# Patient Record
Sex: MALE | Race: WHITE | Employment: OTHER | URBAN - METROPOLITAN AREA
[De-identification: names, ages, dates, MRNs, and addresses within clinical notes are randomized per-mention and may not be internally consistent; named-entity substitution may affect disease eponyms.]

---

## 2017-05-03 ENCOUNTER — HOSPITAL ENCOUNTER (OUTPATIENT)
Dept: CARDIAC REHAB | Age: 67
Discharge: HOME | End: 2017-05-03
Attending: INTERNAL MEDICINE
Payer: MEDICARE

## 2017-05-03 VITALS — HEIGHT: 70 IN | BODY MASS INDEX: 22.33 KG/M2 | WEIGHT: 156 LBS

## 2017-05-03 DIAGNOSIS — J44.9 CHRONIC OBSTRUCTIVE PULMONARY DISEASE, UNSPECIFIED COPD TYPE (HCC): ICD-10-CM

## 2017-05-03 PROCEDURE — 93798 PHYS/QHP OP CAR RHAB W/ECG: CPT

## 2017-05-03 PROCEDURE — G0424 PULMONARY REHAB W EXER: HCPCS

## 2017-05-03 RX ORDER — FISH OIL/DHA/EPA 1200-144MG
1200 CAPSULE ORAL
COMMUNITY

## 2017-05-03 RX ORDER — CITALOPRAM 10 MG/1
TABLET ORAL DAILY
COMMUNITY

## 2017-05-03 RX ORDER — MELOXICAM 7.5 MG/1
TABLET ORAL
COMMUNITY

## 2017-05-03 RX ORDER — ALBUTEROL SULFATE 90 UG/1
AEROSOL, METERED RESPIRATORY (INHALATION)
COMMUNITY

## 2017-05-03 NOTE — CARDIO/PULMONARY
Pulmonary Rehab Orientation:     Patient is a 73year old male patient referred to us by Dr. Janice Clark  due to diagnosis of COPD. He lives alone, lost his wife 2 1/2 years ago to cancer. He is retired worked as a  for 20 years. His hobby is playing golf. Patient's prior medical history consists of :      Hepatitis C Screening  1950       DTaP/Tdap/Td series (1 - Tdap)  12/3/1972       FOBT Q 1 YEAR AGE 50-75  12/3/2001       ZOSTER VACCINE AGE 60>  12/3/2011       GLAUCOMA SCREENING Q2Y  12/3/2016       Pneumococcal 65+ Low/Medium Risk (1 of 2 - PCV13)  12/3/2016       MEDICARE YEARLY EXAM  12/3/2016        INFLUENZA AGE 9 TO ADULT  8/1/2017         He continues to smoke 5 cigarettes a day, but he is trying to quit, no set date yet. Lungs clear,no edema noted and no cough. Immunization and allergies were reviewed and noted to be up to date. Exercise includes walking around the block at home along with walking when he plays golf. Patient limitations are none, states he was very active before his diagnosis of COPD. Patient given overview of Pulmonary Rehab program, placement of electrodes/leads and rationale for program.     VS were as follows:   Height 5'10\"  Weight 156 lb  BMI 22.4    Pt reported goals are:  1. To be able to play golf without shortness of breath. 2.  Build up his stamina so he can walk further. 3.  Build his muscle strength. First exercise scheduled for Friday May 5, 2017.

## 2017-05-03 NOTE — CARDIO/PULMONARY
Mr. Payal Hines presented today for 6 minute walk test.  Walk test was conducted via treadmill 6 Minute Walk Test (6MWT). Patient completed test with no stops. Distance walked: .078 miles = 126 meters = 412 feet. Patient was on room air. Lowest )2 was 90%. RPD a 1. RPE 13.     Resting HR 70  Resting /96  Resting O2 94    Peak HR 82 (minute 6)  Peak /92    Immediately After HR 70  Immediately After /92  Immediately After O2 0    5min after HR 64  5min after /92  5min after O2 95

## 2017-05-03 NOTE — CARDIO/PULMONARY
Mr. Kimmy Mondragon, called to let us know that his PFT's are going to be done on Monday May 8,2017.

## 2017-05-05 ENCOUNTER — HOSPITAL ENCOUNTER (OUTPATIENT)
Dept: CARDIAC REHAB | Age: 67
Discharge: HOME | End: 2017-05-05
Attending: INTERNAL MEDICINE
Payer: MEDICARE

## 2017-05-05 VITALS — BODY MASS INDEX: 22.81 KG/M2 | WEIGHT: 159 LBS

## 2017-05-05 DIAGNOSIS — J44.9 CHRONIC OBSTRUCTIVE PULMONARY DISEASE, UNSPECIFIED COPD TYPE (HCC): ICD-10-CM

## 2017-05-05 PROCEDURE — G0424 PULMONARY REHAB W EXER: HCPCS

## 2017-05-05 NOTE — CARDIO/PULMONARY
Cardio Pulmonary Rehab: Pt presented today for first exercise session. Exercise Specialist created individualized aerobic exercise regimen. ES reviewed with pt exercise safety and equipment use, RPE chart, Pulse check, target HR, and warm up/cool down. Reviewed with pt if S/S or other unusual symptoms should occur to notify staff. Mr. Nima Feliz successfully completed his first exercise session. Patient was able to tolerate the treadmill for 5 minutes, Airdyne for 5 minutes, and the Nustep for 5 minutes. Patient had no complaints of chest pain or SOB. Peak METS 1.8 (treadmill)  Peak HR 84 (treadmill)  Duration: 25 minutes.

## 2017-05-09 ENCOUNTER — HOSPITAL ENCOUNTER (OUTPATIENT)
Dept: CARDIAC REHAB | Age: 67
Discharge: HOME | End: 2017-05-09
Attending: INTERNAL MEDICINE
Payer: MEDICARE

## 2017-05-09 VITALS — BODY MASS INDEX: 23.27 KG/M2 | WEIGHT: 162.2 LBS

## 2017-05-09 DIAGNOSIS — J44.9 CHRONIC OBSTRUCTIVE PULMONARY DISEASE, UNSPECIFIED COPD TYPE (HCC): ICD-10-CM

## 2017-05-09 PROCEDURE — G0424 PULMONARY REHAB W EXER: HCPCS

## 2017-05-09 NOTE — CARDIO/PULMONARY
S: pt stated that his breathing was a #3 today, moderate. Pt is still smoking 2-5 cigarettes per day, pt is using the nicotine gum    O: B/P 136/82, HR 82, SAO2 90% on room air and decreased to 85% with exercise, but rebound with rest and PLB. BS-clear, NPC, no edema noted    A: pt is making good progress and is using PLB, mild to moderate CHACON and fatigue noted    P: pt to be absent Thursday, pt to return next Tuesday. Pt to continue to walk at home and to use hand weights. Pt to become smoke free and to use a Heart Healthy Diet. Pt to use information from class on Energy Conservation.

## 2017-05-16 ENCOUNTER — HOSPITAL ENCOUNTER (OUTPATIENT)
Dept: CARDIAC REHAB | Age: 67
Discharge: HOME | End: 2017-05-16
Attending: INTERNAL MEDICINE
Payer: MEDICARE

## 2017-05-16 VITALS — BODY MASS INDEX: 22.87 KG/M2 | WEIGHT: 159.4 LBS

## 2017-05-16 PROCEDURE — G0424 PULMONARY REHAB W EXER: HCPCS

## 2017-05-16 NOTE — CARDIO/PULMONARY
S: pt stated that his breathing was a #3 today, moderate. Pt is still smoking 2-5 cigarettes per day, pt is using the nicotine gum     O: B/P 152/93, HR 75, SAO2 92% on room air and decreased to 92% with exercise, BS-clear, NPC, no edema noted     A: pt is making good progress and is using PLB, mild to moderate CHACON and fatigue noted     P: pt to return Thursday. Pt to continue to walk at home and to use hand weights. Pt to become smoke free and to use a Heart Healthy Diet.  Pt to use information from class on Sleep Disorders, how to get a better night sleep.

## 2017-05-18 ENCOUNTER — HOSPITAL ENCOUNTER (OUTPATIENT)
Dept: CARDIAC REHAB | Age: 67
Discharge: HOME | End: 2017-05-18
Attending: INTERNAL MEDICINE
Payer: MEDICARE

## 2017-05-18 VITALS — WEIGHT: 158 LBS | BODY MASS INDEX: 22.67 KG/M2

## 2017-05-18 PROCEDURE — G0424 PULMONARY REHAB W EXER: HCPCS

## 2017-05-18 NOTE — CARDIO/PULMONARY
S: pt stated that his breathing was a #2 today,slight. Pt is still smoking 2-5 cigarettes per day, pt is using the nicotine gum      O: B/P 125/79, HR 84, SAO2 92% on room air and decreased to 90% with exercise, BS-clear, NPC, no edema noted      A: pt is making good progress and is using PLB, mild to moderate CHACON and fatigue noted      P: pt to return Tuesday. Pt to continue to walk at home and to use hand weights. Pt to become smoke free and to use a Heart Healthy Diet.      S/P: pt has good family/friends support and enjoys golf.

## 2017-05-23 ENCOUNTER — HOSPITAL ENCOUNTER (OUTPATIENT)
Dept: CARDIAC REHAB | Age: 67
Discharge: HOME | End: 2017-05-23
Attending: INTERNAL MEDICINE
Payer: MEDICARE

## 2017-05-23 VITALS — WEIGHT: 160.8 LBS | BODY MASS INDEX: 23.07 KG/M2

## 2017-05-23 PROCEDURE — G0424 PULMONARY REHAB W EXER: HCPCS

## 2017-05-23 NOTE — CARDIO/PULMONARY
S: pt stated that his breathing was a #2 today,slight. Pt is still smoking 2-5 cigarettes per day, pt is using the nicotine gum      O: B/P 153/95, HR 89, SAO2 92% on room air and decreased to 91% with exercise, BS-clear, NPC, no edema noted.      A: pt is making good progress and is using PLB, mild to moderate CHACON and fatigue noted      P: pt to return Thursday. Pt to continue to walk at home and to use hand weights. Pt to become smoke free and to use a Heart Healthy Diet. Reviewed Preventing Lung Infection with pt.  Note to be fax to MD concerning PVC's.     S/P: pt has good family/friends support and enjoys golf.   Mark Guzman

## 2017-05-25 ENCOUNTER — HOSPITAL ENCOUNTER (OUTPATIENT)
Dept: CARDIAC REHAB | Age: 67
Discharge: HOME | End: 2017-05-25
Attending: INTERNAL MEDICINE
Payer: MEDICARE

## 2017-05-25 VITALS — BODY MASS INDEX: 22.79 KG/M2 | WEIGHT: 158.8 LBS

## 2017-05-25 PROCEDURE — G0424 PULMONARY REHAB W EXER: HCPCS

## 2017-05-25 NOTE — CARDIO/PULMONARY
S: pt stated that his breathing was a #2 today,slight. Pt is still smoking 2-5 cigarettes per day, pt is using the nicotine gum      O: B/P 135/82, HR 80, SAO2 95% on room air and decreased to 91% with exercise, BS-clear, NPC, no edema noted.      A: pt is making good progress and is using PLB, mild to moderate CHACON and fatigue noted      P: pt to return Tuesday. Pt to continue to walk at home and to use hand weights.  Pt to become smoke free and to use a Heart Healthy Diet.       S/P: pt has good family/friends support and enjoys golf.

## 2017-05-30 ENCOUNTER — HOSPITAL ENCOUNTER (OUTPATIENT)
Dept: CARDIAC REHAB | Age: 67
Discharge: HOME | End: 2017-05-30
Attending: INTERNAL MEDICINE
Payer: MEDICARE

## 2017-05-30 VITALS — WEIGHT: 159.4 LBS | BODY MASS INDEX: 22.87 KG/M2

## 2017-05-30 PROCEDURE — G0424 PULMONARY REHAB W EXER: HCPCS

## 2017-05-30 NOTE — CARDIO/PULMONARY
S: pt stated that his breathing was a #2 today,slight. Pt is still smoking 2-5 cigarettes per day, pt is using the nicotine gum      O: B/P 134/80, HR 79, SAO2 92% on room air and decreased to 91% with exercise, BS CTA, no edema, reports productive cough with clear sputum      A: pt is making good progress and is using PLB, mild to moderate CHACON and fatigue noted      P: pt to return Thursday. Pt to continue to walk at home and to use hand weights. Pt to become smoke free and to use a Heart Healthy Diet.       S/P: pt has good family/friends support and enjoys golf. Reports having a good holiday weekend resting and watching TV.

## 2017-06-01 ENCOUNTER — HOSPITAL ENCOUNTER (OUTPATIENT)
Dept: CARDIAC REHAB | Age: 67
Discharge: HOME | End: 2017-06-01
Attending: INTERNAL MEDICINE
Payer: MEDICARE

## 2017-06-01 VITALS — WEIGHT: 158.4 LBS | BODY MASS INDEX: 22.73 KG/M2

## 2017-06-01 PROCEDURE — G0424 PULMONARY REHAB W EXER: HCPCS

## 2017-06-01 NOTE — CARDIO/PULMONARY
S: pt stated that his breathing was a #2 today,slight. Pt is still smoking 2-5 cigarettes per day, pt is using the nicotine gum      O: B/P 138/85, HR 76, SAO2 92% on room air and decreased to 91% with exercise, BS-clear, prod cough of a small amount of clear, no edema noted. TM increased to 1.6 mph      A: pt is making good progress and is using PLB, mild to moderate CHACON and fatigue noted      P: pt to return Tuesday. Pt to continue to walk at home and to use hand weights. Pt to become smoke free and to use a Heart Healthy Diet. Reviewed Nutrition with pt.       S/P: pt has good family/friends support and enjoys golf.

## 2017-06-06 ENCOUNTER — HOSPITAL ENCOUNTER (OUTPATIENT)
Dept: CARDIAC REHAB | Age: 67
Discharge: HOME | End: 2017-06-06
Attending: INTERNAL MEDICINE
Payer: MEDICARE

## 2017-06-06 VITALS — BODY MASS INDEX: 23.22 KG/M2 | WEIGHT: 161.8 LBS

## 2017-06-06 PROCEDURE — G0424 PULMONARY REHAB W EXER: HCPCS

## 2017-06-06 NOTE — CARDIO/PULMONARY
S: pt stated that his breathing was a #2 today,slight. Pt is still smoking 2-5 cigarettes per day, pt is using the nicotine gum      O: B/P 148/84, HR 71, SAO2 94% on room air and decreased to 92% with exercise, BS-clear, non prod cough , no edema noted.      A: pt is making good progress and is using PLB, mild to moderate CHACON and fatigue noted      P: pt to return Thursday. Pt to continue to walk at home and to use hand weights. Pt to become smoke free and to use a Heart Healthy Diet. COPD medications reviewed by the Pharm.       S/P: pt has good family/friends support and enjoys golf.

## 2017-06-08 ENCOUNTER — HOSPITAL ENCOUNTER (OUTPATIENT)
Dept: CARDIAC REHAB | Age: 67
Discharge: HOME | End: 2017-06-08
Attending: INTERNAL MEDICINE
Payer: MEDICARE

## 2017-06-08 VITALS — BODY MASS INDEX: 23.1 KG/M2 | WEIGHT: 161 LBS

## 2017-06-08 PROCEDURE — G0424 PULMONARY REHAB W EXER: HCPCS

## 2017-06-08 NOTE — CARDIO/PULMONARY
S: pt stated that his breathing was a #2 today,slight. Pt is still smoking 2-3 cigarettes per day, pt is using the nicotine gum      O: B/P 142/86, HR 86, SAO2 94% on room air and decreased to 92% with exercise, BS-clear, non prod cough , no edema noted.      A: pt is making good progress and is using PLB, mild to moderate CHACON and fatigue noted      P: pt to return Tuesday. Pt to continue to walk at home and to use hand weights. Pt to become smoke free and to use a Heart Healthy Diet. COPD medications reviewed by the Pharm.       S/P: pt has good family/friends support and enjoys golf. Patient reports is stamina is improving stating he lives in a trilevel, is up and down stairs all the time and believes he can tolerate it more. Patient states his \"state of mind\" about his health is improving.

## 2017-06-13 ENCOUNTER — HOSPITAL ENCOUNTER (OUTPATIENT)
Dept: CARDIAC REHAB | Age: 67
Discharge: HOME | End: 2017-06-13
Attending: INTERNAL MEDICINE
Payer: MEDICARE

## 2017-06-15 ENCOUNTER — HOSPITAL ENCOUNTER (OUTPATIENT)
Dept: CARDIAC REHAB | Age: 67
Discharge: HOME | End: 2017-06-15
Attending: INTERNAL MEDICINE
Payer: MEDICARE

## 2017-06-15 VITALS — WEIGHT: 157 LBS | BODY MASS INDEX: 22.53 KG/M2

## 2017-06-15 PROCEDURE — G0424 PULMONARY REHAB W EXER: HCPCS

## 2017-06-15 NOTE — CARDIO/PULMONARY
: pt stated that his breathing was a #2 today,slight. Pt is still smoking 2-5 cigarettes per day, pt is using the nicotine gum      O: B/P 121/77, HR 92, SAO2 91% on room air and decreased to 92% with exercise, BS-clear, non prod cough , no edema noted.      A: pt is making good progress and is using PLB, mild to moderate CHACON and fatigue noted      P: pt to return Tuesday at 10:30. Pt to continue to walk at home and to use hand weights.  Pt to become smoke free and to use a Heart Healthy Diet.        S/P: pt has good family/friends support and enjoys golf.

## 2017-06-20 ENCOUNTER — HOSPITAL ENCOUNTER (OUTPATIENT)
Dept: CARDIAC REHAB | Age: 67
Discharge: HOME | End: 2017-06-20
Attending: INTERNAL MEDICINE
Payer: MEDICARE

## 2017-06-22 ENCOUNTER — HOSPITAL ENCOUNTER (OUTPATIENT)
Dept: CARDIAC REHAB | Age: 67
Discharge: HOME | End: 2017-06-22
Attending: INTERNAL MEDICINE
Payer: MEDICARE

## 2017-06-27 ENCOUNTER — HOSPITAL ENCOUNTER (OUTPATIENT)
Dept: CARDIAC REHAB | Age: 67
Discharge: HOME | End: 2017-06-27
Attending: INTERNAL MEDICINE
Payer: MEDICARE

## 2017-06-27 VITALS — BODY MASS INDEX: 22.67 KG/M2 | WEIGHT: 158 LBS

## 2017-06-27 PROCEDURE — G0424 PULMONARY REHAB W EXER: HCPCS

## 2017-06-27 NOTE — CARDIO/PULMONARY
:S: pt stated that his breathing was a #2 today,slight. Pt is still smoking 2-5 cigarettes per day, pt is using the nicotine gum      O: B/P 145/93, HR 84, SAO2 91% on room air and decreased to 92% with exercise, BS-clear, but decreased, non prod cough , no edema noted.      A: pt is making good progress and is using PLB, mild to moderate CHACON and fatigue noted      P: pt to return Thursday. Pt to continue to walk at home and to use hand weights.  Pt to become smoke free and to use a Heart Healthy Diet.        S/P: pt has good family/friends support and enjoys golf.

## 2017-06-29 ENCOUNTER — HOSPITAL ENCOUNTER (OUTPATIENT)
Dept: CARDIAC REHAB | Age: 67
Discharge: HOME | End: 2017-06-29
Attending: INTERNAL MEDICINE
Payer: MEDICARE

## 2017-06-29 VITALS — BODY MASS INDEX: 22.81 KG/M2 | WEIGHT: 159 LBS

## 2017-06-29 PROCEDURE — G0424 PULMONARY REHAB W EXER: HCPCS

## 2017-06-29 NOTE — CARDIO/PULMONARY
:S: pt stated that his breathing was a #2 today,slight. Pt is still smoking 2-5 cigarettes per day, pt is using the nicotine gum      O: B/P 140/78, HR 80, SAO2 91% on room air and decreased to 91% with exercise, BS-clear, but decreased, non prod cough , no edema noted.      A: pt is making good progress and is using PLB, mild to moderate CHACON and fatigue noted      P: pt to return next Thursday, rehab will be closed Tuesday. Pt to continue to walk at home and to use hand weights. Pt to become smoke free and to use a Heart Healthy Diet.        S/P: pt has good family/friends support and enjoys golf.

## 2017-07-06 ENCOUNTER — HOSPITAL ENCOUNTER (OUTPATIENT)
Dept: CARDIAC REHAB | Age: 67
Discharge: HOME | End: 2017-07-06
Attending: INTERNAL MEDICINE
Payer: MEDICARE

## 2017-07-06 VITALS — BODY MASS INDEX: 22.96 KG/M2 | WEIGHT: 160 LBS

## 2017-07-06 PROCEDURE — G0424 PULMONARY REHAB W EXER: HCPCS

## 2017-07-06 NOTE — CARDIO/PULMONARY
S: pt stated that his breathing was a #2 today,slight. Pt is still smoking 2-5 cigarettes per day, pt is using the nicotine gum      O: B/P 150/84, HR 80, SAO2 93% on room air and decreased to 91% with exercise, BS-clear, but decreased, reports ,mild prod cough brown sputum, no edema noted.      A: pt is making good progress and is using PLB, mild to moderate CHACON and fatigue noted      P: pt to return next Tuesday,  Pt to continue to walk at home and to use hand weights. Pt to become smoke free and to use a Heart Healthy Diet.        S/P: pt has good family/friends support and enjoys golf. Advised to keep himself hydrated during high heat and humidity weather.

## 2017-07-11 ENCOUNTER — HOSPITAL ENCOUNTER (OUTPATIENT)
Dept: CARDIAC REHAB | Age: 67
Discharge: HOME | End: 2017-07-11
Attending: INTERNAL MEDICINE
Payer: MEDICARE

## 2017-07-11 VITALS — WEIGHT: 158 LBS | BODY MASS INDEX: 22.67 KG/M2

## 2017-07-11 PROCEDURE — G0424 PULMONARY REHAB W EXER: HCPCS

## 2017-07-11 NOTE — CARDIO/PULMONARY
Patient comes in for visit today. Stress level reported at a 2-3. Lungs clear. No ankle edema. Reports slightly congested cough in the mornings. Walks 3-4 x wk. Eating heart healthy. Friends provide support.

## 2017-07-13 ENCOUNTER — HOSPITAL ENCOUNTER (OUTPATIENT)
Dept: CARDIAC REHAB | Age: 67
Discharge: HOME | End: 2017-07-13
Attending: INTERNAL MEDICINE
Payer: MEDICARE

## 2017-07-13 VITALS — BODY MASS INDEX: 22.53 KG/M2 | WEIGHT: 157 LBS

## 2017-07-13 PROCEDURE — G0424 PULMONARY REHAB W EXER: HCPCS

## 2017-07-13 NOTE — CARDIO/PULMONARY
No changes, o2 sats 91%ra,does not use oxygen,lungs clear,clear  Mucus with cough occas no pedal edema-works out independently.  To become smoke free and follow Vince Frye at home and uses hand weights

## 2017-07-18 ENCOUNTER — HOSPITAL ENCOUNTER (OUTPATIENT)
Dept: CARDIAC REHAB | Age: 67
Discharge: HOME | End: 2017-07-18
Attending: INTERNAL MEDICINE
Payer: MEDICARE

## 2017-07-18 VITALS — WEIGHT: 159 LBS | BODY MASS INDEX: 22.81 KG/M2

## 2017-07-18 PROCEDURE — G0424 PULMONARY REHAB W EXER: HCPCS

## 2017-07-18 NOTE — CARDIO/PULMONARY
S: pt stated that his breathing was a #2 today, slight. Pt is still smoking 2-5 cigarettes per day, pt is using the nicotine gum      O: B/P 125/69, HR 82, SAO2 91% on room air reports , mild prod cough brown sputum, no edema noted.      A: pt is making good progress and is using PLB, mild to moderate CHACON and fatigue noted      P: pt to return next Thursday,  Pt to continue to walk at home and to use hand weights. Pt to become smoke free and to use a Heart Healthy Diet.        S/P: pt has good family/friends support and enjoys golf.

## 2017-07-20 ENCOUNTER — APPOINTMENT (OUTPATIENT)
Dept: CARDIAC REHAB | Age: 67
End: 2017-07-20
Attending: INTERNAL MEDICINE
Payer: MEDICARE

## 2017-07-25 ENCOUNTER — HOSPITAL ENCOUNTER (OUTPATIENT)
Dept: CARDIAC REHAB | Age: 67
Discharge: HOME | End: 2017-07-25
Attending: INTERNAL MEDICINE
Payer: MEDICARE

## 2017-07-25 VITALS — BODY MASS INDEX: 22.67 KG/M2 | WEIGHT: 158 LBS

## 2017-07-25 PROCEDURE — G0424 PULMONARY REHAB W EXER: HCPCS

## 2017-07-27 ENCOUNTER — HOSPITAL ENCOUNTER (OUTPATIENT)
Dept: CARDIAC REHAB | Age: 67
Discharge: HOME | End: 2017-07-27
Attending: INTERNAL MEDICINE
Payer: MEDICARE

## 2017-07-27 VITALS — BODY MASS INDEX: 22.81 KG/M2 | WEIGHT: 159 LBS

## 2017-07-27 PROCEDURE — G0424 PULMONARY REHAB W EXER: HCPCS

## 2017-08-01 ENCOUNTER — HOSPITAL ENCOUNTER (OUTPATIENT)
Dept: CARDIAC REHAB | Age: 67
Discharge: HOME | End: 2017-08-01
Attending: INTERNAL MEDICINE
Payer: MEDICARE

## 2017-08-03 ENCOUNTER — HOSPITAL ENCOUNTER (OUTPATIENT)
Dept: CARDIAC REHAB | Age: 67
Discharge: HOME | End: 2017-08-03
Attending: INTERNAL MEDICINE
Payer: MEDICARE

## 2017-08-03 VITALS — WEIGHT: 162 LBS | BODY MASS INDEX: 23.24 KG/M2

## 2017-08-03 PROCEDURE — G0424 PULMONARY REHAB W EXER: HCPCS

## 2017-08-08 ENCOUNTER — HOSPITAL ENCOUNTER (OUTPATIENT)
Dept: CARDIAC REHAB | Age: 67
Discharge: HOME | End: 2017-08-08
Attending: INTERNAL MEDICINE
Payer: MEDICARE

## 2017-08-08 PROCEDURE — G0424 PULMONARY REHAB W EXER: HCPCS

## 2017-08-10 ENCOUNTER — HOSPITAL ENCOUNTER (OUTPATIENT)
Dept: CARDIAC REHAB | Age: 67
Discharge: HOME | End: 2017-08-10
Attending: INTERNAL MEDICINE
Payer: MEDICARE

## 2017-08-10 VITALS — WEIGHT: 158.6 LBS | BODY MASS INDEX: 22.76 KG/M2

## 2017-08-10 PROCEDURE — G0424 PULMONARY REHAB W EXER: HCPCS

## 2017-08-15 ENCOUNTER — HOSPITAL ENCOUNTER (OUTPATIENT)
Dept: CARDIAC REHAB | Age: 67
Discharge: HOME | End: 2017-08-15
Attending: INTERNAL MEDICINE
Payer: MEDICARE

## 2017-08-15 PROCEDURE — G0424 PULMONARY REHAB W EXER: HCPCS

## 2017-08-17 ENCOUNTER — HOSPITAL ENCOUNTER (OUTPATIENT)
Dept: CARDIAC REHAB | Age: 67
Discharge: HOME | End: 2017-08-17
Attending: INTERNAL MEDICINE
Payer: MEDICARE

## 2017-08-17 VITALS — BODY MASS INDEX: 22.81 KG/M2 | WEIGHT: 159 LBS

## 2017-08-17 PROCEDURE — G0424 PULMONARY REHAB W EXER: HCPCS

## 2017-08-22 ENCOUNTER — APPOINTMENT (OUTPATIENT)
Dept: CARDIAC REHAB | Age: 67
End: 2017-08-22
Attending: INTERNAL MEDICINE
Payer: MEDICARE

## 2017-08-31 ENCOUNTER — HOSPITAL ENCOUNTER (OUTPATIENT)
Dept: CARDIAC REHAB | Age: 67
Discharge: HOME | End: 2017-08-31
Attending: INTERNAL MEDICINE
Payer: MEDICARE

## 2017-08-31 ENCOUNTER — APPOINTMENT (OUTPATIENT)
Dept: CARDIAC REHAB | Age: 67
End: 2017-08-31
Attending: INTERNAL MEDICINE
Payer: MEDICARE

## 2017-08-31 VITALS — BODY MASS INDEX: 22.81 KG/M2 | WEIGHT: 159 LBS

## 2017-08-31 PROCEDURE — G0424 PULMONARY REHAB W EXER: HCPCS

## 2017-09-05 ENCOUNTER — HOSPITAL ENCOUNTER (OUTPATIENT)
Dept: CARDIAC REHAB | Age: 67
Discharge: HOME | End: 2017-09-05
Attending: INTERNAL MEDICINE
Payer: MEDICARE

## 2017-09-05 VITALS — WEIGHT: 158 LBS | BODY MASS INDEX: 22.67 KG/M2

## 2017-09-05 PROCEDURE — G0424 PULMONARY REHAB W EXER: HCPCS

## 2017-09-07 ENCOUNTER — HOSPITAL ENCOUNTER (OUTPATIENT)
Dept: CARDIAC REHAB | Age: 67
Discharge: HOME | End: 2017-09-07
Attending: INTERNAL MEDICINE
Payer: MEDICARE

## 2017-09-07 VITALS — WEIGHT: 159.4 LBS | BODY MASS INDEX: 22.87 KG/M2

## 2017-09-07 PROCEDURE — G0424 PULMONARY REHAB W EXER: HCPCS

## 2017-09-14 ENCOUNTER — HOSPITAL ENCOUNTER (OUTPATIENT)
Dept: CARDIAC REHAB | Age: 67
Discharge: HOME | End: 2017-09-14
Attending: INTERNAL MEDICINE
Payer: MEDICARE

## 2017-09-14 VITALS — BODY MASS INDEX: 22.96 KG/M2 | WEIGHT: 160 LBS

## 2017-09-14 PROCEDURE — G0424 PULMONARY REHAB W EXER: HCPCS

## 2017-09-19 ENCOUNTER — HOSPITAL ENCOUNTER (OUTPATIENT)
Dept: CARDIAC REHAB | Age: 67
Discharge: HOME | End: 2017-09-19
Attending: INTERNAL MEDICINE
Payer: MEDICARE

## 2017-09-19 VITALS — BODY MASS INDEX: 22.99 KG/M2 | WEIGHT: 160.2 LBS

## 2017-09-19 PROCEDURE — G0424 PULMONARY REHAB W EXER: HCPCS

## 2017-09-21 ENCOUNTER — HOSPITAL ENCOUNTER (OUTPATIENT)
Dept: CARDIAC REHAB | Age: 67
Discharge: HOME | End: 2017-09-21
Attending: INTERNAL MEDICINE
Payer: MEDICARE

## 2017-09-21 VITALS — WEIGHT: 160 LBS | BODY MASS INDEX: 22.96 KG/M2

## 2017-09-21 PROCEDURE — G0424 PULMONARY REHAB W EXER: HCPCS

## 2017-09-22 ENCOUNTER — HOSPITAL ENCOUNTER (OUTPATIENT)
Dept: GENERAL RADIOLOGY | Age: 67
Discharge: HOME | End: 2017-09-22
Payer: MEDICARE

## 2017-09-22 DIAGNOSIS — J44.9 COPD (CHRONIC OBSTRUCTIVE PULMONARY DISEASE) (HCC): ICD-10-CM

## 2017-09-22 PROCEDURE — 71020 XR CHEST PA LAT: CPT

## 2017-09-26 ENCOUNTER — HOSPITAL ENCOUNTER (OUTPATIENT)
Dept: CARDIAC REHAB | Age: 67
Discharge: HOME | End: 2017-09-26
Attending: INTERNAL MEDICINE
Payer: MEDICARE

## 2017-09-26 VITALS — WEIGHT: 160.4 LBS | BODY MASS INDEX: 23.02 KG/M2

## 2017-09-26 PROCEDURE — G0424 PULMONARY REHAB W EXER: HCPCS

## 2017-09-28 ENCOUNTER — HOSPITAL ENCOUNTER (OUTPATIENT)
Dept: CARDIAC REHAB | Age: 67
Discharge: HOME | End: 2017-09-28
Attending: INTERNAL MEDICINE
Payer: MEDICARE

## 2017-09-28 VITALS — WEIGHT: 159 LBS | BODY MASS INDEX: 22.81 KG/M2

## 2017-09-28 PROCEDURE — G0424 PULMONARY REHAB W EXER: HCPCS

## 2017-10-31 ENCOUNTER — APPOINTMENT (OUTPATIENT)
Dept: CARDIAC REHAB | Age: 67
End: 2017-10-31
Attending: INTERNAL MEDICINE

## 2017-11-07 ENCOUNTER — APPOINTMENT (OUTPATIENT)
Dept: CARDIAC REHAB | Age: 67
End: 2017-11-07
Attending: INTERNAL MEDICINE

## 2019-04-08 ENCOUNTER — HOSPITAL ENCOUNTER (OUTPATIENT)
Dept: CT IMAGING | Age: 69
Discharge: HOME | End: 2019-04-08
Attending: NURSE PRACTITIONER
Payer: MEDICARE

## 2019-04-08 DIAGNOSIS — Z87.891 PERSONAL HISTORY OF NICOTINE DEPENDENCE: ICD-10-CM

## 2019-04-08 PROCEDURE — G0297 LDCT FOR LUNG CA SCREEN: HCPCS

## 2020-11-04 ENCOUNTER — TRANSCRIBE ORDER (OUTPATIENT)
Dept: SCHEDULING | Age: 70
End: 2020-11-04

## 2020-11-04 DIAGNOSIS — Z87.891 PERSONAL HISTORY OF NICOTINE DEPENDENCE: Primary | ICD-10-CM

## 2020-12-03 ENCOUNTER — TRANSCRIBE ORDER (OUTPATIENT)
Dept: SCHEDULING | Age: 70
End: 2020-12-03

## 2020-12-03 DIAGNOSIS — Z87.891 PERSONAL HISTORY OF TOBACCO USE, PRESENTING HAZARDS TO HEALTH: Primary | ICD-10-CM

## 2021-04-27 ENCOUNTER — HOSPITAL ENCOUNTER (OUTPATIENT)
Dept: CT IMAGING | Age: 71
Discharge: HOME | End: 2021-04-27
Attending: INTERNAL MEDICINE
Payer: MEDICARE

## 2021-04-27 DIAGNOSIS — Z87.891 PERSONAL HISTORY OF TOBACCO USE, PRESENTING HAZARDS TO HEALTH: ICD-10-CM

## 2021-04-27 PROCEDURE — 71271 CT THORAX LUNG CANCER SCR C-: CPT

## 2021-05-06 ENCOUNTER — TRANSCRIBE ORDER (OUTPATIENT)
Dept: SCHEDULING | Age: 71
End: 2021-05-06

## 2021-05-06 DIAGNOSIS — R91.1 SOLITARY PULMONARY NODULE: Primary | ICD-10-CM

## 2021-05-21 ENCOUNTER — HOSPITAL ENCOUNTER (OUTPATIENT)
Dept: PET IMAGING | Age: 71
Discharge: HOME | End: 2021-05-21
Attending: INTERNAL MEDICINE
Payer: MEDICARE

## 2021-05-21 VITALS — HEIGHT: 69 IN | WEIGHT: 153 LBS | BODY MASS INDEX: 22.66 KG/M2

## 2021-05-21 DIAGNOSIS — R91.1 SOLITARY PULMONARY NODULE: ICD-10-CM

## 2021-05-21 LAB
GLUCOSE BLD STRIP.AUTO-MCNC: 93 MG/DL (ref 65–117)
SERVICE CMNT-IMP: NORMAL

## 2021-05-21 PROCEDURE — 78815 PET IMAGE W/CT SKULL-THIGH: CPT

## 2021-05-21 RX ORDER — SODIUM CHLORIDE 0.9 % (FLUSH) 0.9 %
10 SYRINGE (ML) INJECTION
Status: COMPLETED | OUTPATIENT
Start: 2021-05-21 | End: 2021-05-21

## 2021-05-21 RX ADMIN — Medication 10 ML: at 13:25

## 2023-01-13 ENCOUNTER — TRANSCRIBE ORDER (OUTPATIENT)
Dept: SCHEDULING | Age: 73
End: 2023-01-13

## 2023-01-13 DIAGNOSIS — Z87.891 PERSONAL HISTORY OF NICOTINE DEPENDENCE: Primary | ICD-10-CM

## 2023-01-16 ENCOUNTER — TRANSCRIBE ORDER (OUTPATIENT)
Dept: SCHEDULING | Age: 73
End: 2023-01-16

## 2023-01-16 DIAGNOSIS — Z87.891 PERSONAL HISTORY OF TOBACCO USE, PRESENTING HAZARDS TO HEALTH: Primary | ICD-10-CM

## 2023-01-26 ENCOUNTER — HOSPITAL ENCOUNTER (OUTPATIENT)
Dept: CT IMAGING | Age: 73
Discharge: HOME | End: 2023-01-26
Attending: INTERNAL MEDICINE
Payer: MEDICARE

## 2023-01-26 DIAGNOSIS — Z87.891 PERSONAL HISTORY OF TOBACCO USE, PRESENTING HAZARDS TO HEALTH: ICD-10-CM

## 2023-01-26 PROCEDURE — 71271 CT THORAX LUNG CANCER SCR C-: CPT

## 2023-05-04 ENCOUNTER — TRANSCRIBE ORDER (OUTPATIENT)
Dept: SCHEDULING | Age: 73
End: 2023-05-04

## 2023-05-04 DIAGNOSIS — I73.9 PERIPHERAL VASCULAR DISEASE, UNSPECIFIED (HCC): Primary | ICD-10-CM

## 2023-05-13 ENCOUNTER — TRANSCRIBE ORDERS (OUTPATIENT)
Facility: HOSPITAL | Age: 73
End: 2023-05-13

## 2023-05-13 DIAGNOSIS — I73.9 PERIPHERAL VASCULAR DISEASE, UNSPECIFIED (HCC): Primary | ICD-10-CM

## 2024-02-16 ENCOUNTER — HOSPITAL ENCOUNTER (OUTPATIENT)
Facility: HOSPITAL | Age: 74
End: 2024-02-16
Attending: INTERNAL MEDICINE
Payer: MEDICARE

## 2024-02-16 DIAGNOSIS — F17.211 NICOTINE DEPENDENCE, CIGARETTES, IN REMISSION: ICD-10-CM

## 2024-02-16 DIAGNOSIS — Z87.891 PERSONAL HISTORY OF TOBACCO USE: ICD-10-CM

## 2024-02-16 DIAGNOSIS — Z87.891 PERSONAL HISTORY OF NICOTINE DEPENDENCE: ICD-10-CM

## 2024-02-16 PROCEDURE — 71271 CT THORAX LUNG CANCER SCR C-: CPT

## 2024-05-16 ENCOUNTER — APPOINTMENT (OUTPATIENT)
Facility: HOSPITAL | Age: 74
DRG: 253 | End: 2024-05-16
Payer: MEDICARE

## 2024-05-16 ENCOUNTER — HOSPITAL ENCOUNTER (INPATIENT)
Facility: HOSPITAL | Age: 74
LOS: 6 days | Discharge: HOME | DRG: 253 | End: 2024-05-22
Attending: EMERGENCY MEDICINE | Admitting: STUDENT IN AN ORGANIZED HEALTH CARE EDUCATION/TRAINING PROGRAM
Payer: MEDICARE

## 2024-05-16 DIAGNOSIS — T81.89XA NON-HEALING SURGICAL WOUND, INITIAL ENCOUNTER: ICD-10-CM

## 2024-05-16 DIAGNOSIS — T81.89XS NONHEALING SURGICAL WOUND, SEQUELA: Primary | ICD-10-CM

## 2024-05-16 LAB
ALBUMIN SERPL-MCNC: 3 G/DL (ref 3.5–5)
ALBUMIN/GLOB SERPL: 0.9 (ref 1.1–2.2)
ALP SERPL-CCNC: 122 U/L (ref 45–117)
ALT SERPL-CCNC: 12 U/L (ref 12–78)
ANION GAP SERPL CALC-SCNC: 5 MMOL/L (ref 5–15)
AST SERPL-CCNC: 16 U/L (ref 15–37)
BASOPHILS # BLD: 0.1 K/UL (ref 0–0.1)
BASOPHILS NFR BLD: 1 % (ref 0–1)
BILIRUB SERPL-MCNC: 0.6 MG/DL (ref 0.2–1)
BUN SERPL-MCNC: 24 MG/DL (ref 6–20)
BUN/CREAT SERPL: 27 (ref 12–20)
CALCIUM SERPL-MCNC: 8.4 MG/DL (ref 8.5–10.1)
CHLORIDE SERPL-SCNC: 106 MMOL/L (ref 97–108)
CO2 SERPL-SCNC: 29 MMOL/L (ref 21–32)
CREAT SERPL-MCNC: 0.9 MG/DL (ref 0.7–1.3)
DIFFERENTIAL METHOD BLD: NORMAL
EOSINOPHIL # BLD: 0.2 K/UL (ref 0–0.4)
EOSINOPHIL NFR BLD: 5 % (ref 0–7)
ERYTHROCYTE [DISTWIDTH] IN BLOOD BY AUTOMATED COUNT: 13.8 % (ref 11.5–14.5)
ERYTHROCYTE [SEDIMENTATION RATE] IN BLOOD: 12 MM/HR (ref 0–20)
GLOBULIN SER CALC-MCNC: 3.5 G/DL (ref 2–4)
GLUCOSE SERPL-MCNC: 154 MG/DL (ref 65–100)
HCT VFR BLD AUTO: 43 % (ref 36.6–50.3)
HGB BLD-MCNC: 15 G/DL (ref 12.1–17)
IMM GRANULOCYTES # BLD AUTO: 0 K/UL (ref 0–0.04)
IMM GRANULOCYTES NFR BLD AUTO: 0 % (ref 0–0.5)
LYMPHOCYTES # BLD: 0.9 K/UL (ref 0.8–3.5)
LYMPHOCYTES NFR BLD: 20 % (ref 12–49)
MCH RBC QN AUTO: 31.4 PG (ref 26–34)
MCHC RBC AUTO-ENTMCNC: 34.9 G/DL (ref 30–36.5)
MCV RBC AUTO: 90 FL (ref 80–99)
MONOCYTES # BLD: 0.6 K/UL (ref 0–1)
MONOCYTES NFR BLD: 12 % (ref 5–13)
NEUTS SEG # BLD: 2.9 K/UL (ref 1.8–8)
NEUTS SEG NFR BLD: 62 % (ref 32–75)
NRBC # BLD: 0 K/UL (ref 0–0.01)
NRBC BLD-RTO: 0 PER 100 WBC
PLATELET # BLD AUTO: 186 K/UL (ref 150–400)
PMV BLD AUTO: 10 FL (ref 8.9–12.9)
POTASSIUM SERPL-SCNC: 3.4 MMOL/L (ref 3.5–5.1)
PROT SERPL-MCNC: 6.5 G/DL (ref 6.4–8.2)
RBC # BLD AUTO: 4.78 M/UL (ref 4.1–5.7)
SODIUM SERPL-SCNC: 140 MMOL/L (ref 136–145)
WBC # BLD AUTO: 4.7 K/UL (ref 4.1–11.1)

## 2024-05-16 PROCEDURE — 85025 COMPLETE CBC W/AUTO DIFF WBC: CPT

## 2024-05-16 PROCEDURE — 87040 BLOOD CULTURE FOR BACTERIA: CPT

## 2024-05-16 PROCEDURE — 96375 TX/PRO/DX INJ NEW DRUG ADDON: CPT

## 2024-05-16 PROCEDURE — 85652 RBC SED RATE AUTOMATED: CPT

## 2024-05-16 PROCEDURE — 2580000003 HC RX 258: Performed by: STUDENT IN AN ORGANIZED HEALTH CARE EDUCATION/TRAINING PROGRAM

## 2024-05-16 PROCEDURE — 96374 THER/PROPH/DIAG INJ IV PUSH: CPT

## 2024-05-16 PROCEDURE — 1100000000 HC RM PRIVATE

## 2024-05-16 PROCEDURE — 6360000002 HC RX W HCPCS: Performed by: EMERGENCY MEDICINE

## 2024-05-16 PROCEDURE — 36415 COLL VENOUS BLD VENIPUNCTURE: CPT

## 2024-05-16 PROCEDURE — 86140 C-REACTIVE PROTEIN: CPT

## 2024-05-16 PROCEDURE — 99285 EMERGENCY DEPT VISIT HI MDM: CPT

## 2024-05-16 PROCEDURE — 2580000003 HC RX 258: Performed by: EMERGENCY MEDICINE

## 2024-05-16 PROCEDURE — 80053 COMPREHEN METABOLIC PANEL: CPT

## 2024-05-16 PROCEDURE — 2700000000 HC OXYGEN THERAPY PER DAY

## 2024-05-16 PROCEDURE — 73630 X-RAY EXAM OF FOOT: CPT

## 2024-05-16 RX ORDER — ASPIRIN 325 MG
325 TABLET ORAL DAILY
Status: DISCONTINUED | OUTPATIENT
Start: 2024-05-17 | End: 2024-05-21

## 2024-05-16 RX ORDER — SODIUM CHLORIDE 9 MG/ML
INJECTION, SOLUTION INTRAVENOUS PRN
Status: DISCONTINUED | OUTPATIENT
Start: 2024-05-16 | End: 2024-05-22 | Stop reason: HOSPADM

## 2024-05-16 RX ORDER — ALBUTEROL SULFATE 90 UG/1
1 AEROSOL, METERED RESPIRATORY (INHALATION) EVERY 6 HOURS PRN
Status: DISCONTINUED | OUTPATIENT
Start: 2024-05-16 | End: 2024-05-22 | Stop reason: HOSPADM

## 2024-05-16 RX ORDER — ONDANSETRON 2 MG/ML
4 INJECTION INTRAMUSCULAR; INTRAVENOUS EVERY 6 HOURS PRN
Status: DISCONTINUED | OUTPATIENT
Start: 2024-05-16 | End: 2024-05-22 | Stop reason: HOSPADM

## 2024-05-16 RX ORDER — SODIUM CHLORIDE 0.9 % (FLUSH) 0.9 %
5-40 SYRINGE (ML) INJECTION PRN
Status: DISCONTINUED | OUTPATIENT
Start: 2024-05-16 | End: 2024-05-22 | Stop reason: HOSPADM

## 2024-05-16 RX ORDER — ONDANSETRON 4 MG/1
4 TABLET, ORALLY DISINTEGRATING ORAL EVERY 8 HOURS PRN
Status: DISCONTINUED | OUTPATIENT
Start: 2024-05-16 | End: 2024-05-22 | Stop reason: HOSPADM

## 2024-05-16 RX ORDER — SODIUM CHLORIDE 0.9 % (FLUSH) 0.9 %
5-40 SYRINGE (ML) INJECTION EVERY 12 HOURS SCHEDULED
Status: DISCONTINUED | OUTPATIENT
Start: 2024-05-16 | End: 2024-05-22 | Stop reason: HOSPADM

## 2024-05-16 RX ORDER — ACETAMINOPHEN 650 MG/1
650 SUPPOSITORY RECTAL EVERY 6 HOURS PRN
Status: DISCONTINUED | OUTPATIENT
Start: 2024-05-16 | End: 2024-05-22 | Stop reason: HOSPADM

## 2024-05-16 RX ORDER — ENOXAPARIN SODIUM 100 MG/ML
40 INJECTION SUBCUTANEOUS DAILY
Status: DISCONTINUED | OUTPATIENT
Start: 2024-05-17 | End: 2024-05-22 | Stop reason: HOSPADM

## 2024-05-16 RX ORDER — ACETAMINOPHEN 325 MG/1
650 TABLET ORAL EVERY 6 HOURS PRN
Status: DISCONTINUED | OUTPATIENT
Start: 2024-05-16 | End: 2024-05-22 | Stop reason: HOSPADM

## 2024-05-16 RX ORDER — CITALOPRAM 20 MG/1
10 TABLET ORAL DAILY
Status: DISCONTINUED | OUTPATIENT
Start: 2024-05-17 | End: 2024-05-22 | Stop reason: HOSPADM

## 2024-05-16 RX ORDER — ARFORMOTEROL TARTRATE 15 UG/2ML
15 SOLUTION RESPIRATORY (INHALATION)
Status: DISCONTINUED | OUTPATIENT
Start: 2024-05-16 | End: 2024-05-22 | Stop reason: HOSPADM

## 2024-05-16 RX ORDER — SODIUM CHLORIDE 9 MG/ML
INJECTION, SOLUTION INTRAVENOUS CONTINUOUS
Status: ACTIVE | OUTPATIENT
Start: 2024-05-16 | End: 2024-05-18

## 2024-05-16 RX ADMIN — WATER 2000 MG: 1 INJECTION INTRAMUSCULAR; INTRAVENOUS; SUBCUTANEOUS at 19:28

## 2024-05-16 RX ADMIN — SODIUM CHLORIDE: 9 INJECTION, SOLUTION INTRAVENOUS at 22:25

## 2024-05-16 RX ADMIN — VANCOMYCIN HYDROCHLORIDE 1750 MG: 10 INJECTION, POWDER, LYOPHILIZED, FOR SOLUTION INTRAVENOUS at 19:33

## 2024-05-16 RX ADMIN — SODIUM CHLORIDE, PRESERVATIVE FREE 5 ML: 5 INJECTION INTRAVENOUS at 22:19

## 2024-05-16 ASSESSMENT — PAIN - FUNCTIONAL ASSESSMENT: PAIN_FUNCTIONAL_ASSESSMENT: 0-10

## 2024-05-16 ASSESSMENT — LIFESTYLE VARIABLES
HOW MANY STANDARD DRINKS CONTAINING ALCOHOL DO YOU HAVE ON A TYPICAL DAY: PATIENT DOES NOT DRINK
HOW OFTEN DO YOU HAVE A DRINK CONTAINING ALCOHOL: NEVER

## 2024-05-16 ASSESSMENT — PAIN DESCRIPTION - ORIENTATION
ORIENTATION: LEFT
ORIENTATION: LEFT

## 2024-05-16 ASSESSMENT — PAIN DESCRIPTION - LOCATION
LOCATION: TOE (COMMENT WHICH ONE)
LOCATION: FOOT

## 2024-05-16 ASSESSMENT — PAIN SCALES - GENERAL
PAINLEVEL_OUTOF10: 1
PAINLEVEL_OUTOF10: 10
PAINLEVEL_OUTOF10: 4
PAINLEVEL_OUTOF10: 4

## 2024-05-16 ASSESSMENT — PAIN DESCRIPTION - DESCRIPTORS
DESCRIPTORS: ACHING
DESCRIPTORS: ACHING

## 2024-05-16 NOTE — ED NOTES
Pt arrives to ED c/o gradually worsening swelling, redness, and tenderness radiating from the site of his L great toe amputation from 5 week ago. Pt states these sxs started about 3 weeks ago and have worsened since.    1835 - Dr. Turk at bedside    1923 - Bedside shift change report given to Anibal (oncoming nurse) by Arben (offgoing nurse). Report included the following information Nurse Handoff Report, ED Encounter Summary, and Recent Results.

## 2024-05-17 ENCOUNTER — APPOINTMENT (OUTPATIENT)
Facility: HOSPITAL | Age: 74
DRG: 253 | End: 2024-05-17
Attending: STUDENT IN AN ORGANIZED HEALTH CARE EDUCATION/TRAINING PROGRAM
Payer: MEDICARE

## 2024-05-17 ENCOUNTER — APPOINTMENT (OUTPATIENT)
Facility: HOSPITAL | Age: 74
DRG: 253 | End: 2024-05-17
Payer: MEDICARE

## 2024-05-17 LAB
ALBUMIN SERPL-MCNC: 3 G/DL (ref 3.5–5)
ALBUMIN/GLOB SERPL: 0.7 (ref 1.1–2.2)
ALP SERPL-CCNC: 114 U/L (ref 45–117)
ALT SERPL-CCNC: 11 U/L (ref 12–78)
ANION GAP SERPL CALC-SCNC: 4 MMOL/L (ref 5–15)
AST SERPL-CCNC: 16 U/L (ref 15–37)
BASOPHILS # BLD: 0.1 K/UL (ref 0–0.1)
BASOPHILS NFR BLD: 1 % (ref 0–1)
BILIRUB SERPL-MCNC: 0.8 MG/DL (ref 0.2–1)
BUN SERPL-MCNC: 22 MG/DL (ref 6–20)
BUN/CREAT SERPL: 27 (ref 12–20)
CALCIUM SERPL-MCNC: 8.4 MG/DL (ref 8.5–10.1)
CHLORIDE SERPL-SCNC: 108 MMOL/L (ref 97–108)
CO2 SERPL-SCNC: 29 MMOL/L (ref 21–32)
CREAT SERPL-MCNC: 0.83 MG/DL (ref 0.7–1.3)
CRP SERPL-MCNC: 2.45 MG/DL (ref 0–0.3)
DIFFERENTIAL METHOD BLD: NORMAL
EOSINOPHIL # BLD: 0.3 K/UL (ref 0–0.4)
EOSINOPHIL NFR BLD: 4 % (ref 0–7)
ERYTHROCYTE [DISTWIDTH] IN BLOOD BY AUTOMATED COUNT: 13.9 % (ref 11.5–14.5)
GLOBULIN SER CALC-MCNC: 4.1 G/DL (ref 2–4)
GLUCOSE SERPL-MCNC: 86 MG/DL (ref 65–100)
HCT VFR BLD AUTO: 43.7 % (ref 36.6–50.3)
HGB BLD-MCNC: 14.9 G/DL (ref 12.1–17)
IMM GRANULOCYTES # BLD AUTO: 0 K/UL (ref 0–0.04)
IMM GRANULOCYTES NFR BLD AUTO: 0 % (ref 0–0.5)
LYMPHOCYTES # BLD: 1.3 K/UL (ref 0.8–3.5)
LYMPHOCYTES NFR BLD: 21 % (ref 12–49)
MCH RBC QN AUTO: 30.7 PG (ref 26–34)
MCHC RBC AUTO-ENTMCNC: 34.1 G/DL (ref 30–36.5)
MCV RBC AUTO: 89.9 FL (ref 80–99)
MONOCYTES # BLD: 0.6 K/UL (ref 0–1)
MONOCYTES NFR BLD: 10 % (ref 5–13)
NEUTS SEG # BLD: 4 K/UL (ref 1.8–8)
NEUTS SEG NFR BLD: 64 % (ref 32–75)
NRBC # BLD: 0 K/UL (ref 0–0.01)
NRBC BLD-RTO: 0 PER 100 WBC
PLATELET # BLD AUTO: 184 K/UL (ref 150–400)
PMV BLD AUTO: 9.8 FL (ref 8.9–12.9)
POTASSIUM SERPL-SCNC: 3.8 MMOL/L (ref 3.5–5.1)
PROT SERPL-MCNC: 7.1 G/DL (ref 6.4–8.2)
RBC # BLD AUTO: 4.86 M/UL (ref 4.1–5.7)
SODIUM SERPL-SCNC: 141 MMOL/L (ref 136–145)
WBC # BLD AUTO: 6.2 K/UL (ref 4.1–11.1)

## 2024-05-17 PROCEDURE — 6360000002 HC RX W HCPCS: Performed by: STUDENT IN AN ORGANIZED HEALTH CARE EDUCATION/TRAINING PROGRAM

## 2024-05-17 PROCEDURE — 94760 N-INVAS EAR/PLS OXIMETRY 1: CPT

## 2024-05-17 PROCEDURE — 94761 N-INVAS EAR/PLS OXIMETRY MLT: CPT

## 2024-05-17 PROCEDURE — 36415 COLL VENOUS BLD VENIPUNCTURE: CPT

## 2024-05-17 PROCEDURE — 80053 COMPREHEN METABOLIC PANEL: CPT

## 2024-05-17 PROCEDURE — 6370000000 HC RX 637 (ALT 250 FOR IP): Performed by: STUDENT IN AN ORGANIZED HEALTH CARE EDUCATION/TRAINING PROGRAM

## 2024-05-17 PROCEDURE — 6370000000 HC RX 637 (ALT 250 FOR IP): Performed by: HOSPITALIST

## 2024-05-17 PROCEDURE — 85025 COMPLETE CBC W/AUTO DIFF WBC: CPT

## 2024-05-17 PROCEDURE — 93922 UPR/L XTREMITY ART 2 LEVELS: CPT

## 2024-05-17 PROCEDURE — 1100000000 HC RM PRIVATE

## 2024-05-17 PROCEDURE — 2700000000 HC OXYGEN THERAPY PER DAY

## 2024-05-17 PROCEDURE — 94640 AIRWAY INHALATION TREATMENT: CPT

## 2024-05-17 PROCEDURE — 2580000003 HC RX 258: Performed by: STUDENT IN AN ORGANIZED HEALTH CARE EDUCATION/TRAINING PROGRAM

## 2024-05-17 RX ORDER — NICOTINE 21 MG/24HR
1 PATCH, TRANSDERMAL 24 HOURS TRANSDERMAL DAILY
Status: DISCONTINUED | OUTPATIENT
Start: 2024-05-17 | End: 2024-05-22 | Stop reason: HOSPADM

## 2024-05-17 RX ORDER — ACETAMINOPHEN 325 MG/1
650 TABLET ORAL ONCE
Status: DISCONTINUED | OUTPATIENT
Start: 2024-05-17 | End: 2024-05-20

## 2024-05-17 RX ORDER — OXYCODONE HYDROCHLORIDE AND ACETAMINOPHEN 5; 325 MG/1; MG/1
1 TABLET ORAL EVERY 4 HOURS PRN
Status: DISCONTINUED | OUTPATIENT
Start: 2024-05-17 | End: 2024-05-22 | Stop reason: HOSPADM

## 2024-05-17 RX ORDER — SENNA AND DOCUSATE SODIUM 50; 8.6 MG/1; MG/1
2 TABLET, FILM COATED ORAL DAILY PRN
Status: DISCONTINUED | OUTPATIENT
Start: 2024-05-17 | End: 2024-05-22 | Stop reason: HOSPADM

## 2024-05-17 RX ORDER — OXYCODONE HYDROCHLORIDE 5 MG/1
5 TABLET ORAL ONCE
Status: COMPLETED | OUTPATIENT
Start: 2024-05-17 | End: 2024-05-17

## 2024-05-17 RX ADMIN — CEFEPIME 2000 MG: 2 INJECTION, POWDER, FOR SOLUTION INTRAVENOUS at 02:23

## 2024-05-17 RX ADMIN — ARFORMOTEROL TARTRATE 15 MCG: 15 SOLUTION RESPIRATORY (INHALATION) at 19:55

## 2024-05-17 RX ADMIN — IPRATROPIUM BROMIDE 0.5 MG: 0.5 SOLUTION RESPIRATORY (INHALATION) at 14:25

## 2024-05-17 RX ADMIN — ENOXAPARIN SODIUM 40 MG: 100 INJECTION SUBCUTANEOUS at 08:38

## 2024-05-17 RX ADMIN — OXYCODONE HYDROCHLORIDE AND ACETAMINOPHEN 1 TABLET: 5; 325 TABLET ORAL at 20:29

## 2024-05-17 RX ADMIN — CEFEPIME 2000 MG: 2 INJECTION, POWDER, FOR SOLUTION INTRAVENOUS at 17:43

## 2024-05-17 RX ADMIN — SODIUM CHLORIDE: 9 INJECTION, SOLUTION INTRAVENOUS at 13:15

## 2024-05-17 RX ADMIN — OXYCODONE HYDROCHLORIDE AND ACETAMINOPHEN 1 TABLET: 5; 325 TABLET ORAL at 10:55

## 2024-05-17 RX ADMIN — VANCOMYCIN HYDROCHLORIDE 1000 MG: 1 INJECTION, POWDER, LYOPHILIZED, FOR SOLUTION INTRAVENOUS at 08:29

## 2024-05-17 RX ADMIN — OXYCODONE 5 MG: 5 TABLET ORAL at 04:46

## 2024-05-17 RX ADMIN — VANCOMYCIN HYDROCHLORIDE 1000 MG: 1 INJECTION, POWDER, LYOPHILIZED, FOR SOLUTION INTRAVENOUS at 20:36

## 2024-05-17 RX ADMIN — SODIUM CHLORIDE, PRESERVATIVE FREE 10 ML: 5 INJECTION INTRAVENOUS at 08:39

## 2024-05-17 RX ADMIN — CEFEPIME 2000 MG: 2 INJECTION, POWDER, FOR SOLUTION INTRAVENOUS at 10:52

## 2024-05-17 RX ADMIN — ACETAMINOPHEN 650 MG: 325 TABLET ORAL at 04:35

## 2024-05-17 RX ADMIN — IPRATROPIUM BROMIDE 0.5 MG: 0.5 SOLUTION RESPIRATORY (INHALATION) at 19:54

## 2024-05-17 RX ADMIN — ARFORMOTEROL TARTRATE 15 MCG: 15 SOLUTION RESPIRATORY (INHALATION) at 07:32

## 2024-05-17 RX ADMIN — SODIUM CHLORIDE, PRESERVATIVE FREE 10 ML: 5 INJECTION INTRAVENOUS at 20:36

## 2024-05-17 RX ADMIN — CITALOPRAM HYDROBROMIDE 10 MG: 20 TABLET ORAL at 08:24

## 2024-05-17 RX ADMIN — IPRATROPIUM BROMIDE 0.5 MG: 0.5 SOLUTION RESPIRATORY (INHALATION) at 07:32

## 2024-05-17 RX ADMIN — ASPIRIN 325 MG: 325 TABLET ORAL at 08:38

## 2024-05-17 ASSESSMENT — PAIN DESCRIPTION - ONSET
ONSET: ON-GOING
ONSET: ON-GOING

## 2024-05-17 ASSESSMENT — PAIN SCALES - GENERAL
PAINLEVEL_OUTOF10: 7
PAINLEVEL_OUTOF10: 8
PAINLEVEL_OUTOF10: 3
PAINLEVEL_OUTOF10: 0
PAINLEVEL_OUTOF10: 0
PAINLEVEL_OUTOF10: 6

## 2024-05-17 ASSESSMENT — PAIN DESCRIPTION - PAIN TYPE
TYPE: ACUTE PAIN;SURGICAL PAIN;NEUROPATHIC PAIN
TYPE: ACUTE PAIN;SURGICAL PAIN;NEUROPATHIC PAIN

## 2024-05-17 ASSESSMENT — PAIN DESCRIPTION - DESCRIPTORS
DESCRIPTORS: ACHING
DESCRIPTORS: ACHING;THROBBING
DESCRIPTORS: ACHING;THROBBING
DESCRIPTORS: ACHING

## 2024-05-17 ASSESSMENT — PAIN DESCRIPTION - LOCATION
LOCATION: FOOT

## 2024-05-17 ASSESSMENT — PAIN - FUNCTIONAL ASSESSMENT
PAIN_FUNCTIONAL_ASSESSMENT: PREVENTS OR INTERFERES SOME ACTIVE ACTIVITIES AND ADLS
PAIN_FUNCTIONAL_ASSESSMENT: ACTIVITIES ARE NOT PREVENTED

## 2024-05-17 ASSESSMENT — PAIN DESCRIPTION - ORIENTATION
ORIENTATION: LEFT

## 2024-05-17 ASSESSMENT — PAIN DESCRIPTION - FREQUENCY
FREQUENCY: INTERMITTENT
FREQUENCY: INTERMITTENT

## 2024-05-17 NOTE — PROGRESS NOTES
Pharmacy Antimicrobial Kinetic Dosing    Indication for Antimicrobials: SSTI - left foot wound, s/p great toe amputation 5 weeks ago     Current Regimen of Each Antimicrobial:  Vancomycin pharmacy to dose; Start Date ; Day # 2  Cefepime 2gm q 8; Start ; Day # 2    Previous Antimicrobial Therapy:    Goal Level: Vancomycin -600    Date Dose & Interval Measured (mcg/mL) Predicted AUC                       Significant Cultures:    blood: pending     Labs:  Recent Labs     Units 24  0449 24  1826   CREATININE MG/DL 0.83 0.90   BUN MG/DL 22* 24*   WBC K/uL 6.2 4.7     Temp (24hrs), Av °F (36.7 °C), Min:97.7 °F (36.5 °C), Max:98.1 °F (36.7 °C)    Conditions for Dosing Consideration: None    Creatinine Clearance (mL/min): Estimated Creatinine Clearance: 79 mL/min (based on SCr of 0.83 mg/dL).       Impression/Plan:   Continue vancomycin 1000 mg IV q12h for [id number]. Will check a random level tomorrow at 0400  Cefepime dose okay   Podiatry and vascular surgery consulted   Antimicrobial stop date 7 days     Pharmacy will follow daily and adjust medications as appropriate for renal function and/or serum levels.    Thank you,  Sakshi Regan [facility]

## 2024-05-17 NOTE — PROGRESS NOTES
Pharmacy Antimicrobial Kinetic Dosing    Indication for Antimicrobials: SSTI     Current Regimen of Each Antimicrobial:  Vancomycin pharmacy to dose; Start Date ; Day # 1  Cefepime 2gm q 8; Start ; Day #1    Previous Antimicrobial Therapy:  Vancomycin 1750mg x 1      Goal Level: Vancomycin -600    Date Dose & Interval Measured (mcg/mL) Predicted AUC                       Significant Cultures:       Labs:  Recent Labs     Units 24  1826   CREATININE MG/DL 0.90   BUN MG/DL 24*   WBC K/uL 4.7     Temp (24hrs), Av.9 °F (36.6 °C), Min:97.7 °F (36.5 °C), Max:98.1 °F (36.7 °C)      Conditions for Dosing Consideration: None    Creatinine Clearance (mL/min): Estimated Creatinine Clearance: 73 mL/min (based on SCr of 0.9 mg/dL).       Impression/Plan:   Vancomycin 1000mg q 12 for   Cefepime dose okay   Antimicrobial stop date 7 days     Pharmacy will follow daily and adjust medications as appropriate for renal function and/or serum levels.    Thank you,  Diane Landers [facility]

## 2024-05-17 NOTE — ED PROVIDER NOTES
[facility] EMERGENCY DEPT  EMERGENCY DEPARTMENT ENCOUNTER       Pt Name: Nasir Hatfield  MRN: [id number]  Birthdate 1950  Date of evaluation: 5/16/2024  Provider: Litzy Turk MD   PCP: Annie Mcnair, LOLA - NP  Note Started: 8:29 PM EDT 5/16/24     CHIEF COMPLAINT       Chief Complaint   Patient presents with    Wound Infection     Patient ambulatory into triage complaining of infection of amputated L great toe. Patient reports that the great toe was amputated 5 weeks ago and reports that he noticed more redness and pain over the week since seeing his doctor on Monday. Patient reports that he is on abx currently.         HISTORY OF PRESENT ILLNESS: 1 or more elements      History From: Patient, History limited by: none     Nasir Hatfield is a 73 y.o. male patient with history of COPD, peripheral vascular disease, here for foot infection.  Patient had an amputation of his left great toe 5 weeks ago.  It was performed by his podiatrist in [place].  He said he saw his doctor earlier this week, and everything looked fine.  He has been on clindamycin for a week, but in the last few days, he has had increased pain, swelling and redness to the left foot.  Denies fever or chills.  Denies chest pain, cough.  He has baseline shortness of breath due to COPD, and is on oxygen.       Please See MDM for Additional Details of the HPI/PMH  Nursing Notes were all reviewed and agreed with or any disagreements were addressed in the HPI.     REVIEW OF SYSTEMS        Positives and Pertinent negatives as per HPI.    PAST HISTORY     Past Medical History:  Past Medical History:   Diagnosis Date    Chronic obstructive pulmonary disease (HCC)        Past Surgical History:  No past surgical history on file.    Family History:  Family History   Problem Relation Age of Onset    Other Mother         developed aortic tear after hip fracture    Stroke Father        Social History:  Social History     Tobacco Use    Smoking status:  visualized and preliminarily interpreted by the ED Provider with the findings documented in the MDM section.     Interpretation per the Radiologist below, if available at the time of this note:     XR FOOT LEFT (MIN 3 VIEWS)   Final Result   1. Significant soft tissue swelling over the left first metatarsal at the site   of amputation of the great toe. Minimal cortical indistinctness both medially   and laterally at the distal metatarsal, where early or mild acute osteomyelitis   is not excluded..           PROCEDURES   Unless otherwise noted below, none  Procedures     CRITICAL CARE TIME   0    EMERGENCY DEPARTMENT COURSE and DIFFERENTIAL DIAGNOSIS/MDM   Vitals:    Vitals:    05/16/24 1819 05/16/24 1821 05/16/24 1830 05/16/24 1900   BP: 130/76  (!) 141/85 107/75   Pulse:    75   Resp:    18   Temp:    98.1 °F (36.7 °C)   TempSrc:    Oral   SpO2:  92% 93% 97%   Weight:       Height:            Patient was given the following medications:  Medications   vancomycin (VANCOCIN) 1750 mg in sodium chloride 0.9 % 500 mL IVPB (1,750 mg IntraVENous New Bag 5/16/24 1933)   ceFEPIme (MAXIPIME) 2,000 mg in sterile water 20 mL IV syringe (2,000 mg IntraVENous Given 5/16/24 1928)       Medical Decision Making  Blood work, x-ray, medicate    Amount and/or Complexity of Data Reviewed  Labs: ordered. Decision-making details documented in ED Course.  Radiology: ordered. Decision-making details documented in ED Course.  Discussion of management or test interpretation with external provider(s): The patient is being admitted by the hospitalist, Dr. Ambrose    Risk  Decision regarding hospitalization.              FINAL IMPRESSION     1. Nonhealing surgical wound, sequela    2. Non-healing surgical wound, initial encounter          DISPOSITION/PLAN   Nasir Hatfield's  results have been reviewed with him.  He has been counseled regarding his diagnosis, treatment, and plan.  He verbally conveys understanding and agreement of the signs,  symptoms, diagnosis, treatment and prognosis and additionally agrees to follow up as discussed.  He also agrees with the care-plan and conveys that all of his questions have been answered.  I have also provided discharge instructions for him that include: educational information regarding their diagnosis and treatment, and list of reasons why they would want to return to the ED prior to their follow-up appointment, should his condition change.     CLINICAL IMPRESSION    Admit Note: Pt is being admitted by Dr. Ambrose. The results of their tests and reason(s) for their admission have been discussed with pt and/or available family. They convey agreement and understanding for the need to be admitted and for the admission diagnosis.     PATIENT REFERRED TO:  No follow-up provider specified.     DISCHARGE MEDICATIONS:     Medication List        ASK your doctor about these medications      albuterol sulfate  (90 Base) MCG/ACT inhaler  Commonly known as: PROVENTIL;VENTOLIN;PROAIR     aspirin 325 MG tablet     citalopram 10 MG tablet  Commonly known as: CELEXA     meloxicam 7.5 MG tablet  Commonly known as: MOBIC     umeclidinium-vilanterol 62.5-25 MCG/ACT inhaler  Commonly known as: ANORO ELLIPTA                DISCONTINUED MEDICATIONS:  Current Discharge Medication List          I am the Primary Clinician of Record.   Litzy Turk MD (electronically signed)    (Please note that parts of this dictation were completed with voice recognition software. Quite often unanticipated grammatical, syntax, homophones, and other interpretive errors are inadvertently transcribed by the computer software. Please disregards these errors. Please excuse any errors that have escaped final proofreading.)        Litzy Turk MD  05/17/24 [id number]

## 2024-05-17 NOTE — PROGRESS NOTES
Chart reviewed for PCP hospital follow up. Patient's FELA is currently 5/20/24. CMA can schedule the appt when patient is clinically ready to discharge. [facility] placed [facility] information AVS for patient resource. Pending patient discharge.

## 2024-05-17 NOTE — PROGRESS NOTES
Hospitalist Progress Note    NAME:   Nasir Hatfield   : 1950   MRN: [id number]     Date/Time: 2024 10:35 AM  Patient PCP: Annie Mcnair APRN - NP    Estimated discharge date:  Barriers: needs vascular     Assessment / Plan:  Left foot wound, s/p left great toe amputation   History of peripheral vascular disease -status post stenting on left lower extremity  Here - with several days increasing left foot pain at site of great toe amputation, performed approx 5 weeks ago by podiatry in [place], per pt. Increased pain, some erythema around site.   XR  on admission -  with cortical changes near first metatarsal - unable to exclude osteomyelitis.   S/p Vanc and Cefepime in the ED.   ESR 12 CRP 2.45  Ordered RACHELLE for LLE   Podiatry consulted -advised for vascular surgery consult.  Vascular was consulted will follow-up recommendation  Will continue broad spectrum coverage for now - Vanc, Cefepime   Will follow blood culture  She has been ambulating well even with the pain in the foot.  No active discharge was found, continue regular nursing care  Percocet ordered for pain management, with as needed bowel regimen    Severe COPD on home oxygen  Stable currently, on home O2 at 3L   Continue home inhaler     Active smoker  Patient is very much positive to giving up smoking.  She wanted nicotine patch.  At this 10 minutes spent in counseling regarding smoking cessation.     Depression / anxiety  Continue celexa         Medical Decision Making:   I personally reviewed labs: CBC BMP  I personally reviewed imaging: X-ray of foot likely cortical changes on distal MT  I personally reviewed EKG: N/A  Toxic drug monitoring: Renal function while on vancomycin  Discussed case with: Patient and podiatry attending     Code Status: FULL CODE  DVT Prophylaxis: lovenox       Subjective:     Chief Complaint / Reason for Physician Visit  Patient currently admitted and treated for left great toe nonhealing wound.   He also likely has peripheral vascular disease.    Labs and chart reviewed patient examined overnight events noted.  He mentioned he has been having on and off pain in his left foot.      Objective:     VITALS:   Last 24hrs VS reviewed since prior progress note. Most recent are:  Patient Vitals for the past 24 hrs:   BP Temp Temp src Pulse Resp SpO2 Height Weight   05/17/24 0804 121/78 97.2 °F (36.2 °C) Oral 63 20 90 % -- --   05/17/24 0516 -- -- -- -- 14 -- -- --   05/17/24 0325 (!) 141/82 98.2 °F (36.8 °C) Oral 66 17 96 % -- --   05/16/24 2310 (!) 145/95 98.1 °F (36.7 °C) Oral 62 18 97 % -- --   05/16/24 2230 (!) 144/77 -- -- 66 18 97 % -- --   05/16/24 2200 (!) 153/85 98 °F (36.7 °C) Oral 61 16 98 % -- --   05/16/24 1900 107/75 98.1 °F (36.7 °C) Oral 75 18 97 % -- --   05/16/24 1830 (!) 141/85 -- -- -- -- 93 % -- --   05/16/24 1821 -- -- -- -- -- 92 % -- --   05/16/24 1819 130/76 -- -- -- -- -- -- --   05/16/24 1713 (!) 152/116 97.7 °F (36.5 °C) Oral 73 18 90 % 1.778 m (5' 10\") 69.4 kg (153 lb)         Intake/Output Summary (Last 24 hours) at 5/17/2024 1035  Last data filed at 5/17/2024 0516  Gross per 24 hour   Intake 505.35 ml   Output 600 ml   Net -94.65 ml        I had a face to face encounter and independently examined this patient on 5/17/2024, as outlined below:  PHYSICAL EXAM:  General: Alert, cooperative.  On 3 L of oxygen via nasal cannula  EENT:  EOMI. Anicteric sclerae.  Resp:  CTA bilaterally, no wheezing or rales.  No accessory muscle use.  Occasional coughs  CV:  Regular  rhythm,  No edema  GI:  Soft, Non distended, Non tender.  +Bowel sounds  Neurologic:  Alert and oriented X 3, normal speech  Psych:   Good insight. Not anxious nor agitated  Skin:  No rashes.  No jaundice  Left foot: Status post amputation at the MTP level-no active discharge however tenderness present to palpation..  The whole foot looks dusky.  No pulses palpable.    Reviewed most current lab test results and cultures

## 2024-05-17 NOTE — PROGRESS NOTES
74 yo non-diabetic smoker w/COPD had LLE percutaneous vascular intervention at [facility] followed by L great toe amp (Dr Fleming) now admitted with L foot cellulitis +/- osteo at 1st MT remnant. L RACHELLE = 0.5. Bounding R pedal pulses. No pulses below groin level on left. Will get CTA this weekend. Will need repeat intervention LLE before any further foot surgery.

## 2024-05-17 NOTE — H&P
Hospitalist Admission Note    NAME:   Nasir Hatfield   : 1950   MRN: [id number]     Date/Time: 2024 9:24 PM    Patient PCP: Annie Mcnair, LOLA - DANA    *Please be aware this note is formulated with assistance from voice-recognition dictation software. Please excuse any errors that may be present*    ______________________________________________________________________  Given the patient's current clinical presentation, I have a high level of concern for decompensation if discharged from the emergency department.  Complex decision making was performed, which includes reviewing the patient's available past medical records, laboratory results, and x-ray films.       My assessment of this patient's clinical condition and my plan of care is as follows.    Assessment / Plan:  # Left foot wound, s/p left great toe amputation   # History of peripheral vascular disease   Here today with several days increasing left foot pain at site of great toe amputation, performed approx 5 weeks ago by podiatry in [place], per pt. Increased pain, some erythema around site. Labs reassuring however XR today with cortical changes near first metatarsal - unable to exclude osteomyelitis. S/p Vanc and Cefepime in the ED.   - Ordered RACHELLE for LLE   - Podiatry consult  - Vascular Surgery consult  - No palpable pulse to DP on my evaluation; discussed with ED who states was able to obtain doppler pulse, did repeat exam   - Will continue broad spectrum coverage for now - Vanc, Cefepime   - Check ESR and CRP   - Trend CBC   - Check procalcitonin    # Severe COPD on home oxygen  - Stable currently, on home O2 at 3L   - Continue home inhaler equivalents     # Depression / anxiety  - Continue celexa       Medical Decision Making:   I personally reviewed labs: Yes - CBC, CMP  I personally reviewed imaging: Yes - XR  I personally reviewed EKG: N/A  Toxic drug monitoring: Yes  Discussed case with: ED provider. After discussion I am  Automatic Reconciliation, Ar   citalopram (CELEXA) 10 MG tablet Take by mouth daily    Automatic Reconciliation, Ar   meloxicam (MOBIC) 7.5 MG tablet Take by mouth daily as needed    Automatic Reconciliation, Ar   umeclidinium-vilanterol (ANORO ELLIPTA) 62.5-25 MCG/ACT inhaler Inhale 1 puff into the lungs daily    Automatic Reconciliation, Ar         Objective:   VITALS:    Patient Vitals for the past 24 hrs:   BP Temp Temp src Pulse Resp SpO2 Height Weight   24 1900 107/75 98.1 °F (36.7 °C) Oral 75 18 97 % -- --   24 1830 (!) 141/85 -- -- -- -- 93 % -- --   24 1821 -- -- -- -- -- 92 % -- --   24 1819 130/76 -- -- -- -- -- -- --   24 1713 (!) 152/116 97.7 °F (36.5 °C) Oral 73 18 90 % 1.778 m (5' 10\") 69.4 kg (153 lb)       Temp (24hrs), Av.9 °F (36.6 °C), Min:97.7 °F (36.5 °C), Max:98.1 °F (36.7 °C)      O2 Flow Rate (L/min): 3 L/min O2 Device: Nasal cannula    Wt Readings from Last 12 Encounters:   24 69.4 kg (153 lb)         PHYSICAL EXAM:  General:    Alert, cooperative, appears stated age.     Lungs:   CTA b/l.  No wheezing or Rhonchi. No rales.  Chest wall:  No tenderness.  No accessory muscle use.  Heart:   Regular  rhythm,  No  Murmur.   No edema  Abdomen:   Soft, NT. ND  BS+  Extremities: LLE with surgical wound to left great toe area - general erythema to distal foot, no obvious drainage. Preserved cap refill to LLE distally. The left foot is not dusky or mottled.   Neurologic: No facial asymmetry. No aphasia or slurred speech. Symmetrical strength, Sensation grossly intact.          LAB DATA REVIEWED:    Recent Results (from the past 12 hour(s))   CBC with Auto Differential    Collection Time: 24  6:26 PM   Result Value Ref Range    WBC 4.7 4.1 - 11.1 K/uL    RBC 4.78 4.10 - 5.70 M/uL    Hemoglobin 15.0 12.1 - 17.0 g/dL    Hematocrit 43.0 36.6 - 50.3 %    MCV 90.0 80.0 - 99.0 FL    MCH 31.4 26.0 - 34.0 PG    MCHC 34.9 30.0 - 36.5 g/dL    RDW 13.8 11.5 - 14.5 %

## 2024-05-17 NOTE — PROGRESS NOTES
Physician Progress Note      PATIENT:               ANGEL OAKES  CSN #:                  [id number]  :                       1950  ADMIT DATE:       2024 5:15 PM  DISCH DATE:  RESPONDING  PROVIDER #:        Danilo Zamorano MD          QUERY TEXT:    Pt admitted with foot wound and has respiratory failure documented. If   possible, please document in progress notes and discharge summary further   specificity regarding the type and acuity of respiratory failure:    The medical record reflects the following:  Risk Factors: COPD on 3 L NC at baseline      Clinical Indicators:  H&P:  Severe COPD on home oxygen  - Stable currently, on home O2 at 3L  - Continue home inhaler equivalents        Treatment: maintain on home o2, monitor sats, continue home inhalers        Thank you,  JOI Hargrove,RN  Clinical Documentation  [email or web address]  Ph: [phone]  or via [employer]  Options provided:  -- Chronic respiratory failure with hypoxia  -- Other - I will add my own diagnosis  -- Disagree - Not applicable / Not valid  -- Disagree - Clinically unable to determine / Unknown  -- Refer to Clinical Documentation Reviewer    PROVIDER RESPONSE TEXT:    This patient has chronic respiratory failure with hypoxia.    Query created by: Shannon Hutchins on 2024 11:31 AM      Electronically signed by:  Danilo Zamorano MD 2024 12:51 PM

## 2024-05-17 NOTE — PLAN OF CARE
Problem: Respiratory - Adult  Goal: Achieves optimal ventilation and oxygenation  Outcome: Progressing  Flowsheets (Taken 5/16/2024 2310)  Achieves optimal ventilation and oxygenation:   Assess for changes in respiratory status   Assess for changes in mentation and behavior   Position to facilitate oxygenation and minimize respiratory effort   Oxygen supplementation based on oxygen saturation or arterial blood gases   Initiate smoking cessation protocol as indicated   Encourage broncho-pulmonary hygiene including cough, deep breathe, incentive spirometry   Assess the need for suctioning and aspirate as needed   Assess and instruct to report shortness of breath or any respiratory difficulty   Respiratory therapy support as indicated     Problem: Discharge Planning  Goal: Discharge to home or other facility with appropriate resources  Outcome: Progressing  Flowsheets (Taken 5/16/2024 2310)  Discharge to home or other facility with appropriate resources:   Identify barriers to discharge with patient and caregiver   Arrange for needed discharge resources and transportation as appropriate   Identify discharge learning needs (meds, wound care, etc)   Arrange for interpreters to assist at discharge as needed   Refer to discharge planning if patient needs post-hospital services based on physician order or complex needs related to functional status, cognitive ability or social support system     Problem: Pain  Goal: Verbalizes/displays adequate comfort level or baseline comfort level  Outcome: Progressing     Problem: Safety - Adult  Goal: Free from fall injury  Outcome: Progressing  Flowsheets (Taken 5/16/2024 2310)  Free From Fall Injury:   Instruct family/caregiver on patient safety   Based on caregiver fall risk screen, instruct family/caregiver to ask for assistance with transferring infant if caregiver noted to have fall risk factors     Problem: ABCDS Injury Assessment  Goal: Absence of physical injury  Outcome:  Progressing  Flowsheets (Taken 5/16/2024 7996)  Absence of Physical Injury: Implement safety measures based on patient assessment

## 2024-05-17 NOTE — CONSULTS
Seen patient in the room   Reviewed the results  Discussed the findings with the patient   Need vascular surgery eval first before podiatry surgical treatment   Non palpable pedal pulses dependent rubor left foot

## 2024-05-17 NOTE — RT PROTOCOL NOTE
ADULT PROTOCOL: JET AEROSOL ASSESSMENT    Patient  Nasir Hatfield     73 y.o.   male     5/17/2024  11:37 AM    Breath Sounds Pre Procedure: Breath Sounds Pre-Tx CONG: Diminished, Clear                                  Breath Sounds Pre-Tx LLL: Diminished        Breath Sounds Pre-Tx RUL: Diminished, Clear        Breath Sounds Pre-Tx RML: Diminished, Clear        Breath Sounds Pre-Tx RLL: Diminished  Breath Sounds Post Procedure: Breath Sounds Post-Tx CONG: Diminished, Clear          Breath Sounds Post-Tx LLL: Diminished          Breath Sounds Post-Tx RUL: Diminished, Clear          Breath Sounds Post-Tx RML: Diminished          Breath Sounds Post-Tx RLL: Diminished                                       Heart Rate: Pre procedure Pre-Tx Pulse: 69           Post procedure      Resp Rate: Pre procedure Pre-Tx Resps: 16           Post procedure        Oxygen: O2 Therapy: Oxygen   nasal cannula     Changed: No    SpO2:  SpO2: 90 %   with Oxygen                Nebulizer Therapy: Current medications Medications: Ipratropium Bromide, Arformoterol      Changed: Yes    Comments: Made Atrovent TID.   Problem List:   Patient Active Problem List   Diagnosis    Acute respiratory failure (HCC)    Nonhealing surgical wound, sequela       Respiratory Therapist: Favio Art, RT

## 2024-05-18 ENCOUNTER — APPOINTMENT (OUTPATIENT)
Facility: HOSPITAL | Age: 74
DRG: 253 | End: 2024-05-18
Payer: MEDICARE

## 2024-05-18 LAB
ALBUMIN SERPL-MCNC: 2.7 G/DL (ref 3.5–5)
ALBUMIN/GLOB SERPL: 0.8 (ref 1.1–2.2)
ALP SERPL-CCNC: 100 U/L (ref 45–117)
ALT SERPL-CCNC: 13 U/L (ref 12–78)
ANION GAP SERPL CALC-SCNC: 5 MMOL/L (ref 5–15)
AST SERPL-CCNC: 17 U/L (ref 15–37)
BASOPHILS # BLD: 0 K/UL (ref 0–0.1)
BASOPHILS NFR BLD: 1 % (ref 0–1)
BILIRUB SERPL-MCNC: 0.7 MG/DL (ref 0.2–1)
BUN SERPL-MCNC: 22 MG/DL (ref 6–20)
BUN/CREAT SERPL: 31 (ref 12–20)
CALCIUM SERPL-MCNC: 8.2 MG/DL (ref 8.5–10.1)
CHLORIDE SERPL-SCNC: 110 MMOL/L (ref 97–108)
CO2 SERPL-SCNC: 26 MMOL/L (ref 21–32)
CREAT SERPL-MCNC: 0.72 MG/DL (ref 0.7–1.3)
DIFFERENTIAL METHOD BLD: NORMAL
ECHO BSA: 1.85 M2
EOSINOPHIL # BLD: 0.3 K/UL (ref 0–0.4)
EOSINOPHIL NFR BLD: 5 % (ref 0–7)
ERYTHROCYTE [DISTWIDTH] IN BLOOD BY AUTOMATED COUNT: 14.1 % (ref 11.5–14.5)
GLOBULIN SER CALC-MCNC: 3.4 G/DL (ref 2–4)
GLUCOSE SERPL-MCNC: 86 MG/DL (ref 65–100)
HCT VFR BLD AUTO: 39 % (ref 36.6–50.3)
HGB BLD-MCNC: 13.2 G/DL (ref 12.1–17)
IMM GRANULOCYTES # BLD AUTO: 0 K/UL (ref 0–0.04)
IMM GRANULOCYTES NFR BLD AUTO: 0 % (ref 0–0.5)
LYMPHOCYTES # BLD: 1.1 K/UL (ref 0.8–3.5)
LYMPHOCYTES NFR BLD: 21 % (ref 12–49)
MCH RBC QN AUTO: 31 PG (ref 26–34)
MCHC RBC AUTO-ENTMCNC: 33.8 G/DL (ref 30–36.5)
MCV RBC AUTO: 91.5 FL (ref 80–99)
MONOCYTES # BLD: 0.6 K/UL (ref 0–1)
MONOCYTES NFR BLD: 11 % (ref 5–13)
NEUTS SEG # BLD: 3.3 K/UL (ref 1.8–8)
NEUTS SEG NFR BLD: 62 % (ref 32–75)
NRBC # BLD: 0 K/UL (ref 0–0.01)
NRBC BLD-RTO: 0 PER 100 WBC
PLATELET # BLD AUTO: 163 K/UL (ref 150–400)
PMV BLD AUTO: 9.8 FL (ref 8.9–12.9)
POTASSIUM SERPL-SCNC: 4 MMOL/L (ref 3.5–5.1)
PROT SERPL-MCNC: 6.1 G/DL (ref 6.4–8.2)
RBC # BLD AUTO: 4.26 M/UL (ref 4.1–5.7)
SODIUM SERPL-SCNC: 141 MMOL/L (ref 136–145)
VANCOMYCIN SERPL-MCNC: 14.8 UG/ML
VAS LEFT ABI: 0.5
VAS LEFT ARM BP: 148 MMHG
VAS LEFT DORSALIS PEDIS BP: 58 MMHG
VAS LEFT PTA BP: 74 MMHG
VAS RIGHT ABI: 0.99
VAS RIGHT DORSALIS PEDIS BP: 139 MMHG
VAS RIGHT PTA BP: 146 MMHG
VAS RIGHT TBI: 0.57
VAS RIGHT TOE PRESSURE: 85 MMHG
WBC # BLD AUTO: 5.3 K/UL (ref 4.1–11.1)

## 2024-05-18 PROCEDURE — 94761 N-INVAS EAR/PLS OXIMETRY MLT: CPT

## 2024-05-18 PROCEDURE — 2700000000 HC OXYGEN THERAPY PER DAY

## 2024-05-18 PROCEDURE — 2580000003 HC RX 258: Performed by: STUDENT IN AN ORGANIZED HEALTH CARE EDUCATION/TRAINING PROGRAM

## 2024-05-18 PROCEDURE — 6360000004 HC RX CONTRAST MEDICATION: Performed by: SURGERY

## 2024-05-18 PROCEDURE — 80202 ASSAY OF VANCOMYCIN: CPT

## 2024-05-18 PROCEDURE — 94760 N-INVAS EAR/PLS OXIMETRY 1: CPT

## 2024-05-18 PROCEDURE — 1100000000 HC RM PRIVATE

## 2024-05-18 PROCEDURE — 6370000000 HC RX 637 (ALT 250 FOR IP): Performed by: STUDENT IN AN ORGANIZED HEALTH CARE EDUCATION/TRAINING PROGRAM

## 2024-05-18 PROCEDURE — 6360000002 HC RX W HCPCS: Performed by: STUDENT IN AN ORGANIZED HEALTH CARE EDUCATION/TRAINING PROGRAM

## 2024-05-18 PROCEDURE — 36415 COLL VENOUS BLD VENIPUNCTURE: CPT

## 2024-05-18 PROCEDURE — 75635 CT ANGIO ABDOMINAL ARTERIES: CPT

## 2024-05-18 PROCEDURE — 80053 COMPREHEN METABOLIC PANEL: CPT

## 2024-05-18 PROCEDURE — 94640 AIRWAY INHALATION TREATMENT: CPT

## 2024-05-18 PROCEDURE — 85025 COMPLETE CBC W/AUTO DIFF WBC: CPT

## 2024-05-18 PROCEDURE — 93922 UPR/L XTREMITY ART 2 LEVELS: CPT | Performed by: SURGERY

## 2024-05-18 RX ADMIN — CEFEPIME 2000 MG: 2 INJECTION, POWDER, FOR SOLUTION INTRAVENOUS at 17:34

## 2024-05-18 RX ADMIN — SODIUM CHLORIDE: 9 INJECTION, SOLUTION INTRAVENOUS at 21:12

## 2024-05-18 RX ADMIN — SODIUM CHLORIDE, PRESERVATIVE FREE 10 ML: 5 INJECTION INTRAVENOUS at 21:14

## 2024-05-18 RX ADMIN — CEFEPIME 2000 MG: 2 INJECTION, POWDER, FOR SOLUTION INTRAVENOUS at 02:14

## 2024-05-18 RX ADMIN — SODIUM CHLORIDE, PRESERVATIVE FREE 10 ML: 5 INJECTION INTRAVENOUS at 08:45

## 2024-05-18 RX ADMIN — IPRATROPIUM BROMIDE 0.5 MG: 0.5 SOLUTION RESPIRATORY (INHALATION) at 20:22

## 2024-05-18 RX ADMIN — IPRATROPIUM BROMIDE 0.5 MG: 0.5 SOLUTION RESPIRATORY (INHALATION) at 07:36

## 2024-05-18 RX ADMIN — ARFORMOTEROL TARTRATE 15 MCG: 15 SOLUTION RESPIRATORY (INHALATION) at 07:36

## 2024-05-18 RX ADMIN — VANCOMYCIN HYDROCHLORIDE 1250 MG: 10 INJECTION, POWDER, LYOPHILIZED, FOR SOLUTION INTRAVENOUS at 21:14

## 2024-05-18 RX ADMIN — CEFEPIME 2000 MG: 2 INJECTION, POWDER, FOR SOLUTION INTRAVENOUS at 09:32

## 2024-05-18 RX ADMIN — OXYCODONE HYDROCHLORIDE AND ACETAMINOPHEN 1 TABLET: 5; 325 TABLET ORAL at 23:44

## 2024-05-18 RX ADMIN — IOPAMIDOL 100 ML: 755 INJECTION, SOLUTION INTRAVENOUS at 14:09

## 2024-05-18 RX ADMIN — IPRATROPIUM BROMIDE 0.5 MG: 0.5 SOLUTION RESPIRATORY (INHALATION) at 13:39

## 2024-05-18 RX ADMIN — ENOXAPARIN SODIUM 40 MG: 100 INJECTION SUBCUTANEOUS at 08:45

## 2024-05-18 RX ADMIN — ASPIRIN 325 MG: 325 TABLET ORAL at 08:45

## 2024-05-18 RX ADMIN — OXYCODONE HYDROCHLORIDE AND ACETAMINOPHEN 1 TABLET: 5; 325 TABLET ORAL at 00:41

## 2024-05-18 RX ADMIN — VANCOMYCIN HYDROCHLORIDE 1250 MG: 10 INJECTION, POWDER, LYOPHILIZED, FOR SOLUTION INTRAVENOUS at 09:29

## 2024-05-18 RX ADMIN — OXYCODONE HYDROCHLORIDE AND ACETAMINOPHEN 1 TABLET: 5; 325 TABLET ORAL at 19:20

## 2024-05-18 RX ADMIN — OXYCODONE HYDROCHLORIDE AND ACETAMINOPHEN 1 TABLET: 5; 325 TABLET ORAL at 08:42

## 2024-05-18 RX ADMIN — CITALOPRAM HYDROBROMIDE 10 MG: 20 TABLET ORAL at 08:44

## 2024-05-18 RX ADMIN — OXYCODONE HYDROCHLORIDE AND ACETAMINOPHEN 1 TABLET: 5; 325 TABLET ORAL at 13:29

## 2024-05-18 RX ADMIN — ARFORMOTEROL TARTRATE 15 MCG: 15 SOLUTION RESPIRATORY (INHALATION) at 20:22

## 2024-05-18 ASSESSMENT — PAIN SCALES - GENERAL
PAINLEVEL_OUTOF10: 0
PAINLEVEL_OUTOF10: 6
PAINLEVEL_OUTOF10: 6
PAINLEVEL_OUTOF10: 0
PAINLEVEL_OUTOF10: 7

## 2024-05-18 ASSESSMENT — PAIN DESCRIPTION - ORIENTATION
ORIENTATION: LEFT

## 2024-05-18 ASSESSMENT — PAIN DESCRIPTION - LOCATION
LOCATION: FOOT

## 2024-05-18 ASSESSMENT — PAIN DESCRIPTION - DESCRIPTORS
DESCRIPTORS: SORE;ACHING
DESCRIPTORS: ACHING
DESCRIPTORS: SORE

## 2024-05-18 NOTE — PLAN OF CARE
Problem: Respiratory - Adult  Goal: Achieves optimal ventilation and oxygenation  5/17/2024 2254 by Domingo Paz, RN  Outcome: Progressing  5/17/2024 1955 by Sherry Oglesby RCP  Outcome: Progressing  Flowsheets (Taken 5/17/2024 0843 by Ashish Gomez, RN)  Achieves optimal ventilation and oxygenation:   Assess for changes in respiratory status   Assess for changes in mentation and behavior     Problem: Discharge Planning  Goal: Discharge to home or other facility with appropriate resources  Outcome: Progressing     Problem: Pain  Goal: Verbalizes/displays adequate comfort level or baseline comfort level  Outcome: Progressing     Problem: Safety - Adult  Goal: Free from fall injury  Outcome: Progressing     Problem: ABCDS Injury Assessment  Goal: Absence of physical injury  Outcome: Progressing

## 2024-05-18 NOTE — PROGRESS NOTES
Pharmacy Antimicrobial Kinetic Dosing    Indication for Antimicrobials: SSTI - left foot wound, s/p great toe amputation 5 weeks ago     Current Regimen of Each Antimicrobial:  Vancomycin pharmacy to dose; Start Date ; Day # 3  Cefepime 2gm q 8; Start ; Day # 3    Previous Antimicrobial Therapy:  Vancomycin 1750mg x 1      Goal Level: Vancomycin -600    Date Dose & Interval Measured (mcg/mL) Predicted AUC    1000mg q 12  14.8 425                 Significant Cultures:    blood: NG, prelim      Labs:  Recent Labs     Units 24  0214 24  0449 24  1826   CREATININE MG/DL 0.72 0.83 0.90   BUN MG/DL 22* 22* 24*   WBC K/uL 5.3 6.2 4.7     Temp (24hrs), Av.7 °F (36.5 °C), Min:97.2 °F (36.2 °C), Max:98.2 °F (36.8 °C)      Conditions for Dosing Consideration: None    Creatinine Clearance (mL/min): Estimated Creatinine Clearance: 91 mL/min (based on SCr of 0.72 mg/dL).       Impression/Plan:   Vancomycin therapeutic but on lower end (), will adjust regimen to 1250mg q 12 for   Random level with am labs    Cefepime dose okay   Podiatry and vascular surgery consulted   Antimicrobial stop date 7 days     Pharmacy will follow daily and adjust medications as appropriate for renal function and/or serum levels.    Thank you,  Diane Landers [facility]

## 2024-05-18 NOTE — PLAN OF CARE
Problem: Respiratory - Adult  Goal: Achieves optimal ventilation and oxygenation  5/17/2024 2256 by Domingo Paz RN  Outcome: Progressing  5/17/2024 2254 by Domingo Paz RN  Outcome: Progressing  5/17/2024 1955 by Sherry Oglesby RCP  Outcome: Progressing  Flowsheets (Taken 5/17/2024 0843 by Ashish Gomez RN)  Achieves optimal ventilation and oxygenation:   Assess for changes in respiratory status   Assess for changes in mentation and behavior     Problem: Discharge Planning  Goal: Discharge to home or other facility with appropriate resources  5/17/2024 2256 by Domingo Paz RN  Outcome: Progressing  5/17/2024 2254 by Domingo Paz RN  Outcome: Progressing     Problem: Pain  Goal: Verbalizes/displays adequate comfort level or baseline comfort level  5/17/2024 2256 by Domingo Paz RN  Outcome: Progressing  5/17/2024 2254 by Domingo Paz RN  Outcome: Progressing     Problem: Safety - Adult  Goal: Free from fall injury  5/17/2024 2256 by Domingo Paz RN  Outcome: Progressing  5/17/2024 2254 by Domingo Paz RN  Outcome: Progressing     Problem: ABCDS Injury Assessment  Goal: Absence of physical injury  5/17/2024 2256 by Domingo Paz RN  Outcome: Progressing  5/17/2024 2254 by Domingo Paz RN  Outcome: Progressing

## 2024-05-18 NOTE — PROGRESS NOTES
Hospitalist Progress Note    NAME:   Nasir Hatfield   : 1950   MRN: [id number]     Date/Time: 2024 11:22 AM  Patient PCP: Annie Mcnair APRN - NP    Estimated discharge date:  Barriers: needs vascular - cta .    Assessment / Plan:  Left foot wound, s/p left great toe amputation   History of peripheral vascular disease -status post stenting on left lower extremity  Here - with several days increasing left foot pain at site of great toe amputation, performed approx 5 weeks ago by podiatry in [place], per pt. Increased pain, some erythema around site.   XR  on admission -  with cortical changes near first metatarsal - unable to exclude osteomyelitis.   S/p Vanc and Cefepime in the ED.   ESR 12 CRP 2.45  Blood cultures negative till now  RACHELLE-  Right side findings: Resting RACHELLE is 0.99. This is within the normal range. Resting TBI is 0.57. Moderately decreased resting right TBI.    Left side findings: Resting RACHELLE is 0.50. This is severely decreased.  Podiatry consulted -advised for vascular surgery consult.  Vascular was consulted -advised for CTA which will be performed today.  Will continue broad spectrum coverage for now - Vanc, Cefepime   She has been ambulating well even with the pain in the foot.  No active discharge was found, continue regular nursing care  Percocet ordered for pain management, with as needed bowel regimen-continue the same    Severe COPD on home oxygen  Stable currently, on home O2 at 3L   Continue home inhaler     Active smoker  Patient is very much positive to giving up smoking.  She wanted nicotine patch.  At this 10 minutes spent in counseling regarding smoking cessation.     Depression / anxiety  Continue celexa         Medical Decision Making:   I personally reviewed labs: CBC BMP  I personally reviewed imaging: None for today  I personally reviewed EKG: N/A  Toxic drug monitoring: Renal function while on vancomycin  Discussed case with: Patient      Code  Status: FULL CODE  DVT Prophylaxis: lovenox       Subjective:     Chief Complaint / Reason for Physician Visit  Patient currently admitted and treated for left great toe nonhealing wound.  He also likely has peripheral vascular disease.    Labs and chart reviewed patient examined overnight events noted.  He mentioned he has been having on and off pain in his left foot.      Objective:     VITALS:   Last 24hrs VS reviewed since prior progress note. Most recent are:  Patient Vitals for the past 24 hrs:   BP Temp Temp src Pulse Resp SpO2   05/18/24 0912 -- -- -- -- 18 --   05/18/24 0842 -- -- -- -- 18 --   05/18/24 0815 -- -- -- -- -- (!) 89 %   05/18/24 0210 123/88 97.9 °F (36.6 °C) -- 72 14 93 %   05/18/24 0111 -- -- -- -- 14 --   05/18/24 0041 -- -- -- -- 14 --   05/17/24 2015 (!) 144/85 97.5 °F (36.4 °C) Oral 61 14 92 %   05/17/24 1125 -- -- -- -- 20 --         Intake/Output Summary (Last 24 hours) at 5/18/2024 1122  Last data filed at 5/18/2024 0434  Gross per 24 hour   Intake 940 ml   Output 700 ml   Net 240 ml        I had a face to face encounter and independently examined this patient on 5/18/2024, as outlined below:  PHYSICAL EXAM:  General: Alert, cooperative.  On 3 L of oxygen via nasal cannula  EENT:  EOMI. Anicteric sclerae.  Resp:  CTA bilaterally, no wheezing or rales.  No accessory muscle use.  Occasional coughs  CV:  Regular  rhythm,  No edema  GI:  Soft, Non distended, Non tender.  +Bowel sounds  Neurologic:  Alert and oriented X 3, normal speech  Psych:   Good insight. Not anxious nor agitated  Skin:  No rashes.  No jaundice  Left foot: Status post amputation at the MTP level-no active discharge however tenderness present to palpation..  The whole foot looks dusky.  No pulses palpable.    Reviewed most current lab test results and cultures  YES  Reviewed most current radiology test results   YES  Review and summation of old records today    NO  Reviewed patient's current orders and MAR

## 2024-05-18 NOTE — CARE COORDINATION
Care Management Initial Assessment       RUR: 11%  Readmission? No  1st IM letter given? Yes - 5/16/24  1st  letter given: No    CM introduce self, explain role and confirmed demographics with pt.    Pt lives alone in a tri level home with no steps enter.    No hx of home health, SNF or inpatient rehab.    Pt has a hx of outpatient rehab.    Pt stated that his PCP is Dr. Nielson.     Pt uses [facility] at [place].    At the time of d/c pt will transport himself.    CM will follow and assist with d/c planning.   05/18/24 1142   Service Assessment   Patient Orientation Alert and Oriented   Cognition Alert   History Provided By Patient   Primary Caregiver Self   Support Systems Children   Patient's Healthcare Decision Maker is: Named in Scanned ACP Document   PCP Verified by CM Yes   Last Visit to PCP Within last 6 months   Prior Functional Level Independent in ADLs/IADLs   Current Functional Level Independent in ADLs/IADLs   Can patient return to prior living arrangement Yes   Family able to assist with home care needs: No   Would you like for me to discuss the discharge plan with any other family members/significant others, and if so, who? No   Financial Resources Medicare   Community Resources None   Social/Functional History   Lives With Alone   Type of Home House   Home Equipment Oxygen  (Built in shower seat)   Active  Yes   Discharge Planning   Type of Residence House   Current Services Prior To Admission Oxygen Therapy   Patient expects to be discharged to: House     Advance Care Planning     General Advance Care Planning (ACP) Conversation    Date of Conversation: 5/18/2024  Conducted with: Patient with Decision Making Capacity  Other persons present: None    Healthcare Decision Maker: No healthcare decision makers have been documented.  Click here to complete HealthCare Decision Makers including selection of the Healthcare Decision Maker Relationship (ie \"Primary\")

## 2024-05-18 NOTE — PLAN OF CARE
Problem: Respiratory - Adult  Goal: Achieves optimal ventilation and oxygenation  5/18/2024 1654 by Rey Gonzalez, RN  Outcome: Progressing  5/18/2024 0739 by Favio Martel, RT  Outcome: Progressing     Problem: Discharge Planning  Goal: Discharge to home or other facility with appropriate resources  Outcome: Progressing     Problem: Pain  Goal: Verbalizes/displays adequate comfort level or baseline comfort level  Outcome: Progressing     Problem: Safety - Adult  Goal: Free from fall injury  Outcome: Progressing     Problem: ABCDS Injury Assessment  Goal: Absence of physical injury  Outcome: Progressing

## 2024-05-19 LAB
ALBUMIN SERPL-MCNC: 2.9 G/DL (ref 3.5–5)
ALBUMIN/GLOB SERPL: 0.7 (ref 1.1–2.2)
ALP SERPL-CCNC: 102 U/L (ref 45–117)
ALT SERPL-CCNC: 14 U/L (ref 12–78)
ANION GAP SERPL CALC-SCNC: 6 MMOL/L (ref 5–15)
AST SERPL-CCNC: 21 U/L (ref 15–37)
BASOPHILS # BLD: 0.1 K/UL (ref 0–0.1)
BASOPHILS NFR BLD: 1 % (ref 0–1)
BILIRUB SERPL-MCNC: 0.7 MG/DL (ref 0.2–1)
BUN SERPL-MCNC: 15 MG/DL (ref 6–20)
BUN/CREAT SERPL: 21 (ref 12–20)
CALCIUM SERPL-MCNC: 8.7 MG/DL (ref 8.5–10.1)
CHLORIDE SERPL-SCNC: 108 MMOL/L (ref 97–108)
CO2 SERPL-SCNC: 25 MMOL/L (ref 21–32)
CREAT SERPL-MCNC: 0.73 MG/DL (ref 0.7–1.3)
DIFFERENTIAL METHOD BLD: NORMAL
EOSINOPHIL # BLD: 0.3 K/UL (ref 0–0.4)
EOSINOPHIL NFR BLD: 6 % (ref 0–7)
ERYTHROCYTE [DISTWIDTH] IN BLOOD BY AUTOMATED COUNT: 13.7 % (ref 11.5–14.5)
GLOBULIN SER CALC-MCNC: 4.1 G/DL (ref 2–4)
GLUCOSE SERPL-MCNC: 90 MG/DL (ref 65–100)
HCT VFR BLD AUTO: 44.5 % (ref 36.6–50.3)
HGB BLD-MCNC: 14.9 G/DL (ref 12.1–17)
IMM GRANULOCYTES # BLD AUTO: 0 K/UL (ref 0–0.04)
IMM GRANULOCYTES NFR BLD AUTO: 0 % (ref 0–0.5)
LYMPHOCYTES # BLD: 1 K/UL (ref 0.8–3.5)
LYMPHOCYTES NFR BLD: 18 % (ref 12–49)
MCH RBC QN AUTO: 30.7 PG (ref 26–34)
MCHC RBC AUTO-ENTMCNC: 33.5 G/DL (ref 30–36.5)
MCV RBC AUTO: 91.8 FL (ref 80–99)
MONOCYTES # BLD: 0.5 K/UL (ref 0–1)
MONOCYTES NFR BLD: 9 % (ref 5–13)
NEUTS SEG # BLD: 3.7 K/UL (ref 1.8–8)
NEUTS SEG NFR BLD: 66 % (ref 32–75)
NRBC # BLD: 0 K/UL (ref 0–0.01)
NRBC BLD-RTO: 0 PER 100 WBC
PLATELET # BLD AUTO: 156 K/UL (ref 150–400)
PMV BLD AUTO: 9.8 FL (ref 8.9–12.9)
POTASSIUM SERPL-SCNC: 4.1 MMOL/L (ref 3.5–5.1)
PROT SERPL-MCNC: 7 G/DL (ref 6.4–8.2)
RBC # BLD AUTO: 4.85 M/UL (ref 4.1–5.7)
SODIUM SERPL-SCNC: 139 MMOL/L (ref 136–145)
VANCOMYCIN SERPL-MCNC: 16.5 UG/ML
WBC # BLD AUTO: 5.6 K/UL (ref 4.1–11.1)

## 2024-05-19 PROCEDURE — 6370000000 HC RX 637 (ALT 250 FOR IP): Performed by: STUDENT IN AN ORGANIZED HEALTH CARE EDUCATION/TRAINING PROGRAM

## 2024-05-19 PROCEDURE — 6360000002 HC RX W HCPCS: Performed by: STUDENT IN AN ORGANIZED HEALTH CARE EDUCATION/TRAINING PROGRAM

## 2024-05-19 PROCEDURE — 94640 AIRWAY INHALATION TREATMENT: CPT

## 2024-05-19 PROCEDURE — 80053 COMPREHEN METABOLIC PANEL: CPT

## 2024-05-19 PROCEDURE — 85025 COMPLETE CBC W/AUTO DIFF WBC: CPT

## 2024-05-19 PROCEDURE — 2700000000 HC OXYGEN THERAPY PER DAY

## 2024-05-19 PROCEDURE — 2580000003 HC RX 258: Performed by: STUDENT IN AN ORGANIZED HEALTH CARE EDUCATION/TRAINING PROGRAM

## 2024-05-19 PROCEDURE — 36415 COLL VENOUS BLD VENIPUNCTURE: CPT

## 2024-05-19 PROCEDURE — 80202 ASSAY OF VANCOMYCIN: CPT

## 2024-05-19 PROCEDURE — 94761 N-INVAS EAR/PLS OXIMETRY MLT: CPT

## 2024-05-19 PROCEDURE — 1100000000 HC RM PRIVATE

## 2024-05-19 RX ORDER — BUDESONIDE 0.5 MG/2ML
0.5 INHALANT ORAL
Status: DISCONTINUED | OUTPATIENT
Start: 2024-05-19 | End: 2024-05-22 | Stop reason: HOSPADM

## 2024-05-19 RX ADMIN — SODIUM CHLORIDE, PRESERVATIVE FREE 10 ML: 5 INJECTION INTRAVENOUS at 10:03

## 2024-05-19 RX ADMIN — BUDESONIDE INHALATION 500 MCG: 0.5 SUSPENSION RESPIRATORY (INHALATION) at 19:36

## 2024-05-19 RX ADMIN — OXYCODONE HYDROCHLORIDE AND ACETAMINOPHEN 1 TABLET: 5; 325 TABLET ORAL at 11:25

## 2024-05-19 RX ADMIN — IPRATROPIUM BROMIDE 0.5 MG: 0.5 SOLUTION RESPIRATORY (INHALATION) at 19:36

## 2024-05-19 RX ADMIN — SODIUM CHLORIDE, PRESERVATIVE FREE 10 ML: 5 INJECTION INTRAVENOUS at 20:02

## 2024-05-19 RX ADMIN — CEFEPIME 2000 MG: 2 INJECTION, POWDER, FOR SOLUTION INTRAVENOUS at 11:31

## 2024-05-19 RX ADMIN — OXYCODONE HYDROCHLORIDE AND ACETAMINOPHEN 1 TABLET: 5; 325 TABLET ORAL at 06:25

## 2024-05-19 RX ADMIN — VANCOMYCIN HYDROCHLORIDE 1250 MG: 10 INJECTION, POWDER, LYOPHILIZED, FOR SOLUTION INTRAVENOUS at 19:57

## 2024-05-19 RX ADMIN — ARFORMOTEROL TARTRATE 15 MCG: 15 SOLUTION RESPIRATORY (INHALATION) at 07:37

## 2024-05-19 RX ADMIN — IPRATROPIUM BROMIDE 0.5 MG: 0.5 SOLUTION RESPIRATORY (INHALATION) at 14:49

## 2024-05-19 RX ADMIN — CEFEPIME 2000 MG: 2 INJECTION, POWDER, FOR SOLUTION INTRAVENOUS at 17:34

## 2024-05-19 RX ADMIN — ENOXAPARIN SODIUM 40 MG: 100 INJECTION SUBCUTANEOUS at 09:49

## 2024-05-19 RX ADMIN — OXYCODONE HYDROCHLORIDE AND ACETAMINOPHEN 1 TABLET: 5; 325 TABLET ORAL at 17:39

## 2024-05-19 RX ADMIN — SODIUM CHLORIDE: 9 INJECTION, SOLUTION INTRAVENOUS at 01:12

## 2024-05-19 RX ADMIN — IPRATROPIUM BROMIDE 0.5 MG: 0.5 SOLUTION RESPIRATORY (INHALATION) at 07:37

## 2024-05-19 RX ADMIN — CITALOPRAM HYDROBROMIDE 10 MG: 20 TABLET ORAL at 09:49

## 2024-05-19 RX ADMIN — VANCOMYCIN HYDROCHLORIDE 1250 MG: 10 INJECTION, POWDER, LYOPHILIZED, FOR SOLUTION INTRAVENOUS at 09:50

## 2024-05-19 RX ADMIN — ASPIRIN 325 MG: 325 TABLET ORAL at 09:48

## 2024-05-19 RX ADMIN — CEFEPIME 2000 MG: 2 INJECTION, POWDER, FOR SOLUTION INTRAVENOUS at 01:13

## 2024-05-19 RX ADMIN — ARFORMOTEROL TARTRATE 15 MCG: 15 SOLUTION RESPIRATORY (INHALATION) at 19:36

## 2024-05-19 ASSESSMENT — PAIN DESCRIPTION - DESCRIPTORS: DESCRIPTORS: ACHING

## 2024-05-19 ASSESSMENT — PAIN DESCRIPTION - ORIENTATION
ORIENTATION: LEFT
ORIENTATION: LEFT

## 2024-05-19 ASSESSMENT — PAIN SCALES - GENERAL
PAINLEVEL_OUTOF10: 10
PAINLEVEL_OUTOF10: 4
PAINLEVEL_OUTOF10: 4

## 2024-05-19 ASSESSMENT — PAIN DESCRIPTION - LOCATION
LOCATION: FOOT
LOCATION: FOOT

## 2024-05-19 NOTE — PROGRESS NOTES
His left foot still aches  CTA reviewed: prior LLE intervention in [place] (from retrograde pedal/PT approach) is down. He has a long segment SFA/pop occlusion.  Plan an attempt to open and stent from antegrade approach - posted for Monday. If unable to cross, will need bypass this admission - he has good quality visible GSV. He understands and agrees.

## 2024-05-19 NOTE — RT PROTOCOL NOTE
ADULT PROTOCOL: JET AEROSOL ASSESSMENT    Patient  Nasir Hatfield     73 y.o.   male     5/19/2024  2:51 PM    Breath Sounds Pre Procedure: Breath Sounds Pre-Tx CONG: Diminished                                  Breath Sounds Pre-Tx LLL: Diminished        Breath Sounds Pre-Tx RUL: Diminished        Breath Sounds Pre-Tx RML: Diminished        Breath Sounds Pre-Tx RLL: Diminished  Breath Sounds Post Procedure: Breath Sounds Post-Tx CONG: Diminished          Breath Sounds Post-Tx LLL: Diminished          Breath Sounds Post-Tx RUL: Diminished          Breath Sounds Post-Tx RML: Diminished          Breath Sounds Post-Tx RLL: Diminished                                       Heart Rate: Pre procedure Pre-Tx Pulse: 85           Post procedure Post-Tx Pulse: 88    Resp Rate: Pre procedure Pre-Tx Resps: 16           Post procedure Post-Tx Resps: 20      Oxygen: O2 Therapy: Oxygen   nasal cannula     Changed: Yes    SpO2:  SpO2: (!) 86 %   with Oxygen                Nebulizer Therapy: Current medications Medications: Ipratropium Bromide      Changed: Yes    Comments: Added budesonide to supplement home Trelegy inhaler.      Problem List:   Patient Active Problem List   Diagnosis    Acute respiratory failure (HCC)    Nonhealing surgical wound, sequela       Respiratory Therapist: Favio Art, RT

## 2024-05-19 NOTE — PLAN OF CARE
Problem: Respiratory - Adult  Goal: Achieves optimal ventilation and oxygenation  Outcome: Progressing     Problem: Discharge Planning  Goal: Discharge to home or other facility with appropriate resources  Outcome: Progressing     Problem: Pain  Goal: Verbalizes/displays adequate comfort level or baseline comfort level  Outcome: Progressing     Problem: Safety - Adult  Goal: Free from fall injury  Outcome: Progressing     Problem: ABCDS Injury Assessment  Goal: Absence of physical injury  Outcome: Progressing

## 2024-05-19 NOTE — PROGRESS NOTES
Pharmacy Antimicrobial Kinetic Dosing    Indication for Antimicrobials: SSTI - left foot wound, s/p great toe amputation 5 weeks ago     Current Regimen of Each Antimicrobial:  Vancomycin pharmacy to dose; Start Date ; Day #   Cefepime 2gm q 8; Start ; Day # 4    Previous Antimicrobial Therapy:  Vancomycin 1750mg x 1      Goal Level: Vancomycin -600    Date Dose & Interval Measured (mcg/mL) Predicted AUC    1000mg q 12  14.8 425    1250mg q 12 16.5 559           Significant Cultures:    blood: NG x 3 days    Labs:  Recent Labs     Units 24  0550 24  0214 24  0449 24  1826   CREATININE MG/DL 0.73 0.72 0.83 0.90   BUN MG/DL 15 22* 22* 24*   WBC K/uL 5.6 5.3 6.2 4.7     Temp (24hrs), Av °F (36.7 °C), Min:97.7 °F (36.5 °C), Max:98.2 °F (36.8 °C)    Conditions for Dosing Consideration: None    Creatinine Clearance (mL/min): Estimated Creatinine Clearance: 90 mL/min (based on SCr of 0.73 mg/dL).     Impression/Plan:   Vancomycin level resulted,  at steady state, continue current regimen  Cefepime dose okay   Podiatry and vascular surgery consulted   Antimicrobial stop date 7 days     Pharmacy will follow daily and adjust medications as appropriate for renal function and/or serum levels.    Thank you,  Darryl Ash [facility]

## 2024-05-19 NOTE — PROGRESS NOTES
Hospitalist Progress Note    NAME:   Nasir Hatfield   : 1950   MRN: [id number]     Date/Time: 2024 11:28 AM  Patient PCP: Annie Mcnair APRN - NP    Estimated discharge date:  Barriers: needs vascular , POSSIBLE BYPASS ON MONDAY     Assessment / Plan:  Left foot wound, s/p left great toe amputation   History of peripheral vascular disease -status post stenting on left lower extremity  Here - with several days increasing left foot pain at site of great toe amputation, performed approx 5 weeks ago by podiatry in [place], per pt. Increased pain, some erythema around site.   XR  on admission -  with cortical changes near first metatarsal - unable to exclude osteomyelitis.   S/p Vanc and Cefepime in the ED.   ESR 12 CRP 2.45  Blood cultures negative till now  RACHELLE-  Right side findings: Resting RACHELLE is 0.99. This is within the normal range. Resting TBI is 0.57. Moderately decreased resting right TBI.    Left side findings: Resting RACHELLE is 0.50. This is severely decreased.  Podiatry consulted -advised for vascular surgery consult.  Vascular was consulted -advised for CTA   CTA - ON  -   1.  Long segment occlusion of the left distal superficial femoral artery and  proximal popliteal artery.  2.  Bilateral distal anterior tibial artery occlusion.  3.  No significant aortoiliac stenosis.    Will continue broad spectrum coverage for now - Vanc, Cefepime   HE has been ambulating well even with the pain in the foot.  No active discharge was found, continue regular nursing care  Percocet ordered for pain management, with as needed bowel regimen-continue the same  Planned for surgery in am tomorrow - BYPASS VS STENT  NPO PAST MN     Severe COPD on home oxygen  Stable currently, on home O2 at 3L   Continue home inhaler     Active smoker  Patient is very much positive to giving up smoking.  She wanted nicotine patch.  At this 10 minutes spent in counseling regarding smoking cessation.     Depression  wheezing or rales.  No accessory muscle use.  Occasional coughs  CV:  Regular  rhythm,  No edema  GI:  Soft, Non distended, Non tender.  +Bowel sounds  Neurologic:  Alert and oriented X 3, normal speech  Psych:   Good insight. Not anxious nor agitated  Skin:  No rashes.  No jaundice  Left foot: Status post amputation at the MTP level-no active discharge however tenderness present to palpation..  The whole foot looks dusky.  No pulses palpable.    Reviewed most current lab test results and cultures  YES  Reviewed most current radiology test results   YES  Review and summation of old records today    NO  Reviewed patient's current orders and MAR    YES  PMH/SH reviewed - no change compared to H&P    Procedures: see electronic medical records for all procedures/Xrays and details which were not copied into this note but were reviewed prior to creation of Plan.      LABS:  I reviewed today's most current labs and imaging studies.  Pertinent labs include:  Recent Labs     05/17/24 0449 05/18/24  0214 05/19/24  0550   WBC 6.2 5.3 5.6   HGB 14.9 13.2 14.9   HCT 43.7 39.0 44.5    163 156     Recent Labs     05/17/24  0449 05/18/24  0214 05/19/24  0550    141 139   K 3.8 4.0 4.1    110* 108   CO2 29 26 25   GLUCOSE 86 86 90   BUN 22* 22* 15   CREATININE 0.83 0.72 0.73   CALCIUM 8.4* 8.2* 8.7   BILITOT 0.8 0.7 0.7   AST 16 17 21   ALT 11* 13 14       Signed: Danilo Zamorano MD

## 2024-05-20 ENCOUNTER — APPOINTMENT (OUTPATIENT)
Facility: HOSPITAL | Age: 74
DRG: 253 | End: 2024-05-20
Payer: MEDICARE

## 2024-05-20 ENCOUNTER — ANESTHESIA EVENT (OUTPATIENT)
Facility: HOSPITAL | Age: 74
DRG: 253 | End: 2024-05-20
Payer: MEDICARE

## 2024-05-20 ENCOUNTER — ANESTHESIA (OUTPATIENT)
Facility: HOSPITAL | Age: 74
DRG: 253 | End: 2024-05-20
Payer: MEDICARE

## 2024-05-20 LAB
ANION GAP SERPL CALC-SCNC: 4 MMOL/L (ref 5–15)
BUN SERPL-MCNC: 11 MG/DL (ref 6–20)
BUN/CREAT SERPL: 15 (ref 12–20)
CALCIUM SERPL-MCNC: 8.9 MG/DL (ref 8.5–10.1)
CHLORIDE SERPL-SCNC: 107 MMOL/L (ref 97–108)
CO2 SERPL-SCNC: 27 MMOL/L (ref 21–32)
CREAT SERPL-MCNC: 0.71 MG/DL (ref 0.7–1.3)
GLUCOSE SERPL-MCNC: 89 MG/DL (ref 65–100)
POTASSIUM SERPL-SCNC: 3.9 MMOL/L (ref 3.5–5.1)
SODIUM SERPL-SCNC: 138 MMOL/L (ref 136–145)

## 2024-05-20 PROCEDURE — 2580000003 HC RX 258: Performed by: SURGERY

## 2024-05-20 PROCEDURE — B41G1ZZ FLUOROSCOPY OF LEFT LOWER EXTREMITY ARTERIES USING LOW OSMOLAR CONTRAST: ICD-10-PCS | Performed by: SURGERY

## 2024-05-20 PROCEDURE — 6360000002 HC RX W HCPCS: Performed by: STUDENT IN AN ORGANIZED HEALTH CARE EDUCATION/TRAINING PROGRAM

## 2024-05-20 PROCEDURE — 36415 COLL VENOUS BLD VENIPUNCTURE: CPT

## 2024-05-20 PROCEDURE — 94760 N-INVAS EAR/PLS OXIMETRY 1: CPT

## 2024-05-20 PROCEDURE — 3600000007 HC SURGERY HYBRID BASE: Performed by: SURGERY

## 2024-05-20 PROCEDURE — C1725 CATH, TRANSLUMIN NON-LASER: HCPCS | Performed by: SURGERY

## 2024-05-20 PROCEDURE — C1769 GUIDE WIRE: HCPCS | Performed by: SURGERY

## 2024-05-20 PROCEDURE — 6360000002 HC RX W HCPCS: Performed by: SURGERY

## 2024-05-20 PROCEDURE — 3700000000 HC ANESTHESIA ATTENDED CARE: Performed by: SURGERY

## 2024-05-20 PROCEDURE — 80048 BASIC METABOLIC PNL TOTAL CA: CPT

## 2024-05-20 PROCEDURE — 94640 AIRWAY INHALATION TREATMENT: CPT

## 2024-05-20 PROCEDURE — 047N34Z DILATION OF LEFT POPLITEAL ARTERY WITH DRUG-ELUTING INTRALUMINAL DEVICE, PERCUTANEOUS APPROACH: ICD-10-PCS | Performed by: SURGERY

## 2024-05-20 PROCEDURE — C1894 INTRO/SHEATH, NON-LASER: HCPCS | Performed by: SURGERY

## 2024-05-20 PROCEDURE — 6360000002 HC RX W HCPCS: Performed by: NURSE ANESTHETIST, CERTIFIED REGISTERED

## 2024-05-20 PROCEDURE — C1887 CATHETER, GUIDING: HCPCS | Performed by: SURGERY

## 2024-05-20 PROCEDURE — 3600000017 HC SURGERY HYBRID ADDL 15MIN: Performed by: SURGERY

## 2024-05-20 PROCEDURE — 6360000002 HC RX W HCPCS: Performed by: ANESTHESIOLOGY

## 2024-05-20 PROCEDURE — 7100000000 HC PACU RECOVERY - FIRST 15 MIN: Performed by: SURGERY

## 2024-05-20 PROCEDURE — 3700000001 HC ADD 15 MINUTES (ANESTHESIA): Performed by: SURGERY

## 2024-05-20 PROCEDURE — 7100000001 HC PACU RECOVERY - ADDTL 15 MIN: Performed by: SURGERY

## 2024-05-20 PROCEDURE — 2709999900 HC NON-CHARGEABLE SUPPLY: Performed by: SURGERY

## 2024-05-20 PROCEDURE — 6370000000 HC RX 637 (ALT 250 FOR IP): Performed by: STUDENT IN AN ORGANIZED HEALTH CARE EDUCATION/TRAINING PROGRAM

## 2024-05-20 PROCEDURE — C1874 STENT, COATED/COV W/DEL SYS: HCPCS | Performed by: SURGERY

## 2024-05-20 PROCEDURE — C1876 STENT, NON-COA/NON-COV W/DEL: HCPCS | Performed by: SURGERY

## 2024-05-20 PROCEDURE — 2580000003 HC RX 258: Performed by: NURSE ANESTHETIST, CERTIFIED REGISTERED

## 2024-05-20 PROCEDURE — 6370000000 HC RX 637 (ALT 250 FOR IP): Performed by: SURGERY

## 2024-05-20 PROCEDURE — 2580000003 HC RX 258: Performed by: STUDENT IN AN ORGANIZED HEALTH CARE EDUCATION/TRAINING PROGRAM

## 2024-05-20 PROCEDURE — 1100000000 HC RM PRIVATE

## 2024-05-20 PROCEDURE — 047L34Z DILATION OF LEFT FEMORAL ARTERY WITH DRUG-ELUTING INTRALUMINAL DEVICE, PERCUTANEOUS APPROACH: ICD-10-PCS | Performed by: SURGERY

## 2024-05-20 PROCEDURE — 2700000000 HC OXYGEN THERAPY PER DAY

## 2024-05-20 PROCEDURE — A4217 STERILE WATER/SALINE, 500 ML: HCPCS | Performed by: SURGERY

## 2024-05-20 PROCEDURE — 94761 N-INVAS EAR/PLS OXIMETRY MLT: CPT

## 2024-05-20 PROCEDURE — 047U34Z DILATION OF LEFT PERONEAL ARTERY WITH DRUG-ELUTING INTRALUMINAL DEVICE, PERCUTANEOUS APPROACH: ICD-10-PCS | Performed by: SURGERY

## 2024-05-20 PROCEDURE — 2500000003 HC RX 250 WO HCPCS: Performed by: NURSE ANESTHETIST, CERTIFIED REGISTERED

## 2024-05-20 DEVICE — VIABAHN SX ENDO HEPARIN 18 RO 6MMX5CM 6FR 120CM CATH
Type: IMPLANTABLE DEVICE | Site: OTHER | Status: FUNCTIONAL
Brand: GORE VIABAHN ENDOPROSTHESIS WITH HEPARIN

## 2024-05-20 DEVICE — STENT PRB35-06-040-120 PROTEGE EF V07
Type: IMPLANTABLE DEVICE | Site: OTHER | Status: FUNCTIONAL
Brand: EVERFLEX™ PROTÉGÉ™ EVERFLEX™

## 2024-05-20 RX ORDER — ONDANSETRON 2 MG/ML
INJECTION INTRAMUSCULAR; INTRAVENOUS PRN
Status: DISCONTINUED | OUTPATIENT
Start: 2024-05-20 | End: 2024-05-20 | Stop reason: SDUPTHER

## 2024-05-20 RX ORDER — MIDAZOLAM HYDROCHLORIDE 1 MG/ML
INJECTION INTRAMUSCULAR; INTRAVENOUS PRN
Status: DISCONTINUED | OUTPATIENT
Start: 2024-05-20 | End: 2024-05-20 | Stop reason: SDUPTHER

## 2024-05-20 RX ORDER — ONDANSETRON 2 MG/ML
4 INJECTION INTRAMUSCULAR; INTRAVENOUS
Status: DISCONTINUED | OUTPATIENT
Start: 2024-05-20 | End: 2024-05-20 | Stop reason: HOSPADM

## 2024-05-20 RX ORDER — HYDROMORPHONE HYDROCHLORIDE 1 MG/ML
0.5 INJECTION, SOLUTION INTRAMUSCULAR; INTRAVENOUS; SUBCUTANEOUS EVERY 5 MIN PRN
Status: DISCONTINUED | OUTPATIENT
Start: 2024-05-20 | End: 2024-05-20 | Stop reason: HOSPADM

## 2024-05-20 RX ORDER — SUCCINYLCHOLINE/SOD CL,ISO/PF 200MG/10ML
SYRINGE (ML) INTRAVENOUS PRN
Status: DISCONTINUED | OUTPATIENT
Start: 2024-05-20 | End: 2024-05-20 | Stop reason: SDUPTHER

## 2024-05-20 RX ORDER — PHENYLEPHRINE HYDROCHLORIDE 10 MG/ML
INJECTION INTRAVENOUS PRN
Status: DISCONTINUED | OUTPATIENT
Start: 2024-05-20 | End: 2024-05-20 | Stop reason: SDUPTHER

## 2024-05-20 RX ORDER — FENTANYL CITRATE 50 UG/ML
50 INJECTION, SOLUTION INTRAMUSCULAR; INTRAVENOUS EVERY 5 MIN PRN
Status: DISCONTINUED | OUTPATIENT
Start: 2024-05-20 | End: 2024-05-20 | Stop reason: HOSPADM

## 2024-05-20 RX ORDER — ROCURONIUM BROMIDE 10 MG/ML
INJECTION, SOLUTION INTRAVENOUS PRN
Status: DISCONTINUED | OUTPATIENT
Start: 2024-05-20 | End: 2024-05-20 | Stop reason: SDUPTHER

## 2024-05-20 RX ORDER — HYDROMORPHONE HYDROCHLORIDE 2 MG/ML
INJECTION, SOLUTION INTRAMUSCULAR; INTRAVENOUS; SUBCUTANEOUS PRN
Status: DISCONTINUED | OUTPATIENT
Start: 2024-05-20 | End: 2024-05-20 | Stop reason: SDUPTHER

## 2024-05-20 RX ORDER — HEPARIN SODIUM 5000 [USP'U]/ML
INJECTION, SOLUTION INTRAVENOUS; SUBCUTANEOUS PRN
Status: DISCONTINUED | OUTPATIENT
Start: 2024-05-20 | End: 2024-05-20 | Stop reason: SDUPTHER

## 2024-05-20 RX ORDER — SODIUM CHLORIDE 0.9 % (FLUSH) 0.9 %
5-40 SYRINGE (ML) INJECTION PRN
Status: DISCONTINUED | OUTPATIENT
Start: 2024-05-20 | End: 2024-05-20 | Stop reason: HOSPADM

## 2024-05-20 RX ORDER — MIDAZOLAM HYDROCHLORIDE 5 MG/5ML
2 INJECTION, SOLUTION INTRAMUSCULAR; INTRAVENOUS
Status: DISCONTINUED | OUTPATIENT
Start: 2024-05-20 | End: 2024-05-20 | Stop reason: HOSPADM

## 2024-05-20 RX ORDER — DIPHENHYDRAMINE HYDROCHLORIDE 50 MG/ML
12.5 INJECTION INTRAMUSCULAR; INTRAVENOUS
Status: DISCONTINUED | OUTPATIENT
Start: 2024-05-20 | End: 2024-05-20 | Stop reason: HOSPADM

## 2024-05-20 RX ORDER — SODIUM CHLORIDE 0.9 % (FLUSH) 0.9 %
5-40 SYRINGE (ML) INJECTION EVERY 12 HOURS SCHEDULED
Status: DISCONTINUED | OUTPATIENT
Start: 2024-05-20 | End: 2024-05-20 | Stop reason: HOSPADM

## 2024-05-20 RX ORDER — SODIUM CHLORIDE, SODIUM LACTATE, POTASSIUM CHLORIDE, CALCIUM CHLORIDE 600; 310; 30; 20 MG/100ML; MG/100ML; MG/100ML; MG/100ML
INJECTION, SOLUTION INTRAVENOUS CONTINUOUS
Status: DISCONTINUED | OUTPATIENT
Start: 2024-05-20 | End: 2024-05-20 | Stop reason: HOSPADM

## 2024-05-20 RX ORDER — FENTANYL CITRATE 50 UG/ML
INJECTION, SOLUTION INTRAMUSCULAR; INTRAVENOUS PRN
Status: DISCONTINUED | OUTPATIENT
Start: 2024-05-20 | End: 2024-05-20 | Stop reason: SDUPTHER

## 2024-05-20 RX ORDER — MEPERIDINE HYDROCHLORIDE 25 MG/ML
12.5 INJECTION INTRAMUSCULAR; INTRAVENOUS; SUBCUTANEOUS EVERY 5 MIN PRN
Status: DISCONTINUED | OUTPATIENT
Start: 2024-05-20 | End: 2024-05-20 | Stop reason: HOSPADM

## 2024-05-20 RX ORDER — LIDOCAINE HYDROCHLORIDE 20 MG/ML
INJECTION, SOLUTION EPIDURAL; INFILTRATION; INTRACAUDAL; PERINEURAL PRN
Status: DISCONTINUED | OUTPATIENT
Start: 2024-05-20 | End: 2024-05-20 | Stop reason: SDUPTHER

## 2024-05-20 RX ORDER — HYDRALAZINE HYDROCHLORIDE 20 MG/ML
10 INJECTION INTRAMUSCULAR; INTRAVENOUS
Status: DISCONTINUED | OUTPATIENT
Start: 2024-05-20 | End: 2024-05-20 | Stop reason: HOSPADM

## 2024-05-20 RX ORDER — SODIUM CHLORIDE 9 MG/ML
INJECTION, SOLUTION INTRAVENOUS PRN
Status: DISCONTINUED | OUTPATIENT
Start: 2024-05-20 | End: 2024-05-20 | Stop reason: HOSPADM

## 2024-05-20 RX ORDER — PROCHLORPERAZINE EDISYLATE 5 MG/ML
5 INJECTION INTRAMUSCULAR; INTRAVENOUS
Status: DISCONTINUED | OUTPATIENT
Start: 2024-05-20 | End: 2024-05-20 | Stop reason: HOSPADM

## 2024-05-20 RX ORDER — SODIUM CHLORIDE, SODIUM LACTATE, POTASSIUM CHLORIDE, CALCIUM CHLORIDE 600; 310; 30; 20 MG/100ML; MG/100ML; MG/100ML; MG/100ML
INJECTION, SOLUTION INTRAVENOUS CONTINUOUS PRN
Status: DISCONTINUED | OUTPATIENT
Start: 2024-05-20 | End: 2024-05-20 | Stop reason: SDUPTHER

## 2024-05-20 RX ORDER — PROPOFOL 10 MG/ML
INJECTION, EMULSION INTRAVENOUS PRN
Status: DISCONTINUED | OUTPATIENT
Start: 2024-05-20 | End: 2024-05-20 | Stop reason: SDUPTHER

## 2024-05-20 RX ORDER — NALOXONE HYDROCHLORIDE 0.4 MG/ML
INJECTION, SOLUTION INTRAMUSCULAR; INTRAVENOUS; SUBCUTANEOUS PRN
Status: DISCONTINUED | OUTPATIENT
Start: 2024-05-20 | End: 2024-05-20 | Stop reason: HOSPADM

## 2024-05-20 RX ADMIN — ARFORMOTEROL TARTRATE 15 MCG: 15 SOLUTION RESPIRATORY (INHALATION) at 07:17

## 2024-05-20 RX ADMIN — ROCURONIUM BROMIDE 35 MG: 10 INJECTION INTRAVENOUS at 14:07

## 2024-05-20 RX ADMIN — VANCOMYCIN HYDROCHLORIDE 1250 MG: 10 INJECTION, POWDER, LYOPHILIZED, FOR SOLUTION INTRAVENOUS at 21:58

## 2024-05-20 RX ADMIN — HYDRALAZINE HYDROCHLORIDE 10 MG: 20 INJECTION INTRAMUSCULAR; INTRAVENOUS at 15:37

## 2024-05-20 RX ADMIN — ARFORMOTEROL TARTRATE 15 MCG: 15 SOLUTION RESPIRATORY (INHALATION) at 19:27

## 2024-05-20 RX ADMIN — SODIUM CHLORIDE, POTASSIUM CHLORIDE, SODIUM LACTATE AND CALCIUM CHLORIDE: 600; 310; 30; 20 INJECTION, SOLUTION INTRAVENOUS at 13:48

## 2024-05-20 RX ADMIN — CEFEPIME 2000 MG: 2 INJECTION, POWDER, FOR SOLUTION INTRAVENOUS at 18:27

## 2024-05-20 RX ADMIN — FENTANYL CITRATE 50 MCG: 50 INJECTION, SOLUTION INTRAMUSCULAR; INTRAVENOUS at 13:17

## 2024-05-20 RX ADMIN — PROPOFOL 150 MG: 10 INJECTION, EMULSION INTRAVENOUS at 13:52

## 2024-05-20 RX ADMIN — ACETAMINOPHEN 650 MG: 325 TABLET ORAL at 18:17

## 2024-05-20 RX ADMIN — ONDANSETRON HYDROCHLORIDE 4 MG: 2 INJECTION, SOLUTION INTRAMUSCULAR; INTRAVENOUS at 14:18

## 2024-05-20 RX ADMIN — VANCOMYCIN HYDROCHLORIDE 1250 MG: 10 INJECTION, POWDER, LYOPHILIZED, FOR SOLUTION INTRAVENOUS at 09:04

## 2024-05-20 RX ADMIN — CITALOPRAM HYDROBROMIDE 10 MG: 20 TABLET ORAL at 08:59

## 2024-05-20 RX ADMIN — OXYCODONE HYDROCHLORIDE AND ACETAMINOPHEN 1 TABLET: 5; 325 TABLET ORAL at 00:07

## 2024-05-20 RX ADMIN — OXYCODONE HYDROCHLORIDE AND ACETAMINOPHEN 1 TABLET: 5; 325 TABLET ORAL at 06:55

## 2024-05-20 RX ADMIN — MIDAZOLAM HYDROCHLORIDE 2 MG: 1 INJECTION, SOLUTION INTRAMUSCULAR; INTRAVENOUS at 13:16

## 2024-05-20 RX ADMIN — IPRATROPIUM BROMIDE 0.5 MG: 0.5 SOLUTION RESPIRATORY (INHALATION) at 19:31

## 2024-05-20 RX ADMIN — HYDROMORPHONE HYDROCHLORIDE 0.5 MG: 2 INJECTION INTRAMUSCULAR; INTRAVENOUS; SUBCUTANEOUS at 13:48

## 2024-05-20 RX ADMIN — LIDOCAINE HYDROCHLORIDE 100 MG: 20 INJECTION, SOLUTION EPIDURAL; INFILTRATION; INTRACAUDAL; PERINEURAL at 13:52

## 2024-05-20 RX ADMIN — CEFEPIME 2000 MG: 2 INJECTION, POWDER, FOR SOLUTION INTRAVENOUS at 01:51

## 2024-05-20 RX ADMIN — CEFEPIME 2000 MG: 2 INJECTION, POWDER, FOR SOLUTION INTRAVENOUS at 10:50

## 2024-05-20 RX ADMIN — HEPARIN SODIUM 5000 UNITS: 5000 INJECTION INTRAVENOUS; SUBCUTANEOUS at 14:16

## 2024-05-20 RX ADMIN — BUDESONIDE INHALATION 500 MCG: 0.5 SUSPENSION RESPIRATORY (INHALATION) at 07:17

## 2024-05-20 RX ADMIN — SODIUM CHLORIDE, PRESERVATIVE FREE 10 ML: 5 INJECTION INTRAVENOUS at 22:00

## 2024-05-20 RX ADMIN — SUGAMMADEX 150 MG: 100 INJECTION, SOLUTION INTRAVENOUS at 14:49

## 2024-05-20 RX ADMIN — Medication 100 MG: at 13:52

## 2024-05-20 RX ADMIN — BUDESONIDE INHALATION 500 MCG: 0.5 SUSPENSION RESPIRATORY (INHALATION) at 19:27

## 2024-05-20 RX ADMIN — PHENYLEPHRINE HYDROCHLORIDE 160 MCG: 10 INJECTION INTRAVENOUS at 14:05

## 2024-05-20 RX ADMIN — IPRATROPIUM BROMIDE 0.5 MG: 0.5 SOLUTION RESPIRATORY (INHALATION) at 07:10

## 2024-05-20 RX ADMIN — ROCURONIUM BROMIDE 5 MG: 10 INJECTION INTRAVENOUS at 13:52

## 2024-05-20 RX ADMIN — PHENYLEPHRINE HYDROCHLORIDE 40 MCG/MIN: 10 INJECTION INTRAVENOUS at 13:59

## 2024-05-20 ASSESSMENT — PAIN DESCRIPTION - LOCATION: LOCATION: HEAD

## 2024-05-20 ASSESSMENT — PAIN SCALES - GENERAL
PAINLEVEL_OUTOF10: 10
PAINLEVEL_OUTOF10: 9
PAINLEVEL_OUTOF10: 4
PAINLEVEL_OUTOF10: 0
PAINLEVEL_OUTOF10: 4

## 2024-05-20 ASSESSMENT — ENCOUNTER SYMPTOMS: SHORTNESS OF BREATH: 1

## 2024-05-20 NOTE — FLOWSHEET NOTE
05/20/24 1505   Handoff   Communication Given Transfer Handoff   Handoff Given To Gera PETERSON   Handoff Received From James ZULETA/ Dona RN   Handoff Communication Face to Face;At bedside   Time Handoff Given 1505

## 2024-05-20 NOTE — PROGRESS NOTES
Pt had a good night, eager to get through surgery and feeling better. Pt asks that we contact his daughter once we know a time on surgery as she would like to be present. No complaints or concerns at this time. Verbal and bedside shift report communicated to oncoming day shift nurse NICHOLAS Vanegas .

## 2024-05-20 NOTE — ANESTHESIA PRE PROCEDURE
History:  No past surgical history on file.    Social History:    Social History     Tobacco Use   • Smoking status: Light Smoker     Current packs/day: 0.25     Types: Cigarettes   • Smokeless tobacco: Not on file   Substance Use Topics   • Alcohol use: Yes     Alcohol/week: 16.0 standard drinks of alcohol                                Ready to quit: Not Answered  Counseling given: Not Answered      Vital Signs (Current):   Vitals:    05/20/24 0037 05/20/24 0655 05/20/24 0845 05/20/24 1242   BP:   (!) 151/84 (!) 164/100   Pulse:   83 85   Resp: 16 18 22 12   Temp:   97.9 °F (36.6 °C) 98.7 °F (37.1 °C)   TempSrc:   Oral Oral   SpO2:    94%   Weight:       Height:                                                  BP Readings from Last 3 Encounters:   05/20/24 (!) 164/100       NPO Status: Time of last liquid consumption: 0900                        Time of last solid consumption: 0900                        Date of last liquid consumption: 05/19/24                        Date of last solid food consumption: 05/19/24    BMI:   Wt Readings from Last 3 Encounters:   05/16/24 69.4 kg (153 lb)     Body mass index is 21.95 kg/m².    CBC:   Lab Results   Component Value Date/Time    WBC 5.6 05/19/2024 05:50 AM    RBC 4.85 05/19/2024 05:50 AM    HGB 14.9 05/19/2024 05:50 AM    HCT 44.5 05/19/2024 05:50 AM    MCV 91.8 05/19/2024 05:50 AM    RDW 13.7 05/19/2024 05:50 AM     05/19/2024 05:50 AM       CMP:   Lab Results   Component Value Date/Time     05/20/2024 12:07 AM    K 3.9 05/20/2024 12:07 AM     05/20/2024 12:07 AM    CO2 27 05/20/2024 12:07 AM    BUN 11 05/20/2024 12:07 AM    CREATININE 0.71 05/20/2024 12:07 AM    LABGLOM >90 05/20/2024 12:07 AM    GLUCOSE 89 05/20/2024 12:07 AM    CALCIUM 8.9 05/20/2024 12:07 AM    BILITOT 0.7 05/19/2024 05:50 AM    ALKPHOS 102 05/19/2024 05:50 AM    AST 21 05/19/2024 05:50 AM    ALT 14 05/19/2024 05:50 AM       POC Tests: No results for input(s): \"POCGLU\", \"POCNA\",

## 2024-05-20 NOTE — PROGRESS NOTES
Transportation arrived for patient being transferred to pre op /pacu. Partial dentures removed and placed with patient belongings. CHG bath performed. Pt has iv abx running with 2 hours left.

## 2024-05-20 NOTE — BRIEF OP NOTE
Brief Postoperative Note      Patient: Nasir Hatfield  YOB: 1950  MRN: [id number]    Date of Procedure: 5/20/2024    Pre-Op Diagnosis: Left SFA/popliteal occlusion    Post-Op Diagnosis: Same       Proc: 1. R CFA sheath placement w/ultrasound guidance            2. LLE arteriogram            3. 3rd level cath placement LLE            4. Left SFA/popliteal PTA/stent placement             5. Left peroneal artery PTA (3.0mm)                Surgeon(s):  Harris Lorenzo MD    Assistant:  First Assistant: Bethanie Ryan RN    Anesthesia: Other    Estimated Blood Loss (mL): Minimal    Complications: None    Implants:  Implant Name Type Inv. Item Serial No.  Lot No. LRB No. Used Action   GRAFT EVAR L150MM DIA5MM CATH L120CM DIA6FR 0.035IN HEP - [id number] Peripheral stents GRAFT EVAR L150MM DIA5MM CATH L120CM DIA6FR 0.035IN HEP [id number] [employer]WD  Left 1 Implanted   STENT PERIPH L5CM DIA6MM CATH L120CM 0.018IN FEM IL ART - [id number] Peripheral stents STENT PERIPH L5CM DIA6MM CATH L120CM 0.018IN FEM IL ART [id number] [employer]WD  Left 1 Implanted   STENT VASC SELF EXP 0.035IN 6X40MM [id number] LP EVERFLEX - [id number] Peripheral stents STENT VASC SELF EXP 0.035IN 6X40MM [id number] LP EVERFLEX  [employer] EV3-WD [id number] Left 1 Implanted         Findings: Excellent technical result.    Electronically signed by Harris Lorenzo MD on 5/20/2024 at 3:06 PM

## 2024-05-20 NOTE — PROGRESS NOTES
Bedside and Verbal shift change report given to Domingo RN (oncoming nurse) by Breana RN (offgoing nurse). Report included the following information Nurse Handoff Report, Recent Results, and Quality Measures.

## 2024-05-20 NOTE — FLOWSHEET NOTE
05/20/24 1648   Handoff   Communication Given Transfer Handoff   Handoff Given To Breana PETERSON   Handoff Received From Gera PETERSON   Handoff Communication Face to Face   Time Handoff Given 2552

## 2024-05-20 NOTE — PROGRESS NOTES
Pharmacy Antimicrobial Kinetic Dosing    Indication for Antimicrobials: SSTI - left foot wound, s/p great toe amputation 5 weeks ago     Current Regimen of Each Antimicrobial:  Vancomycin pharmacy to dose; Start Date ; Day # 5  Cefepime 2gm q 8; Start ; Day # 5    Previous Antimicrobial Therapy:  Vancomycin 1750mg x 1      Goal Level: Vancomycin -600    Date Dose & Interval Measured (mcg/mL) Predicted AUC    1000mg q 12  14.8 425    1250mg q 12 16.5 559           Significant Cultures:    blood: NG x 4 days    Labs:  Recent Labs     Units 24  0007 24  0550 24  0214   CREATININE MG/DL 0.71 0.73 0.72   BUN MG/DL 11 15 22*   WBC K/uL  --  5.6 5.3     Temp (24hrs), Av.1 °F (36.7 °C), Min:98.1 °F (36.7 °C), Max:98.1 °F (36.7 °C)    Conditions for Dosing Consideration: None    Creatinine Clearance (mL/min): Estimated Creatinine Clearance: 92 mL/min (based on SCr of 0.71 mg/dL).     Impression/Plan:   Continue vancomycin 1250 mg IV q12h for AUCss 550. Will check a random level tomorrow 0400.  Cefepime dose okay   Podiatry and vascular surgery following.    Antimicrobial stop date 7 days     Pharmacy will follow daily and adjust medications as appropriate for renal function and/or serum levels.    Thank you,  Sakshi Regan [facility]

## 2024-05-20 NOTE — PROGRESS NOTES
Hospitalist Progress Note    NAME:   Nasir Hatfield   : 1950   MRN: [id number]     Date/Time: 2024 12:34 PM  Patient PCP: Annie Mcnair APRN - DANA    Estimated discharge date:  Barriers: needs vascular , surgery today    Assessment / Plan:  Left foot wound, s/p left great toe amputation   History of peripheral vascular disease -status post stenting on left lower extremity  Here - with several days increasing left foot pain at site of great toe amputation, performed approx 5 weeks ago by podiatry in [place], per pt. Increased pain, some erythema around site.   XR  on admission -  with cortical changes near first metatarsal - unable to exclude osteomyelitis.   S/p Vanc and Cefepime in the ED.   ESR 12 CRP 2.45  Blood cultures negative till now  RACHELLE-  Right side findings: Resting RACHELLE is 0.99. This is within the normal range. Resting TBI is 0.57. Moderately decreased resting right TBI.    Left side findings: Resting RACHELLE is 0.50. This is severely decreased.  Podiatry consulted -advised for vascular surgery consult.  Vascular was consulted -advised for CTA   CTA - ON  -   1.  Long segment occlusion of the left distal superficial femoral artery and  proximal popliteal artery.  2.  Bilateral distal anterior tibial artery occlusion.  3.  No significant aortoiliac stenosis.    Will continue broad spectrum coverage for now - Vanc, Cefepime   HE has been ambulating well even with the pain in the foot.  No active discharge was found, continue regular nursing care  Percocet ordered for pain management, with as needed bowel regimen-continue the same  Plan for surgery today.  Start patient on regular diet for surgery.  Will need vascular clearance before discharge.    Severe COPD on home oxygen  Stable currently, on home O2 at 3L   Continue home inhaler     Active smoker  Patient is very much positive to giving up smoking.  She wanted nicotine patch.  At this 10 minutes spent in counseling

## 2024-05-20 NOTE — PROGRESS NOTES
ADULT PROTOCOL: JET AEROSOL ASSESSMENT    Patient  Nasir Hatfield     73 y.o.   male     5/20/2024  9:33 AM    Breath Sounds Pre Procedure: Breath Sounds Pre-Tx CONG: Rhonchi, Diminished                                  Breath Sounds Pre-Tx LLL: Rhonchi, Diminished        Breath Sounds Pre-Tx RUL: Rhonchi, Diminished        Breath Sounds Pre-Tx RML: Rhonchi, Diminished        Breath Sounds Pre-Tx RLL: Diminished  Breath Sounds Post Procedure: Breath Sounds Post-Tx CONG: Rhonchi, Diminished          Breath Sounds Post-Tx LLL: Rhonchi, Diminished          Breath Sounds Post-Tx RUL: Rhonchi, Diminished          Breath Sounds Post-Tx RML: Rhonchi, Diminished          Breath Sounds Post-Tx RLL: Diminished                                     Heart Rate: Pre procedure Pre-Tx Pulse: 85           Post procedure Post-Tx Pulse: 88    Resp Rate: Pre procedure Pre-Tx Resps: 16           Post procedure Post-Tx Resps: 20    Oxygen: O2 Therapy: Oxygen   nasal cannula     Changed: No    SpO2:  SpO2: (!) 86 %   with Oxygen                Nebulizer Therapy: Current medications Medications: Arformoterol, Budesonide      Changed: No    Smoking History:   Tobacco Use    Smoking status: Light Smoker       Current packs/day: 0.25       Types: Cigarettes    Smokeless tobacco: Not on file       Problem List:   Patient Active Problem List   Diagnosis    Acute respiratory failure (HCC)    Nonhealing surgical wound, sequela       Respiratory Therapist: Clif Crabtree, RT

## 2024-05-20 NOTE — ANESTHESIA POSTPROCEDURE EVALUATION
Department of Anesthesiology  Postprocedure Note    Patient: Nasir Hatfield  MRN: [id number]  YOB: 1950  Date of evaluation: 5/20/2024    Procedure Summary       Date: 05/20/24 Room [facility] MA[facility]    Anesthesia Start: 1348 Anesthesia Stop: 1505    Procedure: LEFT FEMORAL ANGIOGRAM BALLOON  ANGIOPLASTY AND STENTING (Left: Groin) Diagnosis:       Peripheral vascular disease, unspecified (HCC)      (Peripheral vascular disease, unspecified (HCC) [I73.9])    Providers: Harris Lorenzo MD Responsible Provider: Brad Zambrano DO    Anesthesia Type: General ASA Status: 3            Anesthesia Type: General    Carmel Phase I: Carmel Score: 8    Carmel Phase II:      Anesthesia Post Evaluation    Patient location during evaluation: PACU  Patient participation: complete - patient participated  Level of consciousness: awake and alert  Pain score: 0  Airway patency: patent  Nausea & Vomiting: no nausea  Cardiovascular status: hemodynamically stable  Respiratory status: acceptable  Hydration status: euvolemic  Comments: Seen, no complaints   Pain management: adequate    No notable events documented.

## 2024-05-21 ENCOUNTER — APPOINTMENT (OUTPATIENT)
Facility: HOSPITAL | Age: 74
DRG: 253 | End: 2024-05-21
Attending: STUDENT IN AN ORGANIZED HEALTH CARE EDUCATION/TRAINING PROGRAM
Payer: MEDICARE

## 2024-05-21 LAB
ANION GAP SERPL CALC-SCNC: 2 MMOL/L (ref 5–15)
BUN SERPL-MCNC: 18 MG/DL (ref 6–20)
BUN/CREAT SERPL: 18 (ref 12–20)
CALCIUM SERPL-MCNC: 9 MG/DL (ref 8.5–10.1)
CHLORIDE SERPL-SCNC: 105 MMOL/L (ref 97–108)
CO2 SERPL-SCNC: 30 MMOL/L (ref 21–32)
CREAT SERPL-MCNC: 1 MG/DL (ref 0.7–1.3)
CRP SERPL-MCNC: 4.48 MG/DL (ref 0–0.3)
GLUCOSE SERPL-MCNC: 90 MG/DL (ref 65–100)
POTASSIUM SERPL-SCNC: 4 MMOL/L (ref 3.5–5.1)
SODIUM SERPL-SCNC: 137 MMOL/L (ref 136–145)
VANCOMYCIN SERPL-MCNC: 24.7 UG/ML

## 2024-05-21 PROCEDURE — 36415 COLL VENOUS BLD VENIPUNCTURE: CPT

## 2024-05-21 PROCEDURE — 94640 AIRWAY INHALATION TREATMENT: CPT

## 2024-05-21 PROCEDURE — 80202 ASSAY OF VANCOMYCIN: CPT

## 2024-05-21 PROCEDURE — 1100000000 HC RM PRIVATE

## 2024-05-21 PROCEDURE — 6370000000 HC RX 637 (ALT 250 FOR IP): Performed by: SURGERY

## 2024-05-21 PROCEDURE — 2580000003 HC RX 258: Performed by: STUDENT IN AN ORGANIZED HEALTH CARE EDUCATION/TRAINING PROGRAM

## 2024-05-21 PROCEDURE — 93922 UPR/L XTREMITY ART 2 LEVELS: CPT

## 2024-05-21 PROCEDURE — 80048 BASIC METABOLIC PNL TOTAL CA: CPT

## 2024-05-21 PROCEDURE — 94760 N-INVAS EAR/PLS OXIMETRY 1: CPT

## 2024-05-21 PROCEDURE — 2700000000 HC OXYGEN THERAPY PER DAY

## 2024-05-21 PROCEDURE — 6360000002 HC RX W HCPCS: Performed by: SURGERY

## 2024-05-21 PROCEDURE — 94761 N-INVAS EAR/PLS OXIMETRY MLT: CPT

## 2024-05-21 PROCEDURE — 6360000002 HC RX W HCPCS: Performed by: STUDENT IN AN ORGANIZED HEALTH CARE EDUCATION/TRAINING PROGRAM

## 2024-05-21 PROCEDURE — 86140 C-REACTIVE PROTEIN: CPT

## 2024-05-21 PROCEDURE — 2580000003 HC RX 258: Performed by: SURGERY

## 2024-05-21 RX ORDER — ASPIRIN 81 MG/1
81 TABLET, CHEWABLE ORAL DAILY
Status: DISCONTINUED | OUTPATIENT
Start: 2024-05-21 | End: 2024-05-22 | Stop reason: HOSPADM

## 2024-05-21 RX ORDER — CLOPIDOGREL BISULFATE 75 MG/1
75 TABLET ORAL DAILY
Status: DISCONTINUED | OUTPATIENT
Start: 2024-05-21 | End: 2024-05-22 | Stop reason: HOSPADM

## 2024-05-21 RX ADMIN — IPRATROPIUM BROMIDE 0.5 MG: 0.5 SOLUTION RESPIRATORY (INHALATION) at 13:44

## 2024-05-21 RX ADMIN — OXYCODONE HYDROCHLORIDE AND ACETAMINOPHEN 1 TABLET: 5; 325 TABLET ORAL at 02:39

## 2024-05-21 RX ADMIN — CEFEPIME 2000 MG: 2 INJECTION, POWDER, FOR SOLUTION INTRAVENOUS at 02:36

## 2024-05-21 RX ADMIN — ARFORMOTEROL TARTRATE 15 MCG: 15 SOLUTION RESPIRATORY (INHALATION) at 09:00

## 2024-05-21 RX ADMIN — OXYCODONE HYDROCHLORIDE AND ACETAMINOPHEN 1 TABLET: 5; 325 TABLET ORAL at 17:51

## 2024-05-21 RX ADMIN — SODIUM CHLORIDE, PRESERVATIVE FREE 10 ML: 5 INJECTION INTRAVENOUS at 09:20

## 2024-05-21 RX ADMIN — IPRATROPIUM BROMIDE 0.5 MG: 0.5 SOLUTION RESPIRATORY (INHALATION) at 08:55

## 2024-05-21 RX ADMIN — CITALOPRAM HYDROBROMIDE 10 MG: 20 TABLET ORAL at 09:12

## 2024-05-21 RX ADMIN — CEFEPIME 2000 MG: 2 INJECTION, POWDER, FOR SOLUTION INTRAVENOUS at 09:17

## 2024-05-21 RX ADMIN — ENOXAPARIN SODIUM 40 MG: 100 INJECTION SUBCUTANEOUS at 09:13

## 2024-05-21 RX ADMIN — ASPIRIN 81 MG CHEWABLE TABLET 81 MG: 81 TABLET CHEWABLE at 09:12

## 2024-05-21 RX ADMIN — ACETAMINOPHEN 650 MG: 325 TABLET ORAL at 16:11

## 2024-05-21 RX ADMIN — BUDESONIDE INHALATION 500 MCG: 0.5 SUSPENSION RESPIRATORY (INHALATION) at 09:00

## 2024-05-21 RX ADMIN — CLOPIDOGREL BISULFATE 75 MG: 75 TABLET ORAL at 09:12

## 2024-05-21 RX ADMIN — SODIUM CHLORIDE, PRESERVATIVE FREE 10 ML: 5 INJECTION INTRAVENOUS at 21:02

## 2024-05-21 RX ADMIN — CEFEPIME 2000 MG: 2 INJECTION, POWDER, FOR SOLUTION INTRAVENOUS at 17:50

## 2024-05-21 ASSESSMENT — PAIN DESCRIPTION - DESCRIPTORS
DESCRIPTORS: ACHING
DESCRIPTORS: ACHING
DESCRIPTORS: THROBBING

## 2024-05-21 ASSESSMENT — PAIN SCALES - GENERAL
PAINLEVEL_OUTOF10: 0
PAINLEVEL_OUTOF10: 0
PAINLEVEL_OUTOF10: 6
PAINLEVEL_OUTOF10: 4
PAINLEVEL_OUTOF10: 3

## 2024-05-21 ASSESSMENT — PAIN DESCRIPTION - LOCATION
LOCATION: FOOT
LOCATION: HEAD
LOCATION: FOOT

## 2024-05-21 ASSESSMENT — PAIN DESCRIPTION - ORIENTATION
ORIENTATION: LEFT
ORIENTATION: LEFT

## 2024-05-21 NOTE — PROGRESS NOTES
Groin site looks good  L foot hot  Will re-check RACHELLE  Defer left foot surgical decisions to Dr Chisholm

## 2024-05-21 NOTE — PROGRESS NOTES
Hospitalist Progress Note    NAME:   Nasir Hatfield   : 1950   MRN: [id number]     Date/Time: 2024 12:02 PM  Patient PCP: Annie Mcnair APRN - DANA    Estimated discharge date:  Barriers: Follow-up RACHELLE ,follow-up podiatry, may need surgical debridement versus long-term IV antibiotics    Assessment / Plan:  Left foot wound, s/p left great toe amputation   Left superficial femoral artery occlusion, bilateral distal anterior tibial artery occlusion  History of peripheral vascular disease -status post stenting on left lower extremity  Here - with several days increasing left foot pain at site of great toe amputation, performed approx 5 weeks ago by podiatry in [place], per pt. Increased pain, some erythema around site.   XR  on admission -  with cortical changes near first metatarsal - unable to exclude osteomyelitis.   S/p Vanc and Cefepime in the ED.   ESR 12 CRP 2.45  Blood cultures negative till now  RACHELLE done on 2024   Right side findings: Resting RACHELLE is 0.99. This is within the normal range. Resting TBI is 0.57. Moderately decreased resting right TBI.  Left side findings: Resting RACHELLE is 0.50. This is severely decreased.    CTA - ON  -   1.  Long segment occlusion of the left distal superficial femoral artery and  proximal popliteal artery.  2.  Bilateral distal anterior tibial artery occlusion.  3.  No significant aortoiliac stenosis.    Patient is status post-right CFA sheath placement, left SFA popliteal posterior tibial artery stent placement, left peroneal PTA  Vascular surgery would like to get another AB I.  Podiatry reconsulted today to see if patient needs debridement or just IV antibiotics  Discussed with case management on rounds  Continue pain management and bowel regimen  Continue IV antibiotics for now    Severe COPD on home oxygen  Stable currently, on home O2 at 3L   Continue home inhaler     Active smoker  Patient is very much positive to giving up smoking.  She  wanted nicotine patch.  At this 10 minutes spent in counseling regarding smoking cessation.     Depression / anxiety  Continue celexa         Medical Decision Making:   I personally reviewed labs: CBC BMP  I personally reviewed imaging: None for today  I personally reviewed EKG: N/A  Toxic drug monitoring: Renal function while on vancomycin  Discussed case with: Patient      Code Status: FULL CODE  DVT Prophylaxis: lovenox       Subjective:     Chief Complaint / Reason for Physician Visit  Patient currently admitted and treated for left great toe nonhealing wound.  He also  has peripheral vascular disease.    Labs and chart reviewed patient examined overnight events noted.  He mentioned he has been having on and off pain in his left foot.      Objective:     VITALS:   Last 24hrs VS reviewed since prior progress note. Most recent are:  Patient Vitals for the past 24 hrs:   BP Temp Temp src Pulse Resp SpO2   05/21/24 0855 -- -- -- -- -- 95 %   05/21/24 0814 (!) 148/85 99 °F (37.2 °C) Oral 80 20 90 %   05/21/24 0309 -- -- -- -- 14 --   05/21/24 0239 -- -- -- -- 14 --   05/20/24 1943 129/67 97.8 °F (36.6 °C) Oral 85 18 91 %   05/20/24 1700 (!) 160/90 97.7 °F (36.5 °C) -- 82 18 91 %   05/20/24 1645 (!) 160/76 -- -- 80 17 93 %   05/20/24 1630 (!) 148/86 98.1 °F (36.7 °C) -- 80 17 92 %   05/20/24 1615 (!) 147/90 -- -- 81 19 97 %   05/20/24 1600 (!) 167/101 -- -- 83 18 97 %   05/20/24 1545 (!) 185/111 -- -- 92 19 96 %   05/20/24 1530 (!) 183/103 -- -- 96 13 97 %   05/20/24 1525 (!) 173/102 -- -- 90 15 98 %   05/20/24 1520 (!) 166/88 -- -- 87 14 99 %   05/20/24 1515 (!) 155/96 -- -- 86 13 97 %   05/20/24 1510 (!) 154/96 -- -- 86 13 --   05/20/24 1506 (!) 145/89 98.3 °F (36.8 °C) Oral 83 12 99 %   05/20/24 1505 (!) 145/89 -- -- 84 13 98 %   05/20/24 1340 (!) 147/86 -- -- 78 15 97 %   05/20/24 1338 -- -- -- -- -- 97 %   05/20/24 1335 (!) 150/89 -- -- 77 13 95 %   05/20/24 1331 -- -- -- -- -- 94 %   05/20/24 1329 (!) 143/83 --  -- 79 16 92 %   05/20/24 1325 (!) 151/93 -- -- 78 15 91 %   05/20/24 1320 -- -- -- 72 14 92 %   05/20/24 1315 -- -- -- 78 14 92 %   05/20/24 1242 (!) 164/100 98.7 °F (37.1 °C) Oral 85 12 94 %         Intake/Output Summary (Last 24 hours) at 5/21/2024 1202  Last data filed at 5/21/2024 0452  Gross per 24 hour   Intake 1200 ml   Output 1010 ml   Net 190 ml          I had a face to face encounter and independently examined this patient on 5/21/2024, as outlined below:  PHYSICAL EXAM:  General: Alert, cooperative.  On 3 L of oxygen via nasal cannula  EENT:  EOMI. Anicteric sclerae.  Resp:  CTA bilaterally, no wheezing or rales.  No accessory muscle use.  Occasional coughs  CV:  Regular  rhythm,  No edema  GI:  Soft, Non distended, Non tender.  +Bowel sounds  Neurologic:  Alert and oriented X 3, normal speech  Psych:   Good insight. Not anxious nor agitated  Skin:  No rashes.  No jaundice  Left foot: Status post amputation at the MTP level-no active discharge however tenderness present to palpation.  Posterior tibial artery palpable on the left.    Reviewed most current lab test results and cultures  YES  Reviewed most current radiology test results   YES  Review and summation of old records today    NO  Reviewed patient's current orders and MAR    YES  PMH/SH reviewed - no change compared to H&P    Procedures: see electronic medical records for all procedures/Xrays and details which were not copied into this note but were reviewed prior to creation of Plan.      LABS:  I reviewed today's most current labs and imaging studies.  Pertinent labs include:  Recent Labs     05/19/24  0550   WBC 5.6   HGB 14.9   HCT 44.5        Recent Labs     05/19/24  0550 05/20/24  0007 05/21/24  0241    138 137   K 4.1 3.9 4.0    107 105   CO2 25 27 30   GLUCOSE 90 89 90   BUN 15 11 18   CREATININE 0.73 0.71 1.00   CALCIUM 8.7 8.9 9.0   BILITOT 0.7  --   --    AST 21  --   --    ALT 14  --   --        Signed: Danilo

## 2024-05-21 NOTE — PLAN OF CARE
Problem: Respiratory - Adult  Goal: Achieves optimal ventilation and oxygenation  5/20/2024 2339 by Domingo Paz, RN  Outcome: Progressing  5/20/2024 1928 by Sherry Oglesby RCP  Outcome: Progressing     Problem: Discharge Planning  Goal: Discharge to home or other facility with appropriate resources  Outcome: Progressing     Problem: Pain  Goal: Verbalizes/displays adequate comfort level or baseline comfort level  Outcome: Progressing     Problem: Safety - Adult  Goal: Free from fall injury  Outcome: Progressing     Problem: ABCDS Injury Assessment  Goal: Absence of physical injury  Outcome: Progressing

## 2024-05-21 NOTE — PLAN OF CARE
Problem: Respiratory - Adult  Goal: Achieves optimal ventilation and oxygenation  5/21/2024 1244 by Temitope Mendenhall RN  Outcome: Progressing  5/21/2024 0902 by Kelsey Hernandez RT  Outcome: Progressing  5/20/2024 2339 by Domingo Paz RN  Outcome: Progressing     Problem: Discharge Planning  Goal: Discharge to home or other facility with appropriate resources  5/21/2024 1244 by Temitope Mendenhall RN  Outcome: Progressing  5/20/2024 2339 by Domingo Paz RN  Outcome: Progressing     Problem: Pain  Goal: Verbalizes/displays adequate comfort level or baseline comfort level  5/21/2024 1244 by Temitope Mendenhall RN  Outcome: Progressing  5/20/2024 2339 by Domingo Paz RN  Outcome: Progressing     Problem: Safety - Adult  Goal: Free from fall injury  5/21/2024 1244 by Temitope Mendenhall RN  Outcome: Progressing  5/20/2024 2339 by Domingo Paz RN  Outcome: Progressing     Problem: ABCDS Injury Assessment  Goal: Absence of physical injury  5/21/2024 1244 by Temitope Mendenhall RN  Outcome: Progressing  5/20/2024 2339 by Domingo Paz RN  Outcome: Progressing

## 2024-05-21 NOTE — PROGRESS NOTES
Pharmacy Antimicrobial Kinetic Dosing    Indication for Antimicrobials: SSTI - left foot wound, s/p great toe amputation 5 weeks ago     Current Regimen of Each Antimicrobial:  Vancomycin pharmacy to dose; Start Date ; Day # 6  Cefepime 2gm q 8; Start ; Day # 6    Previous Antimicrobial Therapy:  Vancomycin 1750mg x 1      Goal Level: Vancomycin -600    Date Dose & Interval Measured (mcg/mL) Predicted AUC    1000mg q 12  14.8 425    1250mg q 12 16.5 559     1250mg q 12  24.7 722     Significant Cultures:    blood: NG x 4 days    Labs:  Recent Labs     Units 24  0241 24  0007 24  0550   CREATININE MG/DL 1.00 0.71 0.73   BUN MG/DL 18 11 15   WBC K/uL  --   --  5.6     Temp (24hrs), Av.1 °F (36.7 °C), Min:97.7 °F (36.5 °C), Max:98.7 °F (37.1 °C)    Conditions for Dosing Consideration: None    Creatinine Clearance (mL/min): Estimated Creatinine Clearance: 66 mL/min (based on SCr of 1 mg/dL).     Impression/Plan:   Scr increase and supratherapeutic Vanco level - HOLD vanco and recheck level tomorrow am  Cefepime dose okay   Podiatry and vascular surgery following.    Antimicrobial stop date 7 days     Pharmacy will follow daily and adjust medications as appropriate for renal function and/or serum levels.    Thank you,  Diane Landers [facility]

## 2024-05-21 NOTE — CARE COORDINATION
Transition of Care Plan:    RUR: 7% Low Risk  Prior Level of Functioning: Independent with ADL's  Disposition: Home  Follow up appointments: PCP follow up   DME needed: N/A  Transportation at discharge: Pt will transport himself  IM/IMM Medicare/ letter given: 2 IM Needed prior  Is patient a  and connected with [facility]? N/A   Caregiver Contact:   Skye Turk (Child)  [phone] (Mobile)   Discharge Caregiver contacted prior to discharge? Yes by pt  Care Conference needed? N/A  Barriers to discharge:  Medical Clearance    Initial Note: Chart Reviewed: Pt pending medical clearance for d/c. Pt may need surgical debridement versus long-term IV antibiotics. CM will continue to follow.     BIB Marrero,  [phone]

## 2024-05-22 VITALS
HEART RATE: 44 BPM | TEMPERATURE: 98.2 F | OXYGEN SATURATION: 89 % | SYSTOLIC BLOOD PRESSURE: 152 MMHG | RESPIRATION RATE: 19 BRPM | DIASTOLIC BLOOD PRESSURE: 85 MMHG | BODY MASS INDEX: 21.9 KG/M2 | HEIGHT: 70 IN | WEIGHT: 153 LBS

## 2024-05-22 LAB
ANION GAP SERPL CALC-SCNC: 4 MMOL/L (ref 5–15)
BACTERIA SPEC CULT: NORMAL
BACTERIA SPEC CULT: NORMAL
BUN SERPL-MCNC: 20 MG/DL (ref 6–20)
BUN/CREAT SERPL: 24 (ref 12–20)
CALCIUM SERPL-MCNC: 8.5 MG/DL (ref 8.5–10.1)
CHLORIDE SERPL-SCNC: 105 MMOL/L (ref 97–108)
CO2 SERPL-SCNC: 30 MMOL/L (ref 21–32)
CREAT SERPL-MCNC: 0.84 MG/DL (ref 0.7–1.3)
ECHO BSA: 1.85 M2
GLUCOSE BLD STRIP.AUTO-MCNC: 99 MG/DL (ref 65–117)
GLUCOSE SERPL-MCNC: 94 MG/DL (ref 65–100)
POTASSIUM SERPL-SCNC: 3.9 MMOL/L (ref 3.5–5.1)
SERVICE CMNT-IMP: NORMAL
SODIUM SERPL-SCNC: 139 MMOL/L (ref 136–145)
VANCOMYCIN SERPL-MCNC: 8.6 UG/ML
VAS LEFT ABI: 1
VAS LEFT DORSALIS PEDIS BP: 145 MMHG
VAS LEFT PTA BP: 155 MMHG
VAS RIGHT ABI: 1.11
VAS RIGHT ARM BP: 155 MMHG
VAS RIGHT DORSALIS PEDIS BP: 172 MMHG
VAS RIGHT PTA BP: 165 MMHG
VAS RIGHT TBI: 0.93
VAS RIGHT TOE PRESSURE: 144 MMHG

## 2024-05-22 PROCEDURE — 6360000002 HC RX W HCPCS: Performed by: SURGERY

## 2024-05-22 PROCEDURE — 36415 COLL VENOUS BLD VENIPUNCTURE: CPT

## 2024-05-22 PROCEDURE — 88307 TISSUE EXAM BY PATHOLOGIST: CPT

## 2024-05-22 PROCEDURE — 82962 GLUCOSE BLOOD TEST: CPT

## 2024-05-22 PROCEDURE — 2709999900 HC NON-CHARGEABLE SUPPLY: Performed by: PODIATRIST

## 2024-05-22 PROCEDURE — 88311 DECALCIFY TISSUE: CPT

## 2024-05-22 PROCEDURE — 87205 SMEAR GRAM STAIN: CPT

## 2024-05-22 PROCEDURE — 6370000000 HC RX 637 (ALT 250 FOR IP): Performed by: SURGERY

## 2024-05-22 PROCEDURE — 6370000000 HC RX 637 (ALT 250 FOR IP): Performed by: PODIATRIST

## 2024-05-22 PROCEDURE — 94761 N-INVAS EAR/PLS OXIMETRY MLT: CPT

## 2024-05-22 PROCEDURE — 2700000000 HC OXYGEN THERAPY PER DAY

## 2024-05-22 PROCEDURE — 3600000002 HC SURGERY LEVEL 2 BASE: Performed by: PODIATRIST

## 2024-05-22 PROCEDURE — 2580000003 HC RX 258: Performed by: PODIATRIST

## 2024-05-22 PROCEDURE — 0QBP0ZX EXCISION OF LEFT METATARSAL, OPEN APPROACH, DIAGNOSTIC: ICD-10-PCS | Performed by: PODIATRIST

## 2024-05-22 PROCEDURE — 6360000002 HC RX W HCPCS: Performed by: PODIATRIST

## 2024-05-22 PROCEDURE — 6360000002 HC RX W HCPCS: Performed by: STUDENT IN AN ORGANIZED HEALTH CARE EDUCATION/TRAINING PROGRAM

## 2024-05-22 PROCEDURE — 2500000003 HC RX 250 WO HCPCS: Performed by: PODIATRIST

## 2024-05-22 PROCEDURE — 94760 N-INVAS EAR/PLS OXIMETRY 1: CPT

## 2024-05-22 PROCEDURE — 80048 BASIC METABOLIC PNL TOTAL CA: CPT

## 2024-05-22 PROCEDURE — 2580000003 HC RX 258: Performed by: STUDENT IN AN ORGANIZED HEALTH CARE EDUCATION/TRAINING PROGRAM

## 2024-05-22 PROCEDURE — 87070 CULTURE OTHR SPECIMN AEROBIC: CPT

## 2024-05-22 PROCEDURE — 3600000012 HC SURGERY LEVEL 2 ADDTL 15MIN: Performed by: PODIATRIST

## 2024-05-22 PROCEDURE — 87075 CULTR BACTERIA EXCEPT BLOOD: CPT

## 2024-05-22 PROCEDURE — 94640 AIRWAY INHALATION TREATMENT: CPT

## 2024-05-22 PROCEDURE — 2580000003 HC RX 258: Performed by: SURGERY

## 2024-05-22 PROCEDURE — 80202 ASSAY OF VANCOMYCIN: CPT

## 2024-05-22 RX ORDER — SODIUM CHLORIDE 0.9 % (FLUSH) 0.9 %
5-40 SYRINGE (ML) INJECTION PRN
Status: CANCELLED | OUTPATIENT
Start: 2024-05-22

## 2024-05-22 RX ORDER — SODIUM CHLORIDE 9 MG/ML
INJECTION, SOLUTION INTRAVENOUS PRN
Status: CANCELLED | OUTPATIENT
Start: 2024-05-22

## 2024-05-22 RX ORDER — IPRATROPIUM BROMIDE AND ALBUTEROL SULFATE 2.5; .5 MG/3ML; MG/3ML
1 SOLUTION RESPIRATORY (INHALATION)
Status: CANCELLED | OUTPATIENT
Start: 2024-05-22 | End: 2024-05-23

## 2024-05-22 RX ORDER — BUPIVACAINE HYDROCHLORIDE 5 MG/ML
INJECTION, SOLUTION PERINEURAL PRN
Status: DISCONTINUED | OUTPATIENT
Start: 2024-05-22 | End: 2024-05-22 | Stop reason: ALTCHOICE

## 2024-05-22 RX ORDER — PROCHLORPERAZINE EDISYLATE 5 MG/ML
5 INJECTION INTRAMUSCULAR; INTRAVENOUS
Status: CANCELLED | OUTPATIENT
Start: 2024-05-22 | End: 2024-05-23

## 2024-05-22 RX ORDER — DOXYCYCLINE HYCLATE 100 MG
100 TABLET ORAL 2 TIMES DAILY
Qty: 20 TABLET | Refills: 0 | Status: SHIPPED | OUTPATIENT
Start: 2024-05-22 | End: 2024-06-01

## 2024-05-22 RX ORDER — NALOXONE HYDROCHLORIDE 0.4 MG/ML
INJECTION, SOLUTION INTRAMUSCULAR; INTRAVENOUS; SUBCUTANEOUS PRN
Status: CANCELLED | OUTPATIENT
Start: 2024-05-22

## 2024-05-22 RX ORDER — FENTANYL CITRATE 50 UG/ML
25 INJECTION, SOLUTION INTRAMUSCULAR; INTRAVENOUS EVERY 5 MIN PRN
Status: CANCELLED | OUTPATIENT
Start: 2024-05-22

## 2024-05-22 RX ORDER — GLUCAGON 1 MG/ML
1 KIT INJECTION PRN
Status: CANCELLED | OUTPATIENT
Start: 2024-05-22

## 2024-05-22 RX ORDER — CLOPIDOGREL BISULFATE 75 MG/1
75 TABLET ORAL DAILY
Qty: 30 TABLET | Refills: 0 | Status: SHIPPED | OUTPATIENT
Start: 2024-05-23

## 2024-05-22 RX ORDER — LIDOCAINE HYDROCHLORIDE AND EPINEPHRINE 10; 10 MG/ML; UG/ML
INJECTION, SOLUTION INFILTRATION; PERINEURAL PRN
Status: DISCONTINUED | OUTPATIENT
Start: 2024-05-22 | End: 2024-05-22 | Stop reason: ALTCHOICE

## 2024-05-22 RX ORDER — SODIUM CHLORIDE 0.9 % (FLUSH) 0.9 %
5-40 SYRINGE (ML) INJECTION EVERY 12 HOURS SCHEDULED
Status: CANCELLED | OUTPATIENT
Start: 2024-05-22

## 2024-05-22 RX ORDER — SODIUM CHLORIDE, SODIUM LACTATE, POTASSIUM CHLORIDE, CALCIUM CHLORIDE 600; 310; 30; 20 MG/100ML; MG/100ML; MG/100ML; MG/100ML
INJECTION, SOLUTION INTRAVENOUS CONTINUOUS
Status: DISCONTINUED | OUTPATIENT
Start: 2024-05-22 | End: 2024-05-22 | Stop reason: HOSPADM

## 2024-05-22 RX ORDER — ONDANSETRON 2 MG/ML
4 INJECTION INTRAMUSCULAR; INTRAVENOUS
Status: CANCELLED | OUTPATIENT
Start: 2024-05-22 | End: 2024-05-23

## 2024-05-22 RX ORDER — DEXTROSE MONOHYDRATE 100 MG/ML
INJECTION, SOLUTION INTRAVENOUS CONTINUOUS PRN
Status: CANCELLED | OUTPATIENT
Start: 2024-05-22

## 2024-05-22 RX ORDER — OXYCODONE HYDROCHLORIDE AND ACETAMINOPHEN 5; 325 MG/1; MG/1
1 TABLET ORAL EVERY 6 HOURS PRN
Qty: 12 TABLET | Refills: 0 | Status: SHIPPED | OUTPATIENT
Start: 2024-05-22 | End: 2024-05-25

## 2024-05-22 RX ORDER — HYDROMORPHONE HYDROCHLORIDE 1 MG/ML
0.5 INJECTION, SOLUTION INTRAMUSCULAR; INTRAVENOUS; SUBCUTANEOUS EVERY 5 MIN PRN
Status: CANCELLED | OUTPATIENT
Start: 2024-05-22

## 2024-05-22 RX ADMIN — CLOPIDOGREL BISULFATE 75 MG: 75 TABLET ORAL at 10:05

## 2024-05-22 RX ADMIN — VANCOMYCIN HYDROCHLORIDE 1000 MG: 1 INJECTION, POWDER, LYOPHILIZED, FOR SOLUTION INTRAVENOUS at 05:36

## 2024-05-22 RX ADMIN — OXYCODONE HYDROCHLORIDE AND ACETAMINOPHEN 1 TABLET: 5; 325 TABLET ORAL at 02:28

## 2024-05-22 RX ADMIN — BUDESONIDE INHALATION 500 MCG: 0.5 SUSPENSION RESPIRATORY (INHALATION) at 08:53

## 2024-05-22 RX ADMIN — SODIUM CHLORIDE, POTASSIUM CHLORIDE, SODIUM LACTATE AND CALCIUM CHLORIDE 50 ML: 600; 310; 30; 20 INJECTION, SOLUTION INTRAVENOUS at 16:06

## 2024-05-22 RX ADMIN — VANCOMYCIN HYDROCHLORIDE 1000 MG: 1 INJECTION, POWDER, LYOPHILIZED, FOR SOLUTION INTRAVENOUS at 17:33

## 2024-05-22 RX ADMIN — SODIUM CHLORIDE, PRESERVATIVE FREE 10 ML: 5 INJECTION INTRAVENOUS at 10:06

## 2024-05-22 RX ADMIN — ENOXAPARIN SODIUM 40 MG: 100 INJECTION SUBCUTANEOUS at 10:04

## 2024-05-22 RX ADMIN — CEFEPIME 2000 MG: 2 INJECTION, POWDER, FOR SOLUTION INTRAVENOUS at 02:42

## 2024-05-22 RX ADMIN — IPRATROPIUM BROMIDE 0.5 MG: 0.5 SOLUTION RESPIRATORY (INHALATION) at 08:48

## 2024-05-22 RX ADMIN — ARFORMOTEROL TARTRATE 15 MCG: 15 SOLUTION RESPIRATORY (INHALATION) at 08:53

## 2024-05-22 RX ADMIN — Medication 3 AMPULE: at 16:06

## 2024-05-22 RX ADMIN — IPRATROPIUM BROMIDE 0.5 MG: 0.5 SOLUTION RESPIRATORY (INHALATION) at 13:32

## 2024-05-22 RX ADMIN — CITALOPRAM HYDROBROMIDE 10 MG: 20 TABLET ORAL at 10:04

## 2024-05-22 RX ADMIN — ASPIRIN 81 MG CHEWABLE TABLET 81 MG: 81 TABLET CHEWABLE at 10:04

## 2024-05-22 RX ADMIN — OXYCODONE HYDROCHLORIDE AND ACETAMINOPHEN 1 TABLET: 5; 325 TABLET ORAL at 10:21

## 2024-05-22 RX ADMIN — CEFEPIME 2000 MG: 2 INJECTION, POWDER, FOR SOLUTION INTRAVENOUS at 10:12

## 2024-05-22 ASSESSMENT — PAIN SCALES - GENERAL
PAINLEVEL_OUTOF10: 0
PAINLEVEL_OUTOF10: 4
PAINLEVEL_OUTOF10: 6

## 2024-05-22 ASSESSMENT — PAIN DESCRIPTION - DESCRIPTORS: DESCRIPTORS: ACHING

## 2024-05-22 ASSESSMENT — PAIN - FUNCTIONAL ASSESSMENT: PAIN_FUNCTIONAL_ASSESSMENT: 0-10

## 2024-05-22 ASSESSMENT — PAIN DESCRIPTION - LOCATION: LOCATION: FOOT

## 2024-05-22 ASSESSMENT — PAIN DESCRIPTION - ORIENTATION: ORIENTATION: LEFT

## 2024-05-22 NOTE — PROGRESS NOTES
Pulse oximetry assessment   96% at rest on room air (if 88% or less, skip next steps)  96% while ambulating on room air

## 2024-05-22 NOTE — OP NOTE
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    PROCEDURES PERFORMED:       1.  Right common femoral artery sheath placement with ultrasound guidance.     2. Left leg runoff arteriogram.     3. Third-level catheter placement, left leg.     4. Left SFA and popliteal PTA and stent placement.     5. Left peroneal artery PTA (3 mm).    SURGEON:  Harris Lorenzo MD    ANESTHESIA:  General.    ESTIMATED BLOOD LOSS:  Minimal.    COMPLICATIONS:  None.    INDICATIONS:  The patient is a 73-year-old gentleman, who had a left great toe amputation at an outside facility in conjunction with outpatient percutaneous intervention, which has failed.  He is now admitted with poorly healing left toe amputation site and possible osteomyelitis.  His RACHELLE on the left is 0.50.  Noninvasive imaging revealed a recurrent occlusion of his left femoral and popliteal segment previously treated at [facility].    DESCRIPTION OF PROCEDURE:  After obtaining informed consent, with the patient supine on the operating table, after adequate induction of general anesthesia, signing the patient's confirmation, and confirmation of ongoing antibiotics, the right groin was prepped and draped in a sterile fashion.  Hand-held ultrasound was used to identify and percutaneously access the right common femoral artery just over the femoral head.  A 6-[place] sheath was placed retrograde into the right iliac system.  An  of overlapping stents was then aggressively postdilated with appropriate sized balloons.  This resulted in a widely patent segment with no residual stenosis and unobstructed flow.  The peroneal artery stenosis was then addressed with a 3 mm angioplasty balloon with complete resolution and no significant residual stenosis.  The instrumentation was removed from the right groin and the access site controlled with a Mynx device.  The patient tolerated the procedure well.  No complications.        MD LIANG BROWNE/MARSHALL  D:  05/22/2024 07:22:54  T:  05/22/2024 09:26:07  JOB #:  [id number]    CC:   Harris Lorenzo MD

## 2024-05-22 NOTE — PROGRESS NOTES
Pharmacy Antimicrobial Kinetic Dosing    Indication for Antimicrobials: SSTI - left foot wound, s/p great toe amputation 5 weeks ago     Current Regimen of Each Antimicrobial:  Vancomycin pharmacy to dose; Start Date ; Day # 7  Cefepime 2gm q 8; Start ; Day # 7    Previous Antimicrobial Therapy:  Vancomycin 1750mg x 1      Goal Level: Vancomycin -600    Date Dose & Interval Measured (mcg/mL) Predicted AUC    1000mg q 12  14.8 425    1250mg q 12 16.5 559     1250mg q 12  24.7 722     Held  8.6      Significant Cultures:    blood: NG x 4 days    Labs:  Recent Labs     Units 24  0237 24  0241 24  0007 24  0550   CREATININE MG/DL 0.84 1.00 0.71 0.73   BUN MG/DL 20 18 11 15   WBC K/uL  --   --   --  5.6     Temp (24hrs), Av °F (37.2 °C), Min:99 °F (37.2 °C), Max:99 °F (37.2 °C)    Conditions for Dosing Consideration: None    Creatinine Clearance (mL/min): Estimated Creatinine Clearance: 78 mL/min (based on SCr of 0.84 mg/dL).     Impression/Plan:   Scr has normalized and Vancomycin level is 8.6. Will cautiously start back on regimen of reduced dose of 1000mg q 12 for    Random level with AM labs    Cefepime dose okay   Podiatry and vascular surgery following.    Antimicrobial stop date 7 days     Pharmacy will follow daily and adjust medications as appropriate for renal function and/or serum levels.    Thank you,  Diane Landers [facility]

## 2024-05-22 NOTE — DISCHARGE SUMMARY
Discharge Summary    Name: Nasir Hatfield  [id number]  YOB: 1950 (Age: 73 y.o.)   Date of Admission: 5/16/2024  Date of Discharge: 5/22/2024  Attending Physician: Staci Matos MD    Discharge Diagnosis:   Left foot wound, s/p left great toe amputation   Left superficial femoral artery occlusion, bilateral distal anterior tibial artery occlusion  History of peripheral vascular disease -status post stenting on left lower extremity  Patient is status post-right CFA sheath placement, left SFA popliteal posterior tibial artery stent placement, left peroneal PTA  Vascular surgery would like to get another AB I.  Podiatry reconsulted today to see if patient needs debridement or just IV antibiotics  Discussed with case management on rounds  Continue pain management and bowel regimen  Continue IV antibiotics for now   Active smoker   Depression / anxiety   Severe COPD on home oxygen    Consultations:  IP CONSULT TO PHARMACY      Brief Admission History/Reason for Admission Per Jamal Ambrose MD:   Nasir Hatfield is a 73 y.o.  male with PMHx significant for COPD, chronic left extremity wound status post first toe amputation who presents with the above chief complaint.   We were asked to admit for work up and evaluation of the above problems.      Here today with several days of worsening left foot pain. States that has gotten most of his care for foot wound in [facility]. States had amputation performed 5 weeks ago by podiatrist (Dr. Ibarra?) and notes has been recovering well from this generally until several days ago. Difficulty sleeping secondary to pain. No nausea, no vomiting, no fevers, no chills. No change in home oxygen requirement.      In ED, there was concern for underlying infection. XR was obtained showing cortical density change which could represent possible osteomyelitis. CBC was wnl, BMP appears near baseline.     Brief Hospital Course by Bro

## 2024-05-22 NOTE — PERIOP NOTE
TRANSFER - IN REPORT:    Verbal report received from Kalpana PETERSON on Nasir Hatfield  being received from 3211 for ordered procedure      Report consisted of patient’s Situation, Background, Assessment and   Recommendations(SBAR).     Information from the following report(s) Nurse Handoff Report, ED Encounter Summary, ED SBAR, Adult Overview, Surgery Report, Intake/Output, MAR, Recent Results, and Med Rec Status was reviewed with the receiving nurse.    Opportunity for questions and clarification was provided.      Assessment completed upon patient’s arrival to unit and care assume

## 2024-05-22 NOTE — PLAN OF CARE
Problem: Discharge Planning  Goal: Discharge to home or other facility with appropriate resources  5/21/2024 2218 by Domingo Paz RN  Outcome: Progressing  5/21/2024 1244 by Temitope Mendenhall RN  Outcome: Progressing     Problem: Respiratory - Adult  Goal: Achieves optimal ventilation and oxygenation  5/21/2024 2218 by Domingo Paz RN  Outcome: Progressing  5/21/2024 1244 by Temitope Mendenhall RN  Outcome: Progressing  5/21/2024 0902 by Kelsey Hernandez, RT  Outcome: Progressing     Problem: Pain  Goal: Verbalizes/displays adequate comfort level or baseline comfort level  5/21/2024 2218 by Domingo Paz RN  Outcome: Progressing  5/21/2024 1244 by Temitope Mendenhall RN  Outcome: Progressing     Problem: Safety - Adult  Goal: Free from fall injury  5/21/2024 2218 by Domingo Paz RN  Outcome: Progressing  5/21/2024 1244 by Temitope Mendenhall RN  Outcome: Progressing     Problem: ABCDS Injury Assessment  Goal: Absence of physical injury  5/21/2024 2218 by Domingo Paz RN  Outcome: Progressing  5/21/2024 1244 by Temitope Mendenhall RN  Outcome: Progressing

## 2024-05-22 NOTE — BRIEF OP NOTE
Brief Postoperative Note      Patient: Nasir Hatfield  YOB: 1950  MRN: [id number]    Date of Procedure: 5/22/2024    Pre-Op Diagnosis Codes:     * Wound infection [T14.8XXA, L08.9] Osteomyelitis left first Metatarsal     Post-Op Diagnosis: Same       Procedure(s):  BONE BIOPSY LEFT FOOT    Surgeon(s):  Thony Chisholm DPM    Assistant:  * No surgical staff found *    Anesthesia: Local 7 cc's of 0.5% marcaine plain pre scrub and on the field 1% lidocaine with epi 3 cc's used on the filed for hemostasis locally     Hemostasis: Esmarch bandage     Estimated Blood Loss (mL): Minimal    Complications: None    Specimens:   ID Type Source Tests Collected by Time Destination   1 : BONE BIOPSY; LEFT FOOT Bone Foot SURGICAL PATHOLOGY Thony Chisholm DPM 5/22/2024 1633    A : Left Foot Swab Foot CULTURE, ANAEROBIC, CULTURE, WOUND Thony Chisholm DPM 5/22/2024 1628        Implants:  * No implants in log *      Drains: * No LDAs found *    Findings:  Infection Present At Time Of Surgery (PATOS) (choose all levels that have infection present):  - Superficial Infection (skin/subcutaneous) present as evidenced by fluid consistent with infection  Other Findings: viable bone visualized and no signs of deeper infection is seen     Electronically signed by Thony Chisholm DPM on 5/22/2024 at 4:52 PM

## 2024-05-22 NOTE — PERIOP NOTE
Sbar in note.  Pt  to  holding area     pt   identified   self  and  procedure tfor  today.   consent   obtained   pt   had  eatened  breakfast and   drank  coffee   earlier   anesthesia   aware  pt  is local   case.   Glucose   99  in holding  area.    Pt  on routine   antibiotics  due   at   1700   and    1800    see   mar.

## 2024-05-22 NOTE — PLAN OF CARE
Problem: Respiratory - Adult  Goal: Achieves optimal ventilation and oxygenation  5/22/2024 1410 by Breana Ortiz, RN  Outcome: Progressing  5/22/2024 0855 by Kelsey Hernandez, RT  Outcome: Progressing     Problem: Discharge Planning  Goal: Discharge to home or other facility with appropriate resources  Outcome: Progressing     Problem: Pain  Goal: Verbalizes/displays adequate comfort level or baseline comfort level  Outcome: Progressing     Problem: Safety - Adult  Goal: Free from fall injury  Outcome: Progressing     Problem: ABCDS Injury Assessment  Goal: Absence of physical injury  Outcome: Progressing

## 2024-05-22 NOTE — PROGRESS NOTES
Post op patient can be discharged when medically appropriate as per Podiatry with following instructions and recommendations    Can be sent home with oral Doxycycline 100 mg for 10 days  Will eval outpatient if needed for IV infusion or adjusting antibiotic once the intra op cultures and bone biopsy are done  Patient can be as tolerated weight bearing on the post op shoe  Follow up in my office in one week after discharge patient to call my office [phone] for appointment   Bandage to be kept clean, dry, and intact till the follow up with me    If need further assistance from me please reach out to me on perfect serve or my cell [phone]

## 2024-05-22 NOTE — DISCHARGE INSTRUCTIONS
Osteomyelitis: Care Instructions  Overview  Osteomyelitis (say \"eb-fwvr-fn-vm-ov-OM-tus\") is a bone infection. It is caused by bacteria that can infect a bone. The bacterial infection can come from an injury, a wound, or a previous surgery. Or it can be carried through the blood from another area in the body.  Osteomyelitis can be a short- or long-term problem. It is treated with antibiotics. You will probably get treatment in the hospital first with antibiotics through a needle in a vein (I.V.) and then take antibiotic pills. The type of treatment depends on the type of bacteria causing the infection, the bones affected, and how bad the infection is. Often people need surgery to drain pus from a bone or to fix a damaged bone or joint.  Short-term osteomyelitis that is treated right away usually can be cured. But the long-term form sometimes comes back after treatment. You can help your chances of stopping the infection by taking your medicines as directed.  Follow-up care is a key part of your treatment and safety. Be sure to make and go to all appointments, and call your doctor if you are having problems. It's also a good idea to know your test results and keep a list of the medicines you take.  How can you care for yourself at home?  Take your antibiotics as directed. Do not stop taking them just because you feel better. You need to take the full course of antibiotics.  Take pain medicines exactly as directed.  If the doctor gave you a prescription medicine for pain, take it as prescribed.  If you are not taking a prescription pain medicine, ask your doctor if you can take an over-the-counter medicine.  Do mild exercise and stretching if your doctor says it is okay. This can help keep your bones and muscles healthy. Avoid strenuous work or exercise until your doctor says you can do it.  Consider physical therapy if your doctor suggests it. Physical therapy may help you have a normal range of movement.  Do

## 2024-05-22 NOTE — PROGRESS NOTES
Seen patient at bedside resting  States left foot feels a lot better does not have any pain   Reduced edema and erythema, warm on the foot, especially distal toes with normalizing color and temperature   Advised the patient for a bone biopsy under local anesthesia and obtaining deep cultures with a minimal incision if cultures and biopsy is  Positive can do outpatient infusion   Scheduled for bone biopsy tomorrow evening under  local

## 2024-05-22 NOTE — PLAN OF CARE
Problem: Respiratory - Adult  Goal: Achieves optimal ventilation and oxygenation  5/22/2024 1844 by Breana Ortiz RN  Outcome: Adequate for Discharge  5/22/2024 1410 by Breana Ortiz RN  Outcome: Progressing  5/22/2024 0855 by Kelsey Hernandez, RT  Outcome: Progressing     Problem: Discharge Planning  Goal: Discharge to home or other facility with appropriate resources  5/22/2024 1844 by Breana Ortiz RN  Outcome: Adequate for Discharge  5/22/2024 1410 by Breana Ortiz RN  Outcome: Progressing     Problem: Pain  Goal: Verbalizes/displays adequate comfort level or baseline comfort level  5/22/2024 1844 by Breana Ortiz RN  Outcome: Adequate for Discharge  5/22/2024 1410 by Breana Ortiz RN  Outcome: Progressing     Problem: Safety - Adult  Goal: Free from fall injury  5/22/2024 1844 by Breana Ortiz RN  Outcome: Adequate for Discharge  5/22/2024 1410 by Breana Ortiz RN  Outcome: Progressing     Problem: ABCDS Injury Assessment  Goal: Absence of physical injury  5/22/2024 1844 by Breana Ortiz RN  Outcome: Adequate for Discharge  5/22/2024 1410 by Breana Ortiz RN  Outcome: Progressing     Problem: Respiratory - Adult  Goal: Achieves optimal ventilation and oxygenation  5/22/2024 1846 by Breana Ortiz RN  Outcome: Adequate for Discharge  5/22/2024 1845 by Berana Ortiz RN  Outcome: Adequate for Discharge  5/22/2024 1844 by Breana Ortiz RN  Outcome: Adequate for Discharge  5/22/2024 1410 by Breana Ortiz RN  Outcome: Progressing  5/22/2024 0855 by Kelsey Hernandez, RT  Outcome: Progressing     Problem: Respiratory - Adult  Goal: Achieves optimal ventilation and oxygenation  5/22/2024 1846 by Breana Ortiz RN  Outcome: Adequate for Discharge  5/22/2024 1845 by Breana Ortiz RN  Outcome: Adequate for Discharge  5/22/2024 1844 by Breana Ortiz RN  Outcome: Adequate for Discharge  5/22/2024 1410 by Breana Ortiz RN  Outcome: Progressing  5/22/2024 0855 by Kelsey Hernandez, RT  Outcome:  Progressing     Problem: Respiratory - Adult  Goal: Achieves optimal ventilation and oxygenation  5/22/2024 1846 by Breana Ortiz RN  Outcome: Adequate for Discharge  5/22/2024 1845 by Breana Ortiz RN  Outcome: Adequate for Discharge  5/22/2024 1844 by Breana Ortiz RN  Outcome: Adequate for Discharge  5/22/2024 1410 by Breana Ortiz RN  Outcome: Progressing  5/22/2024 0855 by Kelsey Hernandez, RT  Outcome: Progressing     Problem: Discharge Planning  Goal: Discharge to home or other facility with appropriate resources  5/22/2024 1846 by Breana Ortiz RN  Outcome: Adequate for Discharge  5/22/2024 1845 by Breana Ortiz RN  Outcome: Adequate for Discharge  5/22/2024 1844 by Breana Ortiz RN  Outcome: Adequate for Discharge  5/22/2024 1410 by Breana Ortiz RN  Outcome: Progressing     Problem: Pain  Goal: Verbalizes/displays adequate comfort level or baseline comfort level  5/22/2024 1846 by Breana Ortiz RN  Outcome: Adequate for Discharge  5/22/2024 1845 by Breana Ortiz RN  Outcome: Adequate for Discharge  5/22/2024 1844 by Breana Ortiz RN  Outcome: Adequate for Discharge  5/22/2024 1410 by Breana Ortiz RN  Outcome: Progressing     Problem: Safety - Adult  Goal: Free from fall injury  5/22/2024 1846 by Breana Ortiz RN  Outcome: Adequate for Discharge  5/22/2024 1845 by Breana Ortiz RN  Outcome: Adequate for Discharge  5/22/2024 1844 by Breana Ortiz RN  Outcome: Adequate for Discharge  5/22/2024 1410 by Breana Ortiz RN  Outcome: Progressing     Problem: ABCDS Injury Assessment  Goal: Absence of physical injury  5/22/2024 1846 by Breana Ortiz RN  Outcome: Adequate for Discharge  5/22/2024 1845 by Breana Ortiz RN  Outcome: Adequate for Discharge  5/22/2024 1844 by Ortiz, Breana, RN  Outcome: Adequate for Discharge  5/22/2024 1410 by Breana Ortiz, RN  Outcome: Progressing

## 2024-05-24 LAB
BACTERIA SPEC CULT: ABNORMAL
BACTERIA SPEC CULT: NORMAL
GRAM STN SPEC: ABNORMAL
GRAM STN SPEC: ABNORMAL
SERVICE CMNT-IMP: ABNORMAL
SERVICE CMNT-IMP: NORMAL

## 2024-06-04 PROBLEM — A49.01 BACTERIAL INFECTION DUE TO STAPHYLOCOCCUS AUREUS: Status: ACTIVE | Noted: 2024-06-04

## 2024-06-04 RX ORDER — HEPARIN 100 UNIT/ML
500 SYRINGE INTRAVENOUS PRN
Status: CANCELLED | OUTPATIENT
Start: 2024-06-05

## 2024-06-04 RX ORDER — SODIUM CHLORIDE 9 MG/ML
5-250 INJECTION, SOLUTION INTRAVENOUS PRN
Status: CANCELLED | OUTPATIENT
Start: 2024-06-05

## 2024-06-05 ENCOUNTER — HOSPITAL ENCOUNTER (OUTPATIENT)
Facility: HOSPITAL | Age: 74
Setting detail: INFUSION SERIES
Discharge: HOME | End: 2024-06-05
Payer: MEDICARE

## 2024-06-05 VITALS
TEMPERATURE: 97.4 F | SYSTOLIC BLOOD PRESSURE: 140 MMHG | DIASTOLIC BLOOD PRESSURE: 74 MMHG | HEART RATE: 61 BPM | OXYGEN SATURATION: 97 %

## 2024-06-05 DIAGNOSIS — A49.01 BACTERIAL INFECTION DUE TO STAPHYLOCOCCUS AUREUS: Primary | ICD-10-CM

## 2024-06-05 LAB
ALBUMIN SERPL-MCNC: 3.4 G/DL (ref 3.5–5)
ALBUMIN/GLOB SERPL: 0.8 (ref 1.1–2.2)
ALP SERPL-CCNC: 104 U/L (ref 45–117)
ALT SERPL-CCNC: 13 U/L (ref 12–78)
ANION GAP SERPL CALC-SCNC: 7 MMOL/L (ref 5–15)
AST SERPL-CCNC: 27 U/L (ref 15–37)
BASOPHILS # BLD: 0.1 K/UL (ref 0–0.1)
BASOPHILS NFR BLD: 1 % (ref 0–1)
BILIRUB SERPL-MCNC: 0.7 MG/DL (ref 0.2–1)
BUN SERPL-MCNC: 27 MG/DL (ref 6–20)
BUN/CREAT SERPL: 31 (ref 12–20)
CALCIUM SERPL-MCNC: 9.3 MG/DL (ref 8.5–10.1)
CHLORIDE SERPL-SCNC: 101 MMOL/L (ref 97–108)
CO2 SERPL-SCNC: 30 MMOL/L (ref 21–32)
CREAT SERPL-MCNC: 0.86 MG/DL (ref 0.7–1.3)
CRP SERPL-MCNC: 0.55 MG/DL (ref 0–0.3)
DIFFERENTIAL METHOD BLD: NORMAL
EOSINOPHIL # BLD: 0.2 K/UL (ref 0–0.4)
EOSINOPHIL NFR BLD: 4 % (ref 0–7)
ERYTHROCYTE [DISTWIDTH] IN BLOOD BY AUTOMATED COUNT: 13.7 % (ref 11.5–14.5)
ERYTHROCYTE [SEDIMENTATION RATE] IN BLOOD: 7 MM/HR (ref 0–20)
GLOBULIN SER CALC-MCNC: 4.3 G/DL (ref 2–4)
GLUCOSE SERPL-MCNC: 85 MG/DL (ref 65–100)
HCT VFR BLD AUTO: 46.5 % (ref 36.6–50.3)
HGB BLD-MCNC: 16.1 G/DL (ref 12.1–17)
IMM GRANULOCYTES # BLD AUTO: 0 K/UL (ref 0–0.04)
IMM GRANULOCYTES NFR BLD AUTO: 0 % (ref 0–0.5)
LYMPHOCYTES # BLD: 0.9 K/UL (ref 0.8–3.5)
LYMPHOCYTES NFR BLD: 19 % (ref 12–49)
MCH RBC QN AUTO: 30.9 PG (ref 26–34)
MCHC RBC AUTO-ENTMCNC: 34.6 G/DL (ref 30–36.5)
MCV RBC AUTO: 89.3 FL (ref 80–99)
MONOCYTES # BLD: 0.5 K/UL (ref 0–1)
MONOCYTES NFR BLD: 11 % (ref 5–13)
NEUTS SEG # BLD: 3.2 K/UL (ref 1.8–8)
NEUTS SEG NFR BLD: 65 % (ref 32–75)
NRBC # BLD: 0 K/UL (ref 0–0.01)
NRBC BLD-RTO: 0 PER 100 WBC
PLATELET # BLD AUTO: 237 K/UL (ref 150–400)
PMV BLD AUTO: 9.7 FL (ref 8.9–12.9)
POTASSIUM SERPL-SCNC: 4.4 MMOL/L (ref 3.5–5.1)
PROT SERPL-MCNC: 7.7 G/DL (ref 6.4–8.2)
RBC # BLD AUTO: 5.21 M/UL (ref 4.1–5.7)
SODIUM SERPL-SCNC: 138 MMOL/L (ref 136–145)
WBC # BLD AUTO: 4.9 K/UL (ref 4.1–11.1)

## 2024-06-05 PROCEDURE — 36415 COLL VENOUS BLD VENIPUNCTURE: CPT

## 2024-06-05 PROCEDURE — 80053 COMPREHEN METABOLIC PANEL: CPT

## 2024-06-05 PROCEDURE — 6360000002 HC RX W HCPCS: Performed by: PODIATRIST

## 2024-06-05 PROCEDURE — 86140 C-REACTIVE PROTEIN: CPT

## 2024-06-05 PROCEDURE — 2580000003 HC RX 258: Performed by: PODIATRIST

## 2024-06-05 PROCEDURE — 96365 THER/PROPH/DIAG IV INF INIT: CPT

## 2024-06-05 PROCEDURE — 85025 COMPLETE CBC W/AUTO DIFF WBC: CPT

## 2024-06-05 PROCEDURE — 85652 RBC SED RATE AUTOMATED: CPT

## 2024-06-05 RX ORDER — SODIUM CHLORIDE 9 MG/ML
5-250 INJECTION, SOLUTION INTRAVENOUS PRN
Status: CANCELLED | OUTPATIENT
Start: 2024-06-09

## 2024-06-05 RX ORDER — SODIUM CHLORIDE 0.9 % (FLUSH) 0.9 %
5-40 SYRINGE (ML) INJECTION PRN
Status: CANCELLED | OUTPATIENT
Start: 2024-06-09

## 2024-06-05 RX ORDER — HEPARIN 100 UNIT/ML
500 SYRINGE INTRAVENOUS PRN
Status: CANCELLED | OUTPATIENT
Start: 2024-06-09

## 2024-06-05 RX ORDER — SODIUM CHLORIDE 0.9 % (FLUSH) 0.9 %
5-40 SYRINGE (ML) INJECTION PRN
Status: DISCONTINUED | OUTPATIENT
Start: 2024-06-05 | End: 2024-06-06 | Stop reason: HOSPADM

## 2024-06-05 RX ADMIN — SODIUM CHLORIDE, PRESERVATIVE FREE 10 ML: 5 INJECTION INTRAVENOUS at 11:48

## 2024-06-05 RX ADMIN — DALBAVANCIN 1500 MG: 500 INJECTION, POWDER, FOR SOLUTION INTRAVENOUS at 11:14

## 2024-06-05 RX ADMIN — SODIUM CHLORIDE, PRESERVATIVE FREE 10 ML: 5 INJECTION INTRAVENOUS at 10:34

## 2024-06-05 NOTE — PROGRESS NOTES
[facility] Visit Note    1000  Pt arrived at [facility] ambulatory and in no distress for [facility].  Assessment completed, no new complaints voiced. Patient reports that he is receiving this antibiotic for an infection to recent amputation of big toe on his left foot. Patient states much improvement to pain at the site. A 24 gauge PIV was placed to right arm using aseptic technique without difficulty. Labs drawn and sent.     Patient denied having any symptoms of COVID-19, i.e. SOB, coughing, fever, or generally not feeling well.      BP (!) 139/96   Pulse 60   Temp 97.4 °F (36.3 °C) (Tympanic)   SpO2 97%     Recent Results (from the past 12 hour(s))   CBC With Auto Differential    Collection Time: 06/05/24 10:20 AM   Result Value Ref Range    WBC 4.9 4.1 - 11.1 K/uL    RBC 5.21 4.10 - 5.70 M/uL    Hemoglobin 16.1 12.1 - 17.0 g/dL    Hematocrit 46.5 36.6 - 50.3 %    MCV 89.3 80.0 - 99.0 FL    MCH 30.9 26.0 - 34.0 PG    MCHC 34.6 30.0 - 36.5 g/dL    RDW 13.7 11.5 - 14.5 %    Platelets 237 150 - 400 K/uL    MPV 9.7 8.9 - 12.9 FL    Nucleated RBCs 0.0 0  WBC    nRBC 0.00 0.00 - 0.01 K/uL    Neutrophils % 65 32 - 75 %    Lymphocytes % 19 12 - 49 %    Monocytes % 11 5 - 13 %    Eosinophils % 4 0 - 7 %    Basophils % 1 0 - 1 %    Immature Granulocytes % 0 0.0 - 0.5 %    Neutrophils Absolute 3.2 1.8 - 8.0 K/UL    Lymphocytes Absolute 0.9 0.8 - 3.5 K/UL    Monocytes Absolute 0.5 0.0 - 1.0 K/UL    Eosinophils Absolute 0.2 0.0 - 0.4 K/UL    Basophils Absolute 0.1 0.0 - 0.1 K/UL    Immature Granulocytes Absolute 0.0 0.00 - 0.04 K/UL    Differential Type AUTOMATED     Comprehensive metabolic panel    Collection Time: 06/05/24 10:20 AM   Result Value Ref Range    Sodium 138 136 - 145 mmol/L    Potassium 4.4 3.5 - 5.1 mmol/L    Chloride 101 97 - 108 mmol/L    CO2 30 21 - 32 mmol/L    Anion Gap 7 5 - 15 mmol/L    Glucose 85 65 - 100 mg/dL    BUN 27 (H) 6 - 20 MG/DL    Creatinine 0.86 0.70 - 1.30 MG/DL

## 2024-06-07 NOTE — OP NOTE
[facility]              [address] [place]  [address]                            OPERATIVE REPORT      PATIENT NAME: ANGEL OAKES               : 1950  MED REC NO: [id number]                       ROOM: 3211  ACCOUNT NO: [id number]                       ADMIT DATE: 2024  PROVIDER: Thony Chisholm DPM    DATE OF SERVICE:  2024    PREOPERATIVE DIAGNOSES:  Suspected osteomyelitis, left 1st metatarsal.    POSTOPERATIVE DIAGNOSES:  Suspected osteomyelitis, left 1st metatarsal.    PROCEDURES PERFORMED:  Bone biopsy, left foot.    SURGEON:  Thony Chisholm DPM    ASSISTANT:  None.    ANESTHESIA:  Local with 7 mL of 0.5% Marcaine plain before the scrub and 1% lidocaine with epi 3 mL after the scrub for just hemostasis.    ESTIMATED BLOOD LOSS:  Minimal.    SPECIMENS REMOVED:  Bone was sent to pathology and deep cultures were taken.    INTRAOPERATIVE FINDINGS:  This patient had been admitted through the ER for a suspected infection in the left foot.  The patient has had a recent amputation with another podiatrist in the [facility] and has developed an infection and the patient has had followups, but after the followups, it seems to have gotten worse.  Patient came to the ER for admission and patient has received antibiotics so far as inpatient and patient has had imaging studies, which showed possible osteomyelitis.  The patient was advised for the metatarsal head excision or choice of bone biopsy and IV antibiotics.  The patient elected not to have further surgery and elected to have a bone biopsy and followed by IV antibiotics.     COMPLICATIONS:  None.    IMPLANTS:  None.    INDICATIONS:  ***    DESCRIPTION OF PROCEDURE:  The patient was brought into the OR and left on the patient's bed in supine position.  Because of brief time frame for the procedure, advised the patient for a local sedation instead of IV sedation.  The patient

## 2024-06-13 ENCOUNTER — HOSPITAL ENCOUNTER (OUTPATIENT)
Facility: HOSPITAL | Age: 74
Setting detail: INFUSION SERIES
Discharge: HOME | End: 2024-06-13
Payer: MEDICARE

## 2024-06-13 VITALS
TEMPERATURE: 98.1 F | OXYGEN SATURATION: 94 % | SYSTOLIC BLOOD PRESSURE: 162 MMHG | DIASTOLIC BLOOD PRESSURE: 99 MMHG | HEART RATE: 70 BPM

## 2024-06-13 DIAGNOSIS — A49.01 BACTERIAL INFECTION DUE TO STAPHYLOCOCCUS AUREUS: Primary | ICD-10-CM

## 2024-06-13 LAB
ALBUMIN SERPL-MCNC: 3.5 G/DL (ref 3.5–5)
ALBUMIN/GLOB SERPL: 0.9 (ref 1.1–2.2)
ALP SERPL-CCNC: 116 U/L (ref 45–117)
ALT SERPL-CCNC: 15 U/L (ref 12–78)
ANION GAP SERPL CALC-SCNC: 7 MMOL/L (ref 5–15)
AST SERPL-CCNC: 23 U/L (ref 15–37)
BASOPHILS # BLD: 0.1 K/UL (ref 0–0.1)
BASOPHILS NFR BLD: 2 % (ref 0–1)
BILIRUB SERPL-MCNC: 0.6 MG/DL (ref 0.2–1)
BUN SERPL-MCNC: 15 MG/DL (ref 6–20)
BUN/CREAT SERPL: 16 (ref 12–20)
CALCIUM SERPL-MCNC: 8.9 MG/DL (ref 8.5–10.1)
CHLORIDE SERPL-SCNC: 103 MMOL/L (ref 97–108)
CO2 SERPL-SCNC: 29 MMOL/L (ref 21–32)
CREAT SERPL-MCNC: 0.93 MG/DL (ref 0.7–1.3)
CRP SERPL-MCNC: 0.65 MG/DL (ref 0–0.3)
DIFFERENTIAL METHOD BLD: ABNORMAL
EOSINOPHIL # BLD: 0.2 K/UL (ref 0–0.4)
EOSINOPHIL NFR BLD: 4 % (ref 0–7)
ERYTHROCYTE [DISTWIDTH] IN BLOOD BY AUTOMATED COUNT: 13.7 % (ref 11.5–14.5)
ERYTHROCYTE [SEDIMENTATION RATE] IN BLOOD: 3 MM/HR (ref 0–20)
GLOBULIN SER CALC-MCNC: 4.1 G/DL (ref 2–4)
GLUCOSE SERPL-MCNC: 85 MG/DL (ref 65–100)
HCT VFR BLD AUTO: 49.3 % (ref 36.6–50.3)
HGB BLD-MCNC: 16.6 G/DL (ref 12.1–17)
IMM GRANULOCYTES # BLD AUTO: 0 K/UL (ref 0–0.04)
IMM GRANULOCYTES NFR BLD AUTO: 0 % (ref 0–0.5)
LYMPHOCYTES # BLD: 0.9 K/UL (ref 0.8–3.5)
LYMPHOCYTES NFR BLD: 21 % (ref 12–49)
MCH RBC QN AUTO: 30.6 PG (ref 26–34)
MCHC RBC AUTO-ENTMCNC: 33.7 G/DL (ref 30–36.5)
MCV RBC AUTO: 90.8 FL (ref 80–99)
MONOCYTES # BLD: 0.4 K/UL (ref 0–1)
MONOCYTES NFR BLD: 9 % (ref 5–13)
NEUTS SEG # BLD: 2.9 K/UL (ref 1.8–8)
NEUTS SEG NFR BLD: 64 % (ref 32–75)
NRBC # BLD: 0 K/UL (ref 0–0.01)
NRBC BLD-RTO: 0 PER 100 WBC
PLATELET # BLD AUTO: 175 K/UL (ref 150–400)
PMV BLD AUTO: 10.1 FL (ref 8.9–12.9)
POTASSIUM SERPL-SCNC: 3.9 MMOL/L (ref 3.5–5.1)
PROT SERPL-MCNC: 7.6 G/DL (ref 6.4–8.2)
RBC # BLD AUTO: 5.43 M/UL (ref 4.1–5.7)
SODIUM SERPL-SCNC: 139 MMOL/L (ref 136–145)
WBC # BLD AUTO: 4.6 K/UL (ref 4.1–11.1)

## 2024-06-13 PROCEDURE — 85652 RBC SED RATE AUTOMATED: CPT

## 2024-06-13 PROCEDURE — 6360000002 HC RX W HCPCS: Performed by: PODIATRIST

## 2024-06-13 PROCEDURE — 96365 THER/PROPH/DIAG IV INF INIT: CPT

## 2024-06-13 PROCEDURE — 36415 COLL VENOUS BLD VENIPUNCTURE: CPT

## 2024-06-13 PROCEDURE — 86140 C-REACTIVE PROTEIN: CPT

## 2024-06-13 PROCEDURE — 2580000003 HC RX 258: Performed by: PODIATRIST

## 2024-06-13 PROCEDURE — 80053 COMPREHEN METABOLIC PANEL: CPT

## 2024-06-13 PROCEDURE — 85025 COMPLETE CBC W/AUTO DIFF WBC: CPT

## 2024-06-13 RX ORDER — SODIUM CHLORIDE 9 MG/ML
5-250 INJECTION, SOLUTION INTRAVENOUS PRN
OUTPATIENT
Start: 2024-06-13

## 2024-06-13 RX ORDER — SODIUM CHLORIDE 0.9 % (FLUSH) 0.9 %
5-40 SYRINGE (ML) INJECTION PRN
OUTPATIENT
Start: 2024-06-13

## 2024-06-13 RX ORDER — HEPARIN 100 UNIT/ML
500 SYRINGE INTRAVENOUS PRN
OUTPATIENT
Start: 2024-06-13

## 2024-06-13 RX ADMIN — DALBAVANCIN 1500 MG: 500 INJECTION, POWDER, FOR SOLUTION INTRAVENOUS at 13:43

## 2024-06-13 NOTE — PROGRESS NOTES
[facility] Visit Note    Pt arrived to [facility] ambulatory in no acute distress for [place].  Assessment unremarkable except SEE FLOWSHEET.  IV established in RIGHT forearm site WNL. Labs obtained, CBC, CMP, Sedimentation Rate, C-Reactive Protein.    Recent Results (from the past 12 hour(s))   Sedimentation Rate    Collection Time: 06/13/24  1:00 PM   Result Value Ref Range    Sed Rate, Automated 3 0 - 20 mm/hr   Comprehensive metabolic panel    Collection Time: 06/13/24  1:00 PM   Result Value Ref Range    Sodium 139 136 - 145 mmol/L    Potassium 3.9 3.5 - 5.1 mmol/L    Chloride 103 97 - 108 mmol/L    CO2 29 21 - 32 mmol/L    Anion Gap 7 5 - 15 mmol/L    Glucose 85 65 - 100 mg/dL    BUN 15 6 - 20 MG/DL    Creatinine 0.93 0.70 - 1.30 MG/DL    BUN/Creatinine Ratio 16 12 - 20      Est, Glom Filt Rate 87 >60 ml/min/1.73m2    Calcium 8.9 8.5 - 10.1 MG/DL    Total Bilirubin 0.6 0.2 - 1.0 MG/DL    ALT 15 12 - 78 U/L    AST 23 15 - 37 U/L    Alk Phosphatase 116 45 - 117 U/L    Total Protein 7.6 6.4 - 8.2 g/dL    Albumin 3.5 3.5 - 5.0 g/dL    Globulin 4.1 (H) 2.0 - 4.0 g/dL    Albumin/Globulin Ratio 0.9 (L) 1.1 - 2.2     CBC With Auto Differential    Collection Time: 06/13/24  1:00 PM   Result Value Ref Range    WBC 4.6 4.1 - 11.1 K/uL    RBC 5.43 4.10 - 5.70 M/uL    Hemoglobin 16.6 12.1 - 17.0 g/dL    Hematocrit 49.3 36.6 - 50.3 %    MCV 90.8 80.0 - 99.0 FL    MCH 30.6 26.0 - 34.0 PG    MCHC 33.7 30.0 - 36.5 g/dL    RDW 13.7 11.5 - 14.5 %    Platelets 175 150 - 400 K/uL    MPV 10.1 8.9 - 12.9 FL    Nucleated RBCs 0.0 0  WBC    nRBC 0.00 0.00 - 0.01 K/uL    Neutrophils % 64 32 - 75 %    Lymphocytes % 21 12 - 49 %    Monocytes % 9 5 - 13 %    Eosinophils % 4 0 - 7 %    Basophils % 2 (H) 0 - 1 %    Immature Granulocytes % 0 0.0 - 0.5 %    Neutrophils Absolute 2.9 1.8 - 8.0 K/UL    Lymphocytes Absolute 0.9 0.8 - 3.5 K/UL    Monocytes Absolute 0.4 0.0 - 1.0 K/UL    Eosinophils Absolute 0.2 0.0 - 0.4

## 2024-09-05 PROCEDURE — 6360000002 HC RX W HCPCS: Performed by: SURGERY

## 2024-09-06 ENCOUNTER — ANESTHESIA EVENT (OUTPATIENT)
Facility: HOSPITAL | Age: 74
End: 2024-09-06
Payer: MEDICARE

## 2024-09-06 ENCOUNTER — ANESTHESIA (OUTPATIENT)
Facility: HOSPITAL | Age: 74
End: 2024-09-06
Payer: MEDICARE

## 2024-09-06 ENCOUNTER — HOSPITAL ENCOUNTER (INPATIENT)
Facility: HOSPITAL | Age: 74
LOS: 7 days | Discharge: HOME HEALTH | End: 2024-09-13
Attending: SURGERY | Admitting: SURGERY
Payer: MEDICARE

## 2024-09-06 ENCOUNTER — APPOINTMENT (OUTPATIENT)
Facility: HOSPITAL | Age: 74
End: 2024-09-06
Attending: SURGERY
Payer: MEDICARE

## 2024-09-06 DIAGNOSIS — R00.0 TACHYCARDIA: Primary | ICD-10-CM

## 2024-09-06 DIAGNOSIS — I82.90 THROMBUS: ICD-10-CM

## 2024-09-06 LAB
APTT PPP: 49.7 SEC (ref 22.1–31)
ERYTHROCYTE [DISTWIDTH] IN BLOOD BY AUTOMATED COUNT: 14.6 % (ref 11.5–14.5)
FIBRINOGEN PPP-MCNC: 269 MG/DL (ref 200–475)
HCT VFR BLD AUTO: 44 % (ref 36.6–50.3)
HGB BLD-MCNC: 15.1 G/DL (ref 12.1–17)
MCH RBC QN AUTO: 30.6 PG (ref 26–34)
MCHC RBC AUTO-ENTMCNC: 34.3 G/DL (ref 30–36.5)
MCV RBC AUTO: 89.1 FL (ref 80–99)
NRBC # BLD: 0 K/UL (ref 0–0.01)
NRBC BLD-RTO: 0 PER 100 WBC
PLATELET # BLD AUTO: 204 K/UL (ref 150–400)
PMV BLD AUTO: 9.6 FL (ref 8.9–12.9)
RBC # BLD AUTO: 4.94 M/UL (ref 4.1–5.7)
THERAPEUTIC RANGE: ABNORMAL SECS (ref 58–77)
WBC # BLD AUTO: 10.9 K/UL (ref 4.1–11.1)

## 2024-09-06 PROCEDURE — C1725 CATH, TRANSLUMIN NON-LASER: HCPCS | Performed by: SURGERY

## 2024-09-06 PROCEDURE — 2580000003 HC RX 258: Performed by: SURGERY

## 2024-09-06 PROCEDURE — 2500000003 HC RX 250 WO HCPCS: Performed by: NURSE ANESTHETIST, CERTIFIED REGISTERED

## 2024-09-06 PROCEDURE — 2000000000 HC ICU R&B

## 2024-09-06 PROCEDURE — C1894 INTRO/SHEATH, NON-LASER: HCPCS | Performed by: SURGERY

## 2024-09-06 PROCEDURE — 047L34Z DILATION OF LEFT FEMORAL ARTERY WITH DRUG-ELUTING INTRALUMINAL DEVICE, PERCUTANEOUS APPROACH: ICD-10-PCS | Performed by: SURGERY

## 2024-09-06 PROCEDURE — 6370000000 HC RX 637 (ALT 250 FOR IP): Performed by: SURGERY

## 2024-09-06 PROCEDURE — C1757 CATH, THROMBECTOMY/EMBOLECT: HCPCS | Performed by: SURGERY

## 2024-09-06 PROCEDURE — 3700000001 HC ADD 15 MINUTES (ANESTHESIA): Performed by: SURGERY

## 2024-09-06 PROCEDURE — 3600000003 HC SURGERY LEVEL 3 BASE: Performed by: SURGERY

## 2024-09-06 PROCEDURE — 2720000010 HC SURG SUPPLY STERILE: Performed by: SURGERY

## 2024-09-06 PROCEDURE — 6360000002 HC RX W HCPCS: Performed by: ANESTHESIOLOGY

## 2024-09-06 PROCEDURE — 3700000000 HC ANESTHESIA ATTENDED CARE: Performed by: SURGERY

## 2024-09-06 PROCEDURE — C1769 GUIDE WIRE: HCPCS | Performed by: SURGERY

## 2024-09-06 PROCEDURE — 2700000000 HC OXYGEN THERAPY PER DAY

## 2024-09-06 PROCEDURE — 6360000002 HC RX W HCPCS: Performed by: NURSE ANESTHETIST, CERTIFIED REGISTERED

## 2024-09-06 PROCEDURE — 36415 COLL VENOUS BLD VENIPUNCTURE: CPT

## 2024-09-06 PROCEDURE — 2709999900 HC NON-CHARGEABLE SUPPLY: Performed by: SURGERY

## 2024-09-06 PROCEDURE — 3600000013 HC SURGERY LEVEL 3 ADDTL 15MIN: Performed by: SURGERY

## 2024-09-06 PROCEDURE — 6370000000 HC RX 637 (ALT 250 FOR IP): Performed by: NURSE ANESTHETIST, CERTIFIED REGISTERED

## 2024-09-06 PROCEDURE — 2580000003 HC RX 258: Performed by: ANESTHESIOLOGY

## 2024-09-06 PROCEDURE — C1874 STENT, COATED/COV W/DEL SYS: HCPCS | Performed by: SURGERY

## 2024-09-06 PROCEDURE — 85384 FIBRINOGEN ACTIVITY: CPT

## 2024-09-06 PROCEDURE — 85730 THROMBOPLASTIN TIME PARTIAL: CPT

## 2024-09-06 PROCEDURE — 2580000003 HC RX 258: Performed by: NURSE ANESTHETIST, CERTIFIED REGISTERED

## 2024-09-06 PROCEDURE — 7100000000 HC PACU RECOVERY - FIRST 15 MIN: Performed by: SURGERY

## 2024-09-06 PROCEDURE — 047N34Z DILATION OF LEFT POPLITEAL ARTERY WITH DRUG-ELUTING INTRALUMINAL DEVICE, PERCUTANEOUS APPROACH: ICD-10-PCS | Performed by: SURGERY

## 2024-09-06 PROCEDURE — 5A0935A ASSISTANCE WITH RESPIRATORY VENTILATION, LESS THAN 24 CONSECUTIVE HOURS, HIGH NASAL FLOW/VELOCITY: ICD-10-PCS | Performed by: SURGERY

## 2024-09-06 PROCEDURE — 6360000004 HC RX CONTRAST MEDICATION: Performed by: SURGERY

## 2024-09-06 PROCEDURE — B41G1ZZ FLUOROSCOPY OF LEFT LOWER EXTREMITY ARTERIES USING LOW OSMOLAR CONTRAST: ICD-10-PCS | Performed by: SURGERY

## 2024-09-06 PROCEDURE — 6360000002 HC RX W HCPCS: Performed by: SURGERY

## 2024-09-06 PROCEDURE — C1887 CATHETER, GUIDING: HCPCS | Performed by: SURGERY

## 2024-09-06 PROCEDURE — 7100000001 HC PACU RECOVERY - ADDTL 15 MIN: Performed by: SURGERY

## 2024-09-06 PROCEDURE — 85027 COMPLETE CBC AUTOMATED: CPT

## 2024-09-06 PROCEDURE — 3E05317 INTRODUCTION OF OTHER THROMBOLYTIC INTO PERIPHERAL ARTERY, PERCUTANEOUS APPROACH: ICD-10-PCS | Performed by: SURGERY

## 2024-09-06 PROCEDURE — 047U3ZZ DILATION OF LEFT PERONEAL ARTERY, PERCUTANEOUS APPROACH: ICD-10-PCS | Performed by: SURGERY

## 2024-09-06 PROCEDURE — 6360000002 HC RX W HCPCS

## 2024-09-06 PROCEDURE — C1751 CATH, INF, PER/CENT/MIDLINE: HCPCS | Performed by: SURGERY

## 2024-09-06 DEVICE — VIABAHN SX ENDO HEPARIN 18 RO 6MMX15CM 6FR120CM CATH
Type: IMPLANTABLE DEVICE | Site: POPLITEAL | Status: FUNCTIONAL
Brand: GORE VIABAHN ENDOPROSTHESIS WITH HEPARIN

## 2024-09-06 RX ORDER — SODIUM CHLORIDE 0.9 % (FLUSH) 0.9 %
5-40 SYRINGE (ML) INJECTION PRN
Status: DISCONTINUED | OUTPATIENT
Start: 2024-09-06 | End: 2024-09-06 | Stop reason: HOSPADM

## 2024-09-06 RX ORDER — NALOXONE HYDROCHLORIDE 0.4 MG/ML
INJECTION, SOLUTION INTRAMUSCULAR; INTRAVENOUS; SUBCUTANEOUS PRN
Status: DISCONTINUED | OUTPATIENT
Start: 2024-09-06 | End: 2024-09-06 | Stop reason: HOSPADM

## 2024-09-06 RX ORDER — ARFORMOTEROL TARTRATE 15 UG/2ML
15 SOLUTION RESPIRATORY (INHALATION)
Status: DISCONTINUED | OUTPATIENT
Start: 2024-09-06 | End: 2024-09-06

## 2024-09-06 RX ORDER — FENTANYL CITRATE 50 UG/ML
INJECTION, SOLUTION INTRAMUSCULAR; INTRAVENOUS PRN
Status: DISCONTINUED | OUTPATIENT
Start: 2024-09-06 | End: 2024-09-06 | Stop reason: SDUPTHER

## 2024-09-06 RX ORDER — EPHEDRINE SULFATE/0.9% NACL/PF 50 MG/5 ML
SYRINGE (ML) INTRAVENOUS PRN
Status: DISCONTINUED | OUTPATIENT
Start: 2024-09-06 | End: 2024-09-06 | Stop reason: SDUPTHER

## 2024-09-06 RX ORDER — ALBUTEROL SULFATE 90 UG/1
AEROSOL, METERED RESPIRATORY (INHALATION) PRN
Status: DISCONTINUED | OUTPATIENT
Start: 2024-09-06 | End: 2024-09-06 | Stop reason: SDUPTHER

## 2024-09-06 RX ORDER — DEXAMETHASONE SODIUM PHOSPHATE 4 MG/ML
INJECTION, SOLUTION INTRA-ARTICULAR; INTRALESIONAL; INTRAMUSCULAR; INTRAVENOUS; SOFT TISSUE PRN
Status: DISCONTINUED | OUTPATIENT
Start: 2024-09-06 | End: 2024-09-06 | Stop reason: SDUPTHER

## 2024-09-06 RX ORDER — HYDROMORPHONE HYDROCHLORIDE 1 MG/ML
0.5 INJECTION, SOLUTION INTRAMUSCULAR; INTRAVENOUS; SUBCUTANEOUS EVERY 5 MIN PRN
Status: DISCONTINUED | OUTPATIENT
Start: 2024-09-06 | End: 2024-09-06 | Stop reason: HOSPADM

## 2024-09-06 RX ORDER — LIDOCAINE HYDROCHLORIDE 20 MG/ML
INJECTION, SOLUTION EPIDURAL; INFILTRATION; INTRACAUDAL; PERINEURAL PRN
Status: DISCONTINUED | OUTPATIENT
Start: 2024-09-06 | End: 2024-09-06 | Stop reason: SDUPTHER

## 2024-09-06 RX ORDER — SODIUM CHLORIDE, SODIUM LACTATE, POTASSIUM CHLORIDE, CALCIUM CHLORIDE 600; 310; 30; 20 MG/100ML; MG/100ML; MG/100ML; MG/100ML
INJECTION, SOLUTION INTRAVENOUS ONCE
Status: COMPLETED | OUTPATIENT
Start: 2024-09-06 | End: 2024-09-06

## 2024-09-06 RX ORDER — MEPERIDINE HYDROCHLORIDE 25 MG/ML
12.5 INJECTION INTRAMUSCULAR; INTRAVENOUS; SUBCUTANEOUS EVERY 5 MIN PRN
Status: DISCONTINUED | OUTPATIENT
Start: 2024-09-06 | End: 2024-09-06 | Stop reason: HOSPADM

## 2024-09-06 RX ORDER — CITALOPRAM HYDROBROMIDE 10 MG/1
10 TABLET ORAL DAILY
Status: DISCONTINUED | OUTPATIENT
Start: 2024-09-07 | End: 2024-09-13 | Stop reason: HOSPADM

## 2024-09-06 RX ORDER — ONDANSETRON 2 MG/ML
4 INJECTION INTRAMUSCULAR; INTRAVENOUS
Status: DISCONTINUED | OUTPATIENT
Start: 2024-09-06 | End: 2024-09-06 | Stop reason: HOSPADM

## 2024-09-06 RX ORDER — HEPARIN SODIUM 1000 [USP'U]/ML
500 INJECTION, SOLUTION INTRAVENOUS; SUBCUTANEOUS CONTINUOUS
Status: DISCONTINUED | OUTPATIENT
Start: 2024-09-06 | End: 2024-09-06 | Stop reason: SDUPTHER

## 2024-09-06 RX ORDER — MIDAZOLAM HYDROCHLORIDE 1 MG/ML
INJECTION INTRAMUSCULAR; INTRAVENOUS PRN
Status: DISCONTINUED | OUTPATIENT
Start: 2024-09-06 | End: 2024-09-06 | Stop reason: SDUPTHER

## 2024-09-06 RX ORDER — HEPARIN SODIUM 10000 [USP'U]/100ML
500 INJECTION, SOLUTION INTRAVENOUS CONTINUOUS
Status: DISCONTINUED | OUTPATIENT
Start: 2024-09-06 | End: 2024-09-08

## 2024-09-06 RX ORDER — ONDANSETRON 2 MG/ML
4 INJECTION INTRAMUSCULAR; INTRAVENOUS EVERY 6 HOURS PRN
Status: DISCONTINUED | OUTPATIENT
Start: 2024-09-06 | End: 2024-09-13 | Stop reason: HOSPADM

## 2024-09-06 RX ORDER — ASPIRIN 325 MG
325 TABLET ORAL DAILY
Status: DISCONTINUED | OUTPATIENT
Start: 2024-09-07 | End: 2024-09-09

## 2024-09-06 RX ORDER — HEPARIN SODIUM 1000 [USP'U]/ML
INJECTION, SOLUTION INTRAVENOUS; SUBCUTANEOUS PRN
Status: DISCONTINUED | OUTPATIENT
Start: 2024-09-06 | End: 2024-09-06 | Stop reason: SDUPTHER

## 2024-09-06 RX ORDER — PROCHLORPERAZINE EDISYLATE 5 MG/ML
5 INJECTION INTRAMUSCULAR; INTRAVENOUS
Status: DISCONTINUED | OUTPATIENT
Start: 2024-09-06 | End: 2024-09-06 | Stop reason: HOSPADM

## 2024-09-06 RX ORDER — SUCCINYLCHOLINE CHLORIDE 20 MG/ML
INJECTION INTRAMUSCULAR; INTRAVENOUS PRN
Status: DISCONTINUED | OUTPATIENT
Start: 2024-09-06 | End: 2024-09-06 | Stop reason: SDUPTHER

## 2024-09-06 RX ORDER — LORAZEPAM 2 MG/ML
1 INJECTION INTRAMUSCULAR EVERY 6 HOURS PRN
Status: DISCONTINUED | OUTPATIENT
Start: 2024-09-06 | End: 2024-09-08

## 2024-09-06 RX ORDER — FENTANYL CITRATE 50 UG/ML
50 INJECTION, SOLUTION INTRAMUSCULAR; INTRAVENOUS EVERY 5 MIN PRN
Status: DISCONTINUED | OUTPATIENT
Start: 2024-09-06 | End: 2024-09-06 | Stop reason: HOSPADM

## 2024-09-06 RX ORDER — ROCURONIUM BROMIDE 10 MG/ML
INJECTION, SOLUTION INTRAVENOUS PRN
Status: DISCONTINUED | OUTPATIENT
Start: 2024-09-06 | End: 2024-09-06 | Stop reason: SDUPTHER

## 2024-09-06 RX ORDER — ALBUTEROL SULFATE 0.83 MG/ML
2.5 SOLUTION RESPIRATORY (INHALATION) EVERY 4 HOURS PRN
Status: DISCONTINUED | OUTPATIENT
Start: 2024-09-06 | End: 2024-09-13 | Stop reason: HOSPADM

## 2024-09-06 RX ORDER — CLOPIDOGREL BISULFATE 75 MG/1
75 TABLET ORAL DAILY
Status: DISCONTINUED | OUTPATIENT
Start: 2024-09-07 | End: 2024-09-13 | Stop reason: HOSPADM

## 2024-09-06 RX ORDER — ARFORMOTEROL TARTRATE 15 UG/2ML
15 SOLUTION RESPIRATORY (INHALATION)
Status: DISCONTINUED | OUTPATIENT
Start: 2024-09-07 | End: 2024-09-08

## 2024-09-06 RX ORDER — NICOTINE 21 MG/24HR
1 PATCH, TRANSDERMAL 24 HOURS TRANSDERMAL DAILY
Status: DISCONTINUED | OUTPATIENT
Start: 2024-09-06 | End: 2024-09-13 | Stop reason: HOSPADM

## 2024-09-06 RX ORDER — SODIUM CHLORIDE 0.9 % (FLUSH) 0.9 %
5-40 SYRINGE (ML) INJECTION EVERY 12 HOURS SCHEDULED
Status: DISCONTINUED | OUTPATIENT
Start: 2024-09-06 | End: 2024-09-06 | Stop reason: HOSPADM

## 2024-09-06 RX ORDER — ONDANSETRON 2 MG/ML
INJECTION INTRAMUSCULAR; INTRAVENOUS PRN
Status: DISCONTINUED | OUTPATIENT
Start: 2024-09-06 | End: 2024-09-06 | Stop reason: SDUPTHER

## 2024-09-06 RX ORDER — MORPHINE SULFATE 2 MG/ML
INJECTION, SOLUTION INTRAMUSCULAR; INTRAVENOUS
Status: COMPLETED
Start: 2024-09-06 | End: 2024-09-06

## 2024-09-06 RX ORDER — MORPHINE SULFATE 2 MG/ML
2 INJECTION, SOLUTION INTRAMUSCULAR; INTRAVENOUS
Status: DISCONTINUED | OUTPATIENT
Start: 2024-09-06 | End: 2024-09-07

## 2024-09-06 RX ORDER — IOPAMIDOL 755 MG/ML
INJECTION, SOLUTION INTRAVASCULAR PRN
Status: DISCONTINUED | OUTPATIENT
Start: 2024-09-06 | End: 2024-09-06 | Stop reason: ALTCHOICE

## 2024-09-06 RX ORDER — PROPOFOL 10 MG/ML
INJECTION, EMULSION INTRAVENOUS PRN
Status: DISCONTINUED | OUTPATIENT
Start: 2024-09-06 | End: 2024-09-06 | Stop reason: SDUPTHER

## 2024-09-06 RX ORDER — SODIUM CHLORIDE 9 MG/ML
INJECTION, SOLUTION INTRAVENOUS CONTINUOUS
Status: DISCONTINUED | OUTPATIENT
Start: 2024-09-06 | End: 2024-09-11

## 2024-09-06 RX ORDER — SODIUM CHLORIDE 9 MG/ML
INJECTION, SOLUTION INTRAVENOUS PRN
Status: DISCONTINUED | OUTPATIENT
Start: 2024-09-06 | End: 2024-09-06 | Stop reason: HOSPADM

## 2024-09-06 RX ADMIN — WATER 2000 MG: 1 INJECTION INTRAMUSCULAR; INTRAVENOUS; SUBCUTANEOUS at 16:48

## 2024-09-06 RX ADMIN — SUCCINYLCHOLINE CHLORIDE 100 MG: 20 INJECTION, SOLUTION INTRAMUSCULAR; INTRAVENOUS at 16:31

## 2024-09-06 RX ADMIN — ONDANSETRON HYDROCHLORIDE 4 MG: 2 INJECTION, SOLUTION INTRAMUSCULAR; INTRAVENOUS at 18:57

## 2024-09-06 RX ADMIN — PHENYLEPHRINE HYDROCHLORIDE 30 MCG/MIN: 10 INJECTION INTRAVENOUS at 16:47

## 2024-09-06 RX ADMIN — LIDOCAINE HYDROCHLORIDE 80 MG: 20 INJECTION, SOLUTION EPIDURAL; INFILTRATION; INTRACAUDAL; PERINEURAL at 16:30

## 2024-09-06 RX ADMIN — FENTANYL CITRATE 50 MCG: 50 INJECTION INTRAMUSCULAR; INTRAVENOUS at 19:35

## 2024-09-06 RX ADMIN — FENTANYL CITRATE 50 MCG: 50 INJECTION, SOLUTION INTRAMUSCULAR; INTRAVENOUS at 16:30

## 2024-09-06 RX ADMIN — DEXAMETHASONE SODIUM PHOSPHATE 4 MG: 4 INJECTION, SOLUTION INTRAMUSCULAR; INTRAVENOUS at 16:50

## 2024-09-06 RX ADMIN — MIDAZOLAM HYDROCHLORIDE 2 MG: 1 INJECTION, SOLUTION INTRAMUSCULAR; INTRAVENOUS at 16:26

## 2024-09-06 RX ADMIN — HEPARIN SODIUM 1000 UNITS: 1000 INJECTION, SOLUTION INTRAVENOUS; SUBCUTANEOUS at 18:09

## 2024-09-06 RX ADMIN — ROCURONIUM BROMIDE 20 MG: 10 INJECTION INTRAVENOUS at 18:23

## 2024-09-06 RX ADMIN — HEPARIN SODIUM 5000 UNITS: 1000 INJECTION, SOLUTION INTRAVENOUS; SUBCUTANEOUS at 17:09

## 2024-09-06 RX ADMIN — MORPHINE SULFATE 2 MG: 2 INJECTION, SOLUTION INTRAMUSCULAR; INTRAVENOUS at 21:05

## 2024-09-06 RX ADMIN — Medication 5 MG: at 17:15

## 2024-09-06 RX ADMIN — PHENYLEPHRINE HYDROCHLORIDE 50 MCG/MIN: 10 INJECTION INTRAVENOUS at 16:36

## 2024-09-06 RX ADMIN — PROPOFOL 120 MG: 10 INJECTION, EMULSION INTRAVENOUS at 16:30

## 2024-09-06 RX ADMIN — ROCURONIUM BROMIDE 10 MG: 10 INJECTION INTRAVENOUS at 16:30

## 2024-09-06 RX ADMIN — ROCURONIUM BROMIDE 10 MG: 10 INJECTION INTRAVENOUS at 17:35

## 2024-09-06 RX ADMIN — SODIUM CHLORIDE, POTASSIUM CHLORIDE, SODIUM LACTATE AND CALCIUM CHLORIDE: 600; 310; 30; 20 INJECTION, SOLUTION INTRAVENOUS at 18:09

## 2024-09-06 RX ADMIN — SODIUM CHLORIDE: 9 INJECTION, SOLUTION INTRAVENOUS at 20:34

## 2024-09-06 RX ADMIN — SODIUM CHLORIDE 1 MG/HR: 900 INJECTION, SOLUTION INTRAVENOUS at 19:03

## 2024-09-06 RX ADMIN — PROPOFOL 30 MG: 10 INJECTION, EMULSION INTRAVENOUS at 18:23

## 2024-09-06 RX ADMIN — SODIUM CHLORIDE, POTASSIUM CHLORIDE, SODIUM LACTATE AND CALCIUM CHLORIDE: 600; 310; 30; 20 INJECTION, SOLUTION INTRAVENOUS at 16:13

## 2024-09-06 RX ADMIN — FENTANYL CITRATE 50 MCG: 50 INJECTION INTRAMUSCULAR; INTRAVENOUS at 20:00

## 2024-09-06 RX ADMIN — FENTANYL CITRATE 50 MCG: 50 INJECTION, SOLUTION INTRAMUSCULAR; INTRAVENOUS at 17:33

## 2024-09-06 RX ADMIN — ALBUTEROL SULFATE 2 PUFF: 90 AEROSOL, METERED RESPIRATORY (INHALATION) at 17:04

## 2024-09-06 RX ADMIN — ROCURONIUM BROMIDE 30 MG: 10 INJECTION INTRAVENOUS at 16:41

## 2024-09-06 RX ADMIN — SUGAMMADEX 140 MG: 100 INJECTION, SOLUTION INTRAVENOUS at 19:05

## 2024-09-06 RX ADMIN — ALBUTEROL SULFATE 2 PUFF: 90 AEROSOL, METERED RESPIRATORY (INHALATION) at 18:57

## 2024-09-06 ASSESSMENT — PAIN DESCRIPTION - DESCRIPTORS
DESCRIPTORS: BURNING

## 2024-09-06 ASSESSMENT — PAIN DESCRIPTION - ORIENTATION
ORIENTATION: LEFT

## 2024-09-06 ASSESSMENT — LIFESTYLE VARIABLES: SMOKING_STATUS: 1

## 2024-09-06 ASSESSMENT — PAIN SCALES - GENERAL
PAINLEVEL_OUTOF10: 5
PAINLEVEL_OUTOF10: 6
PAINLEVEL_OUTOF10: 5
PAINLEVEL_OUTOF10: 10

## 2024-09-06 ASSESSMENT — PAIN DESCRIPTION - LOCATION
LOCATION: LEG

## 2024-09-06 NOTE — ANESTHESIA POSTPROCEDURE EVALUATION
Department of Anesthesiology  Postprocedure Note    Patient: Nasir Hatfield  MRN: [id number]  YOB: 1950  Date of evaluation: 9/6/2024    Procedure Summary       Date: 09/06/24 Room / Location: [facility] MAIN OR M10 / [facility] MAIN OR    Anesthesia Start: 1626 Anesthesia Stop: 1924    Procedure: LEFT LOWER EXTREMITY THROMBECTOMY (Left: Groin) Diagnosis:       Critical limb ischemia of left lower extremity (HCC)      (Critical limb ischemia of left lower extremity (HCC) [I70.222])    Providers: Harris Lorenzo MD Responsible Provider: Walker Handy MD    Anesthesia Type: General ASA Status: 3            Anesthesia Type: General    Carmel Phase I:      Carmel Phase II:      Anesthesia Post Evaluation    Patient location during evaluation: PACU  Patient participation: complete - patient participated  Level of consciousness: sleepy but conscious and responsive to verbal stimuli  Pain score: 2  Airway patency: patent  Nausea & Vomiting: no vomiting and no nausea  Cardiovascular status: blood pressure returned to baseline and hemodynamically stable  Respiratory status: acceptable  Hydration status: stable  Multimodal analgesia pain management approach  Pain management: adequate    No notable events documented.

## 2024-09-06 NOTE — ANESTHESIA PRE PROCEDURE
Department of Anesthesiology  Preprocedure Note       Name:  Nasir Hatfield   Age:  73 y.o.  :  1950                                          MRN:  [id number]         Date:  2024      Surgeon: Surgeon(s):  Harris Lorenzo MD    Procedure: Procedure(s):  LEFT LOWER EXTREMITY THROMBECTOMY    Medications prior to admission:   Prior to Admission medications    Medication Sig Start Date End Date Taking? Authorizing Provider   OXYGEN Inhale 3 L into the lungs daily   Yes ProviderLiane MD   Multiple Vitamins-Minerals (MENS 50+ MULTI VITAMIN/MIN PO) Take by mouth daily   Yes Liane Toney MD   clopidogrel (PLAVIX) 75 MG tablet Take 1 tablet by mouth daily 24  Yes Staci Matos MD   aspirin 325 MG tablet Take 1 tablet by mouth daily   Yes Automatic Reconciliation, Ar   citalopram (CELEXA) 10 MG tablet Take by mouth daily   Yes Automatic Reconciliation, Ar   umeclidinium-vilanterol (ANORO ELLIPTA) 62.5-25 MCG/ACT inhaler Inhale 1 puff into the lungs daily   Yes Automatic Reconciliation, Ar   albuterol sulfate HFA (PROVENTIL;VENTOLIN;PROAIR) 108 (90 Base) MCG/ACT inhaler Inhale into the lungs    Automatic Reconciliation, Ar       Current medications:    Current Facility-Administered Medications   Medication Dose Route Frequency Provider Last Rate Last Admin   • alcohol 62% (NOZIN) nasal  3 ampule  3 ampule Nasal Once Harris Lorenzo MD       • lactated ringers IV soln infusion   IntraVENous Once Pamela Valencia MD       • ceFAZolin (ANCEF) 2,000 mg in sterile water 20 mL IV syringe  2,000 mg IntraVENous Once Harris Lorenzo MD           Allergies:  No Known Allergies    Problem List:    Patient Active Problem List   Diagnosis Code   • Acute respiratory failure (HCC) J96.00   • Nonhealing surgical wound, sequela T81.89XS   • Bacterial infection due to Staphylococcus aureus A49.01       Past Medical History:        Diagnosis Date   • Chronic obstructive pulmonary disease (HCC)

## 2024-09-07 ENCOUNTER — APPOINTMENT (OUTPATIENT)
Facility: HOSPITAL | Age: 74
End: 2024-09-07
Attending: SURGERY
Payer: MEDICARE

## 2024-09-07 ENCOUNTER — ANESTHESIA (OUTPATIENT)
Facility: HOSPITAL | Age: 74
End: 2024-09-07
Payer: MEDICARE

## 2024-09-07 ENCOUNTER — ANESTHESIA EVENT (OUTPATIENT)
Facility: HOSPITAL | Age: 74
End: 2024-09-07
Payer: MEDICARE

## 2024-09-07 LAB
ANION GAP SERPL CALC-SCNC: 6 MMOL/L (ref 2–12)
APTT PPP: 32 SEC (ref 22.1–31)
APTT PPP: 32.9 SEC (ref 22.1–31)
APTT PPP: 33.5 SEC (ref 22.1–31)
APTT PPP: 35.1 SEC (ref 22.1–31)
BUN SERPL-MCNC: 28 MG/DL (ref 6–20)
BUN/CREAT SERPL: 31 (ref 12–20)
CALCIUM SERPL-MCNC: 8.2 MG/DL (ref 8.5–10.1)
CHLORIDE SERPL-SCNC: 104 MMOL/L (ref 97–108)
CO2 SERPL-SCNC: 29 MMOL/L (ref 21–32)
CREAT SERPL-MCNC: 0.89 MG/DL (ref 0.7–1.3)
ERYTHROCYTE [DISTWIDTH] IN BLOOD BY AUTOMATED COUNT: 14.2 % (ref 11.5–14.5)
ERYTHROCYTE [DISTWIDTH] IN BLOOD BY AUTOMATED COUNT: 14.4 % (ref 11.5–14.5)
ERYTHROCYTE [DISTWIDTH] IN BLOOD BY AUTOMATED COUNT: 14.4 % (ref 11.5–14.5)
ERYTHROCYTE [DISTWIDTH] IN BLOOD BY AUTOMATED COUNT: 14.5 % (ref 11.5–14.5)
FIBRINOGEN PPP-MCNC: 187 MG/DL (ref 200–475)
FIBRINOGEN PPP-MCNC: 236 MG/DL (ref 200–475)
FIBRINOGEN PPP-MCNC: 283 MG/DL (ref 200–475)
FIBRINOGEN PPP-MCNC: 306 MG/DL (ref 200–475)
GLUCOSE SERPL-MCNC: 116 MG/DL (ref 65–100)
HCT VFR BLD AUTO: 42.9 % (ref 36.6–50.3)
HCT VFR BLD AUTO: 43.6 % (ref 36.6–50.3)
HCT VFR BLD AUTO: 43.8 % (ref 36.6–50.3)
HCT VFR BLD AUTO: 45.9 % (ref 36.6–50.3)
HGB BLD-MCNC: 14.5 G/DL (ref 12.1–17)
HGB BLD-MCNC: 14.5 G/DL (ref 12.1–17)
HGB BLD-MCNC: 14.6 G/DL (ref 12.1–17)
HGB BLD-MCNC: 15.3 G/DL (ref 12.1–17)
MCH RBC QN AUTO: 30.2 PG (ref 26–34)
MCH RBC QN AUTO: 30.4 PG (ref 26–34)
MCH RBC QN AUTO: 30.5 PG (ref 26–34)
MCH RBC QN AUTO: 30.7 PG (ref 26–34)
MCHC RBC AUTO-ENTMCNC: 33.1 G/DL (ref 30–36.5)
MCHC RBC AUTO-ENTMCNC: 33.3 G/DL (ref 30–36.5)
MCHC RBC AUTO-ENTMCNC: 33.5 G/DL (ref 30–36.5)
MCHC RBC AUTO-ENTMCNC: 33.8 G/DL (ref 30–36.5)
MCV RBC AUTO: 90.7 FL (ref 80–99)
MCV RBC AUTO: 91 FL (ref 80–99)
MCV RBC AUTO: 91.1 FL (ref 80–99)
MCV RBC AUTO: 91.3 FL (ref 80–99)
NRBC # BLD: 0 K/UL (ref 0–0.01)
NRBC BLD-RTO: 0 PER 100 WBC
PLATELET # BLD AUTO: 148 K/UL (ref 150–400)
PLATELET # BLD AUTO: 165 K/UL (ref 150–400)
PLATELET # BLD AUTO: 184 K/UL (ref 150–400)
PLATELET # BLD AUTO: 192 K/UL (ref 150–400)
PMV BLD AUTO: 10 FL (ref 8.9–12.9)
PMV BLD AUTO: 10.1 FL (ref 8.9–12.9)
PMV BLD AUTO: 9.7 FL (ref 8.9–12.9)
PMV BLD AUTO: 9.9 FL (ref 8.9–12.9)
POTASSIUM SERPL-SCNC: 4.8 MMOL/L (ref 3.5–5.1)
RBC # BLD AUTO: 4.73 M/UL (ref 4.1–5.7)
RBC # BLD AUTO: 4.79 M/UL (ref 4.1–5.7)
RBC # BLD AUTO: 4.8 M/UL (ref 4.1–5.7)
RBC # BLD AUTO: 5.04 M/UL (ref 4.1–5.7)
SODIUM SERPL-SCNC: 139 MMOL/L (ref 136–145)
THERAPEUTIC RANGE: ABNORMAL SECS (ref 58–77)
WBC # BLD AUTO: 10.2 K/UL (ref 4.1–11.1)
WBC # BLD AUTO: 11 K/UL (ref 4.1–11.1)
WBC # BLD AUTO: 9.3 K/UL (ref 4.1–11.1)
WBC # BLD AUTO: 9.9 K/UL (ref 4.1–11.1)

## 2024-09-07 PROCEDURE — 71045 X-RAY EXAM CHEST 1 VIEW: CPT

## 2024-09-07 PROCEDURE — C1894 INTRO/SHEATH, NON-LASER: HCPCS | Performed by: SURGERY

## 2024-09-07 PROCEDURE — C1725 CATH, TRANSLUMIN NON-LASER: HCPCS | Performed by: SURGERY

## 2024-09-07 PROCEDURE — 85730 THROMBOPLASTIN TIME PARTIAL: CPT

## 2024-09-07 PROCEDURE — 85384 FIBRINOGEN ACTIVITY: CPT

## 2024-09-07 PROCEDURE — 2580000003 HC RX 258: Performed by: SURGERY

## 2024-09-07 PROCEDURE — 6360000002 HC RX W HCPCS: Performed by: NURSE ANESTHETIST, CERTIFIED REGISTERED

## 2024-09-07 PROCEDURE — 2709999900 HC NON-CHARGEABLE SUPPLY: Performed by: SURGERY

## 2024-09-07 PROCEDURE — C1751 CATH, INF, PER/CENT/MIDLINE: HCPCS | Performed by: SURGERY

## 2024-09-07 PROCEDURE — 3700000001 HC ADD 15 MINUTES (ANESTHESIA): Performed by: SURGERY

## 2024-09-07 PROCEDURE — 3700000000 HC ANESTHESIA ATTENDED CARE: Performed by: SURGERY

## 2024-09-07 PROCEDURE — 94640 AIRWAY INHALATION TREATMENT: CPT

## 2024-09-07 PROCEDURE — 80048 BASIC METABOLIC PNL TOTAL CA: CPT

## 2024-09-07 PROCEDURE — 2700000000 HC OXYGEN THERAPY PER DAY

## 2024-09-07 PROCEDURE — 6360000004 HC RX CONTRAST MEDICATION: Performed by: SURGERY

## 2024-09-07 PROCEDURE — C1769 GUIDE WIRE: HCPCS | Performed by: SURGERY

## 2024-09-07 PROCEDURE — 85027 COMPLETE CBC AUTOMATED: CPT

## 2024-09-07 PROCEDURE — 2580000003 HC RX 258: Performed by: NURSE ANESTHETIST, CERTIFIED REGISTERED

## 2024-09-07 PROCEDURE — 6360000002 HC RX W HCPCS: Performed by: SURGERY

## 2024-09-07 PROCEDURE — 0JWWX3Z REVISION OF INFUSION DEVICE IN LOWER EXTREMITY SUBCUTANEOUS TISSUE AND FASCIA, EXTERNAL APPROACH: ICD-10-PCS | Performed by: SURGERY

## 2024-09-07 PROCEDURE — A4217 STERILE WATER/SALINE, 500 ML: HCPCS | Performed by: SURGERY

## 2024-09-07 PROCEDURE — 3E05317 INTRODUCTION OF OTHER THROMBOLYTIC INTO PERIPHERAL ARTERY, PERCUTANEOUS APPROACH: ICD-10-PCS | Performed by: SURGERY

## 2024-09-07 PROCEDURE — 6370000000 HC RX 637 (ALT 250 FOR IP): Performed by: SURGERY

## 2024-09-07 PROCEDURE — 2720000010 HC SURG SUPPLY STERILE: Performed by: SURGERY

## 2024-09-07 PROCEDURE — 3600000002 HC SURGERY LEVEL 2 BASE: Performed by: SURGERY

## 2024-09-07 PROCEDURE — 3600000012 HC SURGERY LEVEL 2 ADDTL 15MIN: Performed by: SURGERY

## 2024-09-07 PROCEDURE — 2000000000 HC ICU R&B

## 2024-09-07 PROCEDURE — 36415 COLL VENOUS BLD VENIPUNCTURE: CPT

## 2024-09-07 RX ORDER — NALOXONE HYDROCHLORIDE 0.4 MG/ML
INJECTION, SOLUTION INTRAMUSCULAR; INTRAVENOUS; SUBCUTANEOUS PRN
Status: DISCONTINUED | OUTPATIENT
Start: 2024-09-07 | End: 2024-09-09

## 2024-09-07 RX ORDER — IOPAMIDOL 755 MG/ML
INJECTION, SOLUTION INTRAVASCULAR PRN
Status: DISCONTINUED | OUTPATIENT
Start: 2024-09-07 | End: 2024-09-07 | Stop reason: ALTCHOICE

## 2024-09-07 RX ORDER — MIDAZOLAM HYDROCHLORIDE 1 MG/ML
INJECTION INTRAMUSCULAR; INTRAVENOUS PRN
Status: DISCONTINUED | OUTPATIENT
Start: 2024-09-07 | End: 2024-09-07 | Stop reason: SDUPTHER

## 2024-09-07 RX ORDER — MORPHINE SULFATE 1 MG/30ML
INJECTION INTRAVENOUS CONTINUOUS
Status: DISCONTINUED | OUTPATIENT
Start: 2024-09-07 | End: 2024-09-09

## 2024-09-07 RX ORDER — SODIUM CHLORIDE, SODIUM LACTATE, POTASSIUM CHLORIDE, CALCIUM CHLORIDE 600; 310; 30; 20 MG/100ML; MG/100ML; MG/100ML; MG/100ML
INJECTION, SOLUTION INTRAVENOUS CONTINUOUS PRN
Status: DISCONTINUED | OUTPATIENT
Start: 2024-09-07 | End: 2024-09-07 | Stop reason: SDUPTHER

## 2024-09-07 RX ORDER — ONDANSETRON 2 MG/ML
INJECTION INTRAMUSCULAR; INTRAVENOUS PRN
Status: DISCONTINUED | OUTPATIENT
Start: 2024-09-07 | End: 2024-09-07 | Stop reason: SDUPTHER

## 2024-09-07 RX ORDER — MORPHINE SULFATE/0.9% NACL/PF 1 MG/ML
SYRINGE (ML) INJECTION CONTINUOUS
Status: DISCONTINUED | OUTPATIENT
Start: 2024-09-07 | End: 2024-09-07

## 2024-09-07 RX ORDER — KETOROLAC TROMETHAMINE 30 MG/ML
15 INJECTION, SOLUTION INTRAMUSCULAR; INTRAVENOUS EVERY 6 HOURS
Status: DISCONTINUED | OUTPATIENT
Start: 2024-09-07 | End: 2024-09-08

## 2024-09-07 RX ORDER — POLYETHYLENE GLYCOL 3350 17 G/17G
17 POWDER, FOR SOLUTION ORAL DAILY PRN
Status: DISCONTINUED | OUTPATIENT
Start: 2024-09-07 | End: 2024-09-09

## 2024-09-07 RX ORDER — FENTANYL CITRATE 50 UG/ML
INJECTION, SOLUTION INTRAMUSCULAR; INTRAVENOUS PRN
Status: DISCONTINUED | OUTPATIENT
Start: 2024-09-07 | End: 2024-09-07 | Stop reason: SDUPTHER

## 2024-09-07 RX ADMIN — ARFORMOTEROL TARTRATE 15 MCG: 15 SOLUTION RESPIRATORY (INHALATION) at 07:47

## 2024-09-07 RX ADMIN — ARFORMOTEROL TARTRATE 15 MCG: 15 SOLUTION RESPIRATORY (INHALATION) at 19:57

## 2024-09-07 RX ADMIN — IPRATROPIUM BROMIDE 0.5 MG: 0.5 SOLUTION RESPIRATORY (INHALATION) at 19:57

## 2024-09-07 RX ADMIN — ONDANSETRON HYDROCHLORIDE 4 MG: 2 INJECTION, SOLUTION INTRAMUSCULAR; INTRAVENOUS at 11:45

## 2024-09-07 RX ADMIN — PROPOFOL 35 MCG/KG/MIN: 10 INJECTION, EMULSION INTRAVENOUS at 10:50

## 2024-09-07 RX ADMIN — IPRATROPIUM BROMIDE 0.5 MG: 0.5 SOLUTION RESPIRATORY (INHALATION) at 01:06

## 2024-09-07 RX ADMIN — FENTANYL CITRATE 25 MCG: 50 INJECTION, SOLUTION INTRAMUSCULAR; INTRAVENOUS at 10:59

## 2024-09-07 RX ADMIN — MORPHINE SULFATE 2 MG: 2 INJECTION, SOLUTION INTRAMUSCULAR; INTRAVENOUS at 15:45

## 2024-09-07 RX ADMIN — MORPHINE SULFATE 30 MG: 1 INJECTION INTRAVENOUS at 20:44

## 2024-09-07 RX ADMIN — FENTANYL CITRATE 25 MCG: 50 INJECTION, SOLUTION INTRAMUSCULAR; INTRAVENOUS at 10:49

## 2024-09-07 RX ADMIN — IPRATROPIUM BROMIDE 0.5 MG: 0.5 SOLUTION RESPIRATORY (INHALATION) at 07:47

## 2024-09-07 RX ADMIN — CLOPIDOGREL BISULFATE 75 MG: 75 TABLET ORAL at 10:05

## 2024-09-07 RX ADMIN — ASPIRIN 325 MG: 325 TABLET ORAL at 10:05

## 2024-09-07 RX ADMIN — SODIUM CHLORIDE, POTASSIUM CHLORIDE, SODIUM LACTATE AND CALCIUM CHLORIDE: 600; 310; 30; 20 INJECTION, SOLUTION INTRAVENOUS at 10:40

## 2024-09-07 RX ADMIN — KETOROLAC TROMETHAMINE 15 MG: 30 INJECTION, SOLUTION INTRAMUSCULAR at 19:35

## 2024-09-07 RX ADMIN — MORPHINE SULFATE 2 MG: 2 INJECTION, SOLUTION INTRAMUSCULAR; INTRAVENOUS at 00:12

## 2024-09-07 RX ADMIN — PROPOFOL 20 MG: 10 INJECTION, EMULSION INTRAVENOUS at 10:49

## 2024-09-07 RX ADMIN — MORPHINE SULFATE 2 MG: 2 INJECTION, SOLUTION INTRAMUSCULAR; INTRAVENOUS at 04:02

## 2024-09-07 RX ADMIN — MORPHINE SULFATE 2 MG: 2 INJECTION, SOLUTION INTRAMUSCULAR; INTRAVENOUS at 12:48

## 2024-09-07 RX ADMIN — LORAZEPAM 1 MG: 2 INJECTION INTRAMUSCULAR; INTRAVENOUS at 18:57

## 2024-09-07 RX ADMIN — CITALOPRAM HYDROBROMIDE 10 MG: 10 TABLET ORAL at 10:05

## 2024-09-07 RX ADMIN — SODIUM CHLORIDE: 9 INJECTION, SOLUTION INTRAVENOUS at 16:43

## 2024-09-07 RX ADMIN — MIDAZOLAM HYDROCHLORIDE 1 MG: 1 INJECTION, SOLUTION INTRAMUSCULAR; INTRAVENOUS at 10:40

## 2024-09-07 RX ADMIN — MIDAZOLAM HYDROCHLORIDE 1 MG: 1 INJECTION, SOLUTION INTRAMUSCULAR; INTRAVENOUS at 10:59

## 2024-09-07 RX ADMIN — ALTEPLASE 1 MG/HR: 2.2 INJECTION, POWDER, LYOPHILIZED, FOR SOLUTION INTRAVENOUS at 03:12

## 2024-09-07 RX ADMIN — ALTEPLASE 1.5 MG/HR: 2.2 INJECTION, POWDER, LYOPHILIZED, FOR SOLUTION INTRAVENOUS at 14:42

## 2024-09-07 RX ADMIN — MORPHINE SULFATE 2 MG: 2 INJECTION, SOLUTION INTRAMUSCULAR; INTRAVENOUS at 18:31

## 2024-09-07 RX ADMIN — ALTEPLASE 1.5 MG/HR: 2.2 INJECTION, POWDER, LYOPHILIZED, FOR SOLUTION INTRAVENOUS at 22:22

## 2024-09-07 RX ADMIN — PROPOFOL 10 MG: 10 INJECTION, EMULSION INTRAVENOUS at 10:59

## 2024-09-07 ASSESSMENT — LIFESTYLE VARIABLES: SMOKING_STATUS: 1

## 2024-09-07 ASSESSMENT — PAIN DESCRIPTION - LOCATION
LOCATION: LEG

## 2024-09-07 ASSESSMENT — PAIN SCALES - GENERAL
PAINLEVEL_OUTOF10: 4
PAINLEVEL_OUTOF10: 4
PAINLEVEL_OUTOF10: 3
PAINLEVEL_OUTOF10: 6
PAINLEVEL_OUTOF10: 5
PAINLEVEL_OUTOF10: 0
PAINLEVEL_OUTOF10: 8
PAINLEVEL_OUTOF10: 7
PAINLEVEL_OUTOF10: 10
PAINLEVEL_OUTOF10: 6
PAINLEVEL_OUTOF10: 10
PAINLEVEL_OUTOF10: 3
PAINLEVEL_OUTOF10: 10
PAINLEVEL_OUTOF10: 6
PAINLEVEL_OUTOF10: 10
PAINLEVEL_OUTOF10: 7
PAINLEVEL_OUTOF10: 3
PAINLEVEL_OUTOF10: 8

## 2024-09-07 ASSESSMENT — PAIN DESCRIPTION - DESCRIPTORS
DESCRIPTORS: BURNING;THROBBING
DESCRIPTORS: SHARP;THROBBING
DESCRIPTORS: DISCOMFORT;THROBBING
DESCRIPTORS: SHARP;SHOOTING
DESCRIPTORS: THROBBING
DESCRIPTORS: THROBBING;DISCOMFORT
DESCRIPTORS: ACHING;DISCOMFORT

## 2024-09-07 ASSESSMENT — PAIN - FUNCTIONAL ASSESSMENT
PAIN_FUNCTIONAL_ASSESSMENT: ACTIVITIES ARE NOT PREVENTED

## 2024-09-07 ASSESSMENT — PAIN DESCRIPTION - ORIENTATION
ORIENTATION: LEFT
ORIENTATION: RIGHT
ORIENTATION: LEFT
ORIENTATION: LEFT

## 2024-09-07 ASSESSMENT — COPD QUESTIONNAIRES: CAT_SEVERITY: SEVERE

## 2024-09-07 NOTE — ANESTHESIA POSTPROCEDURE EVALUATION
Department of Anesthesiology  Postprocedure Note    Patient: Nasir Hatfield  MRN: [id number]  YOB: 1950  Date of evaluation: 9/7/2024    Procedure Summary       Date: 09/06/24 Room / Location: [facility] MAIN OR M10 / [facility] MAIN OR    Anesthesia Start: 1626 Anesthesia Stop: 1924    Procedure: LEFT LOWER EXTREMITY THROMBECTOMY (Left: Groin) Diagnosis:       Critical limb ischemia of left lower extremity (HCC)      (Critical limb ischemia of left lower extremity (HCC) [I70.222])    Providers: Harris Lorenzo MD Responsible Provider: Walker Handy MD    Anesthesia Type: General ASA Status: 3            Anesthesia Type: General    Carmel Phase I: Carmel Score: 9    Carmel Phase II:      Anesthesia Post Evaluation    Patient location during evaluation: bedside  Patient participation: complete - patient participated  Level of consciousness: awake and alert  Pain scale: Controlled per protocol.  Airway patency: patent  Nausea & Vomiting: no nausea and no vomiting  Cardiovascular status: hemodynamically stable  Respiratory status: acceptable  Hydration status: stable  Multimodal analgesia pain management approach  Pain management: adequate    No notable events documented.

## 2024-09-07 NOTE — ANESTHESIA PRE PROCEDURE
Department of Anesthesiology  Preprocedure Note       Name:  Nasir Hatfield   Age:  73 y.o.  :  1950                                          MRN:  [id number]         Date:  2024      Surgeon: Surgeon(s):  Harris Lorenzo MD    Procedure: Procedure(s):  LEFT LOWER THROMBECTOMY, POSSIBLE FEM POP    Medications prior to admission:   Prior to Admission medications    Medication Sig Start Date End Date Taking? Authorizing Provider   OXYGEN Inhale 3 L into the lungs daily   Yes Liane Toney MD   Multiple Vitamins-Minerals (MENS 50+ MULTI VITAMIN/MIN PO) Take by mouth daily   Yes Liane Toney MD   clopidogrel (PLAVIX) 75 MG tablet Take 1 tablet by mouth daily 24  Yes Staci Matos MD   aspirin 325 MG tablet Take 1 tablet by mouth daily   Yes Automatic Reconciliation, Ar   citalopram (CELEXA) 10 MG tablet Take by mouth daily   Yes Automatic Reconciliation, Ar   umeclidinium-vilanterol (ANORO ELLIPTA) 62.5-25 MCG/ACT inhaler Inhale 1 puff into the lungs daily   Yes Automatic Reconciliation, Ar   albuterol sulfate HFA (PROVENTIL;VENTOLIN;PROAIR) 108 (90 Base) MCG/ACT inhaler Inhale into the lungs    Automatic Reconciliation, Ar       Current medications:    Current Facility-Administered Medications   Medication Dose Route Frequency Provider Last Rate Last Admin   • ALTEplase (CATHFLO) 10 mg in sodium chloride 0.9 % 100 mL infusion  1 mg/hr IntraCATHeter Continuous Harris Lorenzo MD 10 mL/hr at 24 0312 1 mg/hr at 24   • albuterol (PROVENTIL) (2.5 MG/3ML) 0.083% nebulizer solution 2.5 mg  2.5 mg Nebulization Q4H PRN Harris Lorenzo MD       • aspirin tablet 325 mg  325 mg Oral Daily Harris Lorenzo MD       • clopidogrel (PLAVIX) tablet 75 mg  75 mg Oral Daily Harris Lorenzo MD       • citalopram (CELEXA) tablet 10 mg  10 mg Oral Daily Harris Lorenzo MD       • 0.9 % sodium chloride infusion   IntraVENous Continuous Harris Lorenzo MD 50 mL/hr at 24

## 2024-09-07 NOTE — ANESTHESIA POSTPROCEDURE EVALUATION
Department of Anesthesiology  Postprocedure Note    Patient: Nasir Hatfield  MRN: [id number]  YOB: 1950  Date of evaluation: 9/7/2024    Procedure Summary       Date: 09/07/24 Room / Location: [facility] MAIN OR 0 / [facility] MAIN OR    Anesthesia Start: 1040 Anesthesia Stop: 1210    Procedure: RE INJECT INFUSION CATHETER (Right: Groin) Diagnosis:       Peripheral vascular disease, unspecified (HCC)      (Peripheral vascular disease, unspecified (HCC) [I73.9])    Providers: Harris Lorenzo MD Responsible Provider: Pamela Valencia MD    Anesthesia Type: MAC ASA Status: 3            Anesthesia Type: MAC    Carmel Phase I: Carmel Score: 9    Carmel Phase II:      Anesthesia Post Evaluation    Patient location during evaluation: PACU  Patient participation: complete - patient participated  Level of consciousness: sleepy but conscious and responsive to verbal stimuli  Airway patency: patent  Nausea & Vomiting: no vomiting and no nausea  Cardiovascular status: blood pressure returned to baseline and hemodynamically stable  Respiratory status: acceptable  Hydration status: stable    No notable events documented.

## 2024-09-08 ENCOUNTER — APPOINTMENT (OUTPATIENT)
Facility: HOSPITAL | Age: 74
End: 2024-09-08
Attending: SURGERY
Payer: MEDICARE

## 2024-09-08 ENCOUNTER — ANESTHESIA (OUTPATIENT)
Facility: HOSPITAL | Age: 74
End: 2024-09-08
Payer: MEDICARE

## 2024-09-08 ENCOUNTER — ANESTHESIA EVENT (OUTPATIENT)
Facility: HOSPITAL | Age: 74
End: 2024-09-08
Payer: MEDICARE

## 2024-09-08 LAB
ANION GAP SERPL CALC-SCNC: 1 MMOL/L (ref 2–12)
APTT PPP: 31.7 SEC (ref 22.1–31)
APTT PPP: 32.8 SEC (ref 22.1–31)
APTT PPP: 54.3 SEC (ref 22.1–31)
ARTERIAL PATENCY WRIST A: YES
BASE EXCESS BLDA CALC-SCNC: 4.7 MMOL/L
BASOPHILS # BLD: 0 K/UL (ref 0–0.1)
BASOPHILS NFR BLD: 0 % (ref 0–1)
BDY SITE: ABNORMAL
BUN SERPL-MCNC: 23 MG/DL (ref 6–20)
BUN/CREAT SERPL: 32 (ref 12–20)
CALCIUM SERPL-MCNC: 8.1 MG/DL (ref 8.5–10.1)
CHLORIDE SERPL-SCNC: 102 MMOL/L (ref 97–108)
CO2 SERPL-SCNC: 33 MMOL/L (ref 21–32)
CREAT SERPL-MCNC: 0.73 MG/DL (ref 0.7–1.3)
DIFFERENTIAL METHOD BLD: ABNORMAL
EOSINOPHIL # BLD: 0.1 K/UL (ref 0–0.4)
EOSINOPHIL NFR BLD: 1 % (ref 0–7)
ERYTHROCYTE [DISTWIDTH] IN BLOOD BY AUTOMATED COUNT: 14.3 % (ref 11.5–14.5)
ERYTHROCYTE [DISTWIDTH] IN BLOOD BY AUTOMATED COUNT: 14.5 % (ref 11.5–14.5)
ERYTHROCYTE [DISTWIDTH] IN BLOOD BY AUTOMATED COUNT: 14.6 % (ref 11.5–14.5)
FIBRINOGEN PPP-MCNC: 194 MG/DL (ref 200–475)
FIBRINOGEN PPP-MCNC: 269 MG/DL (ref 200–475)
GLUCOSE SERPL-MCNC: 118 MG/DL (ref 65–100)
HCO3 BLDA-SCNC: 29 MMOL/L (ref 22–26)
HCT VFR BLD AUTO: 42.2 % (ref 36.6–50.3)
HCT VFR BLD AUTO: 45.1 % (ref 36.6–50.3)
HCT VFR BLD AUTO: 45.7 % (ref 36.6–50.3)
HGB BLD-MCNC: 14.2 G/DL (ref 12.1–17)
HGB BLD-MCNC: 14.8 G/DL (ref 12.1–17)
HGB BLD-MCNC: 15 G/DL (ref 12.1–17)
IMM GRANULOCYTES # BLD AUTO: 0.1 K/UL (ref 0–0.04)
IMM GRANULOCYTES NFR BLD AUTO: 1 % (ref 0–0.5)
INR PPP: 1.1 (ref 0.9–1.1)
LYMPHOCYTES # BLD: 0.4 K/UL (ref 0.8–3.5)
LYMPHOCYTES NFR BLD: 4 % (ref 12–49)
MCH RBC QN AUTO: 30 PG (ref 26–34)
MCH RBC QN AUTO: 30.5 PG (ref 26–34)
MCH RBC QN AUTO: 30.7 PG (ref 26–34)
MCHC RBC AUTO-ENTMCNC: 32.4 G/DL (ref 30–36.5)
MCHC RBC AUTO-ENTMCNC: 33.3 G/DL (ref 30–36.5)
MCHC RBC AUTO-ENTMCNC: 33.6 G/DL (ref 30–36.5)
MCV RBC AUTO: 90.8 FL (ref 80–99)
MCV RBC AUTO: 92.4 FL (ref 80–99)
MCV RBC AUTO: 92.5 FL (ref 80–99)
MONOCYTES # BLD: 0.9 K/UL (ref 0–1)
MONOCYTES NFR BLD: 8 % (ref 5–13)
NEUTS SEG # BLD: 9.7 K/UL (ref 1.8–8)
NEUTS SEG NFR BLD: 86 % (ref 32–75)
NRBC # BLD: 0 K/UL (ref 0–0.01)
NRBC BLD-RTO: 0 PER 100 WBC
NT PRO BNP: 3297 PG/ML
PCO2 BLDA: 43 MMHG (ref 35–45)
PH BLDA: 7.45 (ref 7.35–7.45)
PLATELET # BLD AUTO: 147 K/UL (ref 150–400)
PLATELET # BLD AUTO: 151 K/UL (ref 150–400)
PLATELET # BLD AUTO: 165 K/UL (ref 150–400)
PMV BLD AUTO: 10 FL (ref 8.9–12.9)
PMV BLD AUTO: 10 FL (ref 8.9–12.9)
PMV BLD AUTO: 9.8 FL (ref 8.9–12.9)
PO2 BLDA: 45 MMHG (ref 80–100)
POTASSIUM SERPL-SCNC: 4.9 MMOL/L (ref 3.5–5.1)
PROCALCITONIN SERPL-MCNC: 0.27 NG/ML
PROTHROMBIN TIME: 11.4 SEC (ref 9–11.1)
RBC # BLD AUTO: 4.65 M/UL (ref 4.1–5.7)
RBC # BLD AUTO: 4.88 M/UL (ref 4.1–5.7)
RBC # BLD AUTO: 4.94 M/UL (ref 4.1–5.7)
RBC MORPH BLD: ABNORMAL
SAO2 % BLD: 83 % (ref 92–97)
SAO2% DEVICE SAO2% SENSOR NAME: ABNORMAL
SODIUM SERPL-SCNC: 136 MMOL/L (ref 136–145)
SPECIMEN SITE: ABNORMAL
THERAPEUTIC RANGE: ABNORMAL SECS (ref 58–77)
UFH PPP CHRO-ACNC: 0.29 IU/ML
UFH PPP CHRO-ACNC: <0.1 IU/ML
WBC # BLD AUTO: 11.2 K/UL (ref 4.1–11.1)
WBC # BLD AUTO: 11.5 K/UL (ref 4.1–11.1)
WBC # BLD AUTO: 9.6 K/UL (ref 4.1–11.1)

## 2024-09-08 PROCEDURE — 6360000002 HC RX W HCPCS: Performed by: SURGERY

## 2024-09-08 PROCEDURE — 71045 X-RAY EXAM CHEST 1 VIEW: CPT

## 2024-09-08 PROCEDURE — 3600000012 HC SURGERY LEVEL 2 ADDTL 15MIN: Performed by: SURGERY

## 2024-09-08 PROCEDURE — 85730 THROMBOPLASTIN TIME PARTIAL: CPT

## 2024-09-08 PROCEDURE — 2060000000 HC ICU INTERMEDIATE R&B

## 2024-09-08 PROCEDURE — 93005 ELECTROCARDIOGRAM TRACING: CPT | Performed by: SURGERY

## 2024-09-08 PROCEDURE — 85384 FIBRINOGEN ACTIVITY: CPT

## 2024-09-08 PROCEDURE — C1760 CLOSURE DEV, VASC: HCPCS | Performed by: SURGERY

## 2024-09-08 PROCEDURE — 80048 BASIC METABOLIC PNL TOTAL CA: CPT

## 2024-09-08 PROCEDURE — 3700000001 HC ADD 15 MINUTES (ANESTHESIA): Performed by: SURGERY

## 2024-09-08 PROCEDURE — 84443 ASSAY THYROID STIM HORMONE: CPT

## 2024-09-08 PROCEDURE — C1894 INTRO/SHEATH, NON-LASER: HCPCS | Performed by: SURGERY

## 2024-09-08 PROCEDURE — 7100000000 HC PACU RECOVERY - FIRST 15 MIN: Performed by: SURGERY

## 2024-09-08 PROCEDURE — 5A09357 ASSISTANCE WITH RESPIRATORY VENTILATION, LESS THAN 24 CONSECUTIVE HOURS, CONTINUOUS POSITIVE AIRWAY PRESSURE: ICD-10-PCS | Performed by: SURGERY

## 2024-09-08 PROCEDURE — C1769 GUIDE WIRE: HCPCS | Performed by: SURGERY

## 2024-09-08 PROCEDURE — 94660 CPAP INITIATION&MGMT: CPT

## 2024-09-08 PROCEDURE — 3700000000 HC ANESTHESIA ATTENDED CARE: Performed by: SURGERY

## 2024-09-08 PROCEDURE — 6360000002 HC RX W HCPCS: Performed by: NURSE ANESTHETIST, CERTIFIED REGISTERED

## 2024-09-08 PROCEDURE — 2700000000 HC OXYGEN THERAPY PER DAY

## 2024-09-08 PROCEDURE — 82803 BLOOD GASES ANY COMBINATION: CPT

## 2024-09-08 PROCEDURE — 7100000001 HC PACU RECOVERY - ADDTL 15 MIN: Performed by: SURGERY

## 2024-09-08 PROCEDURE — 2580000003 HC RX 258: Performed by: NURSE ANESTHETIST, CERTIFIED REGISTERED

## 2024-09-08 PROCEDURE — 3E05317 INTRODUCTION OF OTHER THROMBOLYTIC INTO PERIPHERAL ARTERY, PERCUTANEOUS APPROACH: ICD-10-PCS | Performed by: SURGERY

## 2024-09-08 PROCEDURE — 3600000002 HC SURGERY LEVEL 2 BASE: Performed by: SURGERY

## 2024-09-08 PROCEDURE — 6360000004 HC RX CONTRAST MEDICATION: Performed by: SURGERY

## 2024-09-08 PROCEDURE — 83880 ASSAY OF NATRIURETIC PEPTIDE: CPT

## 2024-09-08 PROCEDURE — 2580000003 HC RX 258: Performed by: SURGERY

## 2024-09-08 PROCEDURE — 36415 COLL VENOUS BLD VENIPUNCTURE: CPT

## 2024-09-08 PROCEDURE — 94640 AIRWAY INHALATION TREATMENT: CPT

## 2024-09-08 PROCEDURE — 2709999900 HC NON-CHARGEABLE SUPPLY: Performed by: SURGERY

## 2024-09-08 PROCEDURE — 6370000000 HC RX 637 (ALT 250 FOR IP): Performed by: SURGERY

## 2024-09-08 PROCEDURE — 36600 WITHDRAWAL OF ARTERIAL BLOOD: CPT

## 2024-09-08 PROCEDURE — 85610 PROTHROMBIN TIME: CPT

## 2024-09-08 PROCEDURE — 2720000010 HC SURG SUPPLY STERILE: Performed by: SURGERY

## 2024-09-08 PROCEDURE — 84439 ASSAY OF FREE THYROXINE: CPT

## 2024-09-08 PROCEDURE — 2580000003 HC RX 258: Performed by: HOSPITALIST

## 2024-09-08 PROCEDURE — 85520 HEPARIN ASSAY: CPT

## 2024-09-08 PROCEDURE — 84145 PROCALCITONIN (PCT): CPT

## 2024-09-08 PROCEDURE — 85027 COMPLETE CBC AUTOMATED: CPT

## 2024-09-08 PROCEDURE — 6360000002 HC RX W HCPCS: Performed by: HOSPITALIST

## 2024-09-08 PROCEDURE — 85025 COMPLETE CBC W/AUTO DIFF WBC: CPT

## 2024-09-08 RX ORDER — CEFAZOLIN SODIUM/WATER 2 G/20 ML
SYRINGE (ML) INTRAVENOUS PRN
Status: DISCONTINUED | OUTPATIENT
Start: 2024-09-08 | End: 2024-09-08 | Stop reason: SDUPTHER

## 2024-09-08 RX ORDER — SODIUM CHLORIDE, SODIUM LACTATE, POTASSIUM CHLORIDE, CALCIUM CHLORIDE 600; 310; 30; 20 MG/100ML; MG/100ML; MG/100ML; MG/100ML
INJECTION, SOLUTION INTRAVENOUS CONTINUOUS PRN
Status: DISCONTINUED | OUTPATIENT
Start: 2024-09-08 | End: 2024-09-08 | Stop reason: SDUPTHER

## 2024-09-08 RX ORDER — LORAZEPAM 0.5 MG/1
0.5 TABLET ORAL EVERY 4 HOURS PRN
Status: DISCONTINUED | OUTPATIENT
Start: 2024-09-08 | End: 2024-09-13 | Stop reason: HOSPADM

## 2024-09-08 RX ORDER — HEPARIN SODIUM 1000 [USP'U]/ML
4000 INJECTION, SOLUTION INTRAVENOUS; SUBCUTANEOUS PRN
Status: DISCONTINUED | OUTPATIENT
Start: 2024-09-08 | End: 2024-09-09

## 2024-09-08 RX ORDER — KETOROLAC TROMETHAMINE 30 MG/ML
15 INJECTION, SOLUTION INTRAMUSCULAR; INTRAVENOUS EVERY 6 HOURS PRN
Status: DISCONTINUED | OUTPATIENT
Start: 2024-09-08 | End: 2024-09-13

## 2024-09-08 RX ORDER — HEPARIN SODIUM 10000 [USP'U]/100ML
5-30 INJECTION, SOLUTION INTRAVENOUS CONTINUOUS
Status: DISCONTINUED | OUTPATIENT
Start: 2024-09-08 | End: 2024-09-09

## 2024-09-08 RX ORDER — IPRATROPIUM BROMIDE AND ALBUTEROL SULFATE 2.5; .5 MG/3ML; MG/3ML
1 SOLUTION RESPIRATORY (INHALATION)
Status: DISCONTINUED | OUTPATIENT
Start: 2024-09-08 | End: 2024-09-13 | Stop reason: HOSPADM

## 2024-09-08 RX ORDER — IOPAMIDOL 755 MG/ML
INJECTION, SOLUTION INTRAVASCULAR PRN
Status: DISCONTINUED | OUTPATIENT
Start: 2024-09-08 | End: 2024-09-08 | Stop reason: ALTCHOICE

## 2024-09-08 RX ORDER — ARFORMOTEROL TARTRATE 15 UG/2ML
15 SOLUTION RESPIRATORY (INHALATION) DAILY
Status: DISCONTINUED | OUTPATIENT
Start: 2024-09-09 | End: 2024-09-13 | Stop reason: HOSPADM

## 2024-09-08 RX ORDER — HEPARIN SODIUM 1000 [USP'U]/ML
4000 INJECTION, SOLUTION INTRAVENOUS; SUBCUTANEOUS ONCE
Status: COMPLETED | OUTPATIENT
Start: 2024-09-08 | End: 2024-09-08

## 2024-09-08 RX ORDER — HEPARIN SODIUM 1000 [USP'U]/ML
2000 INJECTION, SOLUTION INTRAVENOUS; SUBCUTANEOUS PRN
Status: DISCONTINUED | OUTPATIENT
Start: 2024-09-08 | End: 2024-09-09

## 2024-09-08 RX ORDER — IPRATROPIUM BROMIDE AND ALBUTEROL SULFATE 2.5; .5 MG/3ML; MG/3ML
1 SOLUTION RESPIRATORY (INHALATION)
Status: DISCONTINUED | OUTPATIENT
Start: 2024-09-08 | End: 2024-09-08

## 2024-09-08 RX ORDER — CEFAZOLIN SODIUM/WATER 2 G/20 ML
SYRINGE (ML) INTRAVENOUS PRN
Status: DISCONTINUED | OUTPATIENT
Start: 2024-09-08 | End: 2024-09-08

## 2024-09-08 RX ADMIN — KETOROLAC TROMETHAMINE 15 MG: 30 INJECTION, SOLUTION INTRAMUSCULAR at 12:35

## 2024-09-08 RX ADMIN — KETOROLAC TROMETHAMINE 15 MG: 30 INJECTION, SOLUTION INTRAMUSCULAR at 17:01

## 2024-09-08 RX ADMIN — SODIUM CHLORIDE, POTASSIUM CHLORIDE, SODIUM LACTATE AND CALCIUM CHLORIDE: 600; 310; 30; 20 INJECTION, SOLUTION INTRAVENOUS at 08:02

## 2024-09-08 RX ADMIN — HEPARIN SODIUM AND DEXTROSE 12 UNITS/KG/HR: 10000; 5 INJECTION INTRAVENOUS at 10:16

## 2024-09-08 RX ADMIN — KETOROLAC TROMETHAMINE 15 MG: 30 INJECTION, SOLUTION INTRAMUSCULAR at 02:04

## 2024-09-08 RX ADMIN — IPRATROPIUM BROMIDE 0.5 MG: 0.5 SOLUTION RESPIRATORY (INHALATION) at 13:20

## 2024-09-08 RX ADMIN — CLOPIDOGREL BISULFATE 75 MG: 75 TABLET ORAL at 17:07

## 2024-09-08 RX ADMIN — SODIUM CHLORIDE: 9 INJECTION, SOLUTION INTRAVENOUS at 10:24

## 2024-09-08 RX ADMIN — LORAZEPAM 1 MG: 2 INJECTION INTRAMUSCULAR; INTRAVENOUS at 00:36

## 2024-09-08 RX ADMIN — Medication 2000 MCG: at 08:22

## 2024-09-08 RX ADMIN — CITALOPRAM HYDROBROMIDE 10 MG: 10 TABLET ORAL at 17:08

## 2024-09-08 RX ADMIN — AMPICILLIN SODIUM AND SULBACTAM SODIUM 3000 MG: 2; 1 INJECTION, POWDER, FOR SOLUTION INTRAMUSCULAR; INTRAVENOUS at 15:49

## 2024-09-08 RX ADMIN — AMPICILLIN SODIUM AND SULBACTAM SODIUM 3000 MG: 2; 1 INJECTION, POWDER, FOR SOLUTION INTRAMUSCULAR; INTRAVENOUS at 22:21

## 2024-09-08 RX ADMIN — ALTEPLASE 1.5 MG/HR: 2.2 INJECTION, POWDER, LYOPHILIZED, FOR SOLUTION INTRAVENOUS at 06:02

## 2024-09-08 RX ADMIN — WATER 40 MG: 1 INJECTION INTRAMUSCULAR; INTRAVENOUS; SUBCUTANEOUS at 15:35

## 2024-09-08 RX ADMIN — HEPARIN SODIUM 4000 UNITS: 1000 INJECTION, SOLUTION INTRAVENOUS; SUBCUTANEOUS at 10:22

## 2024-09-08 RX ADMIN — PROPOFOL 35 MCG/KG/MIN: 10 INJECTION, EMULSION INTRAVENOUS at 08:13

## 2024-09-08 RX ADMIN — KETOROLAC TROMETHAMINE 15 MG: 30 INJECTION, SOLUTION INTRAMUSCULAR at 23:18

## 2024-09-08 RX ADMIN — ASPIRIN 325 MG: 325 TABLET ORAL at 17:08

## 2024-09-08 ASSESSMENT — PAIN DESCRIPTION - ORIENTATION
ORIENTATION: LEFT

## 2024-09-08 ASSESSMENT — PAIN SCALES - GENERAL
PAINLEVEL_OUTOF10: 7
PAINLEVEL_OUTOF10: 2
PAINLEVEL_OUTOF10: 3
PAINLEVEL_OUTOF10: 10
PAINLEVEL_OUTOF10: 9
PAINLEVEL_OUTOF10: 2
PAINLEVEL_OUTOF10: 7
PAINLEVEL_OUTOF10: 9
PAINLEVEL_OUTOF10: 2
PAINLEVEL_OUTOF10: 2
PAINLEVEL_OUTOF10: 0
PAINLEVEL_OUTOF10: 10

## 2024-09-08 ASSESSMENT — PAIN DESCRIPTION - DESCRIPTORS
DESCRIPTORS: ACHING;DISCOMFORT;TINGLING
DESCRIPTORS: BURNING;DISCOMFORT;SORE;TENDER

## 2024-09-08 ASSESSMENT — PAIN DESCRIPTION - LOCATION
LOCATION: FOOT
LOCATION: FOOT;LEG;TOE (COMMENT WHICH ONE)
LOCATION: FOOT
LOCATION: FOOT

## 2024-09-09 LAB
APTT PPP: 64.3 SEC (ref 22.1–31)
EKG ATRIAL RATE: 119 BPM
EKG DIAGNOSIS: NORMAL
EKG P AXIS: 79 DEGREES
EKG P-R INTERVAL: 184 MS
EKG Q-T INTERVAL: 308 MS
EKG QRS DURATION: 80 MS
EKG QTC CALCULATION (BAZETT): 424 MS
EKG R AXIS: -78 DEGREES
EKG T AXIS: 70 DEGREES
EKG VENTRICULAR RATE: 114 BPM
ERYTHROCYTE [DISTWIDTH] IN BLOOD BY AUTOMATED COUNT: 14.1 % (ref 11.5–14.5)
ERYTHROCYTE [DISTWIDTH] IN BLOOD BY AUTOMATED COUNT: 14.2 % (ref 11.5–14.5)
FIBRINOGEN PPP-MCNC: 352 MG/DL (ref 200–475)
FIBRINOGEN PPP-MCNC: 425 MG/DL (ref 200–475)
HCT VFR BLD AUTO: 37.4 % (ref 36.6–50.3)
HCT VFR BLD AUTO: 39.7 % (ref 36.6–50.3)
HGB BLD-MCNC: 12.6 G/DL (ref 12.1–17)
HGB BLD-MCNC: 13.3 G/DL (ref 12.1–17)
MCH RBC QN AUTO: 30.7 PG (ref 26–34)
MCH RBC QN AUTO: 30.8 PG (ref 26–34)
MCHC RBC AUTO-ENTMCNC: 33.5 G/DL (ref 30–36.5)
MCHC RBC AUTO-ENTMCNC: 33.7 G/DL (ref 30–36.5)
MCV RBC AUTO: 91.4 FL (ref 80–99)
MCV RBC AUTO: 91.7 FL (ref 80–99)
NRBC # BLD: 0 K/UL (ref 0–0.01)
NRBC # BLD: 0 K/UL (ref 0–0.01)
NRBC BLD-RTO: 0 PER 100 WBC
NRBC BLD-RTO: 0 PER 100 WBC
PLATELET # BLD AUTO: 137 K/UL (ref 150–400)
PLATELET # BLD AUTO: 138 K/UL (ref 150–400)
PMV BLD AUTO: 10 FL (ref 8.9–12.9)
PMV BLD AUTO: 10.1 FL (ref 8.9–12.9)
RBC # BLD AUTO: 4.09 M/UL (ref 4.1–5.7)
RBC # BLD AUTO: 4.33 M/UL (ref 4.1–5.7)
THERAPEUTIC RANGE: ABNORMAL SECS (ref 58–77)
UFH PPP CHRO-ACNC: 0.29 IU/ML
WBC # BLD AUTO: 10.6 K/UL (ref 4.1–11.1)
WBC # BLD AUTO: 13.4 K/UL (ref 4.1–11.1)

## 2024-09-09 PROCEDURE — 2060000000 HC ICU INTERMEDIATE R&B

## 2024-09-09 PROCEDURE — 94669 MECHANICAL CHEST WALL OSCILL: CPT

## 2024-09-09 PROCEDURE — 85520 HEPARIN ASSAY: CPT

## 2024-09-09 PROCEDURE — 6370000000 HC RX 637 (ALT 250 FOR IP): Performed by: SURGERY

## 2024-09-09 PROCEDURE — 6360000002 HC RX W HCPCS: Performed by: SURGERY

## 2024-09-09 PROCEDURE — 85384 FIBRINOGEN ACTIVITY: CPT

## 2024-09-09 PROCEDURE — 2580000003 HC RX 258: Performed by: HOSPITALIST

## 2024-09-09 PROCEDURE — 2700000000 HC OXYGEN THERAPY PER DAY

## 2024-09-09 PROCEDURE — 94640 AIRWAY INHALATION TREATMENT: CPT

## 2024-09-09 PROCEDURE — 85730 THROMBOPLASTIN TIME PARTIAL: CPT

## 2024-09-09 PROCEDURE — 6360000002 HC RX W HCPCS: Performed by: HOSPITALIST

## 2024-09-09 PROCEDURE — 36415 COLL VENOUS BLD VENIPUNCTURE: CPT

## 2024-09-09 PROCEDURE — 2580000003 HC RX 258: Performed by: SURGERY

## 2024-09-09 PROCEDURE — 85027 COMPLETE CBC AUTOMATED: CPT

## 2024-09-09 RX ORDER — OXYCODONE AND ACETAMINOPHEN 5; 325 MG/1; MG/1
1 TABLET ORAL EVERY 4 HOURS PRN
Status: DISCONTINUED | OUTPATIENT
Start: 2024-09-09 | End: 2024-09-13 | Stop reason: HOSPADM

## 2024-09-09 RX ADMIN — AMPICILLIN SODIUM AND SULBACTAM SODIUM 3000 MG: 2; 1 INJECTION, POWDER, FOR SOLUTION INTRAMUSCULAR; INTRAVENOUS at 15:27

## 2024-09-09 RX ADMIN — ARFORMOTEROL TARTRATE 15 MCG: 15 SOLUTION RESPIRATORY (INHALATION) at 09:09

## 2024-09-09 RX ADMIN — APIXABAN 5 MG: 5 TABLET, FILM COATED ORAL at 09:45

## 2024-09-09 RX ADMIN — OXYCODONE HYDROCHLORIDE AND ACETAMINOPHEN 1 TABLET: 5; 325 TABLET ORAL at 18:54

## 2024-09-09 RX ADMIN — AMPICILLIN SODIUM AND SULBACTAM SODIUM 3000 MG: 2; 1 INJECTION, POWDER, FOR SOLUTION INTRAMUSCULAR; INTRAVENOUS at 22:26

## 2024-09-09 RX ADMIN — APIXABAN 5 MG: 5 TABLET, FILM COATED ORAL at 22:21

## 2024-09-09 RX ADMIN — KETOROLAC TROMETHAMINE 15 MG: 30 INJECTION, SOLUTION INTRAMUSCULAR at 06:23

## 2024-09-09 RX ADMIN — IPRATROPIUM BROMIDE AND ALBUTEROL SULFATE 1 DOSE: 2.5; .5 SOLUTION RESPIRATORY (INHALATION) at 22:16

## 2024-09-09 RX ADMIN — CITALOPRAM HYDROBROMIDE 10 MG: 10 TABLET ORAL at 09:46

## 2024-09-09 RX ADMIN — IPRATROPIUM BROMIDE AND ALBUTEROL SULFATE 1 DOSE: 2.5; .5 SOLUTION RESPIRATORY (INHALATION) at 14:11

## 2024-09-09 RX ADMIN — HEPARIN SODIUM 2000 UNITS: 1000 INJECTION INTRAVENOUS; SUBCUTANEOUS at 02:14

## 2024-09-09 RX ADMIN — CLOPIDOGREL BISULFATE 75 MG: 75 TABLET ORAL at 09:45

## 2024-09-09 RX ADMIN — AMPICILLIN SODIUM AND SULBACTAM SODIUM 3000 MG: 2; 1 INJECTION, POWDER, FOR SOLUTION INTRAMUSCULAR; INTRAVENOUS at 10:02

## 2024-09-09 RX ADMIN — OXYCODONE HYDROCHLORIDE AND ACETAMINOPHEN 1 TABLET: 5; 325 TABLET ORAL at 14:54

## 2024-09-09 RX ADMIN — IPRATROPIUM BROMIDE AND ALBUTEROL SULFATE 1 DOSE: 2.5; .5 SOLUTION RESPIRATORY (INHALATION) at 09:03

## 2024-09-09 RX ADMIN — WATER 40 MG: 1 INJECTION INTRAMUSCULAR; INTRAVENOUS; SUBCUTANEOUS at 02:16

## 2024-09-09 RX ADMIN — AMPICILLIN SODIUM AND SULBACTAM SODIUM 3000 MG: 2; 1 INJECTION, POWDER, FOR SOLUTION INTRAMUSCULAR; INTRAVENOUS at 03:48

## 2024-09-09 RX ADMIN — OXYCODONE HYDROCHLORIDE AND ACETAMINOPHEN 1 TABLET: 5; 325 TABLET ORAL at 10:39

## 2024-09-09 RX ADMIN — SODIUM CHLORIDE: 9 INJECTION, SOLUTION INTRAVENOUS at 07:00

## 2024-09-09 RX ADMIN — OXYCODONE HYDROCHLORIDE AND ACETAMINOPHEN 1 TABLET: 5; 325 TABLET ORAL at 22:21

## 2024-09-09 ASSESSMENT — PAIN DESCRIPTION - ORIENTATION
ORIENTATION: LEFT

## 2024-09-09 ASSESSMENT — PAIN - FUNCTIONAL ASSESSMENT
PAIN_FUNCTIONAL_ASSESSMENT: PREVENTS OR INTERFERES SOME ACTIVE ACTIVITIES AND ADLS
PAIN_FUNCTIONAL_ASSESSMENT: PREVENTS OR INTERFERES SOME ACTIVE ACTIVITIES AND ADLS

## 2024-09-09 ASSESSMENT — PAIN DESCRIPTION - ONSET: ONSET: SUDDEN

## 2024-09-09 ASSESSMENT — PAIN DESCRIPTION - PAIN TYPE: TYPE: SURGICAL PAIN

## 2024-09-09 ASSESSMENT — PAIN DESCRIPTION - DESCRIPTORS
DESCRIPTORS: ACHING
DESCRIPTORS: ACHING;SHOOTING;SHARP
DESCRIPTORS: SHARP
DESCRIPTORS: SORE

## 2024-09-09 ASSESSMENT — PAIN DESCRIPTION - LOCATION
LOCATION: FOOT
LOCATION: FOOT;LEG
LOCATION: FOOT
LOCATION: FOOT;LEG
LOCATION: LEG

## 2024-09-09 ASSESSMENT — PAIN SCALES - GENERAL
PAINLEVEL_OUTOF10: 0
PAINLEVEL_OUTOF10: 0
PAINLEVEL_OUTOF10: 4
PAINLEVEL_OUTOF10: 8
PAINLEVEL_OUTOF10: 7
PAINLEVEL_OUTOF10: 3
PAINLEVEL_OUTOF10: 0
PAINLEVEL_OUTOF10: 5
PAINLEVEL_OUTOF10: 5

## 2024-09-09 ASSESSMENT — PAIN DESCRIPTION - FREQUENCY: FREQUENCY: INTERMITTENT

## 2024-09-10 PROCEDURE — 93005 ELECTROCARDIOGRAM TRACING: CPT | Performed by: SURGERY

## 2024-09-10 PROCEDURE — 2580000003 HC RX 258: Performed by: SURGERY

## 2024-09-10 PROCEDURE — 2700000000 HC OXYGEN THERAPY PER DAY

## 2024-09-10 PROCEDURE — 6360000002 HC RX W HCPCS: Performed by: HOSPITALIST

## 2024-09-10 PROCEDURE — 6370000000 HC RX 637 (ALT 250 FOR IP): Performed by: SURGERY

## 2024-09-10 PROCEDURE — 6370000000 HC RX 637 (ALT 250 FOR IP): Performed by: INTERNAL MEDICINE

## 2024-09-10 PROCEDURE — 93005 ELECTROCARDIOGRAM TRACING: CPT | Performed by: HOSPITALIST

## 2024-09-10 PROCEDURE — 6360000002 HC RX W HCPCS: Performed by: SURGERY

## 2024-09-10 PROCEDURE — 94640 AIRWAY INHALATION TREATMENT: CPT

## 2024-09-10 PROCEDURE — 94669 MECHANICAL CHEST WALL OSCILL: CPT

## 2024-09-10 PROCEDURE — 2060000000 HC ICU INTERMEDIATE R&B

## 2024-09-10 PROCEDURE — 2580000003 HC RX 258: Performed by: HOSPITALIST

## 2024-09-10 RX ORDER — METOPROLOL SUCCINATE 50 MG/1
50 TABLET, EXTENDED RELEASE ORAL DAILY
Status: DISCONTINUED | OUTPATIENT
Start: 2024-09-10 | End: 2024-09-11

## 2024-09-10 RX ADMIN — IPRATROPIUM BROMIDE AND ALBUTEROL SULFATE 1 DOSE: 2.5; .5 SOLUTION RESPIRATORY (INHALATION) at 07:11

## 2024-09-10 RX ADMIN — ARFORMOTEROL TARTRATE 15 MCG: 15 SOLUTION RESPIRATORY (INHALATION) at 07:16

## 2024-09-10 RX ADMIN — APIXABAN 5 MG: 5 TABLET, FILM COATED ORAL at 20:31

## 2024-09-10 RX ADMIN — WATER 40 MG: 1 INJECTION INTRAMUSCULAR; INTRAVENOUS; SUBCUTANEOUS at 15:09

## 2024-09-10 RX ADMIN — AMPICILLIN SODIUM AND SULBACTAM SODIUM 3000 MG: 2; 1 INJECTION, POWDER, FOR SOLUTION INTRAMUSCULAR; INTRAVENOUS at 10:55

## 2024-09-10 RX ADMIN — OXYCODONE HYDROCHLORIDE AND ACETAMINOPHEN 1 TABLET: 5; 325 TABLET ORAL at 15:10

## 2024-09-10 RX ADMIN — OXYCODONE HYDROCHLORIDE AND ACETAMINOPHEN 1 TABLET: 5; 325 TABLET ORAL at 04:37

## 2024-09-10 RX ADMIN — CLOPIDOGREL BISULFATE 75 MG: 75 TABLET ORAL at 09:08

## 2024-09-10 RX ADMIN — WATER 40 MG: 1 INJECTION INTRAMUSCULAR; INTRAVENOUS; SUBCUTANEOUS at 04:00

## 2024-09-10 RX ADMIN — IPRATROPIUM BROMIDE AND ALBUTEROL SULFATE 1 DOSE: 2.5; .5 SOLUTION RESPIRATORY (INHALATION) at 21:41

## 2024-09-10 RX ADMIN — AMPICILLIN SODIUM AND SULBACTAM SODIUM 3000 MG: 2; 1 INJECTION, POWDER, FOR SOLUTION INTRAMUSCULAR; INTRAVENOUS at 16:33

## 2024-09-10 RX ADMIN — AMPICILLIN SODIUM AND SULBACTAM SODIUM 3000 MG: 2; 1 INJECTION, POWDER, FOR SOLUTION INTRAMUSCULAR; INTRAVENOUS at 22:22

## 2024-09-10 RX ADMIN — IPRATROPIUM BROMIDE AND ALBUTEROL SULFATE 1 DOSE: 2.5; .5 SOLUTION RESPIRATORY (INHALATION) at 15:20

## 2024-09-10 RX ADMIN — CITALOPRAM HYDROBROMIDE 10 MG: 10 TABLET ORAL at 09:09

## 2024-09-10 RX ADMIN — METOPROLOL SUCCINATE 50 MG: 50 TABLET, EXTENDED RELEASE ORAL at 17:20

## 2024-09-10 RX ADMIN — OXYCODONE HYDROCHLORIDE AND ACETAMINOPHEN 1 TABLET: 5; 325 TABLET ORAL at 20:32

## 2024-09-10 RX ADMIN — SODIUM CHLORIDE: 9 INJECTION, SOLUTION INTRAVENOUS at 07:55

## 2024-09-10 RX ADMIN — AMPICILLIN SODIUM AND SULBACTAM SODIUM 3000 MG: 2; 1 INJECTION, POWDER, FOR SOLUTION INTRAMUSCULAR; INTRAVENOUS at 04:45

## 2024-09-10 RX ADMIN — OXYCODONE HYDROCHLORIDE AND ACETAMINOPHEN 1 TABLET: 5; 325 TABLET ORAL at 09:09

## 2024-09-10 RX ADMIN — APIXABAN 5 MG: 5 TABLET, FILM COATED ORAL at 09:08

## 2024-09-10 ASSESSMENT — PAIN - FUNCTIONAL ASSESSMENT
PAIN_FUNCTIONAL_ASSESSMENT: ACTIVITIES ARE NOT PREVENTED
PAIN_FUNCTIONAL_ASSESSMENT: PREVENTS OR INTERFERES SOME ACTIVE ACTIVITIES AND ADLS
PAIN_FUNCTIONAL_ASSESSMENT: ACTIVITIES ARE NOT PREVENTED
PAIN_FUNCTIONAL_ASSESSMENT: ACTIVITIES ARE NOT PREVENTED
PAIN_FUNCTIONAL_ASSESSMENT: PREVENTS OR INTERFERES SOME ACTIVE ACTIVITIES AND ADLS

## 2024-09-10 ASSESSMENT — PAIN DESCRIPTION - ORIENTATION
ORIENTATION: LEFT

## 2024-09-10 ASSESSMENT — PAIN SCALES - GENERAL
PAINLEVEL_OUTOF10: 0
PAINLEVEL_OUTOF10: 6
PAINLEVEL_OUTOF10: 2
PAINLEVEL_OUTOF10: 2
PAINLEVEL_OUTOF10: 4
PAINLEVEL_OUTOF10: 0
PAINLEVEL_OUTOF10: 3
PAINLEVEL_OUTOF10: 4
PAINLEVEL_OUTOF10: 0
PAINLEVEL_OUTOF10: 4

## 2024-09-10 ASSESSMENT — PAIN DESCRIPTION - DESCRIPTORS
DESCRIPTORS: ACHING
DESCRIPTORS: TINGLING
DESCRIPTORS: TINGLING
DESCRIPTORS: SORE
DESCRIPTORS: SHARP;SORE

## 2024-09-10 ASSESSMENT — PAIN DESCRIPTION - LOCATION
LOCATION: LEG
LOCATION: FOOT;LEG
LOCATION: LEG
LOCATION: LEG
LOCATION: FOOT;LEG

## 2024-09-10 ASSESSMENT — PAIN DESCRIPTION - PAIN TYPE
TYPE: SURGICAL PAIN
TYPE: SURGICAL PAIN

## 2024-09-10 ASSESSMENT — PAIN DESCRIPTION - FREQUENCY: FREQUENCY: INTERMITTENT

## 2024-09-10 ASSESSMENT — PAIN DESCRIPTION - ONSET: ONSET: SUDDEN

## 2024-09-11 LAB
EKG ATRIAL RATE: 102 BPM
EKG ATRIAL RATE: 138 BPM
EKG ATRIAL RATE: 156 BPM
EKG DIAGNOSIS: NORMAL
EKG P AXIS: 42 DEGREES
EKG P AXIS: 72 DEGREES
EKG P AXIS: 77 DEGREES
EKG P-R INTERVAL: 130 MS
EKG P-R INTERVAL: 160 MS
EKG P-R INTERVAL: 168 MS
EKG Q-T INTERVAL: 316 MS
EKG Q-T INTERVAL: 348 MS
EKG Q-T INTERVAL: 362 MS
EKG QRS DURATION: 78 MS
EKG QRS DURATION: 78 MS
EKG QRS DURATION: 86 MS
EKG QTC CALCULATION (BAZETT): 468 MS
EKG QTC CALCULATION (BAZETT): 470 MS
EKG QTC CALCULATION (BAZETT): 471 MS
EKG R AXIS: -74 DEGREES
EKG R AXIS: -78 DEGREES
EKG R AXIS: -78 DEGREES
EKG T AXIS: 50 DEGREES
EKG T AXIS: 68 DEGREES
EKG T AXIS: 70 DEGREES
EKG VENTRICULAR RATE: 102 BPM
EKG VENTRICULAR RATE: 109 BPM
EKG VENTRICULAR RATE: 133 BPM
T4 FREE SERPL-MCNC: 1.2 NG/DL (ref 0.8–1.5)
TSH SERPL DL<=0.05 MIU/L-ACNC: 0.33 UIU/ML (ref 0.36–3.74)

## 2024-09-11 PROCEDURE — 2700000000 HC OXYGEN THERAPY PER DAY

## 2024-09-11 PROCEDURE — 97535 SELF CARE MNGMENT TRAINING: CPT

## 2024-09-11 PROCEDURE — 2060000000 HC ICU INTERMEDIATE R&B

## 2024-09-11 PROCEDURE — 6370000000 HC RX 637 (ALT 250 FOR IP): Performed by: NURSE PRACTITIONER

## 2024-09-11 PROCEDURE — 97162 PT EVAL MOD COMPLEX 30 MIN: CPT

## 2024-09-11 PROCEDURE — 94640 AIRWAY INHALATION TREATMENT: CPT

## 2024-09-11 PROCEDURE — 97116 GAIT TRAINING THERAPY: CPT

## 2024-09-11 PROCEDURE — 6360000002 HC RX W HCPCS: Performed by: SURGERY

## 2024-09-11 PROCEDURE — 6360000002 HC RX W HCPCS: Performed by: HOSPITALIST

## 2024-09-11 PROCEDURE — 97530 THERAPEUTIC ACTIVITIES: CPT

## 2024-09-11 PROCEDURE — 2580000003 HC RX 258: Performed by: HOSPITALIST

## 2024-09-11 PROCEDURE — 6370000000 HC RX 637 (ALT 250 FOR IP): Performed by: SURGERY

## 2024-09-11 PROCEDURE — 97166 OT EVAL MOD COMPLEX 45 MIN: CPT

## 2024-09-11 PROCEDURE — 6370000000 HC RX 637 (ALT 250 FOR IP): Performed by: INTERNAL MEDICINE

## 2024-09-11 RX ORDER — METOPROLOL TARTRATE 25 MG/1
25 TABLET, FILM COATED ORAL ONCE
Status: COMPLETED | OUTPATIENT
Start: 2024-09-11 | End: 2024-09-11

## 2024-09-11 RX ADMIN — ARFORMOTEROL TARTRATE 15 MCG: 15 SOLUTION RESPIRATORY (INHALATION) at 07:20

## 2024-09-11 RX ADMIN — OXYCODONE HYDROCHLORIDE AND ACETAMINOPHEN 1 TABLET: 5; 325 TABLET ORAL at 20:59

## 2024-09-11 RX ADMIN — CITALOPRAM HYDROBROMIDE 10 MG: 10 TABLET ORAL at 08:17

## 2024-09-11 RX ADMIN — IPRATROPIUM BROMIDE AND ALBUTEROL SULFATE 1 DOSE: 2.5; .5 SOLUTION RESPIRATORY (INHALATION) at 07:13

## 2024-09-11 RX ADMIN — APIXABAN 5 MG: 5 TABLET, FILM COATED ORAL at 20:59

## 2024-09-11 RX ADMIN — AMPICILLIN SODIUM AND SULBACTAM SODIUM 3000 MG: 2; 1 INJECTION, POWDER, FOR SOLUTION INTRAMUSCULAR; INTRAVENOUS at 04:12

## 2024-09-11 RX ADMIN — IPRATROPIUM BROMIDE AND ALBUTEROL SULFATE 1 DOSE: 2.5; .5 SOLUTION RESPIRATORY (INHALATION) at 17:28

## 2024-09-11 RX ADMIN — OXYCODONE HYDROCHLORIDE AND ACETAMINOPHEN 1 TABLET: 5; 325 TABLET ORAL at 04:12

## 2024-09-11 RX ADMIN — AMPICILLIN SODIUM AND SULBACTAM SODIUM 3000 MG: 2; 1 INJECTION, POWDER, FOR SOLUTION INTRAMUSCULAR; INTRAVENOUS at 22:15

## 2024-09-11 RX ADMIN — AMPICILLIN SODIUM AND SULBACTAM SODIUM 3000 MG: 2; 1 INJECTION, POWDER, FOR SOLUTION INTRAMUSCULAR; INTRAVENOUS at 17:06

## 2024-09-11 RX ADMIN — OXYCODONE HYDROCHLORIDE AND ACETAMINOPHEN 1 TABLET: 5; 325 TABLET ORAL at 13:49

## 2024-09-11 RX ADMIN — METOPROLOL TARTRATE 25 MG: 25 TABLET, FILM COATED ORAL at 09:45

## 2024-09-11 RX ADMIN — METOPROLOL SUCCINATE 50 MG: 50 TABLET, EXTENDED RELEASE ORAL at 08:17

## 2024-09-11 RX ADMIN — WATER 40 MG: 1 INJECTION INTRAMUSCULAR; INTRAVENOUS; SUBCUTANEOUS at 04:10

## 2024-09-11 RX ADMIN — WATER 40 MG: 1 INJECTION INTRAMUSCULAR; INTRAVENOUS; SUBCUTANEOUS at 13:41

## 2024-09-11 RX ADMIN — CLOPIDOGREL BISULFATE 75 MG: 75 TABLET ORAL at 08:17

## 2024-09-11 RX ADMIN — AMPICILLIN SODIUM AND SULBACTAM SODIUM 3000 MG: 2; 1 INJECTION, POWDER, FOR SOLUTION INTRAMUSCULAR; INTRAVENOUS at 08:55

## 2024-09-11 RX ADMIN — APIXABAN 5 MG: 5 TABLET, FILM COATED ORAL at 08:17

## 2024-09-11 RX ADMIN — IPRATROPIUM BROMIDE AND ALBUTEROL SULFATE 1 DOSE: 2.5; .5 SOLUTION RESPIRATORY (INHALATION) at 13:26

## 2024-09-11 RX ADMIN — OXYCODONE HYDROCHLORIDE AND ACETAMINOPHEN 1 TABLET: 5; 325 TABLET ORAL at 00:20

## 2024-09-11 ASSESSMENT — PAIN SCALES - GENERAL
PAINLEVEL_OUTOF10: 0
PAINLEVEL_OUTOF10: 6
PAINLEVEL_OUTOF10: 1
PAINLEVEL_OUTOF10: 0
PAINLEVEL_OUTOF10: 6
PAINLEVEL_OUTOF10: 3
PAINLEVEL_OUTOF10: 4

## 2024-09-11 ASSESSMENT — PAIN DESCRIPTION - DESCRIPTORS
DESCRIPTORS: SORE
DESCRIPTORS: ACHING
DESCRIPTORS: SHARP
DESCRIPTORS: ACHING

## 2024-09-11 ASSESSMENT — PAIN DESCRIPTION - ORIENTATION
ORIENTATION: LOWER;LEFT
ORIENTATION: LEFT

## 2024-09-11 ASSESSMENT — PAIN DESCRIPTION - LOCATION
LOCATION: LEG;FOOT
LOCATION: LEG;FOOT
LOCATION: LEG
LOCATION: LEG

## 2024-09-11 ASSESSMENT — PAIN DESCRIPTION - PAIN TYPE: TYPE: SURGICAL PAIN

## 2024-09-11 ASSESSMENT — PAIN - FUNCTIONAL ASSESSMENT
PAIN_FUNCTIONAL_ASSESSMENT: ACTIVITIES ARE NOT PREVENTED

## 2024-09-12 LAB — MAGNESIUM SERPL-MCNC: 2.4 MG/DL (ref 1.6–2.4)

## 2024-09-12 PROCEDURE — 97116 GAIT TRAINING THERAPY: CPT

## 2024-09-12 PROCEDURE — 6360000002 HC RX W HCPCS: Performed by: SURGERY

## 2024-09-12 PROCEDURE — 94640 AIRWAY INHALATION TREATMENT: CPT

## 2024-09-12 PROCEDURE — 6370000000 HC RX 637 (ALT 250 FOR IP): Performed by: SURGERY

## 2024-09-12 PROCEDURE — 83735 ASSAY OF MAGNESIUM: CPT

## 2024-09-12 PROCEDURE — 36415 COLL VENOUS BLD VENIPUNCTURE: CPT

## 2024-09-12 PROCEDURE — 6360000002 HC RX W HCPCS: Performed by: HOSPITALIST

## 2024-09-12 PROCEDURE — 6370000000 HC RX 637 (ALT 250 FOR IP): Performed by: NURSE PRACTITIONER

## 2024-09-12 PROCEDURE — 2060000000 HC ICU INTERMEDIATE R&B

## 2024-09-12 PROCEDURE — 2580000003 HC RX 258: Performed by: HOSPITALIST

## 2024-09-12 RX ADMIN — AMPICILLIN SODIUM AND SULBACTAM SODIUM 3000 MG: 2; 1 INJECTION, POWDER, FOR SOLUTION INTRAMUSCULAR; INTRAVENOUS at 23:15

## 2024-09-12 RX ADMIN — OXYCODONE HYDROCHLORIDE AND ACETAMINOPHEN 1 TABLET: 5; 325 TABLET ORAL at 02:24

## 2024-09-12 RX ADMIN — OXYCODONE HYDROCHLORIDE AND ACETAMINOPHEN 1 TABLET: 5; 325 TABLET ORAL at 20:07

## 2024-09-12 RX ADMIN — AMPICILLIN SODIUM AND SULBACTAM SODIUM 3000 MG: 2; 1 INJECTION, POWDER, FOR SOLUTION INTRAMUSCULAR; INTRAVENOUS at 03:53

## 2024-09-12 RX ADMIN — IPRATROPIUM BROMIDE AND ALBUTEROL SULFATE 1 DOSE: 2.5; .5 SOLUTION RESPIRATORY (INHALATION) at 20:16

## 2024-09-12 RX ADMIN — ARFORMOTEROL TARTRATE 15 MCG: 15 SOLUTION RESPIRATORY (INHALATION) at 07:54

## 2024-09-12 RX ADMIN — CITALOPRAM HYDROBROMIDE 10 MG: 10 TABLET ORAL at 08:55

## 2024-09-12 RX ADMIN — AMPICILLIN SODIUM AND SULBACTAM SODIUM 3000 MG: 2; 1 INJECTION, POWDER, FOR SOLUTION INTRAMUSCULAR; INTRAVENOUS at 16:31

## 2024-09-12 RX ADMIN — APIXABAN 5 MG: 5 TABLET, FILM COATED ORAL at 20:07

## 2024-09-12 RX ADMIN — IPRATROPIUM BROMIDE AND ALBUTEROL SULFATE 1 DOSE: 2.5; .5 SOLUTION RESPIRATORY (INHALATION) at 14:30

## 2024-09-12 RX ADMIN — IPRATROPIUM BROMIDE AND ALBUTEROL SULFATE 1 DOSE: 2.5; .5 SOLUTION RESPIRATORY (INHALATION) at 07:54

## 2024-09-12 RX ADMIN — WATER 40 MG: 1 INJECTION INTRAMUSCULAR; INTRAVENOUS; SUBCUTANEOUS at 02:25

## 2024-09-12 RX ADMIN — METOPROLOL SUCCINATE 75 MG: 50 TABLET, EXTENDED RELEASE ORAL at 08:55

## 2024-09-12 RX ADMIN — CLOPIDOGREL BISULFATE 75 MG: 75 TABLET ORAL at 08:55

## 2024-09-12 RX ADMIN — APIXABAN 5 MG: 5 TABLET, FILM COATED ORAL at 08:55

## 2024-09-12 RX ADMIN — AMPICILLIN SODIUM AND SULBACTAM SODIUM 3000 MG: 2; 1 INJECTION, POWDER, FOR SOLUTION INTRAMUSCULAR; INTRAVENOUS at 10:50

## 2024-09-12 RX ADMIN — WATER 40 MG: 1 INJECTION INTRAMUSCULAR; INTRAVENOUS; SUBCUTANEOUS at 15:16

## 2024-09-12 ASSESSMENT — PAIN DESCRIPTION - ORIENTATION: ORIENTATION: LEFT

## 2024-09-12 ASSESSMENT — PAIN SCALES - GENERAL
PAINLEVEL_OUTOF10: 0
PAINLEVEL_OUTOF10: 4
PAINLEVEL_OUTOF10: 4

## 2024-09-12 ASSESSMENT — PAIN DESCRIPTION - DESCRIPTORS
DESCRIPTORS: SORE
DESCRIPTORS: SORE

## 2024-09-12 ASSESSMENT — PAIN DESCRIPTION - LOCATION
LOCATION: GROIN
LOCATION: INCISION;LEG

## 2024-09-13 VITALS
OXYGEN SATURATION: 97 % | HEIGHT: 70 IN | DIASTOLIC BLOOD PRESSURE: 82 MMHG | BODY MASS INDEX: 22.09 KG/M2 | RESPIRATION RATE: 19 BRPM | TEMPERATURE: 98.1 F | WEIGHT: 154.32 LBS | SYSTOLIC BLOOD PRESSURE: 130 MMHG | HEART RATE: 88 BPM

## 2024-09-13 PROCEDURE — 2700000000 HC OXYGEN THERAPY PER DAY

## 2024-09-13 PROCEDURE — 6370000000 HC RX 637 (ALT 250 FOR IP): Performed by: SURGERY

## 2024-09-13 PROCEDURE — 2580000003 HC RX 258: Performed by: HOSPITALIST

## 2024-09-13 PROCEDURE — 6370000000 HC RX 637 (ALT 250 FOR IP): Performed by: NURSE PRACTITIONER

## 2024-09-13 PROCEDURE — 6360000002 HC RX W HCPCS: Performed by: SURGERY

## 2024-09-13 PROCEDURE — 6360000002 HC RX W HCPCS: Performed by: HOSPITALIST

## 2024-09-13 PROCEDURE — 97535 SELF CARE MNGMENT TRAINING: CPT

## 2024-09-13 PROCEDURE — 94640 AIRWAY INHALATION TREATMENT: CPT

## 2024-09-13 RX ORDER — PREDNISONE 10 MG/1
TABLET ORAL
Qty: 21 TABLET | Refills: 0 | Status: SHIPPED | OUTPATIENT
Start: 2024-09-13 | End: 2024-09-23

## 2024-09-13 RX ORDER — NICOTINE 21 MG/24HR
1 PATCH, TRANSDERMAL 24 HOURS TRANSDERMAL DAILY
Qty: 30 PATCH | Refills: 3 | Status: SHIPPED | OUTPATIENT
Start: 2024-09-14

## 2024-09-13 RX ORDER — PREDNISONE 20 MG/1
40 TABLET ORAL DAILY
Status: DISCONTINUED | OUTPATIENT
Start: 2024-09-13 | End: 2024-09-13 | Stop reason: HOSPADM

## 2024-09-13 RX ORDER — OXYCODONE AND ACETAMINOPHEN 5; 325 MG/1; MG/1
1 TABLET ORAL EVERY 6 HOURS PRN
Qty: 20 TABLET | Refills: 0 | Status: SHIPPED | OUTPATIENT
Start: 2024-09-13 | End: 2024-09-18

## 2024-09-13 RX ORDER — METOPROLOL SUCCINATE 25 MG/1
75 TABLET, EXTENDED RELEASE ORAL DAILY
Qty: 90 TABLET | Refills: 2 | Status: SHIPPED | OUTPATIENT
Start: 2024-09-14

## 2024-09-13 RX ADMIN — APIXABAN 5 MG: 5 TABLET, FILM COATED ORAL at 09:18

## 2024-09-13 RX ADMIN — OXYCODONE HYDROCHLORIDE AND ACETAMINOPHEN 1 TABLET: 5; 325 TABLET ORAL at 00:49

## 2024-09-13 RX ADMIN — ARFORMOTEROL TARTRATE 15 MCG: 15 SOLUTION RESPIRATORY (INHALATION) at 08:12

## 2024-09-13 RX ADMIN — IPRATROPIUM BROMIDE AND ALBUTEROL SULFATE 1 DOSE: 2.5; .5 SOLUTION RESPIRATORY (INHALATION) at 08:12

## 2024-09-13 RX ADMIN — CLOPIDOGREL BISULFATE 75 MG: 75 TABLET ORAL at 09:18

## 2024-09-13 RX ADMIN — WATER 40 MG: 1 INJECTION INTRAMUSCULAR; INTRAVENOUS; SUBCUTANEOUS at 00:48

## 2024-09-13 RX ADMIN — METOPROLOL SUCCINATE 75 MG: 50 TABLET, EXTENDED RELEASE ORAL at 09:17

## 2024-09-13 RX ADMIN — AMPICILLIN SODIUM AND SULBACTAM SODIUM 3000 MG: 2; 1 INJECTION, POWDER, FOR SOLUTION INTRAMUSCULAR; INTRAVENOUS at 04:12

## 2024-09-13 RX ADMIN — CITALOPRAM HYDROBROMIDE 10 MG: 10 TABLET ORAL at 09:18

## 2024-09-13 RX ADMIN — AMPICILLIN SODIUM AND SULBACTAM SODIUM 3000 MG: 2; 1 INJECTION, POWDER, FOR SOLUTION INTRAMUSCULAR; INTRAVENOUS at 09:26

## 2024-09-13 ASSESSMENT — PAIN DESCRIPTION - DESCRIPTORS: DESCRIPTORS: SORE

## 2024-09-13 ASSESSMENT — PAIN - FUNCTIONAL ASSESSMENT: PAIN_FUNCTIONAL_ASSESSMENT: ACTIVITIES ARE NOT PREVENTED

## 2024-09-13 ASSESSMENT — PAIN SCALES - GENERAL: PAINLEVEL_OUTOF10: 3

## 2024-09-13 ASSESSMENT — PAIN DESCRIPTION - LOCATION: LOCATION: LEG;FOOT

## 2024-09-13 ASSESSMENT — PAIN DESCRIPTION - ORIENTATION: ORIENTATION: LEFT

## 2025-01-26 ENCOUNTER — APPOINTMENT (OUTPATIENT)
Facility: HOSPITAL | Age: 75
DRG: 239 | End: 2025-01-26
Payer: MEDICARE

## 2025-01-26 ENCOUNTER — HOSPITAL ENCOUNTER (INPATIENT)
Facility: HOSPITAL | Age: 75
LOS: 20 days | Discharge: NURSING, REHAB OR LONG-TERM CARE | DRG: 239 | End: 2025-02-15
Attending: EMERGENCY MEDICINE | Admitting: STUDENT IN AN ORGANIZED HEALTH CARE EDUCATION/TRAINING PROGRAM
Payer: MEDICARE

## 2025-01-26 DIAGNOSIS — I70.229 CRITICAL LOWER LIMB ISCHEMIA (HCC): ICD-10-CM

## 2025-01-26 DIAGNOSIS — J96.01 ACUTE RESPIRATORY FAILURE WITH HYPOXIA (HCC): ICD-10-CM

## 2025-01-26 DIAGNOSIS — I99.8 ACUTE LOWER LIMB ISCHEMIA: Primary | ICD-10-CM

## 2025-01-26 LAB
ALBUMIN SERPL-MCNC: 3.3 G/DL (ref 3.5–5)
ALBUMIN/GLOB SERPL: 0.7 (ref 1.1–2.2)
ALP SERPL-CCNC: 118 U/L (ref 45–117)
ALT SERPL-CCNC: 19 U/L (ref 12–78)
ANION GAP BLD CALC-SCNC: 11 (ref 10–20)
ANION GAP SERPL CALC-SCNC: 5 MMOL/L (ref 2–12)
APTT PPP: 34 SEC (ref 22.1–31)
AST SERPL-CCNC: 46 U/L (ref 15–37)
BASE EXCESS BLD CALC-SCNC: 4.4 MMOL/L
BASOPHILS # BLD: 0 K/UL (ref 0–0.1)
BASOPHILS NFR BLD: 0 % (ref 0–1)
BILIRUB SERPL-MCNC: 0.8 MG/DL (ref 0.2–1)
BUN SERPL-MCNC: 35 MG/DL (ref 6–20)
BUN/CREAT SERPL: 36 (ref 12–20)
CA-I BLD-MCNC: 1.25 MMOL/L (ref 1.15–1.33)
CALCIUM SERPL-MCNC: 9.9 MG/DL (ref 8.5–10.1)
CHLORIDE BLD-SCNC: 100 MMOL/L (ref 100–111)
CHLORIDE SERPL-SCNC: 102 MMOL/L (ref 97–108)
CO2 BLD-SCNC: 30 MMOL/L (ref 22–29)
CO2 SERPL-SCNC: 30 MMOL/L (ref 21–32)
CREAT SERPL-MCNC: 0.98 MG/DL (ref 0.7–1.3)
CREAT UR-MCNC: 0.7 MG/DL (ref 0.6–1.3)
DIFFERENTIAL METHOD BLD: ABNORMAL
EOSINOPHIL # BLD: 0 K/UL (ref 0–0.4)
EOSINOPHIL NFR BLD: 0 % (ref 0–7)
ERYTHROCYTE [DISTWIDTH] IN BLOOD BY AUTOMATED COUNT: 17.9 % (ref 11.5–14.5)
GLOBULIN SER CALC-MCNC: 4.7 G/DL (ref 2–4)
GLUCOSE BLD STRIP.AUTO-MCNC: 122 MG/DL (ref 74–99)
GLUCOSE SERPL-MCNC: 122 MG/DL (ref 65–100)
HCO3 BLDA-SCNC: 31 MMOL/L
HCT VFR BLD AUTO: 35.4 % (ref 36.6–50.3)
HGB BLD-MCNC: 11.5 G/DL (ref 12.1–17)
IMM GRANULOCYTES # BLD AUTO: 0 K/UL (ref 0–0.04)
IMM GRANULOCYTES NFR BLD AUTO: 0 % (ref 0–0.5)
INR PPP: 1.2 (ref 0.9–1.1)
LACTATE BLD-SCNC: 1.53 MMOL/L (ref 0.4–2)
LYMPHOCYTES # BLD: 1.88 K/UL (ref 0.8–3.5)
LYMPHOCYTES NFR BLD: 8 % (ref 12–49)
MCH RBC QN AUTO: 24.1 PG (ref 26–34)
MCHC RBC AUTO-ENTMCNC: 32.5 G/DL (ref 30–36.5)
MCV RBC AUTO: 74.1 FL (ref 80–99)
MONOCYTES # BLD: 0.94 K/UL (ref 0–1)
MONOCYTES NFR BLD: 4 % (ref 5–13)
NEUTS SEG # BLD: 20.68 K/UL (ref 1.8–8)
NEUTS SEG NFR BLD: 88 % (ref 32–75)
NRBC # BLD: 0 K/UL (ref 0–0.01)
NRBC BLD-RTO: 0 PER 100 WBC
PCO2 BLDV: 51 MMHG (ref 41–51)
PH BLDV: 7.39 (ref 7.32–7.42)
PLATELET # BLD AUTO: 337 K/UL (ref 150–400)
PMV BLD AUTO: 9.2 FL (ref 8.9–12.9)
PO2 BLDV: <27 MMHG (ref 25–40)
POTASSIUM BLD-SCNC: 4 MMOL/L (ref 3.5–5.5)
POTASSIUM SERPL-SCNC: 3.6 MMOL/L (ref 3.5–5.1)
PROCALCITONIN SERPL-MCNC: 0.54 NG/ML
PROT SERPL-MCNC: 8 G/DL (ref 6.4–8.2)
PROTHROMBIN TIME: 12.7 SEC (ref 9.2–11.2)
RBC # BLD AUTO: 4.78 M/UL (ref 4.1–5.7)
RBC MORPH BLD: ABNORMAL
SODIUM BLD-SCNC: 141 MMOL/L (ref 136–145)
SODIUM SERPL-SCNC: 137 MMOL/L (ref 136–145)
SPECIMEN SITE: ABNORMAL
THERAPEUTIC RANGE: ABNORMAL SECS (ref 58–77)
TROPONIN I SERPL HS-MCNC: 79 NG/L (ref 0–76)
UFH PPP CHRO-ACNC: 0.13 IU/ML
WBC # BLD AUTO: 23.5 K/UL (ref 4.1–11.1)

## 2025-01-26 PROCEDURE — 1100000000 HC RM PRIVATE

## 2025-01-26 PROCEDURE — 36415 COLL VENOUS BLD VENIPUNCTURE: CPT

## 2025-01-26 PROCEDURE — 6370000000 HC RX 637 (ALT 250 FOR IP): Performed by: STUDENT IN AN ORGANIZED HEALTH CARE EDUCATION/TRAINING PROGRAM

## 2025-01-26 PROCEDURE — 85520 HEPARIN ASSAY: CPT

## 2025-01-26 PROCEDURE — 2500000003 HC RX 250 WO HCPCS: Performed by: STUDENT IN AN ORGANIZED HEALTH CARE EDUCATION/TRAINING PROGRAM

## 2025-01-26 PROCEDURE — 96374 THER/PROPH/DIAG INJ IV PUSH: CPT

## 2025-01-26 PROCEDURE — 82803 BLOOD GASES ANY COMBINATION: CPT

## 2025-01-26 PROCEDURE — 6360000002 HC RX W HCPCS: Performed by: EMERGENCY MEDICINE

## 2025-01-26 PROCEDURE — 6360000002 HC RX W HCPCS: Performed by: STUDENT IN AN ORGANIZED HEALTH CARE EDUCATION/TRAINING PROGRAM

## 2025-01-26 PROCEDURE — 84132 ASSAY OF SERUM POTASSIUM: CPT

## 2025-01-26 PROCEDURE — 75635 CT ANGIO ABDOMINAL ARTERIES: CPT

## 2025-01-26 PROCEDURE — 71045 X-RAY EXAM CHEST 1 VIEW: CPT

## 2025-01-26 PROCEDURE — 80053 COMPREHEN METABOLIC PANEL: CPT

## 2025-01-26 PROCEDURE — 2700000000 HC OXYGEN THERAPY PER DAY

## 2025-01-26 PROCEDURE — 84484 ASSAY OF TROPONIN QUANT: CPT

## 2025-01-26 PROCEDURE — 82330 ASSAY OF CALCIUM: CPT

## 2025-01-26 PROCEDURE — 94640 AIRWAY INHALATION TREATMENT: CPT

## 2025-01-26 PROCEDURE — 82947 ASSAY GLUCOSE BLOOD QUANT: CPT

## 2025-01-26 PROCEDURE — 85025 COMPLETE CBC W/AUTO DIFF WBC: CPT

## 2025-01-26 PROCEDURE — 85610 PROTHROMBIN TIME: CPT

## 2025-01-26 PROCEDURE — 6360000004 HC RX CONTRAST MEDICATION: Performed by: EMERGENCY MEDICINE

## 2025-01-26 PROCEDURE — 93005 ELECTROCARDIOGRAM TRACING: CPT

## 2025-01-26 PROCEDURE — 84295 ASSAY OF SERUM SODIUM: CPT

## 2025-01-26 PROCEDURE — 96375 TX/PRO/DX INJ NEW DRUG ADDON: CPT

## 2025-01-26 PROCEDURE — 2580000003 HC RX 258: Performed by: EMERGENCY MEDICINE

## 2025-01-26 PROCEDURE — 99285 EMERGENCY DEPT VISIT HI MDM: CPT

## 2025-01-26 PROCEDURE — 87040 BLOOD CULTURE FOR BACTERIA: CPT

## 2025-01-26 PROCEDURE — 96365 THER/PROPH/DIAG IV INF INIT: CPT

## 2025-01-26 PROCEDURE — 85730 THROMBOPLASTIN TIME PARTIAL: CPT

## 2025-01-26 PROCEDURE — 84145 PROCALCITONIN (PCT): CPT

## 2025-01-26 RX ORDER — ALBUTEROL SULFATE 90 UG/1
1 INHALANT RESPIRATORY (INHALATION) EVERY 6 HOURS PRN
Status: DISCONTINUED | OUTPATIENT
Start: 2025-01-26 | End: 2025-01-26 | Stop reason: CLARIF

## 2025-01-26 RX ORDER — HEPARIN SODIUM 10000 [USP'U]/100ML
12-30 INJECTION, SOLUTION INTRAVENOUS CONTINUOUS
Status: DISCONTINUED | OUTPATIENT
Start: 2025-01-26 | End: 2025-01-28

## 2025-01-26 RX ORDER — HEPARIN SODIUM 1000 [USP'U]/ML
4000 INJECTION, SOLUTION INTRAVENOUS; SUBCUTANEOUS PRN
Status: DISCONTINUED | OUTPATIENT
Start: 2025-01-26 | End: 2025-01-28

## 2025-01-26 RX ORDER — CITALOPRAM HYDROBROMIDE 10 MG/1
10 TABLET ORAL DAILY
Status: DISCONTINUED | OUTPATIENT
Start: 2025-01-26 | End: 2025-02-15 | Stop reason: HOSPADM

## 2025-01-26 RX ORDER — CLOPIDOGREL BISULFATE 75 MG/1
75 TABLET ORAL DAILY
Status: DISCONTINUED | OUTPATIENT
Start: 2025-01-26 | End: 2025-01-28

## 2025-01-26 RX ORDER — ACETAMINOPHEN 650 MG/1
650 SUPPOSITORY RECTAL EVERY 6 HOURS PRN
Status: DISCONTINUED | OUTPATIENT
Start: 2025-01-26 | End: 2025-01-27

## 2025-01-26 RX ORDER — MORPHINE SULFATE 4 MG/ML
4 INJECTION, SOLUTION INTRAMUSCULAR; INTRAVENOUS
Status: COMPLETED | OUTPATIENT
Start: 2025-01-26 | End: 2025-01-26

## 2025-01-26 RX ORDER — IOPAMIDOL 755 MG/ML
100 INJECTION, SOLUTION INTRAVASCULAR
Status: COMPLETED | OUTPATIENT
Start: 2025-01-26 | End: 2025-01-26

## 2025-01-26 RX ORDER — SODIUM CHLORIDE 9 MG/ML
INJECTION, SOLUTION INTRAVENOUS PRN
Status: DISCONTINUED | OUTPATIENT
Start: 2025-01-26 | End: 2025-01-27

## 2025-01-26 RX ORDER — VANCOMYCIN 1.75 GRAM/500 ML IN 0.9 % SODIUM CHLORIDE INTRAVENOUS
25 ONCE
Status: DISCONTINUED | OUTPATIENT
Start: 2025-01-26 | End: 2025-01-26 | Stop reason: CLARIF

## 2025-01-26 RX ORDER — VANCOMYCIN 1.25 G/250ML
1750 INJECTION, SOLUTION INTRAVENOUS ONCE
Status: COMPLETED | OUTPATIENT
Start: 2025-01-26 | End: 2025-01-27

## 2025-01-26 RX ORDER — SODIUM CHLORIDE 0.9 % (FLUSH) 0.9 %
5-40 SYRINGE (ML) INJECTION EVERY 12 HOURS SCHEDULED
Status: DISCONTINUED | OUTPATIENT
Start: 2025-01-26 | End: 2025-01-27

## 2025-01-26 RX ORDER — HEPARIN SODIUM 1000 [USP'U]/ML
4000 INJECTION, SOLUTION INTRAVENOUS; SUBCUTANEOUS ONCE
Status: COMPLETED | OUTPATIENT
Start: 2025-01-26 | End: 2025-01-26

## 2025-01-26 RX ORDER — SODIUM CHLORIDE 0.9 % (FLUSH) 0.9 %
5-40 SYRINGE (ML) INJECTION PRN
Status: DISCONTINUED | OUTPATIENT
Start: 2025-01-26 | End: 2025-01-29

## 2025-01-26 RX ORDER — ARFORMOTEROL TARTRATE 15 UG/2ML
15 SOLUTION RESPIRATORY (INHALATION)
Status: DISCONTINUED | OUTPATIENT
Start: 2025-01-26 | End: 2025-01-26

## 2025-01-26 RX ORDER — ONDANSETRON 2 MG/ML
4 INJECTION INTRAMUSCULAR; INTRAVENOUS EVERY 6 HOURS PRN
Status: DISCONTINUED | OUTPATIENT
Start: 2025-01-26 | End: 2025-02-15 | Stop reason: HOSPADM

## 2025-01-26 RX ORDER — ARFORMOTEROL TARTRATE 15 UG/2ML
15 SOLUTION RESPIRATORY (INHALATION)
Status: DISCONTINUED | OUTPATIENT
Start: 2025-01-27 | End: 2025-01-29

## 2025-01-26 RX ORDER — NICOTINE 21 MG/24HR
1 PATCH, TRANSDERMAL 24 HOURS TRANSDERMAL DAILY
Status: DISCONTINUED | OUTPATIENT
Start: 2025-01-26 | End: 2025-02-15 | Stop reason: HOSPADM

## 2025-01-26 RX ORDER — ACETAMINOPHEN 325 MG/1
650 TABLET ORAL EVERY 6 HOURS PRN
Status: DISCONTINUED | OUTPATIENT
Start: 2025-01-26 | End: 2025-01-27

## 2025-01-26 RX ORDER — HEPARIN SODIUM 1000 [USP'U]/ML
2000 INJECTION, SOLUTION INTRAVENOUS; SUBCUTANEOUS PRN
Status: DISCONTINUED | OUTPATIENT
Start: 2025-01-26 | End: 2025-01-28

## 2025-01-26 RX ORDER — ALBUTEROL SULFATE 0.83 MG/ML
2.5 SOLUTION RESPIRATORY (INHALATION) EVERY 6 HOURS PRN
Status: DISCONTINUED | OUTPATIENT
Start: 2025-01-26 | End: 2025-02-15 | Stop reason: HOSPADM

## 2025-01-26 RX ADMIN — IPRATROPIUM BROMIDE 0.5 MG: 0.5 SOLUTION RESPIRATORY (INHALATION) at 21:01

## 2025-01-26 RX ADMIN — PIPERACILLIN AND TAZOBACTAM 4500 MG: 4; .5 INJECTION, POWDER, LYOPHILIZED, FOR SOLUTION INTRAVENOUS at 19:00

## 2025-01-26 RX ADMIN — METOPROLOL SUCCINATE 75 MG: 25 TABLET, EXTENDED RELEASE ORAL at 22:23

## 2025-01-26 RX ADMIN — MORPHINE SULFATE 4 MG: 4 INJECTION, SOLUTION INTRAMUSCULAR; INTRAVENOUS at 17:07

## 2025-01-26 RX ADMIN — ARFORMOTEROL TARTRATE 15 MCG: 15 SOLUTION RESPIRATORY (INHALATION) at 21:01

## 2025-01-26 RX ADMIN — CLOPIDOGREL BISULFATE 75 MG: 75 TABLET ORAL at 22:22

## 2025-01-26 RX ADMIN — SODIUM CHLORIDE, PRESERVATIVE FREE 10 ML: 5 INJECTION INTRAVENOUS at 22:27

## 2025-01-26 RX ADMIN — VANCOMYCIN 1750 MG: 1.25 INJECTION, SOLUTION INTRAVENOUS at 22:10

## 2025-01-26 RX ADMIN — HEPARIN SODIUM 12 UNITS/KG/HR: 10000 INJECTION, SOLUTION INTRAVENOUS at 20:15

## 2025-01-26 RX ADMIN — CITALOPRAM HYDROBROMIDE 10 MG: 20 TABLET ORAL at 22:22

## 2025-01-26 RX ADMIN — IOPAMIDOL 100 ML: 755 INJECTION, SOLUTION INTRAVENOUS at 18:40

## 2025-01-26 RX ADMIN — HEPARIN SODIUM 4000 UNITS: 1000 INJECTION INTRAVENOUS; SUBCUTANEOUS at 20:08

## 2025-01-26 ASSESSMENT — PAIN SCALES - GENERAL: PAINLEVEL_OUTOF10: 9

## 2025-01-26 ASSESSMENT — PAIN DESCRIPTION - ORIENTATION: ORIENTATION: LEFT

## 2025-01-26 ASSESSMENT — PAIN DESCRIPTION - LOCATION: LOCATION: FOOT

## 2025-01-26 ASSESSMENT — PAIN - FUNCTIONAL ASSESSMENT: PAIN_FUNCTIONAL_ASSESSMENT: 0-10

## 2025-01-26 NOTE — ED NOTES
Patient is scheduled for a bypass of the left leg with MD Lorenzo [facility] on 2/3, per daughter.

## 2025-01-26 NOTE — ED PROVIDER NOTES
[facility] EMERGENCY DEPARTMENT  EMERGENCY DEPARTMENT ENCOUNTER       Pt Name: Nasir Hatfield  MRN: [id number]  Birthdate 1950  Date of evaluation: 1/26/2025  Provider: Pepe Larose MD   PCP: Annie Mcnair, LOLA - NP  Note Started: 4:45 PM 1/26/25     CHIEF COMPLAINT       Chief Complaint   Patient presents with    Toe Pain     Patient via EMS with c/o left foot pain and swelling. Patient had a toe amputation 2 years ago due to a bone infection, patient is on 3 L NC baseline for COPD. Patient reports pain 10/10.         HISTORY OF PRESENT ILLNESS: 1 or more elements      History From: Patient, History limited by: None     Nasir Hatfield is a 74 y.o. male with a history of peripheral artery disease who presents \with left foot pain.  He reports over the past 24 to 40 hours he has had throbbing severe pain to his left foot, noticed his foot was cold this morning.  He is followed by Dr. Neville Lorenzo, is pending a bypass surgery in his left leg early February.  Denies fever or chills.  Reports pain feels similar to when he had a prior bone infection of the foot.     Nursing Notes were all reviewed and agreed with or any disagreements were addressed in the HPI.     REVIEW OF SYSTEMS        Positives and Pertinent negatives as per HPI.    PAST HISTORY     Past Medical History:  Past Medical History:   Diagnosis Date    Chronic obstructive pulmonary disease (HCC)     Hx of blood clots 08/29/2024    Left lower leg    Hypertension        Past Surgical History:  Past Surgical History:   Procedure Laterality Date    AMPUTATION Left 2024    Left Big Toe    ANGIOPLASTY Left 05/20/2024    LEFT FEMORAL ANGIOGRAM BALLOON  ANGIOPLASTY AND STENTING performed by Harris Lorenzo MD at hospitals MAIN OR    COLONOSCOPY      EYE SURGERY Left     cataract surgery    FOOT DEBRIDEMENT Left 05/22/2024    BONE BIOPSY LEFT FOOT performed by Thony Chisholm DPM at hospitals MAIN OR    VASCULAR SURGERY Right 9/7/2024    RE INJECT INFUSION     Chloride 102 97 - 108 mmol/L    CO2 30 21 - 32 mmol/L    Anion Gap 5 2 - 12 mmol/L    Glucose 122 (H) 65 - 100 mg/dL    BUN 35 (H) 6 - 20 MG/DL    Creatinine 0.98 0.70 - 1.30 MG/DL    BUN/Creatinine Ratio 36 (H) 12 - 20      Est, Glom Filt Rate 81 >60 ml/min/1.73m2    Calcium 9.9 8.5 - 10.1 MG/DL    Total Bilirubin 0.8 0.2 - 1.0 MG/DL    ALT 19 12 - 78 U/L    AST 46 (H) 15 - 37 U/L    Alk Phosphatase 118 (H) 45 - 117 U/L    Total Protein 8.0 6.4 - 8.2 g/dL    Albumin 3.3 (L) 3.5 - 5.0 g/dL    Globulin 4.7 (H) 2.0 - 4.0 g/dL    Albumin/Globulin Ratio 0.7 (L) 1.1 - 2.2     Procalcitonin    Collection Time: 01/26/25  4:50 PM   Result Value Ref Range    Procalcitonin 0.54 ng/mL   Troponin    Collection Time: 01/26/25  4:50 PM   Result Value Ref Range    Troponin, High Sensitivity 79 (H) 0 - 76 ng/L   EKG 12 Lead    Collection Time: 01/26/25  4:54 PM   Result Value Ref Range    Ventricular Rate 114 BPM    Atrial Rate 81 BPM    QRS Duration 82 ms    Q-T Interval 392 ms    QTc Calculation (Bazett) 540 ms    R Axis -85 degrees    T Axis 82 degrees    Diagnosis       Undetermined rhythm  Left anterior fascicular block  Anterior infarct , age undetermined  Prolonged QT  When compared with ECG of 10-SEP-2024 17:53,  Current undetermined rhythm precludes rhythm comparison, needs review  Inverted T waves have replaced nonspecific T wave abnormality in Anterior   leads     POCT Blood Gas & Electrolytes    Collection Time: 01/26/25  5:00 PM   Result Value Ref Range    PH, VENOUS (POC) 7.39 7.32 - 7.42      PCO2, Schroeder, POC 51.0 41 - 51 MMHG    PO2, VENOUS (POC) <27 25 - 40 mmHg    HCO3, Arterial 31 mmol/L    Base Excess 4.4 mmol/L    POC Sodium 141 136 - 145 MMOL/L    POC Potassium 4.0 3.5 - 5.5 MMOL/L    POC Chloride 100 100 - 111 MMOL/L    POC TCO2 30 (H) 22 - 29 MMOL/L    Anion Gap, POC 11 10 - 20      POC Glucose 122 (H) 74 - 99 MG/DL    POC Creatinine 0.7 0.6 - 1.3 MG/DL    eGFR, POC >90 >60 ml/min/1.73m2    POC Ionized

## 2025-01-27 LAB
ALBUMIN SERPL-MCNC: 2.6 G/DL (ref 3.5–5)
ALBUMIN/GLOB SERPL: 0.7 (ref 1.1–2.2)
ALP SERPL-CCNC: 96 U/L (ref 45–117)
ALT SERPL-CCNC: 16 U/L (ref 12–78)
ANION GAP SERPL CALC-SCNC: 5 MMOL/L (ref 2–12)
APTT PPP: 41.6 SEC (ref 22.1–31)
APTT PPP: 47 SEC (ref 22.1–31)
APTT PPP: 49.4 SEC (ref 22.1–31)
AST SERPL-CCNC: 37 U/L (ref 15–37)
BASOPHILS # BLD: 0.05 K/UL (ref 0–0.1)
BASOPHILS NFR BLD: 0.3 % (ref 0–1)
BILIRUB SERPL-MCNC: 0.8 MG/DL (ref 0.2–1)
BUN SERPL-MCNC: 28 MG/DL (ref 6–20)
BUN/CREAT SERPL: 39 (ref 12–20)
CALCIUM SERPL-MCNC: 8.8 MG/DL (ref 8.5–10.1)
CHLORIDE SERPL-SCNC: 105 MMOL/L (ref 97–108)
CO2 SERPL-SCNC: 29 MMOL/L (ref 21–32)
CREAT SERPL-MCNC: 0.71 MG/DL (ref 0.7–1.3)
CRP SERPL-MCNC: 17.7 MG/DL (ref 0–0.3)
DIFFERENTIAL METHOD BLD: ABNORMAL
EOSINOPHIL # BLD: 0.04 K/UL (ref 0–0.4)
EOSINOPHIL NFR BLD: 0.2 % (ref 0–7)
ERYTHROCYTE [DISTWIDTH] IN BLOOD BY AUTOMATED COUNT: 18.2 % (ref 11.5–14.5)
ERYTHROCYTE [SEDIMENTATION RATE] IN BLOOD: 47 MM/HR (ref 0–20)
GLOBULIN SER CALC-MCNC: 3.9 G/DL (ref 2–4)
GLUCOSE BLD STRIP.AUTO-MCNC: 110 MG/DL (ref 65–117)
GLUCOSE SERPL-MCNC: 103 MG/DL (ref 65–100)
HCT VFR BLD AUTO: 31.4 % (ref 36.6–50.3)
HGB BLD-MCNC: 10.2 G/DL (ref 12.1–17)
IMM GRANULOCYTES # BLD AUTO: 0.14 K/UL (ref 0–0.04)
IMM GRANULOCYTES NFR BLD AUTO: 0.7 % (ref 0–0.5)
LYMPHOCYTES # BLD: 0.84 K/UL (ref 0.8–3.5)
LYMPHOCYTES NFR BLD: 4.2 % (ref 12–49)
MCH RBC QN AUTO: 24.3 PG (ref 26–34)
MCHC RBC AUTO-ENTMCNC: 32.5 G/DL (ref 30–36.5)
MCV RBC AUTO: 74.8 FL (ref 80–99)
MONOCYTES # BLD: 1.57 K/UL (ref 0–1)
MONOCYTES NFR BLD: 7.9 % (ref 5–13)
NEUTS SEG # BLD: 17.24 K/UL (ref 1.8–8)
NEUTS SEG NFR BLD: 86.7 % (ref 32–75)
NRBC # BLD: 0 K/UL (ref 0–0.01)
NRBC BLD-RTO: 0 PER 100 WBC
PLATELET # BLD AUTO: 335 K/UL (ref 150–400)
PMV BLD AUTO: 10.1 FL (ref 8.9–12.9)
POTASSIUM SERPL-SCNC: 3.7 MMOL/L (ref 3.5–5.1)
PROT SERPL-MCNC: 6.5 G/DL (ref 6.4–8.2)
RBC # BLD AUTO: 4.2 M/UL (ref 4.1–5.7)
SERVICE CMNT-IMP: NORMAL
SODIUM SERPL-SCNC: 139 MMOL/L (ref 136–145)
THERAPEUTIC RANGE: ABNORMAL SECS (ref 58–77)
WBC # BLD AUTO: 19.9 K/UL (ref 4.1–11.1)

## 2025-01-27 PROCEDURE — 82962 GLUCOSE BLOOD TEST: CPT

## 2025-01-27 PROCEDURE — 86140 C-REACTIVE PROTEIN: CPT

## 2025-01-27 PROCEDURE — 94640 AIRWAY INHALATION TREATMENT: CPT

## 2025-01-27 PROCEDURE — 36415 COLL VENOUS BLD VENIPUNCTURE: CPT

## 2025-01-27 PROCEDURE — 6370000000 HC RX 637 (ALT 250 FOR IP): Performed by: NURSE PRACTITIONER

## 2025-01-27 PROCEDURE — 2500000003 HC RX 250 WO HCPCS: Performed by: STUDENT IN AN ORGANIZED HEALTH CARE EDUCATION/TRAINING PROGRAM

## 2025-01-27 PROCEDURE — 6360000002 HC RX W HCPCS: Performed by: STUDENT IN AN ORGANIZED HEALTH CARE EDUCATION/TRAINING PROGRAM

## 2025-01-27 PROCEDURE — 6370000000 HC RX 637 (ALT 250 FOR IP): Performed by: STUDENT IN AN ORGANIZED HEALTH CARE EDUCATION/TRAINING PROGRAM

## 2025-01-27 PROCEDURE — 85025 COMPLETE CBC W/AUTO DIFF WBC: CPT

## 2025-01-27 PROCEDURE — 85652 RBC SED RATE AUTOMATED: CPT

## 2025-01-27 PROCEDURE — 1100000003 HC PRIVATE W/ TELEMETRY

## 2025-01-27 PROCEDURE — 2580000003 HC RX 258: Performed by: STUDENT IN AN ORGANIZED HEALTH CARE EDUCATION/TRAINING PROGRAM

## 2025-01-27 PROCEDURE — 94761 N-INVAS EAR/PLS OXIMETRY MLT: CPT

## 2025-01-27 PROCEDURE — 2700000000 HC OXYGEN THERAPY PER DAY

## 2025-01-27 PROCEDURE — 80053 COMPREHEN METABOLIC PANEL: CPT

## 2025-01-27 PROCEDURE — 6360000002 HC RX W HCPCS: Performed by: INTERNAL MEDICINE

## 2025-01-27 PROCEDURE — 85730 THROMBOPLASTIN TIME PARTIAL: CPT

## 2025-01-27 RX ORDER — HYDROMORPHONE HYDROCHLORIDE 1 MG/ML
2 INJECTION, SOLUTION INTRAMUSCULAR; INTRAVENOUS; SUBCUTANEOUS EVERY 4 HOURS PRN
Status: DISCONTINUED | OUTPATIENT
Start: 2025-01-27 | End: 2025-01-27

## 2025-01-27 RX ORDER — OXYCODONE HYDROCHLORIDE 5 MG/1
10 TABLET ORAL EVERY 4 HOURS PRN
Status: DISCONTINUED | OUTPATIENT
Start: 2025-01-27 | End: 2025-02-15 | Stop reason: HOSPADM

## 2025-01-27 RX ORDER — MORPHINE SULFATE 2 MG/ML
2 INJECTION, SOLUTION INTRAMUSCULAR; INTRAVENOUS EVERY 4 HOURS PRN
Status: DISCONTINUED | OUTPATIENT
Start: 2025-01-27 | End: 2025-01-27

## 2025-01-27 RX ORDER — OXYCODONE HYDROCHLORIDE 5 MG/1
5 TABLET ORAL EVERY 4 HOURS PRN
Status: DISCONTINUED | OUTPATIENT
Start: 2025-01-27 | End: 2025-02-15 | Stop reason: HOSPADM

## 2025-01-27 RX ORDER — OXYCODONE HYDROCHLORIDE 5 MG/1
5 TABLET ORAL EVERY 4 HOURS PRN
Status: DISCONTINUED | OUTPATIENT
Start: 2025-01-27 | End: 2025-01-27

## 2025-01-27 RX ORDER — ACETAMINOPHEN 500 MG
1000 TABLET ORAL EVERY 8 HOURS SCHEDULED
Status: DISCONTINUED | OUTPATIENT
Start: 2025-01-27 | End: 2025-02-15 | Stop reason: HOSPADM

## 2025-01-27 RX ORDER — HYDROMORPHONE HYDROCHLORIDE 1 MG/ML
1 INJECTION, SOLUTION INTRAMUSCULAR; INTRAVENOUS; SUBCUTANEOUS EVERY 4 HOURS PRN
Status: DISCONTINUED | OUTPATIENT
Start: 2025-01-27 | End: 2025-02-14 | Stop reason: CLARIF

## 2025-01-27 RX ADMIN — HEPARIN SODIUM 2000 UNITS: 1000 INJECTION INTRAVENOUS; SUBCUTANEOUS at 03:07

## 2025-01-27 RX ADMIN — HEPARIN SODIUM 2000 UNITS: 1000 INJECTION INTRAVENOUS; SUBCUTANEOUS at 13:54

## 2025-01-27 RX ADMIN — OXYCODONE HYDROCHLORIDE 5 MG: 5 TABLET ORAL at 03:41

## 2025-01-27 RX ADMIN — CITALOPRAM HYDROBROMIDE 10 MG: 20 TABLET ORAL at 09:00

## 2025-01-27 RX ADMIN — IPRATROPIUM BROMIDE 0.5 MG: 0.5 SOLUTION RESPIRATORY (INHALATION) at 20:41

## 2025-01-27 RX ADMIN — IPRATROPIUM BROMIDE 0.5 MG: 0.5 SOLUTION RESPIRATORY (INHALATION) at 13:13

## 2025-01-27 RX ADMIN — SODIUM CHLORIDE 25 ML: 9 INJECTION, SOLUTION INTRAVENOUS at 09:10

## 2025-01-27 RX ADMIN — HEPARIN SODIUM 2000 UNITS: 1000 INJECTION INTRAVENOUS; SUBCUTANEOUS at 21:29

## 2025-01-27 RX ADMIN — OXYCODONE HYDROCHLORIDE 5 MG: 5 TABLET ORAL at 09:00

## 2025-01-27 RX ADMIN — ARFORMOTEROL TARTRATE 15 MCG: 15 SOLUTION RESPIRATORY (INHALATION) at 20:44

## 2025-01-27 RX ADMIN — VANCOMYCIN HYDROCHLORIDE 750 MG: 750 INJECTION, POWDER, LYOPHILIZED, FOR SOLUTION INTRAVENOUS at 22:46

## 2025-01-27 RX ADMIN — VANCOMYCIN HYDROCHLORIDE 750 MG: 750 INJECTION, POWDER, LYOPHILIZED, FOR SOLUTION INTRAVENOUS at 09:20

## 2025-01-27 RX ADMIN — SODIUM CHLORIDE, PRESERVATIVE FREE 10 ML: 5 INJECTION INTRAVENOUS at 09:04

## 2025-01-27 RX ADMIN — ACETAMINOPHEN 1000 MG: 500 TABLET ORAL at 15:21

## 2025-01-27 RX ADMIN — PIPERACILLIN AND TAZOBACTAM 3375 MG: 3; .375 INJECTION, POWDER, LYOPHILIZED, FOR SOLUTION INTRAVENOUS at 03:49

## 2025-01-27 RX ADMIN — ACETAMINOPHEN 1000 MG: 500 TABLET ORAL at 22:47

## 2025-01-27 RX ADMIN — METOPROLOL SUCCINATE 75 MG: 25 TABLET, EXTENDED RELEASE ORAL at 09:00

## 2025-01-27 RX ADMIN — PIPERACILLIN AND TAZOBACTAM 3375 MG: 3; .375 INJECTION, POWDER, LYOPHILIZED, FOR SOLUTION INTRAVENOUS at 11:28

## 2025-01-27 RX ADMIN — SODIUM CHLORIDE, PRESERVATIVE FREE 10 ML: 5 INJECTION INTRAVENOUS at 20:47

## 2025-01-27 RX ADMIN — HEPARIN SODIUM 18 UNITS/KG/HR: 10000 INJECTION, SOLUTION INTRAVENOUS at 22:18

## 2025-01-27 RX ADMIN — HYDROMORPHONE HYDROCHLORIDE 2 MG: 1 INJECTION, SOLUTION INTRAMUSCULAR; INTRAVENOUS; SUBCUTANEOUS at 11:53

## 2025-01-27 RX ADMIN — PIPERACILLIN AND TAZOBACTAM 3375 MG: 3; .375 INJECTION, POWDER, LYOPHILIZED, FOR SOLUTION INTRAVENOUS at 20:50

## 2025-01-27 ASSESSMENT — PAIN DESCRIPTION - DIRECTION
RADIATING_TOWARDS: LEFT

## 2025-01-27 ASSESSMENT — PAIN DESCRIPTION - ORIENTATION
ORIENTATION: LEFT

## 2025-01-27 ASSESSMENT — PAIN DESCRIPTION - FREQUENCY
FREQUENCY: CONTINUOUS

## 2025-01-27 ASSESSMENT — PAIN DESCRIPTION - DESCRIPTORS
DESCRIPTORS: SHARP
DESCRIPTORS: SHARP
DESCRIPTORS: ACHING;BURNING
DESCRIPTORS: SHARP

## 2025-01-27 ASSESSMENT — PAIN SCALES - GENERAL
PAINLEVEL_OUTOF10: 8
PAINLEVEL_OUTOF10: 7
PAINLEVEL_OUTOF10: 8
PAINLEVEL_OUTOF10: 7
PAINLEVEL_OUTOF10: 7
PAINLEVEL_OUTOF10: 10
PAINLEVEL_OUTOF10: 10
PAINLEVEL_OUTOF10: 3
PAINLEVEL_OUTOF10: 9
PAINLEVEL_OUTOF10: 7
PAINLEVEL_OUTOF10: 7

## 2025-01-27 ASSESSMENT — PAIN DESCRIPTION - PAIN TYPE
TYPE: ACUTE PAIN

## 2025-01-27 ASSESSMENT — PAIN DESCRIPTION - LOCATION
LOCATION: FOOT

## 2025-01-27 ASSESSMENT — PAIN - FUNCTIONAL ASSESSMENT
PAIN_FUNCTIONAL_ASSESSMENT: PREVENTS OR INTERFERES SOME ACTIVE ACTIVITIES AND ADLS

## 2025-01-27 ASSESSMENT — PAIN DESCRIPTION - ONSET
ONSET: ON-GOING

## 2025-01-27 NOTE — ED NOTES
Report given to NICHOLAS Hummel. Nurse was informed of reason for arrival, vitals, labs, medications, orders, procedures, results, anything left pending and current plan of action. Questions were asked and received prior to departure from the patient.

## 2025-01-27 NOTE — PROGRESS NOTES
Hospitalist Progress Note    NAME:   Nasir Hatfield   : 1950   MRN: [id number]     Date/Time: 2025 1:35 PM  Patient PCP: Annie Mcnair, LOLA - NP    Estimated discharge date: ? , HH ?versus Rehab TBD  Barriers: OR- L LE bypass Sx on Wednesday , vascular surgery clearance      Assessment / Plan:    Critical limb ischemia- L foot POA   Follows with Vascular Surgery, Dr. Lorenzo - notes plan for L LE bypass as outpatient   - Here with worsening L leg swelling, pain, redness recently  - CTA runoff showing occluded left superficial femoral artery, distal vessels difficult to evaluation, probably reconstitution at level of popliteal artery    IP Vascular Surgery consulted  Plan for left tibial bypass with vein on Wednesday.  Hold clopidogrel.  Agree with heparin drip in the interim.   Float heel at all times   Encourage I-S and OOB to chair in the interim.   PO diet for now  - Follow anti-Xa      L foot cellulitis with Toe necrosis POA  -WBC 23.5->19.9 today  -Color change to LLE however no [place] erythema, leg is cold   - Blood cultures sent- neg x 24 hrs  - crp elevated as expected 17.7  CXR - severe Bullous Emphysema noted   Continue IV Vanc and Zosyn for now - empiric IV Abx     Chronic hypoxic respiratory failure   Hx of severe COPD  - Continue home inhalers  - Lung sounds coarse  - Continue PRN duonebs as needed for wheezing      Active Smoking POA  - Nicotine patch  - Needs to quit, discussed with him  at length        Medical Decision Making:   I personally reviewed labs: Y  I personally reviewed imaging: Y  I personally reviewed EKG: N  Toxic drug monitoring: Y     Discussed case with: Pt, Rn, CM on IDRs        Code Status: Full Code   DVT Prophylaxis: Heparin gtt          Subjective:     Chief Complaint / Reason for Physician Visit:  F/u for L foot resting pain due to critical limb ischemia, PAD, smoker, COPD  \"I am in pain\".  Discussed with RN events overnight.     Pt scheduled for left

## 2025-01-27 NOTE — H&P
Hospitalist Admission Note    NAME:Nasir Hatfield   : 1950   MRN: [id number]     Date/Time: 2025 8:34 PM    Patient PCP: Annie Mcnair, LOLA - NP    *Please be aware this note is formulated with assistance from Dragon voice-recognition dictation software. Please excuse any errors that may be present*    ______________________________________________________________________  Given the patient's current clinical presentation, I have a high level of concern for decompensation if discharged from the emergency department.  Complex decision making was performed, which includes reviewing the patient's available past medical records, laboratory results, and x-ray films.       My assessment of this patient's clinical condition and my plan of care is as follows.    Problem List:  Patient Active Problem List   Diagnosis    Acute respiratory failure    Nonhealing surgical wound, sequela    Bacterial infection due to Staphylococcus aureus    Thrombus    Critical lower limb ischemia (HCC)         Assessment / Plan:    Critical limb ischemia   - Follows with Vascular Surgery, Dr. Lorenzo - notes plan for bypass as outpatient   - Here today with worsening L leg swelling, pain, redness recently  - CTA runoff showing occluded left superficial femoral artery, distal vessels difficult to evaluation, probably reconstitution at level of popliteal artery  - Vascular Surgery consulted - recommended heparin gtt  - Continue heparin gtt  - Will keep NPO pMN until Surgery to evaluate in AM  - Follow anti-Xa     Concern for infection/sepsis  -WBC 23.5 with significant left shift  -Color change to LLE however no [place] erythema, leg is cold   - Blood cultures sent, pending  - Send inflammatory markers  - Check CXR   - COVID and flu   - Continue IV Vanc and Zosyn for now     Chronic hypoxic respiratory failure   Hx of severe COPD  - Continue home inhalers  - Lung sounds coarse  - CXR pending as above  - Continue PRN duonebs as  absent LLE great toe    Neurological:      General: No focal deficit present.      Mental Status: He is alert and oriented to person, place, and time.             LAB DATA REVIEWED:    Recent Results (from the past 12 hour(s))   CBC with Auto Differential    Collection Time: 01/26/25  4:50 PM   Result Value Ref Range    WBC 23.5 (H) 4.1 - 11.1 K/uL    RBC 4.78 4.10 - 5.70 M/uL    Hemoglobin 11.5 (L) 12.1 - 17.0 g/dL    Hematocrit 35.4 (L) 36.6 - 50.3 %    MCV 74.1 (L) 80.0 - 99.0 FL    MCH 24.1 (L) 26.0 - 34.0 PG    MCHC 32.5 30.0 - 36.5 g/dL    RDW 17.9 (H) 11.5 - 14.5 %    Platelets 337 150 - 400 K/uL    MPV 9.2 8.9 - 12.9 FL    Nucleated RBCs 0.0 0  WBC    nRBC 0.00 0.00 - 0.01 K/uL    Neutrophils % 88 (H) 32.0 - 75.0 %    Lymphocytes % 8 (L) 12.0 - 49.0 %    Monocytes % 4 (L) 5.0 - 13.0 %    Eosinophils % 0 0.0 - 7.0 %    Basophils % 0 0.0 - 1.0 %    Immature Granulocytes % 0 0.0 - 0.5 %    Neutrophils Absolute 20.68 (H) 1.80 - 8.00 K/UL    Lymphocytes Absolute 1.88 0.80 - 3.50 K/UL    Monocytes Absolute 0.94 0.00 - 1.00 K/UL    Eosinophils Absolute 0.00 0.00 - 0.40 K/UL    Basophils Absolute 0.00 0.00 - 0.10 K/UL    Immature Granulocytes Absolute 0.00 0.00 - 0.04 K/UL    Differential Type MANUAL      RBC Comment HYPOCHROMIA  1+        RBC Comment MICROCYTOSIS  2+        RBC Comment ANISOCYTOSIS  2+        RBC Comment POLYCHROMASIA  1+        RBC Comment STOMATOCYTES  1+        RBC Comment OVALOCYTES  1+       Comprehensive Metabolic Panel    Collection Time: 01/26/25  4:50 PM   Result Value Ref Range    Sodium 137 136 - 145 mmol/L    Potassium 3.6 3.5 - 5.1 mmol/L    Chloride 102 97 - 108 mmol/L    CO2 30 21 - 32 mmol/L    Anion Gap 5 2 - 12 mmol/L    Glucose 122 (H) 65 - 100 mg/dL    BUN 35 (H) 6 - 20 MG/DL    Creatinine 0.98 0.70 - 1.30 MG/DL    BUN/Creatinine Ratio 36 (H) 12 - 20      Est, Glom Filt Rate 81 >60 ml/min/1.73m2    Calcium 9.9 8.5 - 10.1 MG/DL    Total Bilirubin 0.8 0.2 - 1.0 MG/DL    ALT  Isovue-[id number]. ORAL CONTRAST: None TECHNIQUE:  Helical CT angiography of the abdomen, pelvis and bilateral extremities (aortic-iliofemoral runoff) Helical CT abdomen/pelvis and bilateral extremities (aortic-iliofemoral runoff) following rapid bolus intravenous contrast administration. Post contrast imaging in the arterial and delayed venous phases. Contiguous axial images were reconstructed in lung and soft tissue windows. Coronal and sagittal reformats were generated.  CT dose reduction was achieved through use of a standardized protocol tailored for this examination and automatic exposure control for dose modulation. Three-dimensional postprocessing was performed. FINDINGS: LOWER THORAX: Emphysematous change. Calcified granuloma left base AORTA: No aneurysm or dissection. No mesenteric arterial occlusion. Extensive atherosclerotic change ILIAC ARTERIES: Extensive atherosclerotic disease. Narrowing of the distal external iliac artery bilaterally LOWER EXTREMITY ARTERIES: Focally occluded mid left superficial femoral artery just proximal to the stent which is also occluded. Significant motion makes evaluation of the distal vessels challenging. Probable reconstitution of the popliteal artery with flow within the posterior tibial artery to the level of the ankle. Diffuse atherosclerotic change of the right superficial femoral artery. Right popliteal artery is mildly aneurysmal measuring 11 mm. Tibialis anterior artery is occluded proximally. Posterior tibial artery is patent LIVER: No arterially enhancing mass. BILIARY TREE: Gallbladder is within normal limits. CBD is not dilated. SPLEEN: within normal limits. PANCREAS: No arterially enhancing mass, inflammation, or ductal dilatation. ADRENALS: Unremarkable. KIDNEYS: No mass, calculus, or hydronephrosis. STOMACH: Unremarkable. SMALL BOWEL: No dilatation or wall thickening. COLON: No dilatation or wall thickening. Left-sided diverticulosis APPENDIX: Not seen PERITONEUM:  No ascites or pneumoperitoneum. RETROPERITONEUM: No lymphadenopathy or hemorrhage. REPRODUCTIVE ORGANS: Not enlarged URINARY BLADDER: No mass or calculus. BONES: No destructive bone lesion. ABDOMINAL WALL: Left inguinal hernia containing fat ADDITIONAL COMMENTS: N/A     1. Occluded left superficial femoral artery. The distal vessels are difficult to evaluate due to significant motion artifact. Probable reconstitution at the level of the popliteal artery Electronically signed by Estefany Richard    XR CHEST PORTABLE    Result Date: 1/26/2025  EXAM:  XR CHEST PORTABLE INDICATION: SIRS COMPARISON: September 8 TECHNIQUE: portable chest AP view FINDINGS: The cardiac silhouette is within normal limits. The pulmonary vasculature is within normal limits. There is severe bullous emphysema but no definite superimposed pneumonia. The visualized bones and upper abdomen are age-appropriate.     Severe bullous emphysema. No acute abnormality. Electronically signed by Chip Curtis MD     _______________________________________________________________________    TOTAL TIME:  76 Minutes    Tobacco cessation counseling: Upon evaluation, the patient expressed that they are a current tobacco user. For 8 minutes, I counseled the patient on the hazards of smoking.  The patient was encouraged to quit as soon as possible in order to decrease further risks to their health.   We discussed nicotine replacement therapies and medical treatment options to decrease cravings and improve chances of success quitting.    The patient has conveyed their understanding of the risks involved should they continue to use tobacco products.      Signed: Jamal Ambrose MD    Procedures: see electronic medical records for all procedures/Xrays and details which were not copied into this note but were reviewed prior to creation of Plan.

## 2025-01-27 NOTE — PROGRESS NOTES
Comprehensive Nutrition Assessment    Type and Reason for Visit:  Initial, Positive nutrition screen    Nutrition Recommendations/Plan:   Regular diet  Add ensure plus high protein BID     Malnutrition Assessment:  Malnutrition Status:  Severe malnutrition (01/27/25 1435)    Context:  Chronic Illness     Findings of the 6 clinical characteristics of malnutrition:  Energy Intake:  Mild decrease in energy intake  Weight Loss:  No weight loss     Body Fat Loss:  Mild body fat loss Fat Overlying Ribs, Orbital   Muscle Mass Loss:  Severe muscle mass loss Clavicles (pectoralis & deltoids)  Fluid Accumulation:  Severe Generalized   Strength:  Not Performed    Nutrition Assessment:    Patient medically noted for critical left limb ischemia. PMH Chronic respiratory failure, COPD, and PAD. MST referral received. Patient reports a decreased appetite a few days PTA but normally eats fairly well. Denies any recent weight changes and reports UBW ~152#. He has not tried supplements before and jokes he drinks [place] as a supplement. Muscle and fat wasting noted on NFPE. Will liberalize to a regular diet. Patient agreeable to chocolate ensure plus high protein. Encouraged intake of meals as tolerated. Plans for vascular procedure on Wednesday.     Wt Readings from Last 5 Encounters:   01/27/25 70.3 kg (155 lb)   09/10/24 70 kg (154 lb 5.2 oz)   05/16/24 69.4 kg (153 lb)     Nutrition Related Findings:    Labs reviewed   3+ generalized edema   Celexa, Zosyn, Vanc         Current Nutrition Intake & Therapies:          ADULT DIET; Regular; Low Fat/Low Chol/High Fiber/2 gm Na    Anthropometric Measures:  Height: 177.8 cm (5' 10\")  Ideal Body Weight (IBW): 166 lbs (75 kg)       Current Body Weight: 70.3 kg (154 lb 15.7 oz),   IBW.    Current BMI (kg/m2): 22.2                             BMI Categories: Normal Weight (BMI 18.5-24.9)    Estimated Daily Nutrient Needs:  Energy Requirements Based On: Kcal/kg  Weight Used for Energy  Requirements: Current  Energy (kcal/day): 2109 kcals (30 kcals/kg bw)  Weight Used for Protein Requirements: Current  Protein (g/day): 84-98g (1.2-1.4 g/kg bw)  Method Used for Fluid Requirements: 1 ml/kcal  Fluid (ml/day): 2100 mL    Nutrition Diagnosis:   Severe malnutrition related to catabolic illness, impaired respiratory function as evidenced by criteria as identified in malnutrition assessment    Nutrition Interventions:   Food and/or Nutrient Delivery: Modify Current Diet, Start Oral Nutrition Supplement  Nutrition Education/Counseling: No recommendation at this time  Coordination of Nutrition Care: Continue to monitor while inpatient       Goals:  Goals: PO intake 75% or greater, by next RD assessment          Nutrition Monitoring and Evaluation:   Behavioral-Environmental Outcomes: None Identified  Food/Nutrient Intake Outcomes: Food and Nutrient Intake, Supplement Intake  Physical Signs/Symptoms Outcomes: Biochemical Data, Weight    Discharge Planning:    Continue current diet, Continue Oral Nutrition Supplement     Yael Davidson RD  Contact: ext [phone]

## 2025-01-27 NOTE — PROGRESS NOTES
Consult received  Scheduled for bypass next week  Dr Lorenzo to evaluate tomorrow to see if timing needs to be adjusted.

## 2025-01-27 NOTE — ED NOTES
Bedside and Verbal shift change report given to Artem (oncoming nurse) by  (offgoing nurse). Report included the following information Nurse Handoff Report, Index, ED Encounter Summary, ED SBAR, Adult Overview, Surgery Report, Intake/Output, MAR, Recent Results, and Med Rec Status.

## 2025-01-27 NOTE — PROGRESS NOTES
Well known to me  Scheduled for leg bypass next week.  Will expedite and try to move up to this Weds  Will see pt later today         Foot looks about the same but pain worse  CTA reviewed   Good saphenous vein on exam  Plan vein bypass to PT level on Weds

## 2025-01-27 NOTE — CONSULTS
Vascular Surgery Consult Note  1/27/2025    Subjective:     Nasir Hatfield is a frail thin 74 y.o. male with a pmhx significant for COPD w/ CRF, HTN, CVA, and alcohol use.  He is a current smoker.  He was taking clopidgrel and Eliquis prior to presenting.     He is admitted to the hospital with left lower extremity critical limb ischemia after presenting with rest pain and decreased ROM.  Plan is for bypass on Wednesday.      Past medical history  Peripheral arterial disease   Chronic obstructive pulmonary disease   Chronic respiratory failure   Oxygen dependence   Tobacco use   Hypertension  Stroke   Depression       Past procedural history  Left great toe amputation  Left lower extremity thrombectomy 9/2024   Left femoral angioplasty and stenting 5/2024   Left foot debridement   Removal of bilateral cataracts    Family history  Mother: Emphysema, aortic dissection  Father: Stroke     Social history  He is a 0.5 PPD smoker   He drinks a pint of whiskey per week.      Home medication   metoprolol succinate (TOPROL XL) 25 MG extended release tablet Take 3 tablets by mouth daily   nicotine (NICODERM CQ) 21 MG/24HR Place 1 patch onto the skin daily   apixaban (ELIQUIS) 5 MG TABS tablet Take 1 tablet by mouth 2 times daily   OXYGEN Inhale 3 L into the lungs daily   Multiple Vitamins-Minerals (MENS 50+ MULTI VITAMIN/MIN PO) Take by mouth daily   clopidogrel (PLAVIX) 75 MG tablet Take 1 tablet by mouth daily   albuterol sulfate HFA (PROVENTIL;VENTOLIN;PROAIR) 108 (90 Base) MCG/ACT inhaler Inhale into the lungs   citalopram (CELEXA) 10 MG tablet Take by mouth daily   umeclidinium-vilanterol (ANORO ELLIPTA) 62.5-25 MCG/ACT inhaler Inhale 1 puff into the lungs daily     No Known Allergies     Review of Systems   Constitutional:  Positive for activity change. Negative for chills and fever.   HENT:  Negative for congestion.    Eyes:  Negative for visual disturbance.   Respiratory:  Positive for shortness of breath. Negative  (H) 32.0 - 75.0 %    Lymphocytes % 4.2 (L) 12.0 - 49.0 %    Monocytes % 7.9 5.0 - 13.0 %    Eosinophils % 0.2 0.0 - 7.0 %    Basophils % 0.3 0.0 - 1.0 %    Immature Granulocytes % 0.7 (H) 0.0 - 0.5 %    Neutrophils Absolute 17.24 (H) 1.80 - 8.00 K/UL    Lymphocytes Absolute 0.84 0.80 - 3.50 K/UL    Monocytes Absolute 1.57 (H) 0.00 - 1.00 K/UL    Eosinophils Absolute 0.04 0.00 - 0.40 K/UL    Basophils Absolute 0.05 0.00 - 0.10 K/UL    Immature Granulocytes Absolute 0.14 (H) 0.00 - 0.04 K/UL    Differential Type AUTOMATED         Assessmen/Plan:     Left critical limb ischemia  Left femoral artery occlusion    Peripheral arterial disease  Plan for left tibial bypass with vein on Wednesday.  Hold clopidogrel.  Agree with heparin drip in the interim.   Float heel at all times   Encourage I-S and OOB to chair in the interim.     Chronic obstructive pulmonary disease   -Not in exacerbation   -Brovana and Atrovent   Chronic hypoxic/hypercapnic respiratory failure   Oxygen dependence   Tobacco use   -Smoking cessation edu completed  -Nicoderm     Alcohol use  -Currently w/o s/s of w/d    Hypertension  -Toprol XL     History of stroke     Depression     -Celexa     Management of comorbid conditions by primary team.    VTE Prophylaxis: Heparin drip    Disposition: TBD post procedure     Signed By: Tonya Cohn [facility]    January 27, 2025

## 2025-01-28 ENCOUNTER — ANESTHESIA EVENT (OUTPATIENT)
Facility: HOSPITAL | Age: 75
End: 2025-01-28
Payer: MEDICARE

## 2025-01-28 LAB
ALBUMIN SERPL-MCNC: 2.3 G/DL (ref 3.5–5)
ALBUMIN/GLOB SERPL: 0.6 (ref 1.1–2.2)
ALP SERPL-CCNC: 93 U/L (ref 45–117)
ALT SERPL-CCNC: 16 U/L (ref 12–78)
ANION GAP SERPL CALC-SCNC: 2 MMOL/L (ref 2–12)
APPEARANCE UR: CLEAR
APTT PPP: 59.3 SEC (ref 22.1–31)
APTT PPP: 75.4 SEC (ref 22.1–31)
AST SERPL-CCNC: 25 U/L (ref 15–37)
BACTERIA SPEC CULT: NORMAL
BACTERIA SPEC CULT: NORMAL
BACTERIA URNS QL MICRO: NEGATIVE /HPF
BASOPHILS # BLD: 0.06 K/UL (ref 0–0.1)
BASOPHILS NFR BLD: 0.4 % (ref 0–1)
BILIRUB SERPL-MCNC: 0.4 MG/DL (ref 0.2–1)
BILIRUB UR QL: NEGATIVE
BUN SERPL-MCNC: 43 MG/DL (ref 6–20)
BUN/CREAT SERPL: 43 (ref 12–20)
CALCIUM SERPL-MCNC: 8.7 MG/DL (ref 8.5–10.1)
CHLORIDE SERPL-SCNC: 107 MMOL/L (ref 97–108)
CO2 SERPL-SCNC: 30 MMOL/L (ref 21–32)
COLOR UR: ABNORMAL
CREAT SERPL-MCNC: 1 MG/DL (ref 0.7–1.3)
DIFFERENTIAL METHOD BLD: ABNORMAL
EOSINOPHIL # BLD: 0.43 K/UL (ref 0–0.4)
EOSINOPHIL NFR BLD: 2.6 % (ref 0–7)
EPITH CASTS URNS QL MICRO: ABNORMAL /LPF
ERYTHROCYTE [DISTWIDTH] IN BLOOD BY AUTOMATED COUNT: 18.3 % (ref 11.5–14.5)
GLOBULIN SER CALC-MCNC: 3.8 G/DL (ref 2–4)
GLUCOSE SERPL-MCNC: 110 MG/DL (ref 65–100)
GLUCOSE UR STRIP.AUTO-MCNC: NEGATIVE MG/DL
HCT VFR BLD AUTO: 31.4 % (ref 36.6–50.3)
HGB BLD-MCNC: 9.7 G/DL (ref 12.1–17)
HGB UR QL STRIP: NEGATIVE
HYALINE CASTS URNS QL MICRO: ABNORMAL /LPF (ref 0–2)
IMM GRANULOCYTES # BLD AUTO: 0.1 K/UL (ref 0–0.04)
IMM GRANULOCYTES NFR BLD AUTO: 0.6 % (ref 0–0.5)
KETONES UR QL STRIP.AUTO: ABNORMAL MG/DL
LEUKOCYTE ESTERASE UR QL STRIP.AUTO: NEGATIVE
LYMPHOCYTES # BLD: 1.13 K/UL (ref 0.8–3.5)
LYMPHOCYTES NFR BLD: 6.7 % (ref 12–49)
MCH RBC QN AUTO: 24.3 PG (ref 26–34)
MCHC RBC AUTO-ENTMCNC: 30.9 G/DL (ref 30–36.5)
MCV RBC AUTO: 78.7 FL (ref 80–99)
MONOCYTES # BLD: 1.48 K/UL (ref 0–1)
MONOCYTES NFR BLD: 8.8 % (ref 5–13)
NEUTS SEG # BLD: 13.61 K/UL (ref 1.8–8)
NEUTS SEG NFR BLD: 80.9 % (ref 32–75)
NITRITE UR QL STRIP.AUTO: NEGATIVE
NRBC # BLD: 0 K/UL (ref 0–0.01)
NRBC BLD-RTO: 0 PER 100 WBC
PH UR STRIP: 5.5 (ref 5–8)
PLATELET # BLD AUTO: 311 K/UL (ref 150–400)
PMV BLD AUTO: 10.1 FL (ref 8.9–12.9)
POTASSIUM SERPL-SCNC: 3.7 MMOL/L (ref 3.5–5.1)
PROT SERPL-MCNC: 6.1 G/DL (ref 6.4–8.2)
PROT UR STRIP-MCNC: 30 MG/DL
RBC # BLD AUTO: 3.99 M/UL (ref 4.1–5.7)
RBC #/AREA URNS HPF: ABNORMAL /HPF (ref 0–5)
SERVICE CMNT-IMP: NORMAL
SERVICE CMNT-IMP: NORMAL
SODIUM SERPL-SCNC: 139 MMOL/L (ref 136–145)
SP GR UR REFRACTOMETRY: 1.02 (ref 1–1.03)
THERAPEUTIC RANGE: ABNORMAL SECS (ref 58–77)
THERAPEUTIC RANGE: ABNORMAL SECS (ref 58–77)
UFH PPP CHRO-ACNC: 0.29 IU/ML
URINE CULTURE IF INDICATED: ABNORMAL
UROBILINOGEN UR QL STRIP.AUTO: 1 EU/DL (ref 0.2–1)
VANCOMYCIN SERPL-MCNC: 13 UG/ML
WBC # BLD AUTO: 16.8 K/UL (ref 4.1–11.1)
WBC URNS QL MICRO: ABNORMAL /HPF (ref 0–4)

## 2025-01-28 PROCEDURE — 80053 COMPREHEN METABOLIC PANEL: CPT

## 2025-01-28 PROCEDURE — 6370000000 HC RX 637 (ALT 250 FOR IP): Performed by: NURSE PRACTITIONER

## 2025-01-28 PROCEDURE — 80202 ASSAY OF VANCOMYCIN: CPT

## 2025-01-28 PROCEDURE — 94761 N-INVAS EAR/PLS OXIMETRY MLT: CPT

## 2025-01-28 PROCEDURE — 85027 COMPLETE CBC AUTOMATED: CPT

## 2025-01-28 PROCEDURE — 2700000000 HC OXYGEN THERAPY PER DAY

## 2025-01-28 PROCEDURE — 94640 AIRWAY INHALATION TREATMENT: CPT

## 2025-01-28 PROCEDURE — 1100000003 HC PRIVATE W/ TELEMETRY

## 2025-01-28 PROCEDURE — 85730 THROMBOPLASTIN TIME PARTIAL: CPT

## 2025-01-28 PROCEDURE — 6360000002 HC RX W HCPCS: Performed by: STUDENT IN AN ORGANIZED HEALTH CARE EDUCATION/TRAINING PROGRAM

## 2025-01-28 PROCEDURE — 6370000000 HC RX 637 (ALT 250 FOR IP): Performed by: STUDENT IN AN ORGANIZED HEALTH CARE EDUCATION/TRAINING PROGRAM

## 2025-01-28 PROCEDURE — 36415 COLL VENOUS BLD VENIPUNCTURE: CPT

## 2025-01-28 PROCEDURE — 2580000003 HC RX 258: Performed by: STUDENT IN AN ORGANIZED HEALTH CARE EDUCATION/TRAINING PROGRAM

## 2025-01-28 PROCEDURE — 85520 HEPARIN ASSAY: CPT

## 2025-01-28 PROCEDURE — 81001 URINALYSIS AUTO W/SCOPE: CPT

## 2025-01-28 RX ADMIN — VANCOMYCIN HYDROCHLORIDE 750 MG: 750 INJECTION, POWDER, LYOPHILIZED, FOR SOLUTION INTRAVENOUS at 08:40

## 2025-01-28 RX ADMIN — CITALOPRAM HYDROBROMIDE 10 MG: 20 TABLET ORAL at 08:41

## 2025-01-28 RX ADMIN — ARFORMOTEROL TARTRATE 15 MCG: 15 SOLUTION RESPIRATORY (INHALATION) at 19:41

## 2025-01-28 RX ADMIN — PIPERACILLIN AND TAZOBACTAM 3375 MG: 3; .375 INJECTION, POWDER, LYOPHILIZED, FOR SOLUTION INTRAVENOUS at 12:02

## 2025-01-28 RX ADMIN — ARFORMOTEROL TARTRATE 15 MCG: 15 SOLUTION RESPIRATORY (INHALATION) at 07:18

## 2025-01-28 RX ADMIN — OXYCODONE 5 MG: 5 TABLET ORAL at 19:29

## 2025-01-28 RX ADMIN — PIPERACILLIN AND TAZOBACTAM 3375 MG: 3; .375 INJECTION, POWDER, LYOPHILIZED, FOR SOLUTION INTRAVENOUS at 03:53

## 2025-01-28 RX ADMIN — ACETAMINOPHEN 1000 MG: 500 TABLET ORAL at 22:18

## 2025-01-28 RX ADMIN — METOPROLOL SUCCINATE 75 MG: 25 TABLET, EXTENDED RELEASE ORAL at 11:56

## 2025-01-28 RX ADMIN — OXYCODONE 5 MG: 5 TABLET ORAL at 11:55

## 2025-01-28 RX ADMIN — VANCOMYCIN HYDROCHLORIDE 750 MG: 750 INJECTION, POWDER, LYOPHILIZED, FOR SOLUTION INTRAVENOUS at 22:27

## 2025-01-28 RX ADMIN — ACETAMINOPHEN 1000 MG: 500 TABLET ORAL at 05:48

## 2025-01-28 RX ADMIN — ACETAMINOPHEN 1000 MG: 500 TABLET ORAL at 11:56

## 2025-01-28 RX ADMIN — IPRATROPIUM BROMIDE 0.5 MG: 0.5 SOLUTION RESPIRATORY (INHALATION) at 19:37

## 2025-01-28 RX ADMIN — PIPERACILLIN AND TAZOBACTAM 3375 MG: 3; .375 INJECTION, POWDER, LYOPHILIZED, FOR SOLUTION INTRAVENOUS at 20:29

## 2025-01-28 RX ADMIN — OXYCODONE 5 MG: 5 TABLET ORAL at 15:47

## 2025-01-28 RX ADMIN — IPRATROPIUM BROMIDE 0.5 MG: 0.5 SOLUTION RESPIRATORY (INHALATION) at 07:18

## 2025-01-28 ASSESSMENT — PAIN DESCRIPTION - DESCRIPTORS
DESCRIPTORS: THROBBING
DESCRIPTORS: SORE
DESCRIPTORS: TIGHTNESS
DESCRIPTORS: SORE

## 2025-01-28 ASSESSMENT — PAIN SCALES - GENERAL
PAINLEVEL_OUTOF10: 8
PAINLEVEL_OUTOF10: 6
PAINLEVEL_OUTOF10: 0
PAINLEVEL_OUTOF10: 0

## 2025-01-28 ASSESSMENT — LIFESTYLE VARIABLES: SMOKING_STATUS: 1

## 2025-01-28 ASSESSMENT — PAIN DESCRIPTION - PAIN TYPE
TYPE: DEEP SOMATIC PAIN

## 2025-01-28 ASSESSMENT — PAIN DESCRIPTION - LOCATION
LOCATION: FOOT
LOCATION: FOOT
LOCATION: LEG
LOCATION: LEG
LOCATION: FOOT

## 2025-01-28 ASSESSMENT — PAIN DESCRIPTION - ORIENTATION
ORIENTATION: LEFT

## 2025-01-28 NOTE — ANESTHESIA PRE PROCEDURE
BP Readings from Last 3 Encounters:   01/28/25 116/70   09/13/24 130/82   06/13/24 (!) 162/99       NPO Status:                                                                                 BMI:   Wt Readings from Last 3 Encounters:   01/27/25 70.3 kg (155 lb)   09/10/24 70 kg (154 lb 5.2 oz)   05/16/24 69.4 kg (153 lb)     Body mass index is 22.24 kg/m².    CBC:   Lab Results   Component Value Date/Time    WBC 16.8 01/28/2025 04:49 AM    RBC 3.99 01/28/2025 04:49 AM    HGB 9.7 01/28/2025 04:49 AM    HCT 31.4 01/28/2025 04:49 AM    MCV 78.7 01/28/2025 04:49 AM    RDW 18.3 01/28/2025 04:49 AM     01/28/2025 04:49 AM       CMP:   Lab Results   Component Value Date/Time     01/28/2025 04:49 AM    K 3.7 01/28/2025 04:49 AM     01/28/2025 04:49 AM    CO2 30 01/28/2025 04:49 AM    BUN 43 01/28/2025 04:49 AM    CREATININE 1.00 01/28/2025 04:49 AM    LABGLOM 79 01/28/2025 04:49 AM    GLUCOSE 110 01/28/2025 04:49 AM    CALCIUM 8.7 01/28/2025 04:49 AM    BILITOT 0.4 01/28/2025 04:49 AM    ALKPHOS 93 01/28/2025 04:49 AM    AST 25 01/28/2025 04:49 AM    ALT 16 01/28/2025 04:49 AM       POC Tests:   Recent Labs     01/26/25  1700 01/27/25  0901   POCGLU 122* 110   POCNA 141  --    POCK 4.0  --    POCCL 100  --        Coags:   Lab Results   Component Value Date/Time    PROTIME 12.7 01/26/2025 07:53 PM    INR 1.2 01/26/2025 07:53 PM    APTT 59.3 01/28/2025 08:05 AM       HCG (If Applicable): No results found for: \"PREGTESTUR\", \"PREGSERUM\", \"HCG\", \"HCGQUANT\"     ABGs:   Lab Results   Component Value Date/Time    PHART 7.45 09/08/2024 01:52 PM    PO2ART 45 09/08/2024 01:52 PM    CDL4VIH 43 09/08/2024 01:52 PM    IXO8FNR 31 01/26/2025 05:00 PM    BEART 4.7 09/08/2024 01:52 PM        Type & Screen (If Applicable):  No results found for: \"ABORH\", \"LABANTI\"    Drug/Infectious Status (If Applicable):  No results found for: \"HIV\", \"HEPCAB\"    COVID-19 Screening (If Applicable): No

## 2025-01-28 NOTE — PROGRESS NOTES
Pharmacy Antimicrobial Kinetic Dosing    Indication for Antimicrobials: L foot cellulitis with Toe necrosis    Current Regimen of Each Antimicrobial:  Zosyn 3.375 gm iv every 8 hr; Start Date ; Day # 3  Vancomycin 1750 mg iv once then 750 mg iv every 12 hr Start Date ; Day # 3    Previous Antimicrobial Therapy:    Goal Level: Vancomycin -600    Date Dose & Interval Measured (mcg/mL) Predicted AUC 24-48 Predicted AUC 24,ss   25 750 mg IV Q12H 13 410 441                   Significant Cultures:    blood - pending    Labs:  Recent Labs     Units 25  0449 25  0555 25  1700 25  1650   CREATININE MG/DL 1.00 0.71 0.7 0.98   BUN MG/DL 43* 28*  --  35*   PROCAL ng/mL  --   --   --  0.54   WBC K/uL 16.8* 19.9*  --  23.5*     Temp (24hrs), Av.1 °F (36.7 °C), Min:97.9 °F (36.6 °C), Max:98.2 °F (36.8 °C)    Conditions for Dosing Consideration: NA    Creatinine Clearance (mL/min): Estimated Creatinine Clearance: 65 mL/min (based on SCr of 1 mg/dL).     Impression/Plan:   Level resulted, AUC at goal, continue current regimen  Predicted ZOE76-43 = 410, Predicted AUC24,ss = 441  Antimicrobial stop date 7 days     Pharmacy will follow daily and adjust medications as appropriate for renal function and/or serum levels.    Thank you,  Darryl Ash, [facility]

## 2025-01-28 NOTE — PROGRESS NOTES
End of Shift Note    Bedside shift change report given to Shilo   (oncoming nurse) by Estephanie Kendall RN (offgoing nurse).  Report included the following information SBAR, Kardex, ED Summary, Intake/Output, MAR, Recent Results, and Cardiac Rhythm NSR    Shift worked:  7a-7p     Shift summary and any significant changes:     Patient admitted for critical limb ischemia.  Patient given dose Vicenta but did not help pain.  Given 2 mg of Dilaudid and this helped with pain but caused confusion and some hallucinations after given.  Patient turned off his bed alarm and then got out of bed around 1830 without calling for staff assistance.  Did not fall.  Helped back to bed and bed pad alarm and bed alarm placed called provider for sitter order for patient safety.  Dilaudid decreased to 1 mg by provider.     Concerns for physician to address:  Limb ischemia surgery planned for Wednesday     Zone phone for oncoming shift:   [phone]       Activity:     Number times ambulated in hallways past shift: 0  Number of times OOB to chair past shift: 0    Cardiac:   Cardiac Monitoring: Yes      Cardiac Rhythm: Sinus rhythm    Access:  Current line(s): PIV     Genitourinary:        Respiratory:   O2 Device: Nasal cannula  Chronic home O2 use?: YES  Incentive spirometer at bedside: NO    GI:     Current diet:  ADULT ORAL NUTRITION SUPPLEMENT; Breakfast, Dinner; Standard High Calorie/High Protein Oral Supplement  ADULT DIET; Regular  Passing flatus: YES    Pain Management:   Patient states pain is manageable on current regimen: YES    Skin:  Alfred Scale Score: 15  Interventions: Wound Offloading (Prevention Methods): Repositioning    Patient Safety:  Fall Risk:         Active Consults:   IP CONSULT TO VASCULAR SURGERY  IP CONSULT TO PHARMACY    Length of Stay:  Expected LOS: 5  Actual LOS: 1    Estephanie Kendall RN

## 2025-01-28 NOTE — PROGRESS NOTES
End of Shift Note    Bedside shift change report given to NICHOLAS Thompson (oncoming nurse) by Shilo Bedoya RN (offgoing nurse).  Report included the following information SBAR and MAR    Shift worked: 7pm - 7am     Shift summary and any significant changes:    Aox4. No episodes of confusion. O2 at 3lpm . No complaints of SOB. Scheduled tylenol given for pain. Heparin drip ongoing.      Concerns for physician to address: None     Zone phone for oncoming shift:         Activity:     Number times ambulated in hallways past shift: 0  Number of times OOB to chair past shift: 0    Cardiac:   Cardiac Monitoring: Yes      Cardiac Rhythm: Sinus rhythm    Access:  Current line(s): PIV     Genitourinary:        Respiratory:   O2 Device: Nasal cannula  Chronic home O2 use?: NO  Incentive spirometer at bedside: NO    GI:  Last BM (including prior to admit): 01/27/25  Current diet:  ADULT ORAL NUTRITION SUPPLEMENT; Breakfast, Dinner; Standard High Calorie/High Protein Oral Supplement  ADULT DIET; Regular  Passing flatus:     Pain Management:   Patient states pain is manageable on current regimen: YES    Skin:  Alfred Scale Score: 18  Interventions: Wound Offloading (Prevention Methods): Bed, pressure redistribution/air, Repositioning, Turning    Patient Safety:  Fall Risk: Nursing Judgement-Fall Risk High(Add Comments): Yes  Fall Risk Interventions  Nursing Judgement-Fall Risk High(Add Comments): Yes  Toilet Every 2 Hours-In Advance of Need: Yes  Hourly Visual Checks: Awake, In bed  Fall Visual Posted: Fall sign posted  Room Door Open: Yes  Alarm On: Bed    Active Consults:   IP CONSULT TO VASCULAR SURGERY  IP CONSULT TO PHARMACY    Length of Stay:  Expected LOS: 5  Actual LOS: 2    Shilo Bedoya RN

## 2025-01-28 NOTE — PROGRESS NOTES
Hospitalist Progress Note    NAME:   Nasir Hatfield   : 1950   MRN: [id number]     Date/Time: 2025 8:20 AM  Patient PCP: Annie Mcnair APRN - NP    Estimated discharge date: ? , HH ?versus Rehab TBD  Barriers: OR- L LE bypass Sx on Wednesday , vascular surgery clearance      Assessment / Plan:    Critical limb ischemia- L foot POA   Follows with Vascular Surgery, Dr. Lorenzo - notes plan for L LE bypass as outpatient   - Here with worsening L leg swelling, pain, redness recently  - CTA runoff showing occluded left superficial femoral artery, distal vessels difficult to evaluation, probably reconstitution at level of popliteal artery    IP Vascular Surgery consulted  Plan for left tibial bypass with vein on Wednesday noted  Hold clopidogrel for now  Agree with heparin drip for now- PTT therapeutic and Hb stable   Float heel at all times   Encourage I-S and OOB to chair in the interim.   PO diet for now, NPO p MN tonight into tomorrow  Cont to follow anti-Xa  while on Hep drip     L foot cellulitis with Toe necrosis POA  -WBC 23.5->19.9->16.8 today  -Color change to LLE however no madison erythema, leg is cold   - Blood cultures sent- neg > 24 hrs  - crp elevated as expected 17.7  CXR - severe Bullous Emphysema noted    Continue IV Vanc and Zosyn for now - empiric IV Abx  Blood Cx neg till now  Afebrile      Chronic hypoxic respiratory failure POA- cont oxygen as at home  Hx of severe COPD  - Continue home inhalers  - Lung sounds coarse  - Continue PRN duonebs as needed for wheezing      Active Smoking POA  - Nicotine patch  - Needs to quit, discussed with him  at length        Medical Decision Making:   I personally reviewed labs: Y  I personally reviewed imaging: Y  I personally reviewed EKG: N  Toxic drug monitoring: Y     Discussed case with: Pt, Rn, CM on IDRs        Code Status: Full Code   DVT Prophylaxis: Heparin gtt          Subjective:     Chief Complaint / Reason for Physician  electronic medical records for all procedures/Xrays and details which were not copied into this note but were reviewed prior to creation of Plan.      LABS:  I reviewed today's most current labs and imaging studies.  Pertinent labs include:  Recent Labs     01/26/25 1650 01/27/25 0555 01/28/25  0449   WBC 23.5* 19.9* 16.8*   HGB 11.5* 10.2* 9.7*   HCT 35.4* 31.4* 31.4*    335 311     Recent Labs     01/26/25 1650 01/26/25  1700 01/26/25 1953 01/27/25 0555 01/28/25  0449     --   --  139 139   K 3.6  --   --  3.7 3.7     --   --  105 107   CO2 30  --   --  29 30   GLUCOSE 122*  --   --  103* 110*   BUN 35*  --   --  28* 43*   CREATININE 0.98 0.7  --  0.71 1.00   CALCIUM 9.9  --   --  8.8 8.7   BILITOT 0.8  --   --  0.8 0.4   AST 46*  --   --  37 25   ALT 19  --   --  16 16   INR  --   --  1.2*  --   --        Signed: Hossein Valencia MD

## 2025-01-29 ENCOUNTER — ANESTHESIA (OUTPATIENT)
Facility: HOSPITAL | Age: 75
End: 2025-01-29
Payer: MEDICARE

## 2025-01-29 LAB
ALBUMIN SERPL-MCNC: 2.4 G/DL (ref 3.5–5)
ALBUMIN/GLOB SERPL: 0.6 (ref 1.1–2.2)
ALP SERPL-CCNC: 95 U/L (ref 45–117)
ALT SERPL-CCNC: 18 U/L (ref 12–78)
ANION GAP SERPL CALC-SCNC: 0 MMOL/L (ref 2–12)
APTT PPP: 33.6 SEC (ref 22.1–31)
AST SERPL-CCNC: 36 U/L (ref 15–37)
BASOPHILS # BLD: 0.07 K/UL (ref 0–0.1)
BASOPHILS NFR BLD: 0.5 % (ref 0–1)
BILIRUB SERPL-MCNC: 0.4 MG/DL (ref 0.2–1)
BUN SERPL-MCNC: 32 MG/DL (ref 6–20)
BUN/CREAT SERPL: 41 (ref 12–20)
CALCIUM SERPL-MCNC: 8.8 MG/DL (ref 8.5–10.1)
CHLORIDE SERPL-SCNC: 107 MMOL/L (ref 97–108)
CO2 SERPL-SCNC: 31 MMOL/L (ref 21–32)
CREAT SERPL-MCNC: 0.78 MG/DL (ref 0.7–1.3)
DIFFERENTIAL METHOD BLD: ABNORMAL
EOSINOPHIL # BLD: 0.57 K/UL (ref 0–0.4)
EOSINOPHIL NFR BLD: 4 % (ref 0–7)
ERYTHROCYTE [DISTWIDTH] IN BLOOD BY AUTOMATED COUNT: 18.2 % (ref 11.5–14.5)
FIBRINOGEN PPP-MCNC: 435 MG/DL (ref 200–475)
GLOBULIN SER CALC-MCNC: 4.3 G/DL (ref 2–4)
GLUCOSE SERPL-MCNC: 84 MG/DL (ref 65–100)
HCT VFR BLD AUTO: 32.6 % (ref 36.6–50.3)
HGB BLD-MCNC: 10.1 G/DL (ref 12.1–17)
IMM GRANULOCYTES # BLD AUTO: 0.1 K/UL (ref 0–0.04)
IMM GRANULOCYTES NFR BLD AUTO: 0.7 % (ref 0–0.5)
INR PPP: 1.1 (ref 0.9–1.1)
LYMPHOCYTES # BLD: 1.05 K/UL (ref 0.8–3.5)
LYMPHOCYTES NFR BLD: 7.3 % (ref 12–49)
MCH RBC QN AUTO: 23.7 PG (ref 26–34)
MCHC RBC AUTO-ENTMCNC: 31 G/DL (ref 30–36.5)
MCV RBC AUTO: 76.5 FL (ref 80–99)
MONOCYTES # BLD: 1.16 K/UL (ref 0–1)
MONOCYTES NFR BLD: 8.1 % (ref 5–13)
NEUTS SEG # BLD: 11.4 K/UL (ref 1.8–8)
NEUTS SEG NFR BLD: 79.4 % (ref 32–75)
NRBC # BLD: 0 K/UL (ref 0–0.01)
NRBC BLD-RTO: 0 PER 100 WBC
PLATELET # BLD AUTO: 328 K/UL (ref 150–400)
PMV BLD AUTO: 10.2 FL (ref 8.9–12.9)
POTASSIUM SERPL-SCNC: 4.3 MMOL/L (ref 3.5–5.1)
PROT SERPL-MCNC: 6.7 G/DL (ref 6.4–8.2)
PROTHROMBIN TIME: 11.4 SEC (ref 9.2–11.2)
RBC # BLD AUTO: 4.26 M/UL (ref 4.1–5.7)
SODIUM SERPL-SCNC: 138 MMOL/L (ref 136–145)
THERAPEUTIC RANGE: ABNORMAL SECS (ref 58–77)
WBC # BLD AUTO: 14.4 K/UL (ref 4.1–11.1)

## 2025-01-29 PROCEDURE — 6360000002 HC RX W HCPCS: Performed by: SURGERY

## 2025-01-29 PROCEDURE — 6360000002 HC RX W HCPCS: Performed by: ANESTHESIOLOGY

## 2025-01-29 PROCEDURE — 2709999900 HC NON-CHARGEABLE SUPPLY: Performed by: SURGERY

## 2025-01-29 PROCEDURE — 94640 AIRWAY INHALATION TREATMENT: CPT

## 2025-01-29 PROCEDURE — 6370000000 HC RX 637 (ALT 250 FOR IP): Performed by: SURGERY

## 2025-01-29 PROCEDURE — 2700000000 HC OXYGEN THERAPY PER DAY

## 2025-01-29 PROCEDURE — P9045 ALBUMIN (HUMAN), 5%, 250 ML: HCPCS | Performed by: NURSE ANESTHETIST, CERTIFIED REGISTERED

## 2025-01-29 PROCEDURE — 3600000002 HC SURGERY LEVEL 2 BASE: Performed by: SURGERY

## 2025-01-29 PROCEDURE — 041L09N BYPASS LEFT FEMORAL ARTERY TO POSTERIOR TIBIAL ARTERY WITH AUTOLOGOUS VENOUS TISSUE, OPEN APPROACH: ICD-10-PCS | Performed by: SURGERY

## 2025-01-29 PROCEDURE — 3700000001 HC ADD 15 MINUTES (ANESTHESIA): Performed by: SURGERY

## 2025-01-29 PROCEDURE — 85610 PROTHROMBIN TIME: CPT

## 2025-01-29 PROCEDURE — 2580000003 HC RX 258: Performed by: STUDENT IN AN ORGANIZED HEALTH CARE EDUCATION/TRAINING PROGRAM

## 2025-01-29 PROCEDURE — 3700000000 HC ANESTHESIA ATTENDED CARE: Performed by: SURGERY

## 2025-01-29 PROCEDURE — 85384 FIBRINOGEN ACTIVITY: CPT

## 2025-01-29 PROCEDURE — 80053 COMPREHEN METABOLIC PANEL: CPT

## 2025-01-29 PROCEDURE — 36415 COLL VENOUS BLD VENIPUNCTURE: CPT

## 2025-01-29 PROCEDURE — 62325 NJX INTERLAMINAR CRV/THRC: CPT | Performed by: ANESTHESIOLOGY

## 2025-01-29 PROCEDURE — 85730 THROMBOPLASTIN TIME PARTIAL: CPT

## 2025-01-29 PROCEDURE — 2580000003 HC RX 258: Performed by: SURGERY

## 2025-01-29 PROCEDURE — 3600000012 HC SURGERY LEVEL 2 ADDTL 15MIN: Performed by: SURGERY

## 2025-01-29 PROCEDURE — 2060000000 HC ICU INTERMEDIATE R&B

## 2025-01-29 PROCEDURE — 6360000002 HC RX W HCPCS: Performed by: NURSE ANESTHETIST, CERTIFIED REGISTERED

## 2025-01-29 PROCEDURE — 2580000003 HC RX 258: Performed by: ANESTHESIOLOGY

## 2025-01-29 PROCEDURE — 7100000001 HC PACU RECOVERY - ADDTL 15 MIN: Performed by: SURGERY

## 2025-01-29 PROCEDURE — 2720000010 HC SURG SUPPLY STERILE: Performed by: SURGERY

## 2025-01-29 PROCEDURE — 7100000000 HC PACU RECOVERY - FIRST 15 MIN: Performed by: SURGERY

## 2025-01-29 PROCEDURE — 6370000000 HC RX 637 (ALT 250 FOR IP): Performed by: ANESTHESIOLOGY

## 2025-01-29 PROCEDURE — 6360000002 HC RX W HCPCS: Performed by: STUDENT IN AN ORGANIZED HEALTH CARE EDUCATION/TRAINING PROGRAM

## 2025-01-29 PROCEDURE — 6370000000 HC RX 637 (ALT 250 FOR IP): Performed by: NURSE PRACTITIONER

## 2025-01-29 PROCEDURE — 2500000003 HC RX 250 WO HCPCS: Performed by: SURGERY

## 2025-01-29 PROCEDURE — 6360000002 HC RX W HCPCS: Performed by: INTERNAL MEDICINE

## 2025-01-29 PROCEDURE — 2580000003 HC RX 258: Performed by: NURSE ANESTHETIST, CERTIFIED REGISTERED

## 2025-01-29 PROCEDURE — 85025 COMPLETE CBC W/AUTO DIFF WBC: CPT

## 2025-01-29 PROCEDURE — 2500000003 HC RX 250 WO HCPCS: Performed by: ANESTHESIOLOGY

## 2025-01-29 RX ORDER — SODIUM CHLORIDE 9 MG/ML
INJECTION, SOLUTION INTRAVENOUS PRN
Status: DISCONTINUED | OUTPATIENT
Start: 2025-01-29 | End: 2025-02-05 | Stop reason: HOSPADM

## 2025-01-29 RX ORDER — PHENYLEPHRINE HCL IN 0.9% NACL 0.4MG/10ML
SYRINGE (ML) INTRAVENOUS
Status: DISCONTINUED | OUTPATIENT
Start: 2025-01-29 | End: 2025-01-29

## 2025-01-29 RX ORDER — ARFORMOTEROL TARTRATE 15 UG/2ML
15 SOLUTION RESPIRATORY (INHALATION)
Status: DISCONTINUED | OUTPATIENT
Start: 2025-01-29 | End: 2025-01-31

## 2025-01-29 RX ORDER — SODIUM CHLORIDE 0.9 % (FLUSH) 0.9 %
5-40 SYRINGE (ML) INJECTION PRN
Status: DISCONTINUED | OUTPATIENT
Start: 2025-01-29 | End: 2025-02-05 | Stop reason: HOSPADM

## 2025-01-29 RX ORDER — PROPOFOL 10 MG/ML
INJECTION, EMULSION INTRAVENOUS
Status: DISCONTINUED | OUTPATIENT
Start: 2025-01-29 | End: 2025-01-29 | Stop reason: SDUPTHER

## 2025-01-29 RX ORDER — SODIUM CHLORIDE 9 MG/ML
INJECTION, SOLUTION INTRAVENOUS PRN
Status: DISCONTINUED | OUTPATIENT
Start: 2025-01-29 | End: 2025-01-29

## 2025-01-29 RX ORDER — ALBUMIN HUMAN 50 G/1000ML
SOLUTION INTRAVENOUS
Status: DISCONTINUED | OUTPATIENT
Start: 2025-01-29 | End: 2025-01-29 | Stop reason: SDUPTHER

## 2025-01-29 RX ORDER — HEPARIN SODIUM 5000 [USP'U]/ML
INJECTION, SOLUTION INTRAVENOUS; SUBCUTANEOUS
Status: DISCONTINUED | OUTPATIENT
Start: 2025-01-29 | End: 2025-01-29 | Stop reason: SDUPTHER

## 2025-01-29 RX ORDER — SODIUM CHLORIDE 0.9 % (FLUSH) 0.9 %
5-40 SYRINGE (ML) INJECTION EVERY 12 HOURS SCHEDULED
Status: DISCONTINUED | OUTPATIENT
Start: 2025-01-29 | End: 2025-01-29

## 2025-01-29 RX ORDER — SODIUM CHLORIDE, SODIUM LACTATE, POTASSIUM CHLORIDE, CALCIUM CHLORIDE 600; 310; 30; 20 MG/100ML; MG/100ML; MG/100ML; MG/100ML
INJECTION, SOLUTION INTRAVENOUS CONTINUOUS
Status: DISCONTINUED | OUTPATIENT
Start: 2025-01-29 | End: 2025-01-29

## 2025-01-29 RX ORDER — SODIUM CHLORIDE 0.9 % (FLUSH) 0.9 %
5-40 SYRINGE (ML) INJECTION PRN
Status: DISCONTINUED | OUTPATIENT
Start: 2025-01-29 | End: 2025-01-29

## 2025-01-29 RX ORDER — MIDAZOLAM HYDROCHLORIDE 1 MG/ML
INJECTION, SOLUTION INTRAMUSCULAR; INTRAVENOUS
Status: DISCONTINUED | OUTPATIENT
Start: 2025-01-29 | End: 2025-01-29 | Stop reason: SDUPTHER

## 2025-01-29 RX ORDER — LIDOCAINE HCL/EPINEPHRINE/PF 2%-1:200K
VIAL (ML) INJECTION
Status: DISCONTINUED | OUTPATIENT
Start: 2025-01-29 | End: 2025-01-29 | Stop reason: SDUPTHER

## 2025-01-29 RX ORDER — PROCHLORPERAZINE EDISYLATE 5 MG/ML
5 INJECTION INTRAMUSCULAR; INTRAVENOUS
Status: DISCONTINUED | OUTPATIENT
Start: 2025-01-29 | End: 2025-01-29 | Stop reason: HOSPADM

## 2025-01-29 RX ORDER — NALOXONE HYDROCHLORIDE 0.4 MG/ML
INJECTION, SOLUTION INTRAMUSCULAR; INTRAVENOUS; SUBCUTANEOUS PRN
Status: DISCONTINUED | OUTPATIENT
Start: 2025-01-29 | End: 2025-01-29

## 2025-01-29 RX ORDER — IPRATROPIUM BROMIDE AND ALBUTEROL SULFATE 2.5; .5 MG/3ML; MG/3ML
1 SOLUTION RESPIRATORY (INHALATION) ONCE
Status: COMPLETED | OUTPATIENT
Start: 2025-01-29 | End: 2025-01-29

## 2025-01-29 RX ORDER — FENTANYL CITRATE 50 UG/ML
50 INJECTION, SOLUTION INTRAMUSCULAR; INTRAVENOUS EVERY 5 MIN PRN
Status: DISCONTINUED | OUTPATIENT
Start: 2025-01-29 | End: 2025-01-29 | Stop reason: HOSPADM

## 2025-01-29 RX ORDER — HYDROMORPHONE HYDROCHLORIDE 1 MG/ML
0.25 INJECTION, SOLUTION INTRAMUSCULAR; INTRAVENOUS; SUBCUTANEOUS EVERY 5 MIN PRN
Status: DISCONTINUED | OUTPATIENT
Start: 2025-01-29 | End: 2025-01-29 | Stop reason: HOSPADM

## 2025-01-29 RX ORDER — SODIUM CHLORIDE 0.9 % (FLUSH) 0.9 %
5-40 SYRINGE (ML) INJECTION EVERY 12 HOURS SCHEDULED
Status: DISCONTINUED | OUTPATIENT
Start: 2025-01-29 | End: 2025-02-05 | Stop reason: HOSPADM

## 2025-01-29 RX ORDER — SODIUM CHLORIDE 9 MG/ML
INJECTION, SOLUTION INTRAVENOUS CONTINUOUS
Status: DISCONTINUED | OUTPATIENT
Start: 2025-01-29 | End: 2025-01-30

## 2025-01-29 RX ADMIN — IPRATROPIUM BROMIDE 0.5 MG: 0.5 SOLUTION RESPIRATORY (INHALATION) at 23:55

## 2025-01-29 RX ADMIN — IPRATROPIUM BROMIDE AND ALBUTEROL SULFATE 1 DOSE: .5; 3 SOLUTION RESPIRATORY (INHALATION) at 07:24

## 2025-01-29 RX ADMIN — ACETAMINOPHEN 1000 MG: 500 TABLET ORAL at 14:45

## 2025-01-29 RX ADMIN — LIDOCAINE HYDROCHLORIDE,EPINEPHRINE BITARTRATE 5 ML: 20; .005 INJECTION, SOLUTION EPIDURAL; INFILTRATION; INTRACAUDAL; PERINEURAL at 07:40

## 2025-01-29 RX ADMIN — PROPOFOL 20 MCG/KG/MIN: 10 INJECTION, EMULSION INTRAVENOUS at 08:17

## 2025-01-29 RX ADMIN — LIDOCAINE HYDROCHLORIDE,EPINEPHRINE BITARTRATE 3 ML: 20; .005 INJECTION, SOLUTION EPIDURAL; INFILTRATION; INTRACAUDAL; PERINEURAL at 11:15

## 2025-01-29 RX ADMIN — HEPARIN SODIUM 5000 UNITS: 5000 INJECTION INTRAVENOUS; SUBCUTANEOUS at 09:26

## 2025-01-29 RX ADMIN — LIDOCAINE HYDROCHLORIDE,EPINEPHRINE BITARTRATE 3 ML: 20; .005 INJECTION, SOLUTION EPIDURAL; INFILTRATION; INTRACAUDAL; PERINEURAL at 11:58

## 2025-01-29 RX ADMIN — PIPERACILLIN AND TAZOBACTAM 3375 MG: 3; .375 INJECTION, POWDER, LYOPHILIZED, FOR SOLUTION INTRAVENOUS at 04:30

## 2025-01-29 RX ADMIN — LIDOCAINE HYDROCHLORIDE,EPINEPHRINE BITARTRATE 3 ML: 20; .005 INJECTION, SOLUTION EPIDURAL; INFILTRATION; INTRACAUDAL; PERINEURAL at 10:27

## 2025-01-29 RX ADMIN — SODIUM CHLORIDE, PRESERVATIVE FREE 10 ML: 5 INJECTION INTRAVENOUS at 21:03

## 2025-01-29 RX ADMIN — SODIUM CHLORIDE 50 ML/HR: 9 INJECTION, SOLUTION INTRAVENOUS at 11:49

## 2025-01-29 RX ADMIN — LIDOCAINE HYDROCHLORIDE,EPINEPHRINE BITARTRATE 3 ML: 20; .005 INJECTION, SOLUTION EPIDURAL; INFILTRATION; INTRACAUDAL; PERINEURAL at 08:31

## 2025-01-29 RX ADMIN — HYDROMORPHONE HYDROCHLORIDE 1 MG: 1 INJECTION, SOLUTION INTRAMUSCULAR; INTRAVENOUS; SUBCUTANEOUS at 16:12

## 2025-01-29 RX ADMIN — BUPIVACAINE HYDROCHLORIDE 100 ML: 5 INJECTION, SOLUTION EPIDURAL; INTRACAUDAL; PERINEURAL at 23:08

## 2025-01-29 RX ADMIN — HYDROMORPHONE HYDROCHLORIDE 1 MG: 1 INJECTION, SOLUTION INTRAMUSCULAR; INTRAVENOUS; SUBCUTANEOUS at 04:26

## 2025-01-29 RX ADMIN — LIDOCAINE HYDROCHLORIDE,EPINEPHRINE BITARTRATE 3 ML: 20; .005 INJECTION, SOLUTION EPIDURAL; INFILTRATION; INTRACAUDAL; PERINEURAL at 07:45

## 2025-01-29 RX ADMIN — OXYCODONE 5 MG: 5 TABLET ORAL at 00:46

## 2025-01-29 RX ADMIN — BUPIVACAINE HYDROCHLORIDE 100 ML: 5 INJECTION, SOLUTION EPIDURAL; INTRACAUDAL; PERINEURAL at 12:45

## 2025-01-29 RX ADMIN — PHENYLEPHRINE HYDROCHLORIDE 40 MCG/MIN: 10 INJECTION INTRAVENOUS at 08:48

## 2025-01-29 RX ADMIN — CITALOPRAM HYDROBROMIDE 10 MG: 20 TABLET ORAL at 14:45

## 2025-01-29 RX ADMIN — SODIUM CHLORIDE, POTASSIUM CHLORIDE, SODIUM LACTATE AND CALCIUM CHLORIDE: 600; 310; 30; 20 INJECTION, SOLUTION INTRAVENOUS at 06:42

## 2025-01-29 RX ADMIN — LIDOCAINE HYDROCHLORIDE,EPINEPHRINE BITARTRATE 3 ML: 20; .005 INJECTION, SOLUTION EPIDURAL; INFILTRATION; INTRACAUDAL; PERINEURAL at 09:28

## 2025-01-29 RX ADMIN — ACETAMINOPHEN 1000 MG: 500 TABLET ORAL at 21:02

## 2025-01-29 RX ADMIN — ARFORMOTEROL TARTRATE 15 MCG: 15 SOLUTION RESPIRATORY (INHALATION) at 23:55

## 2025-01-29 RX ADMIN — LIDOCAINE HYDROCHLORIDE,EPINEPHRINE BITARTRATE 5 ML: 20; .005 INJECTION, SOLUTION EPIDURAL; INFILTRATION; INTRACAUDAL; PERINEURAL at 07:42

## 2025-01-29 RX ADMIN — VANCOMYCIN HYDROCHLORIDE 750 MG: 750 INJECTION, POWDER, LYOPHILIZED, FOR SOLUTION INTRAVENOUS at 09:19

## 2025-01-29 RX ADMIN — ALBUMIN (HUMAN) 250 ML: 12.5 INJECTION, SOLUTION INTRAVENOUS at 08:38

## 2025-01-29 RX ADMIN — MIDAZOLAM HYDROCHLORIDE 0.5 MG: 1 INJECTION, SOLUTION INTRAMUSCULAR; INTRAVENOUS at 08:31

## 2025-01-29 ASSESSMENT — PAIN SCALES - GENERAL
PAINLEVEL_OUTOF10: 8
PAINLEVEL_OUTOF10: 9
PAINLEVEL_OUTOF10: 4
PAINLEVEL_OUTOF10: 8
PAINLEVEL_OUTOF10: 9
PAINLEVEL_OUTOF10: 8
PAINLEVEL_OUTOF10: 2
PAINLEVEL_OUTOF10: 3
PAINLEVEL_OUTOF10: 4

## 2025-01-29 ASSESSMENT — PAIN DESCRIPTION - LOCATION
LOCATION: FOOT
LOCATION: LEG
LOCATION: FOOT
LOCATION: FOOT
LOCATION: LEG
LOCATION: LEG

## 2025-01-29 ASSESSMENT — PAIN DESCRIPTION - DESCRIPTORS
DESCRIPTORS: BURNING
DESCRIPTORS: BURNING
DESCRIPTORS: ACHING
DESCRIPTORS: THROBBING
DESCRIPTORS: ACHING
DESCRIPTORS: ACHING

## 2025-01-29 ASSESSMENT — PAIN - FUNCTIONAL ASSESSMENT
PAIN_FUNCTIONAL_ASSESSMENT: ACTIVITIES ARE NOT PREVENTED

## 2025-01-29 ASSESSMENT — PAIN DESCRIPTION - ORIENTATION
ORIENTATION: LEFT

## 2025-01-29 ASSESSMENT — COPD QUESTIONNAIRES: CAT_SEVERITY: SEVERE

## 2025-01-29 NOTE — PERIOP NOTE
TRANSFER - IN REPORT:    Verbal report received from Giuliana PETERSON on Nasir Hatfield  being received from Ortho room 119 for ordered procedure      Report consisted of patient's Situation, Background, Assessment and   Recommendations(SBAR).     Information from the following report(s) Nurse Handoff Report, Intake/Output, MAR, Recent Results, Med Rec Status, Cardiac Rhythm NSR with PACs/PVCs, and Procedure Verification was reviewed with the receiving nurse.    Opportunity for questions and clarification was provided.      Assessment completed upon patient's arrival to unit and care assumed.

## 2025-01-29 NOTE — OP NOTE
[facility]              [address] [place]  [address]                            OPERATIVE REPORT      PATIENT NAME: ANGEL OAKES               : 1950  MED REC NO: [id number]                       ROOM: OR  ACCOUNT NO: [id number]                       ADMIT DATE: 2025  PROVIDER: Harris Lorenzo MD    DATE OF SERVICE:  2025    PREOPERATIVE DIAGNOSES:  Critical limb threatening ischemia on the left.    POSTOPERATIVE DIAGNOSES:  Critical limb threatening ischemia on the left.    PROCEDURES PERFORMED:  Left common femoral to posterior tibial bypass with ipsilateral non-reversed greater saphenous vein.    SURGEON:  Harris Lorenzo MD    ANESTHESIA:  Epidural.    ESTIMATED BLOOD LOSS:  Less than 100 mL.    COMPLICATIONS:  None.    INDICATIONS:  The patient is a 74-year-old gentleman with multiple attempts of percutaneous revascularization of the left leg, all of which have failed.  He has now developed worsening left-sided rest pain and early tissue loss.  He has good quality of the lateral saphenous vein and has been advised a tibial bypass.    DESCRIPTION OF PROCEDURE:  After obtaining informed consent, the patient was placed supine on the operating room table.  After adequate induction of general anesthesia, signing the patient's confirmation, and confirmation of ongoing antibiotics, the left lower abdomen, groin, leg and foot were all prepped and draped in a sterile fashion.  Incisions were initially made on the medial aspect of the leg below the knee over the visible greater saphenous vein.  The vein was dissected free, controlled, and protected posteriorly.  The dissection was deepened until the posterior tibial artery was identified, dissected free, and controlled with silastic vessel loops.  The saphenous vein was then exposed to discontinuous incisions in the thigh, leaving a skin bridge both at the knee and in the mid thigh.  The

## 2025-01-29 NOTE — PROGRESS NOTES
Patient arrived to IVCU from PACU. Patient with known  COPD and oxygenation issues. On 5Liters nasal cannula with sats in the upper 80's. Left leg incision dressing with breakthrough bleeding. Dressing reinforced by PACU. Epidural with Bupivicain for pain control. Patient on bedrest.     1800: Dr. Lorenzo at bedside, pulses dopplered with MD. Aware of breakthrough bleeding and oxygenation issues. Per Susie Underwood to change or reinforce dressing     1830: upper left leg dressing changed. Staples with ABD and gauze.

## 2025-01-29 NOTE — ANESTHESIA POSTPROCEDURE EVALUATION
Department of Anesthesiology  Postprocedure Note    Patient: Nasir Hatfield  MRN: [id number]  YOB: 1950  Date of evaluation: 1/29/2025    Procedure Summary       Date: 01/29/25 Room / Location: [facility] M[facility] / [facility] OR    Anesthesia Start: 0759 Anesthesia Stop: 1119    Procedure: LEFT LOWER EXTREMITY TIBIAL BYPASS WITH VEIN (Leg Lower) Diagnosis:       Atherosclerosis of native artery of left lower extremity with rest pain (HCC)      (Atherosclerosis of native artery of left lower extremity with rest pain (HCC) [I70.222])    Providers: Harris Lorenzo MD Responsible Provider: Tony Avelar MD    Anesthesia Type: MAC, epidural ASA Status: 3            Anesthesia Type: No value filed.    Carmel Phase I: Carmel Score: 8    Carmel Phase II:      Anesthesia Post Evaluation    Patient location during evaluation: PACU  Patient participation: complete - patient participated  Level of consciousness: sleepy but conscious and responsive to verbal stimuli  Airway patency: patent  Nausea & Vomiting: no vomiting and no nausea  Cardiovascular status: blood pressure returned to baseline and hemodynamically stable  Respiratory status: acceptable  Hydration status: stable  Multimodal analgesia pain management approach  Pain management: adequate    No notable events documented.

## 2025-01-29 NOTE — BRIEF OP NOTE
Brief Postoperative Note      Patient: Nasir Hatfield  YOB: 1950  MRN: [id number]    Date of Procedure: 1/29/2025    Pre-Op Diagnosis: CLTI LLE    Post-Op Diagnosis: Same     Proc: Left CFA=>PT bypass w/ipsilateral NRGSV    Surgeon(s):  Harris Lorenzo MD    Assistant:  Surgical Assistant: Abiodun Brand    Anesthesia: Epidural    Estimated Blood Loss (mL): less than 100     Complications: None    Findings: Good conduit. Smallish PT but patent to foot/ankle.    Electronically signed by Harris Lorenzo MD on 1/29/2025 at 10:57 AM

## 2025-01-29 NOTE — PROGRESS NOTES
Hospitalist Progress Note    NAME:   Nasir Hatfield   : 1950   MRN: [id number]     Date/Time: 2025 1:29 PM  Patient PCP: Annie Mcnair APRN - NP    Estimated discharge date: ? , HH ?versus Rehab TBD  Barriers: OR- L LE bypass Sx todaay , vascular surgery clearance      Assessment / Plan:    Critical limb ischemia- L foot POA   Follows with Vascular Surgery, Dr. Lorenzo - notes plan for L LE bypass as outpatient   - Here with worsening L leg swelling, pain, redness recently  - CTA runoff showing occluded left superficial femoral artery, distal vessels difficult to evaluation, probably reconstitution at level of popliteal artery    IP Vascular Surgery consulted  Plan for left tibial bypass with vein on Wednesday noted  Hold clopidogrel for now  Agree with heparin drip for now- PTT therapeutic and Hb stable   Float heel at all times   Encourage I-S and OOB to chair in the interim.   PO diet for now, NPO p MN tfor OR today  Cont to follow anti-Xa  while on Hep drip     L foot cellulitis with Toe necrosis POA  -WBC 23.5->19.9->16.8->14.4 today  -Color change to LLE however no madison erythema, leg is cold   - Blood cultures sent- neg > 24 hrs  - crp elevated as expected 17.7  CXR - severe Bullous Emphysema noted    Continue IV Vanc and Zosyn for now - empiric IV Abx  Blood Cx neg x 2 days now  Remains Afebrile      Chronic hypoxic respiratory failure POA- cont oxygen as at home  Hx of severe COPD  - Continue home inhalers  - Lung sounds coarse  - Continue PRN duonebs as needed for wheezing      Active Smoking POA  - Nicotine patch  - Needs to quit, discussed with him  at length        Medical Decision Making:   I personally reviewed labs: Y  I personally reviewed imaging: Y  I personally reviewed EKG: N  Toxic drug monitoring: Y     Discussed case with: Pt, Rn, CM on IDRs        Code Status: Full Code   DVT Prophylaxis: Heparin gtt          Subjective:     Chief Complaint / Reason for Physician  Visit:  F/u for L foot resting pain due to critical limb ischemia, PAD, smoker, COPD  \"I am in pain\".  Discussed with RN events overnight.     Pt scheduled for left tibial bypass with vein today AM  NPO p MN- ready for OR  Cont to be on heparin drip and IV abx  WBC trending down, blood cx remains neg till now.       Objective:     VITALS:   Last 24hrs VS reviewed since prior progress note. Most recent are:  Patient Vitals for the past 24 hrs:   BP Temp Temp src Pulse Resp SpO2   01/29/25 1315 124/68 -- -- 85 16 100 %   01/29/25 1300 (!) 156/88 -- -- 84 17 94 %   01/29/25 1255 (!) 143/85 -- -- 92 16 (!) 88 %   01/29/25 1245 116/61 -- -- 89 16 93 %   01/29/25 1230 (!) 182/76 -- -- 93 19 90 %   01/29/25 1215 (!) 140/99 -- -- 87 16 91 %   01/29/25 1205 (!) 185/96 -- -- 91 20 93 %   01/29/25 1200 (!) 176/82 -- -- 99 19 (!) 85 %   01/29/25 1155 (!) 161/83 -- -- 93 18 94 %   01/29/25 1140 (!) 155/85 -- -- 87 16 97 %   01/29/25 1135 (!) 143/86 -- -- 79 16 99 %   01/29/25 1130 (!) 158/81 -- -- 80 19 99 %   01/29/25 1125 (!) 145/93 -- -- 83 15 98 %   01/29/25 1120 (!) 151/99 -- -- 87 16 96 %   01/29/25 1116 (!) 159/99 -- -- 81 -- 93 %   01/29/25 1115 (!) 159/99 -- -- 81 20 (!) 89 %   01/29/25 1110 (!) 114/93 -- -- 69 14 93 %   01/29/25 1105 (!) 158/87 -- -- 73 17 --   01/29/25 1104 (!) 152/93 -- -- 76 16 93 %   01/29/25 0755 128/60 -- -- -- -- 92 %   01/29/25 0750 (!) 144/70 -- -- -- -- 92 %   01/29/25 0740 (!) 169/90 -- -- -- -- --   01/29/25 0735 (!) 151/70 -- -- -- -- --   01/29/25 0730 (!) 150/95 -- -- -- -- --   01/29/25 0632 (!) 141/66 98.7 °F (37.1 °C) Oral 84 16 95 %   01/29/25 0415 129/68 97.6 °F (36.4 °C) Oral 66 20 95 %   01/29/25 0042 124/78 98.1 °F (36.7 °C) Oral 74 16 96 %   01/28/25 2035 126/67 98.8 °F (37.1 °C) Oral (!) 107 19 94 %   01/28/25 1937 -- -- -- 59 16 95 %         Intake/Output Summary (Last 24 hours) at 1/29/2025 1329  Last data filed at 1/29/2025 1108  Gross per 24 hour   Intake 750 ml   Output

## 2025-01-29 NOTE — PERIOP NOTE
Irrisept Wound Debridement and Cleansing System   Ref: [id number]   GTIN: [id number]   LOT: [id number]   Expiration Date: 09/30/2027

## 2025-01-29 NOTE — ANESTHESIA PROCEDURE NOTES
Epidural Block    Patient location during procedure: holding area  Start time: 1/29/2025 7:33 AM  End time: 1/29/2025 7:40 AM  Reason for block: at surgeon's request  Staffing  Performed: anesthesiologist   Anesthesiologist: Tony Avelar MD  Performed by: Tony Avelar MD  Authorized by: Tony Avelar MD    Epidural  Patient position: sitting  Prep: ChloraPrep  Patient monitoring: continuous pulse ox, frequent blood pressure checks and cardiac monitor  Approach: midline  Location: L3-4  Injection technique: ELISEO air  Provider prep: mask and sterile gloves  Needle  Needle type: Tuohy   Needle gauge: 17 G  Catheter type: multi-orifice  Catheter size: 19 G  Test dose: negativeCatheter Secured: tegaderm and tape  Assessment  Sensory level: T10  Hemodynamics: stable  Attempts: 1  Outcomes: uncomplicated and patient tolerated procedure well  Preanesthetic Checklist  Completed: patient identified, IV checked, site marked, risks and benefits discussed, surgical/procedural consents, equipment checked, pre-op evaluation, timeout performed, anesthesia consent given, oxygen available, monitors applied/VS acknowledged, fire risk safety assessment completed and verbalized and blood product R/B/A discussed and consented

## 2025-01-30 PROCEDURE — 6370000000 HC RX 637 (ALT 250 FOR IP): Performed by: SURGERY

## 2025-01-30 PROCEDURE — 94640 AIRWAY INHALATION TREATMENT: CPT

## 2025-01-30 PROCEDURE — 6370000000 HC RX 637 (ALT 250 FOR IP): Performed by: INTERNAL MEDICINE

## 2025-01-30 PROCEDURE — 6360000002 HC RX W HCPCS: Performed by: SURGERY

## 2025-01-30 PROCEDURE — 2060000000 HC ICU INTERMEDIATE R&B

## 2025-01-30 PROCEDURE — 2500000003 HC RX 250 WO HCPCS: Performed by: ANESTHESIOLOGY

## 2025-01-30 PROCEDURE — 6360000002 HC RX W HCPCS: Performed by: ANESTHESIOLOGY

## 2025-01-30 PROCEDURE — 2580000003 HC RX 258: Performed by: ANESTHESIOLOGY

## 2025-01-30 PROCEDURE — 2580000003 HC RX 258: Performed by: SURGERY

## 2025-01-30 PROCEDURE — 2700000000 HC OXYGEN THERAPY PER DAY

## 2025-01-30 RX ORDER — CASTOR OIL AND BALSAM, PERU 788; 87 MG/G; MG/G
OINTMENT TOPICAL 2 TIMES DAILY
Status: DISCONTINUED | OUTPATIENT
Start: 2025-01-30 | End: 2025-02-15 | Stop reason: HOSPADM

## 2025-01-30 RX ORDER — ENOXAPARIN SODIUM 100 MG/ML
40 INJECTION SUBCUTANEOUS DAILY
Status: DISCONTINUED | OUTPATIENT
Start: 2025-01-30 | End: 2025-01-31

## 2025-01-30 RX ORDER — ASPIRIN 81 MG/1
81 TABLET ORAL DAILY
Status: DISCONTINUED | OUTPATIENT
Start: 2025-01-30 | End: 2025-02-15 | Stop reason: HOSPADM

## 2025-01-30 RX ADMIN — CITALOPRAM HYDROBROMIDE 10 MG: 20 TABLET ORAL at 09:54

## 2025-01-30 RX ADMIN — ENOXAPARIN SODIUM 40 MG: 100 INJECTION SUBCUTANEOUS at 09:54

## 2025-01-30 RX ADMIN — Medication: at 09:55

## 2025-01-30 RX ADMIN — SODIUM CHLORIDE: 9 INJECTION, SOLUTION INTRAVENOUS at 09:01

## 2025-01-30 RX ADMIN — ACETAMINOPHEN 1000 MG: 500 TABLET ORAL at 14:36

## 2025-01-30 RX ADMIN — ARFORMOTEROL TARTRATE 15 MCG: 15 SOLUTION RESPIRATORY (INHALATION) at 12:29

## 2025-01-30 RX ADMIN — OXYCODONE 10 MG: 5 TABLET ORAL at 10:27

## 2025-01-30 RX ADMIN — BUPIVACAINE HYDROCHLORIDE 100 ML: 5 INJECTION, SOLUTION EPIDURAL; INTRACAUDAL; PERINEURAL at 12:08

## 2025-01-30 RX ADMIN — Medication: at 20:45

## 2025-01-30 RX ADMIN — SODIUM CHLORIDE, PRESERVATIVE FREE 10 ML: 5 INJECTION INTRAVENOUS at 20:46

## 2025-01-30 RX ADMIN — ASPIRIN 81 MG: 81 TABLET, COATED ORAL at 09:54

## 2025-01-30 RX ADMIN — ACETAMINOPHEN 1000 MG: 500 TABLET ORAL at 05:53

## 2025-01-30 RX ADMIN — OXYCODONE 10 MG: 5 TABLET ORAL at 01:19

## 2025-01-30 RX ADMIN — OXYCODONE 10 MG: 5 TABLET ORAL at 14:36

## 2025-01-30 RX ADMIN — ARFORMOTEROL TARTRATE 15 MCG: 15 SOLUTION RESPIRATORY (INHALATION) at 19:14

## 2025-01-30 RX ADMIN — IPRATROPIUM BROMIDE 0.5 MG: 0.5 SOLUTION RESPIRATORY (INHALATION) at 12:29

## 2025-01-30 RX ADMIN — SODIUM CHLORIDE, PRESERVATIVE FREE 10 ML: 5 INJECTION INTRAVENOUS at 09:55

## 2025-01-30 RX ADMIN — HYDROMORPHONE HYDROCHLORIDE 1 MG: 1 INJECTION, SOLUTION INTRAMUSCULAR; INTRAVENOUS; SUBCUTANEOUS at 12:18

## 2025-01-30 RX ADMIN — BUPIVACAINE HYDROCHLORIDE 100 ML: 5 INJECTION, SOLUTION EPIDURAL; INTRACAUDAL; PERINEURAL at 23:40

## 2025-01-30 RX ADMIN — OXYCODONE 10 MG: 5 TABLET ORAL at 21:56

## 2025-01-30 RX ADMIN — IPRATROPIUM BROMIDE 0.5 MG: 0.5 SOLUTION RESPIRATORY (INHALATION) at 19:15

## 2025-01-30 RX ADMIN — ACETAMINOPHEN 1000 MG: 500 TABLET ORAL at 20:45

## 2025-01-30 ASSESSMENT — PAIN SCALES - GENERAL
PAINLEVEL_OUTOF10: 8
PAINLEVEL_OUTOF10: 8
PAINLEVEL_OUTOF10: 3
PAINLEVEL_OUTOF10: 9
PAINLEVEL_OUTOF10: 8
PAINLEVEL_OUTOF10: 3
PAINLEVEL_OUTOF10: 8
PAINLEVEL_OUTOF10: 9
PAINLEVEL_OUTOF10: 7
PAINLEVEL_OUTOF10: 8

## 2025-01-30 ASSESSMENT — PAIN DESCRIPTION - DESCRIPTORS
DESCRIPTORS: THROBBING
DESCRIPTORS: THROBBING
DESCRIPTORS: ACHING
DESCRIPTORS: ACHING
DESCRIPTORS: THROBBING
DESCRIPTORS: ACHING;SORE
DESCRIPTORS: ACHING;SORE
DESCRIPTORS: THROBBING
DESCRIPTORS: THROBBING
DESCRIPTORS: ACHING

## 2025-01-30 ASSESSMENT — PAIN DESCRIPTION - ORIENTATION
ORIENTATION: LEFT

## 2025-01-30 ASSESSMENT — PAIN - FUNCTIONAL ASSESSMENT

## 2025-01-30 ASSESSMENT — PAIN DESCRIPTION - LOCATION
LOCATION: LEG
LOCATION: INCISION;LEG
LOCATION: LEG
LOCATION: INCISION
LOCATION: LEG

## 2025-01-30 ASSESSMENT — PAIN DESCRIPTION - PAIN TYPE
TYPE: SURGICAL PAIN
TYPE: SURGICAL PAIN

## 2025-01-30 ASSESSMENT — PAIN DESCRIPTION - FREQUENCY
FREQUENCY: CONTINUOUS
FREQUENCY: INTERMITTENT

## 2025-01-30 ASSESSMENT — PAIN DESCRIPTION - ONSET
ONSET: ON-GOING
ONSET: GRADUAL

## 2025-01-30 ASSESSMENT — PAIN DESCRIPTION - DIRECTION: RADIATING_TOWARDS: LEFT

## 2025-01-30 NOTE — PLAN OF CARE
Predictive Model Details          46 (Caution)  Factor Value    Calculated 1/30/2025 05:03 26% Age 74 years old    Deterioration Index Model 24% Supplemental oxygen Oxygen     20% Neurological exam X     9% Cardiac rhythm Sinus rhythm with PAC;Sinus rhythm with PVC     7% WBC count abnormal (14.4 K/uL)     3% Potassium 4.3 mmol/L     3% Pulse 95     2% Systolic 128     2% BUN abnormal (32 MG/DL)     2% Sodium 138 mmol/L     1% Respiratory rate 17     1% Hematocrit abnormal (32.6 %)     1% Pulse oximetry 94 %     0% Temperature 98.7 °F (37.1 °C)    End of Shift Note    Bedside shift change report given to AM RN (oncoming nurse) by EDWARD LU RN (offgoing nurse).  Report included the following information SBAR, Kardex, Intake/Output, MAR, Recent Results, and Cardiac Rhythm SR W PAC's    Shift worked:  7p-7a     Shift summary and any significant changes:     Surgical Pain managed w oxycodone 10 mg prn.  Doppler LLE pulses, Foot warm, cap refill greater than 3 sec's.   L upper thigh minor breakthrough serosanguinous drainage.  Has significant blanchable redness on sacrum, pt educated to tq2h and stay off sacral area.     Concerns for physician to address:  Continue epidural?  Hughes? Advance diet?  Dressing change?       Zone phone for oncoming shift:   [phone]       Activity:     Number times ambulated in hallways past shift: 0  Number of times OOB to chair past shift: 0    Cardiac:   Cardiac Monitoring: Yes  SR w PAC's.        Access:  Current line(s): PIV     Genitourinary:   Urinary Status: Hughes    Respiratory:   O2 Device: Nasal cannula  Chronic home O2 use?: YES 3L nc baseline  Incentive spirometer at bedside: YES    GI:  Last BM (including prior to admit): 01/29/25  Current diet:  ADULT DIET; Clear Liquid  Passing flatus: YES    Pain Management:   Patient states pain is manageable on current regimen: YES    Skin:  Alfred Scale Score: 21  Interventions: Wound Offloading (Prevention Methods):  pain management     Problem: Safety - Adult  Goal: Free from fall injury  Outcome: Progressing  Flowsheets (Taken 1/29/2025 2000)  Free From Fall Injury: Instruct family/caregiver on patient safety     Problem: ABCDS Injury Assessment  Goal: Absence of physical injury  Outcome: Progressing  Flowsheets (Taken 1/29/2025 2000)  Absence of Physical Injury: Implement safety measures based on patient assessment     Problem: Nutrition Deficit:  Goal: Optimize nutritional status  Outcome: Progressing     Problem: Discharge Planning  Goal: Discharge to home or other facility with appropriate resources  Outcome: Progressing  Flowsheets (Taken 1/29/2025 1900)  Discharge to home or other facility with appropriate resources:   Identify barriers to discharge with patient and caregiver   Arrange for needed discharge resources and transportation as appropriate   Identify discharge learning needs (meds, wound care, etc)   Refer to discharge planning if patient needs post-hospital services based on physician order or complex needs related to functional status, cognitive ability or social support system

## 2025-01-30 NOTE — PROGRESS NOTES
Comfortable  Sats = 89% on NC  Denies SOB  Leg dry  2+ L PT pulse  Bedrest/booker/epidural today  Lines and tubes out tomorrow

## 2025-01-30 NOTE — CARE COORDINATION
Care Management Initial Assessment       RUR: 18% \"medium risk\"  Readmission? No  1st IM letter given? Yes, given by Patient Access Team on 1/26/25  1st  letter given: N/A    Initial note - 1206 PM: Chart reviewed. CM met with pt at the bedside to introduce self and role. Verified contact information and demographics. Pt resides alone in a multi level home with 3 JM. Pt PCP is DANA Mcnair with the last visit being within the last sx months. Pt has no hx of HH services or a SNF stay. Pt is independent with ADL's and has no DME needs. Pt has 3L O2 through [employer], a cane, and walker at home. Pt is an active . Pt has an ACP on file; pt is a FULL code. Pt family to transport pt home upon time of d/c. Full assessment below:     01/30/25 1206   Service Assessment   Patient Orientation Alert and Oriented;Person;Place;Situation;Self   Cognition Alert   History Provided By Patient   Primary Caregiver Self   Support Systems Children   Patient's Healthcare Decision Maker is: Named in Scanned ACP Document   PCP Verified by CM Yes  (DANA Mcnair)   Last Visit to PCP Within last 6 months   Prior Functional Level Independent in ADLs/IADLs   Current Functional Level Independent in ADLs/IADLs   Can patient return to prior living arrangement Yes   Ability to make needs known: Good   Family able to assist with home care needs: Yes   Would you like for me to discuss the discharge plan with any other family members/significant others, and if so, who? Yes  (Skye Turk (daughter))   Financial Resources Medicare  ([employer] Medicare)   Community Resources None   Social/Functional History   Lives With Alone   Type of Home House   Home Layout Multi-level   Home Access Stairs to enter with rails   Entrance Stairs - Number of Steps 3 JM   Home Equipment Cane;Walker - Rolling;Oxygen   Receives Help From Family   Prior Level of Assist for ADLs Independent   Prior Level of Assist for Homemaking Independent   Ambulation Assistance

## 2025-01-30 NOTE — PLAN OF CARE
Predictive Model Details          61 (Warning)  Factor Value    Calculated 1/30/2025 08:47 19% Age 74 years old    Deterioration Index Model 18% Supplemental oxygen Nasal cannula     15% Neurological exam X     13% Respiratory rate 22     7% Pulse oximetry 89 %     7% Cardiac rhythm Sinus arrythmia;Sinus rhythm with PAC     6% Systolic 163     5% WBC count abnormal (14.4 K/uL)     3% Pulse 100     3% Potassium 4.3 mmol/L     2% BUN abnormal (32 MG/DL)     1% Sodium 138 mmol/L     1% Hematocrit abnormal (32.6 %)     0% Temperature 97.7 °F (36.5 °C)        Problem: Skin/Tissue Integrity  Goal: Skin integrity remains intact  Description: 1.  Monitor for areas of redness and/or skin breakdown  2.  Assess vascular access sites hourly  3.  Every 4-6 hours minimum:  Change oxygen saturation probe site  4.  Every 4-6 hours:  If on nasal continuous positive airway pressure, respiratory therapy assess nares and determine need for appliance change or resting period  1/30/2025 0846 by Cristel Barrera RN  Outcome: Progressing  1/30/2025 0503 by Marce Solis RN  Outcome: Progressing  Flowsheets  Taken 1/30/2025 0406  Skin Integrity Remains Intact: Monitor for areas of redness and/or skin breakdown  Taken 1/29/2025 1900  Skin Integrity Remains Intact: Monitor for areas of redness and/or skin breakdown     Problem: Respiratory - Adult  Goal: Achieves optimal ventilation and oxygenation  1/30/2025 0846 by Cristel Barrera RN  Outcome: Progressing  1/30/2025 0503 by Marce Solis RN  Outcome: Progressing  1/30/2025 0022 by Kenyatta Cristobal, RT  Outcome: Progressing  Flowsheets  Taken 1/29/2025 1900 by Marce Solis RN  Achieves optimal ventilation and oxygenation:   Assess for changes in respiratory status   Assess and instruct to report shortness of breath or any respiratory difficulty   Encourage broncho-pulmonary hygiene including cough, deep breathe, incentive spirometry  Taken 1/29/2025 1415 by  pain scale   Administer analgesics based on type and severity of pain and evaluate response   Implement non-pharmacological measures as appropriate and evaluate response   Consider cultural and social influences on pain and pain management  Taken 1/29/2025 1900  Verbalizes/displays adequate comfort level or baseline comfort level:   Assess pain using appropriate pain scale   Encourage patient to monitor pain and request assistance   Administer analgesics based on type and severity of pain and evaluate response   Implement non-pharmacological measures as appropriate and evaluate response   Consider cultural and social influences on pain and pain management     Problem: Safety - Adult  Goal: Free from fall injury  1/30/2025 0846 by Cristel Barrera RN  Outcome: Progressing  1/30/2025 0503 by Marce Solis RN  Outcome: Progressing  Flowsheets (Taken 1/29/2025 2000)  Free From Fall Injury: Instruct family/caregiver on patient safety     Problem: ABCDS Injury Assessment  Goal: Absence of physical injury  1/30/2025 0846 by Cristel Barrera RN  Outcome: Progressing  1/30/2025 0503 by Marce Solis RN  Outcome: Progressing  Flowsheets (Taken 1/29/2025 2000)  Absence of Physical Injury: Implement safety measures based on patient assessment     Problem: Nutrition Deficit:  Goal: Optimize nutritional status  1/30/2025 0846 by Cristel Barrera RN  Outcome: Progressing  1/30/2025 0503 by Marce Solis RN  Outcome: Progressing     Problem: Discharge Planning  Goal: Discharge to home or other facility with appropriate resources  1/30/2025 0846 by Cristel Barrera RN  Outcome: Progressing  1/30/2025 0503 by Marce Solis RN  Outcome: Progressing  Flowsheets (Taken 1/29/2025 1900)  Discharge to home or other facility with appropriate resources:   Identify barriers to discharge with patient and caregiver   Arrange for needed discharge resources and transportation as appropriate   Identify discharge

## 2025-01-30 NOTE — PROGRESS NOTES
End of Shift Note    Bedside shift change report given to Ana Lilia PETERSON (oncoming nurse) by Cristel Barrera RN (offgoing nurse).  Report included the following information SBAR    Shift worked:  [id number]     Shift summary and any significant changes:     1500  Dr. Lorenzo paged. For plan of care for patient.      1635  Per Dr. Lorenzo's note plan to remove lines and booker tomorrow.    Concerns for physician to address:  N/A     Zone phone for oncoming shift:   N/A       Activity:     Number times ambulated in hallways past shift: 0  Number of times OOB to chair past shift: 0    Cardiac:   Cardiac Monitoring: Yes      Cardiac Rhythm: Sinus arrythmia, Sinus rhythm with PAC    Access:  Current line(s): PIV , epidural    Genitourinary:   Urinary Status: Booker    Respiratory:   O2 Device: Nasal cannula  Chronic home O2 use?: YES  Incentive spirometer at bedside: YES    GI:  Last BM (including prior to admit): 01/29/25  Current diet:  ADULT DIET; Regular  Passing flatus: YES    Pain Management:   Patient states pain is manageable on current regimen: YES    Skin:  Alfred Scale Score: 19  Interventions: Wound Offloading (Prevention Methods): Repositioning, Pillows    Patient Safety:  Fall Risk: Nursing Judgement-Fall Risk High(Add Comments): Yes  Fall Risk Interventions  Nursing Judgement-Fall Risk High(Add Comments): Yes  Toilet Every 2 Hours-In Advance of Need: Yes  Hourly Visual Checks: Awake, In bed  Fall Visual Posted: Socks  Room Door Open: Yes  Alarm On: Bed  Patient Moved Closer to Nursing Station: No    Active Consults:   IP CONSULT TO VASCULAR SURGERY  IP CONSULT TO PHARMACY    Length of Stay:  Expected LOS: 5  Actual LOS: 4    Cristel Barrera RN

## 2025-01-30 NOTE — PROGRESS NOTES
Hospitalist Progress Note    NAME:   Nasir Hatfield   : 1950   MRN: [id number]     Date/Time: 2025 1:20 PM  Patient PCP: Annie Mcnair APRN - NP    Estimated discharge date: ? , HH ?versus Rehab TBD  Barriers: vascular surgery clearance post op, Pt/Ot eval      Assessment / Plan:    Critical limb ischemia- L foot POA- s/p Left CFA=>PT bypass w/ipsilateral NRGSV by Dr Lorenzo   - Here with worsening L leg swelling, pain, redness recently  - CTA runoff showing occluded left superficial femoral artery, distal vessels difficult to evaluation, probably reconstitution at level of popliteal artery    IP Vascular Surgery consulted- S/p Left CFA=>PT bypass w/ipsilateral NRGSV  by Dr Lorenzo  Stared ASA & resumed lovenox SQ  S/p IV heparin drip - Dcd post op  Awaiting post op followup and clearance  Will DC post op IVF- pt eating well  Cont post op pain management as per surgical team- vasc surgery- plan to taper off Epidural ordered by anesthesia perioperatively, cont PO Oxycodone + IV dilaudid prn  Will order Pt/Ot eval for DC planning    L foot cellulitis with Toe necrosis POA  -WBC 23.5->19.9->16.8->14.4   -Color change to LLE however no madison erythema, leg is cold   - Blood cultures sent- neg > 24 hrs  - crp elevated as expected 17.7  CXR - severe Bullous Emphysema noted    S/p IV Vanc and Zosyn for now - empiric IV Abx stopped now  Blood Cx neg x 3 days now  Remains Afebrile      Chronic hypoxic respiratory failure POA- cont oxygen as at home, at baseline  Hx of severe COPD  - Continue home inhalers  - Lung sounds coarse  - Continue PRN duonebs as needed for wheezing      Active Smoking POA  - Nicotine patch  - Needs to quit, discussed with him  at length        Medical Decision Making:   I personally reviewed labs: Y  I personally reviewed imaging: Y  I personally reviewed EKG: N  Toxic drug monitoring: Y     Discussed case with: Pt, Rn, CM on IDRs        Code Status: Full Code   DVT  Prophylaxis: Heparin gtt          Subjective:     Chief Complaint / Reason for Physician Visit:  F/u for L foot resting pain due to critical limb ischemia, PAD, smoker, COPD  \"I am in pain\".  Discussed with RN events overnight.     S/p Left CFA=>PT bypass w/ipsilateral NRGSV yesterday - POD # 1  Eating well, DC IVF  Cont to be on heparin drip and IV abx  WBC trending down, blood cx remains neg till now.       Objective:     VITALS:   Last 24hrs VS reviewed since prior progress note. Most recent are:  Patient Vitals for the past 24 hrs:   BP Temp Temp src Pulse Resp SpO2   01/30/25 1218 -- -- -- -- 20 --   01/30/25 1100 (!) 150/94 97.7 °F (36.5 °C) Oral 94 17 92 %   01/30/25 1027 -- -- -- -- 24 --   01/30/25 1000 (!) 140/75 -- -- (!) 104 18 92 %   01/30/25 0800 (!) 163/90 -- -- 100 22 --   01/30/25 0730 (!) 147/88 97.7 °F (36.5 °C) Oral (!) 104 18 (!) 89 %   01/30/25 0700 (!) 161/102 -- -- 94 21 91 %   01/30/25 0600 117/71 -- -- (!) 108 17 92 %   01/30/25 0500 121/66 -- -- 80 17 96 %   01/30/25 0406 128/74 98.7 °F (37.1 °C) Oral 95 17 94 %   01/30/25 0300 118/68 -- -- 81 15 94 %   01/30/25 0200 120/70 -- -- 83 16 92 %   01/30/25 0119 -- -- -- -- -- 95 %   01/30/25 0100 117/64 -- -- 80 21 90 %   01/30/25 0000 129/75 -- -- 82 16 (!) 89 %   01/29/25 2300 (!) 147/79 98.5 °F (36.9 °C) Oral 82 22 93 %   01/29/25 2200 131/71 -- -- 89 17 91 %   01/29/25 2100 131/75 -- -- 85 16 94 %   01/29/25 2000 (!) 140/77 -- -- (!) 106 21 93 %   01/29/25 1945 (!) 155/103 -- -- 93 22 --   01/29/25 1930 (!) 147/66 -- -- 92 15 --   01/29/25 1915 139/70 -- -- 85 16 --   01/29/25 1900 (!) 147/62 98.6 °F (37 °C) Oral 92 16 90 %   01/29/25 1800 (!) 150/116 -- -- 92 12 92 %   01/29/25 1700 (!) 149/86 -- -- (!) 119 17 94 %   01/29/25 1600 (!) 164/79 -- -- (!) 102 18 (!) 85 %   01/29/25 1500 (!) 153/125 97.7 °F (36.5 °C) Oral 94 19 (!) 86 %   01/29/25 1450 (!) 162/78 -- -- 89 16 (!) 85 %   01/29/25 1430 (!) 148/87 -- -- 96 18 (!) 89 %   01/29/25  1415 112/78 98.6 °F (37 °C) Oral -- 18 92 %   01/29/25 1400 133/65 -- -- 90 18 91 %   01/29/25 1345 121/62 -- -- 84 16 96 %   01/29/25 1330 119/67 -- -- 75 14 99 %         Intake/Output Summary (Last 24 hours) at 1/30/2025 1320  Last data filed at 1/30/2025 0600  Gross per 24 hour   Intake 1548.58 ml   Output 750 ml   Net 798.58 ml        I had a face to face encounter and independently examined this patient on 1/30/2025, as outlined below:  PHYSICAL EXAM:  General: Alert, cooperative  EENT:  EOMI. Anicteric sclerae.  Resp:  CTA bilaterally, no wheezing or rales.  No accessory muscle use  CV:  Regular  rhythm,  No edema  GI:  Soft, Non distended, Non tender.  +Bowel sounds  Neurologic:  Alert and oriented X 3, normal speech,   Psych:   Good insight. Not anxious nor agitated  Skin:  No rashes.  No jaundice    Reviewed most current lab test results and cultures  YES  Reviewed most current radiology test results   YES  Review and summation of old records today    NO  Reviewed patient's current orders and MAR    YES  PMH/SH reviewed - no change compared to H&P    Procedures: see electronic medical records for all procedures/Xrays and details which were not copied into this note but were reviewed prior to creation of Plan.      LABS:  I reviewed today's most current labs and imaging studies.  Pertinent labs include:  Recent Labs     01/28/25  0449 01/29/25  0532   WBC 16.8* 14.4*   HGB 9.7* 10.1*   HCT 31.4* 32.6*    328     Recent Labs     01/28/25  0449 01/29/25  0532    138   K 3.7 4.3    107   CO2 30 31   GLUCOSE 110* 84   BUN 43* 32*   CREATININE 1.00 0.78   CALCIUM 8.7 8.8   BILITOT 0.4 0.4   AST 25 36   ALT 16 18   INR  --  1.1       Signed: Hossein Valencia MD

## 2025-01-31 ENCOUNTER — APPOINTMENT (OUTPATIENT)
Facility: HOSPITAL | Age: 75
DRG: 239 | End: 2025-01-31
Payer: MEDICARE

## 2025-01-31 LAB
ANION GAP SERPL CALC-SCNC: 3 MMOL/L (ref 2–12)
ARTERIAL PATENCY WRIST A: YES
BASE EXCESS BLDA CALC-SCNC: 7 MMOL/L
BASOPHILS # BLD: 0.07 K/UL (ref 0–0.1)
BASOPHILS NFR BLD: 0.5 % (ref 0–1)
BDY SITE: ABNORMAL
BUN SERPL-MCNC: 22 MG/DL (ref 6–20)
BUN/CREAT SERPL: 35 (ref 12–20)
CALCIUM SERPL-MCNC: 8.7 MG/DL (ref 8.5–10.1)
CHLORIDE SERPL-SCNC: 104 MMOL/L (ref 97–108)
CO2 SERPL-SCNC: 32 MMOL/L (ref 21–32)
CREAT SERPL-MCNC: 0.62 MG/DL (ref 0.7–1.3)
DIFFERENTIAL METHOD BLD: ABNORMAL
EOSINOPHIL # BLD: 0.34 K/UL (ref 0–0.4)
EOSINOPHIL NFR BLD: 2.2 % (ref 0–7)
ERYTHROCYTE [DISTWIDTH] IN BLOOD BY AUTOMATED COUNT: 18.6 % (ref 11.5–14.5)
FIO2 ON VENT: 44 %
GLUCOSE SERPL-MCNC: 88 MG/DL (ref 65–100)
HCO3 BLDA-SCNC: 33 MMOL/L (ref 22–26)
HCT VFR BLD AUTO: 32.3 % (ref 36.6–50.3)
HGB BLD-MCNC: 9.8 G/DL (ref 12.1–17)
IMM GRANULOCYTES # BLD AUTO: 0.12 K/UL (ref 0–0.04)
IMM GRANULOCYTES NFR BLD AUTO: 0.8 % (ref 0–0.5)
LYMPHOCYTES # BLD: 1.14 K/UL (ref 0.8–3.5)
LYMPHOCYTES NFR BLD: 7.4 % (ref 12–49)
MCH RBC QN AUTO: 24 PG (ref 26–34)
MCHC RBC AUTO-ENTMCNC: 30.3 G/DL (ref 30–36.5)
MCV RBC AUTO: 79.2 FL (ref 80–99)
MONOCYTES # BLD: 1.37 K/UL (ref 0–1)
MONOCYTES NFR BLD: 8.9 % (ref 5–13)
NEUTS SEG # BLD: 12.34 K/UL (ref 1.8–8)
NEUTS SEG NFR BLD: 80.2 % (ref 32–75)
NRBC # BLD: 0 K/UL (ref 0–0.01)
NRBC BLD-RTO: 0 PER 100 WBC
PCO2 BLDA: 50 MMHG (ref 35–45)
PH BLDA: 7.43 (ref 7.35–7.45)
PLATELET # BLD AUTO: 280 K/UL (ref 150–400)
PMV BLD AUTO: 10.2 FL (ref 8.9–12.9)
PO2 BLDA: 42 MMHG (ref 80–100)
POTASSIUM SERPL-SCNC: 3.9 MMOL/L (ref 3.5–5.1)
RBC # BLD AUTO: 4.08 M/UL (ref 4.1–5.7)
SAO2 % BLD: 78 % (ref 92–97)
SAO2% DEVICE SAO2% SENSOR NAME: ABNORMAL
SODIUM SERPL-SCNC: 139 MMOL/L (ref 136–145)
SPECIMEN SITE: ABNORMAL
WBC # BLD AUTO: 15.4 K/UL (ref 4.1–11.1)

## 2025-01-31 PROCEDURE — 2060000000 HC ICU INTERMEDIATE R&B

## 2025-01-31 PROCEDURE — 6360000002 HC RX W HCPCS: Performed by: PHYSICIAN ASSISTANT

## 2025-01-31 PROCEDURE — 94640 AIRWAY INHALATION TREATMENT: CPT

## 2025-01-31 PROCEDURE — 2500000003 HC RX 250 WO HCPCS: Performed by: ANESTHESIOLOGY

## 2025-01-31 PROCEDURE — 6370000000 HC RX 637 (ALT 250 FOR IP): Performed by: SURGERY

## 2025-01-31 PROCEDURE — 80048 BASIC METABOLIC PNL TOTAL CA: CPT

## 2025-01-31 PROCEDURE — 6360000002 HC RX W HCPCS: Performed by: SURGERY

## 2025-01-31 PROCEDURE — 6370000000 HC RX 637 (ALT 250 FOR IP): Performed by: PHYSICIAN ASSISTANT

## 2025-01-31 PROCEDURE — 97166 OT EVAL MOD COMPLEX 45 MIN: CPT

## 2025-01-31 PROCEDURE — 36415 COLL VENOUS BLD VENIPUNCTURE: CPT

## 2025-01-31 PROCEDURE — 6370000000 HC RX 637 (ALT 250 FOR IP): Performed by: INTERNAL MEDICINE

## 2025-01-31 PROCEDURE — 97162 PT EVAL MOD COMPLEX 30 MIN: CPT

## 2025-01-31 PROCEDURE — 97535 SELF CARE MNGMENT TRAINING: CPT

## 2025-01-31 PROCEDURE — 85025 COMPLETE CBC W/AUTO DIFF WBC: CPT

## 2025-01-31 PROCEDURE — 2700000000 HC OXYGEN THERAPY PER DAY

## 2025-01-31 PROCEDURE — 71045 X-RAY EXAM CHEST 1 VIEW: CPT

## 2025-01-31 PROCEDURE — 82803 BLOOD GASES ANY COMBINATION: CPT

## 2025-01-31 PROCEDURE — 93005 ELECTROCARDIOGRAM TRACING: CPT

## 2025-01-31 PROCEDURE — 36600 WITHDRAWAL OF ARTERIAL BLOOD: CPT

## 2025-01-31 PROCEDURE — 97530 THERAPEUTIC ACTIVITIES: CPT

## 2025-01-31 RX ORDER — DILTIAZEM HYDROCHLORIDE 120 MG/1
120 CAPSULE, COATED, EXTENDED RELEASE ORAL DAILY
Status: DISCONTINUED | OUTPATIENT
Start: 2025-01-31 | End: 2025-02-15 | Stop reason: HOSPADM

## 2025-01-31 RX ORDER — BUDESONIDE 0.5 MG/2ML
0.5 INHALANT ORAL
Status: DISCONTINUED | OUTPATIENT
Start: 2025-01-31 | End: 2025-02-15 | Stop reason: HOSPADM

## 2025-01-31 RX ORDER — GUAIFENESIN 600 MG/1
1200 TABLET, EXTENDED RELEASE ORAL 2 TIMES DAILY
Status: DISCONTINUED | OUTPATIENT
Start: 2025-01-31 | End: 2025-02-15 | Stop reason: HOSPADM

## 2025-01-31 RX ORDER — DILTIAZEM HYDROCHLORIDE 5 MG/ML
5 INJECTION INTRAVENOUS ONCE
Status: DISCONTINUED | OUTPATIENT
Start: 2025-01-31 | End: 2025-02-15 | Stop reason: HOSPADM

## 2025-01-31 RX ORDER — ARFORMOTEROL TARTRATE 15 UG/2ML
15 SOLUTION RESPIRATORY (INHALATION)
Status: DISCONTINUED | OUTPATIENT
Start: 2025-01-31 | End: 2025-02-02

## 2025-01-31 RX ADMIN — ENOXAPARIN SODIUM 40 MG: 100 INJECTION SUBCUTANEOUS at 08:47

## 2025-01-31 RX ADMIN — OXYCODONE 10 MG: 5 TABLET ORAL at 08:46

## 2025-01-31 RX ADMIN — GUAIFENESIN 1200 MG: 600 TABLET ORAL at 21:49

## 2025-01-31 RX ADMIN — ARFORMOTEROL TARTRATE 15 MCG: 15 SOLUTION RESPIRATORY (INHALATION) at 07:30

## 2025-01-31 RX ADMIN — ASPIRIN 81 MG: 81 TABLET, COATED ORAL at 08:47

## 2025-01-31 RX ADMIN — SODIUM CHLORIDE, PRESERVATIVE FREE 10 ML: 5 INJECTION INTRAVENOUS at 08:47

## 2025-01-31 RX ADMIN — IPRATROPIUM BROMIDE 0.5 MG: 0.5 SOLUTION RESPIRATORY (INHALATION) at 21:22

## 2025-01-31 RX ADMIN — ACETAMINOPHEN 1000 MG: 500 TABLET ORAL at 14:50

## 2025-01-31 RX ADMIN — DILTIAZEM HYDROCHLORIDE 120 MG: 120 CAPSULE, COATED, EXTENDED RELEASE ORAL at 17:54

## 2025-01-31 RX ADMIN — IPRATROPIUM BROMIDE 0.5 MG: 0.5 SOLUTION RESPIRATORY (INHALATION) at 07:30

## 2025-01-31 RX ADMIN — CITALOPRAM HYDROBROMIDE 10 MG: 20 TABLET ORAL at 08:46

## 2025-01-31 RX ADMIN — OXYCODONE 10 MG: 5 TABLET ORAL at 14:50

## 2025-01-31 RX ADMIN — ACETAMINOPHEN 1000 MG: 500 TABLET ORAL at 07:26

## 2025-01-31 RX ADMIN — APIXABAN 5 MG: 5 TABLET, FILM COATED ORAL at 21:49

## 2025-01-31 RX ADMIN — METOPROLOL SUCCINATE 75 MG: 25 TABLET, FILM COATED, EXTENDED RELEASE ORAL at 14:51

## 2025-01-31 RX ADMIN — SODIUM CHLORIDE, PRESERVATIVE FREE 10 ML: 5 INJECTION INTRAVENOUS at 19:42

## 2025-01-31 RX ADMIN — ARFORMOTEROL TARTRATE 15 MCG: 15 SOLUTION RESPIRATORY (INHALATION) at 21:28

## 2025-01-31 RX ADMIN — ACETAMINOPHEN 1000 MG: 500 TABLET ORAL at 21:49

## 2025-01-31 RX ADMIN — OXYCODONE 10 MG: 5 TABLET ORAL at 21:49

## 2025-01-31 RX ADMIN — BUDESONIDE 500 MCG: 0.5 INHALANT RESPIRATORY (INHALATION) at 21:28

## 2025-01-31 RX ADMIN — Medication: at 21:52

## 2025-01-31 ASSESSMENT — PAIN DESCRIPTION - LOCATION
LOCATION: FOOT;TOE (COMMENT WHICH ONE)
LOCATION: LEG
LOCATION: FOOT;TOE (COMMENT WHICH ONE)
LOCATION: LEG
LOCATION: FOOT;LEG;TOE (COMMENT WHICH ONE)
LOCATION: LEG;INCISION

## 2025-01-31 ASSESSMENT — PAIN SCALES - GENERAL
PAINLEVEL_OUTOF10: 7
PAINLEVEL_OUTOF10: 7
PAINLEVEL_OUTOF10: 6
PAINLEVEL_OUTOF10: 6
PAINLEVEL_OUTOF10: 8
PAINLEVEL_OUTOF10: 7

## 2025-01-31 ASSESSMENT — PAIN DESCRIPTION - ORIENTATION
ORIENTATION: LEFT

## 2025-01-31 ASSESSMENT — PAIN DESCRIPTION - DESCRIPTORS
DESCRIPTORS: ACHING;SORE
DESCRIPTORS: THROBBING
DESCRIPTORS: DISCOMFORT;THROBBING

## 2025-01-31 ASSESSMENT — PAIN - FUNCTIONAL ASSESSMENT
PAIN_FUNCTIONAL_ASSESSMENT: ACTIVITIES ARE NOT PREVENTED
PAIN_FUNCTIONAL_ASSESSMENT: PREVENTS OR INTERFERES SOME ACTIVE ACTIVITIES AND ADLS

## 2025-01-31 NOTE — PROGRESS NOTES
Orders received, chart reviewed and patient evaluated by occupational therapy. Pending progression with skilled acute occupational therapy, recommend:    High intensity/comprehensive skilled occupational therapy in a multidisciplinary setting as patient is working towards tolerating up to 3 hours of therapy/day 5-7x/week    Recommend with nursing patient to complete as able in order to maintain strength, endurance and independence: OOB to chair 3x/day, ADLs with set-up to total assist and performing toileting with x2 person assist to BSC with rolling walker. Thank you for your assistance.     Full evaluation to follow.     Diane Lorenzo, OTR/L, OTD

## 2025-01-31 NOTE — PLAN OF CARE
Predictive Model Details          45 (Caution)  Factor Value    Calculated 1/31/2025 02:22 26% Age 74 years old    Deterioration Index Model 24% Supplemental oxygen Oxygen     20% Neurological exam X     9% Cardiac rhythm Sinus rhythm with PAC     7% WBC count abnormal (14.4 K/uL)     4% Systolic 145     3% Potassium 4.3 mmol/L     2% Pulse oximetry 93 %     2% BUN abnormal (32 MG/DL)     2% Sodium 138 mmol/L     1% Hematocrit abnormal (32.6 %)     0% Pulse 76     0% Respiratory rate 16     0% Temperature 98.2 °F (36.8 °C)    End of Shift Note    Bedside shift change report given to am rn (oncoming nurse) by EDWARD LU RN (offgoing nurse).  Report included the following information SBAR, Kardex, Intake/Output, MAR, Recent Results, and Cardiac Rhythm SR w frequent PACs.  Had short burst of SVT appr 2018.    Shift worked:  7p-7a     Shift summary and any significant changes:     Pt has frequent pac's and had short burst of svt at 2018.  Pain L leg managed w oxy 10mg.  Epidural intack.  Doppler LLE pulses, 3 sec cap refills,  L foot red, edematous, w purple toes     Concerns for physician to address:  Plan of care, activity, booker, epidural, pt ot.  Dc planning     Zone phone for oncoming shift:   [phone]       Activity:  Level of Assistance: Minimal assist, patient does 75% or more  Number times ambulated in hallways past shift: 0  Number of times OOB to chair past shift: 0    Cardiac:   Cardiac Monitoring: Yes      Cardiac Rhythm: Sinus rhythm with PAC    Access:  Current line(s): PIV     Genitourinary:   Urinary Status: Booker    Respiratory:   O2 Device: Nasal cannula  Chronic home O2 use?: YES  Incentive spirometer at bedside: YES    GI:  Last BM (including prior to admit): 01/29/25  Current diet:  ADULT DIET; Regular  Passing flatus: YES    Pain Management:   Patient states pain is manageable on current regimen: YES    Skin:  Alfred Scale Score: 18  Interventions: Wound Offloading (Prevention Methods):  smoking cessation protocol as indicated   Oxygen supplementation based on oxygen saturation or arterial blood gases   Position to facilitate oxygenation and minimize respiratory effort   Assess for changes in mentation and behavior   Respiratory therapy support as indicated     Problem: Pain  Goal: Verbalizes/displays adequate comfort level or baseline comfort level  Outcome: Progressing  Flowsheets (Taken 1/30/2025 1900)  Verbalizes/displays adequate comfort level or baseline comfort level:   Encourage patient to monitor pain and request assistance   Assess pain using appropriate pain scale   Administer analgesics based on type and severity of pain and evaluate response   Implement non-pharmacological measures as appropriate and evaluate response     Problem: Safety - Adult  Goal: Free from fall injury  Outcome: Progressing  Flowsheets (Taken 1/30/2025 2000)  Free From Fall Injury: Instruct family/caregiver on patient safety     Problem: ABCDS Injury Assessment  Goal: Absence of physical injury  Outcome: Progressing  Flowsheets (Taken 1/30/2025 2000)  Absence of Physical Injury: Implement safety measures based on patient assessment     Problem: Nutrition Deficit:  Goal: Optimize nutritional status  Outcome: Progressing     Problem: Discharge Planning  Goal: Discharge to home or other facility with appropriate resources  Outcome: Progressing  Flowsheets (Taken 1/30/2025 1900)  Discharge to home or other facility with appropriate resources: Identify barriers to discharge with patient and caregiver

## 2025-01-31 NOTE — PROGRESS NOTES
Hospitalist Progress Note    NAME:   Nasir Htafield   : 1950   MRN: [id number]     Date/Time: 2025 1:01 PM  Patient PCP: Annie Mcnair APRN - NP    Estimated discharge date: 2/3, IPR recommended by Pt/Ot  Barriers: vascular surgery clearance post op, Pulm consult, Oxygen requirement stablizes to baseline post op      Assessment / Plan:    Critical limb ischemia- L foot POA- s/p Left CFA=>PT bypass w/ipsilateral NRGSV by Dr Lorenzo   - Here with worsening L leg swelling, pain, redness recently  - CTA runoff showing occluded left superficial femoral artery, distal vessels difficult to evaluation, probably reconstitution at level of popliteal artery    IP Vascular Surgery consulted- S/p Left CFA=>PT bypass w/ipsilateral NRGSV  by Dr Lorenzo, no cleared from their standpoint for DC planning  Cont ASA & resumed lovenox SQ  S/p IV heparin drip - Dcd post op  Off IVF post op  Cont post op pain management as per surgical team-  cont PO Oxycodone + IV dilaudid prn , pt off epidural drip  S/p booker out today post op- voiding today  BM today per pt  IP Pt/Ot eval for DC planning noted- IPR recommended    L foot cellulitis with Toe necrosis POA  -WBC 23.5->19.9->16.8->14.4 ->15.4  -Color change to LLE however no madison erythema, leg is cold   - Blood cultures sent- neg > 24 hrs  - crp elevated as expected 17.7  CXR - severe Bullous Emphysema noted    S/p IV Vanc and Zosyn for now - empiric IV Abx stopped now  Blood Cx neg x 4 days now  Remains Afebrile      Acute on Chronic hypoxic & hypercapneic respiratory failure POA- Increased oxygen demand post op today- up to 10L/m today AM  , on 3L/m NC home oxygen at baseline  Hx of severe COPD with Emphysema POA- pt of Dr Ritter ([facility])  Afib RVR  - Continue home inhalers  - Lung sounds coarse  - Continue PRN duonebs as needed for wheezing   CXR today AM with RR @ 10:30 AM noted- neg acute but severe Emphysema noted  ABG= 7.43/50/42/78% on 3L/m   Pulm consulted for  MD Erik

## 2025-01-31 NOTE — CONSULTS
[facility] EP/ ARRHYTHMIA/ CARDIOLOGY CONSULT      EP Cardiology consult requested by Hossein Valencia MD for atrial fibrillaiton  PCP:  Annie Mcnair, APRN - NP    Primary cardiologist: None     Electrophysiology Service  1/31/2025     Assessment:   Multifocal atrial tachycardia  Rhythm is more consistent with MFAT and not AF  This is usually driven by a primary pulmonary process and hypoxia  Patient is in hypoxemic respiratory failure on high flow O2  Critical limb ischemia   Status post fem pop by Dr. Lorenzo.   Severe COPD  Acute on chronic respiratory failure  L foot cellulitis  smoking    Plan:   The treatment for MFAT is usually treating underlying pulmonary process  No indication for OAC unless for his vascular issues.   Switch metoprolol to diltiazem.       []    High complexity decision making was performed  []    Patient is at high-risk of decompensation with multiple organ involvement       HPI:  Nasir Hatfield is a frail thin 74 y.o. male with a pmhx significant for COPD w/ CRF, HTN, CVA, and alcohol use.  He is a current smoker.  He was taking clopidgrel and Eliquis prior to presenting. Presented with critical limb ischemia and underwent emergent Fem pop by Dr. Lorenzo. Ppost operatively he has been in hypoxic respiratory failure requiring high flow oxygen. He also is having multifocal atrial tachycardia which is read as atrial fibrillation by the telemetry monitor. He was here last September with a similar rhythm in the setting of COPD exacerbation.        No Known Allergies  Past Medical History:   Diagnosis Date    Chronic obstructive pulmonary disease (HCC)     Hx of blood clots 08/29/2024    Left lower leg    Hypertension      Past Surgical History:   Procedure Laterality Date    AMPUTATION Left 2024    Left Big Toe    ANGIOPLASTY Left 05/20/2024    LEFT FEMORAL ANGIOGRAM BALLOON  ANGIOPLASTY AND STENTING performed by Harris Lorenzo MD at [facility] MAIN OR    COLONOSCOPY      EYE SURGERY  chloride flush 0.9 % injection 5-40 mL  5-40 mL IntraVENous PRN Tony Avelar MD        0.9 % sodium chloride infusion   IntraVENous PRN Tony Avelar MD        acetaminophen (TYLENOL) tablet 1,000 mg  1,000 mg Oral 3 times per day Harris Lorenzo MD   1,000 mg at 01/31/25 1450    oxyCODONE (ROXICODONE) immediate release tablet 10 mg  10 mg Oral Q4H PRN Harris Lorenzo MD   10 mg at 01/31/25 1450    oxyCODONE (ROXICODONE) immediate release tablet 5 mg  5 mg Oral Q4H PRN Harris Lorenzo MD   5 mg at 01/29/25 0046    HYDROmorphone HCl PF (DILAUDID) injection 1 mg  1 mg IntraVENous Q4H PRN Harris Lorenzo MD   1 mg at 01/30/25 1218    citalopram (CELEXA) tablet 10 mg  10 mg Oral Daily Harris Lorenzo MD   10 mg at 01/31/25 0846    nicotine (NICODERM CQ) 21 MG/24HR 1 patch  1 patch TransDERmal Daily Harris Lorenzo MD   1 patch at 01/31/25 0848    ondansetron (ZOFRAN) injection 4 mg  4 mg IntraVENous Q6H PRN Harris Lorenzo MD        albuterol (PROVENTIL) (2.5 MG/3ML) 0.083% nebulizer solution 2.5 mg  2.5 mg Nebulization Q6H PRN Harris Lorenzo MD Alex Baher, MD  Clinical Cardiac Electrophysiology  [facility]  1/31/2025

## 2025-01-31 NOTE — CARE COORDINATION
Transition of Care Plan:    RUR: 18% \"medium risk\"  Prior Level of Functioning: Independent with ADL's  Disposition: Home with HH services (referrals pending)  FELA: 2/3  If SNF or IPR: Date FOC offered: 1/31  Date FOC received: 1/31 - pt declining IPR/SNF   Accepting facility: N/A  Date authorization started with reference number: N/A  Date authorization received and expires: N/A  Follow up appointments: PCP/Specialists as indicated  DME needed: None - pt has cane, walker, and 3L O2 through [employer] at home  Transportation at discharge: Family vs. [employer] ride   IM/IMM Medicare/ letter given: 2nd IM needed prior to d/c   Is patient a [place] and connected with [facility]? No  Caregiver Contact: Skye Turk (child), [phone]  Discharge Caregiver contacted prior to discharge? To be contacted   Care Conference needed? Not at this time   Barriers to discharge: Vascular, pulmonology, PT/OT progress, wean O2 to baseline     1546 PM: Chart reviewed. CM acknowledged recommendation for high intensity physical therapy. CM met with pt at the bedside to discuss recommendation. Pt noted to want to see how he progresses with therapy over the weekend as pt would like to go home with HH if able. CM to send HH referrals.     HERBERT Patel  Care Management  [facility]   [phone]

## 2025-01-31 NOTE — PROGRESS NOTES
Comprehensive Nutrition Assessment    Type and Reason for Visit:  Reassess    Nutrition Recommendations/Plan:   Continue current diet  Ensure plus HP 2x/day  Monitor and record PO intakes, supplement acceptance, and Bms in I/Os     Malnutrition Assessment:  Malnutrition Status:  Severe malnutrition (01/27/25 1435)    Context:  Chronic Illness     Findings of the 6 clinical characteristics of malnutrition:  Energy Intake:  Mild decrease in energy intake  Weight Loss:  No weight loss     Body Fat Loss:  Mild body fat loss Fat Overlying Ribs, Orbital   Muscle Mass Loss:  Severe muscle mass loss Clavicles (pectoralis & deltoids)  Fluid Accumulation:  Severe Generalized   Strength:  Not Performed    Nutrition Assessment:    Follow up. Pt not seen r/t RRT this AM for hypoxia. Chart reviewed, most intakes good, >76%. Plan to continue diet, add back ONS.    Nutrition Related Findings:    Labs: Na 139, K 3.9, BUN 22, Creat 0.62, Gluc 88. Meds: nicotine. 3+ pitting b/l LE edema, improving. BM 1/29. Wound Type: Surgical Incision       Current Nutrition Intake & Therapies:    Average Meal Intake: %  Average Supplements Intake: None Ordered  ADULT DIET; Regular    Anthropometric Measures:  Height: 177.8 cm (5' 10\")  Ideal Body Weight (IBW): 166 lbs (75 kg)    Current Body Weight: 70.3 kg (154 lb 15.7 oz), 93.4 % IBW. Weight Source: Not specified  Current BMI (kg/m2): 22.2  BMI Categories: Normal Weight (BMI 22.0 to 24.9) age over 65    Estimated Daily Nutrient Needs:  Energy Requirements Based On: Kcal/kg  Weight Used for Energy Requirements: Current  Energy (kcal/day): 2109 kcals (30 kcals/kg bw)  Weight Used for Protein Requirements: Current  Protein (g/day): 84-98g (1.2-1.4 g/kg bw)  Method Used for Fluid Requirements: 1 ml/kcal  Fluid (ml/day): 2100 mL    Nutrition Diagnosis:   Severe malnutrition related to catabolic illness, impaired respiratory function as evidenced by criteria as identified in malnutrition

## 2025-01-31 NOTE — PROGRESS NOTES
Orders received, chart reviewed and patient evaluated by physical therapy. Pending progression with skilled acute physical therapy, recommend:    High intensity/comprehensive skilled physical therapy in a multidisciplinary setting as patient is working towards tolerating up to 3 hours of therapy/day 5-7x/week    Recommend with nursing OOB to chair 3x/day and walking daily with x1 assist and rolling walker and gait belt . Thank you for completing as able in order to maintain patient strength, endurance and independence.     Full evaluation to follow.      Mary Mckeon, PT, DPT

## 2025-01-31 NOTE — PROGRESS NOTES
Foot hot  Good graft signal w/doppler  New onset afib w/RVR  COPD exacerbation cont to worsen    Cardiology and pulm input pending

## 2025-01-31 NOTE — PLAN OF CARE
Problem: Physical Therapy - Adult  Goal: By Discharge: Performs mobility at highest level of function for planned discharge setting.  See evaluation for individualized goals.  Description: FUNCTIONAL STATUS PRIOR TO ADMISSION: Patient was independent and active without use of DME. Patient admits to furniture walking in the home. He is a retired . Patient wears 3L O2 at baseline.     HOME SUPPORT PRIOR TO ADMISSION: The patient lived alone with friends and neighbors to provide assistance.    Physical Therapy Goals  Initiated 1/31/2025  1.  Patient will move from supine to sit and sit to supine, scoot up and down, and roll side to side in bed with modified independence within 7 day(s).    2.  Patient will perform sit to stand with modified independence within 7 day(s).  3.  Patient will transfer from bed to chair and chair to bed with modified independence using the least restrictive device within 7 day(s).  4.  Patient will ambulate with modified independence for 100 feet with the least restrictive device within 7 day(s).   5.  Patient will ascend/descend 15 stairs with bilateral handrail(s) with modified independence within 7 day(s).   Outcome: Progressing   PHYSICAL THERAPY EVALUATION    Patient: Nasir Hatfield (74 y.o. male)  Date: 1/31/2025  Primary Diagnosis: Critical lower limb ischemia (HCC) [I70.229]  Acute lower limb ischemia [I99.8]  Procedure(s) (LRB):  LEFT LOWER EXTREMITY TIBIAL BYPASS WITH VEIN (N/A) 2 Days Post-Op   Precautions: Restrictions/Precautions: Weight Bearing, Bed Alarm, Fall Risk   Lower Extremity Weight Bearing Restrictions  Left Lower Extremity Weight Bearing: Weight Bearing As Tolerated                  ASSESSMENT :   DEFICITS/IMPAIRMENTS:   The patient is limited by decreased functional mobility, independence in ADLs, ROM, strength, activity tolerance, balance, increased pain levels, and unsteady gait s/p LLE tibial bypass POD 2.  Patient received on 3L O2 with VSS at rest.  touch the ground. It is throbbing.”    OBJECTIVE DATA SUMMARY:       Past Medical History:   Diagnosis Date    Chronic obstructive pulmonary disease (HCC)     Hx of blood clots 08/29/2024    Left lower leg    Hypertension      Past Surgical History:   Procedure Laterality Date    AMPUTATION Left 2024    Left Big Toe    ANGIOPLASTY Left 05/20/2024    LEFT FEMORAL ANGIOGRAM BALLOON  ANGIOPLASTY AND STENTING performed by Harris Lorenzo MD at [facility] MAIN OR    COLONOSCOPY      EYE SURGERY Left     cataract surgery    FEMORAL-TIBIAL BYPASS GRAFT N/A 1/29/2025    LEFT LOWER EXTREMITY TIBIAL BYPASS WITH VEIN performed by Harris Lorenzo MD at [facility] MAIN OR    FOOT DEBRIDEMENT Left 05/22/2024    BONE BIOPSY LEFT FOOT performed by Thony Chisholm DPM at [facility] MAIN OR    VASCULAR SURGERY Right 9/7/2024    RE INJECT INFUSION CATHETER performed by Harris Lorenzo MD at [facility] MAIN OR    VASCULAR SURGERY Right 9/8/2024    REINJECT AND REMOVE INFUSION CATHETER performed by Harris Lorenzo MD at [facility] MAIN OR    VASCULAR SURGERY Left 9/6/2024    LEFT LOWER EXTREMITY THROMBECTOMY performed by Harris Lorenzo MD at [facility] MAIN OR       Home Situation:  Social/Functional History  Lives With: Alone  Type of Home: House  Home Layout: Multi-level (split level house)  Home Access: Stairs to enter with rails  Entrance Stairs - Number of Steps: 3  Bathroom Shower/Tub: Walk-in shower  Bathroom Equipment: Grab bars in shower, Shower chair  Home Equipment: Oxygen, Rollator, Walker - Rolling, Cane  Receives Help From: Family  Prior Level of Assist for ADLs: Independent  Prior Level of Assist for Homemaking: Independent  Prior Level of Assist for Transfers: Independent  Active : Yes  Mode of Transportation: Car  Occupation: Retired    Cognitive/Behavioral Status:  Orientation  Overall Orientation Status: Within Functional Limits  Orientation Level: Oriented X4  Cognition  Overall Cognitive Status: Exceptions  Arousal/Alertness: Appears

## 2025-01-31 NOTE — SIGNIFICANT EVENT
Rapid Called at 1033    Responded to RRT at 1033 for Hypoxia    Provider at bedside: YES  Interventions ordered: STAT Chest XR, Labs, EKG, and ABG  Sepsis Suspected: No  Transfer to Higher Level of Care: n      Situation  Pt started to have low O2 sats with SOB. RRT was called.     Background  Critical lower limb ischemia.     Assessment  Alert, no CP, but slightly SOB, O2 sats level is at 85%jayro    Intervention/Recommendation  Abg, CXR, EKG, breathing tx.     Reevaluation  O2 is back to normal.     Visit Vitals  BP (!) 157/82   Pulse 96   Temp 98.1 °F (36.7 °C) (Oral)   Resp 20   Ht 1.778 m (5' 10\")   Wt 70.3 kg (155 lb)   SpO2 90%   BMI 22.24 kg/m²        Rapid Ended at 1105  RRT RN assisted with transport to accepting unit NoRex Crowell RN

## 2025-01-31 NOTE — CONSULTS
Pulmonary, Critical Care, and Sleep Medicine~Consult Note    Name: Nasir Hatfield MRN: [id number]   : 1950 Hospital: [facility]   Date: 2025 2:39 PM Admission: 2025     Impression Plan   Acute on chronic hypoxemic and chronic hypercapnic respiratory failure  Afib RVR  COPD--not in acute exacerbation  Critical limb ischemia s/p left CF to PT bypass w/ ipsilateral NRGSV on \  H/o CVA  HTN  Tobacco abuse Supp o2 prn goal sats >88%--currently on 9L o2  Add budesonide and brovana nebs, increase atrovent frequency--in place of home Trelegy  Hold off on prednisone for now but could consider if wheezing persists despite addition of nebs.  Add mucinex  Nicotine patch  Eliquis    Thank you for the consult, will see prn over the weekend     Daily Progression:    Consult for acute on chronic resp failure w/ hypoxia and hypercapnia post op, severe empysema on home oxygen    HPI: 73 y/o M with PMH COPD (FEV1 1.75L, 53% in 2016), chronic hypoxemic respiratory failure, HTN, CVA, PAD, depression who is admitted with critical limb ischemia s/p left CF to PT bypass w/ ipsilateral NRGSV on .  Rapid response called earlier today d/t hypoxia (60s) on 3.5L O2 and new afib RVR.   O2 sats improved to the 90s after increasing O2 to midflow 10L.   HR improved following administration of PO metoprolol.  CXR no acute process  ABG with chronic compensated hypercapnia and hypoxia.    ROS: he is seen in bed, breathing comfortably. He reports that his breathing is at his baseline. Chronic congested cough, nonproductive. He denies leg pain, chest pain. For the past few weeks he has been using O2 continuously at home (3L).     He follows with Dr. Ritter, last visit 2024. He was stable at that time. Using 5L O2 w/ exertion and at night. Continued on Trelegy inhaler.     Social hx: smokes 8-10 cigarettes a day    I have reviewed the labs and previous day’s  into the lungs   Yes Automatic Reconciliation, Ar   citalopram (CELEXA) 10 MG tablet Take by mouth daily   Yes Automatic Reconciliation, Ar   umeclidinium-vilanterol (ANORO ELLIPTA) 62.5-25 MCG/ACT inhaler Inhale 1 puff into the lungs daily    Automatic Reconciliation, Ar     No Known Allergies   Social History     Tobacco Use    Smoking status: Every Day     Current packs/day: 0.50     Types: Cigarettes    Smokeless tobacco: Not on file   Substance Use Topics    Alcohol use: Yes     Alcohol/week: 16.0 standard drinks of alcohol     Comment: liquor - pint per week (whiskey)      Family History   Problem Relation Age of Onset    Other Mother         developed aortic tear after hip fracture    Stroke Father      OBJECTIVE:     Vital Signs:     BP (!) 142/78   Pulse (!) 126   Temp 98 °F (36.7 °C) (Oral)   Resp 20   Ht 1.778 m (5' 10\")   Wt 70.3 kg (155 lb)   SpO2 92%   BMI 22.24 kg/m²    Temp (24hrs), Av.1 °F (36.7 °C), Min:97.8 °F (36.6 °C), Max:98.2 °F (36.8 °C)     Intake/Output:     Last shift: 701 - 1900  In: -   Out: 200 [Urine:200]    Last 3 shifts: 1901 -  0700  In:  [P.O.:1520; I.V.:550]  Out:  [Urine:1925]        Intake/Output Summary (Last 24 hours) at 2025 1439  Last data filed at 2025 0905  Gross per 24 hour   Intake 400 ml   Output 1375 ml   Net -975 ml       Physical Exam:                                        Exam Findings Other   General: No resp distress noted, appears stated age    HEENT:  NCAT    Chest: No pectus deformity, normal chest rise b/l    HEART:  RRR    Lungs:  Mild wheeze, no rhonchi/crackles, diminished    ABD: Soft/NT    EXT: LLE wrapped. S/p left great toe amputation. Right foot edema    Skin: No rashes     Neuro: A/O x 3        Medications:  Current Facility-Administered Medications   Medication Dose Route Frequency    apixaban (ELIQUIS) tablet 5 mg  5 mg Oral BID    dilTIAZem injection 5 mg  5 mg IntraVENous Once    metoprolol  succinate (TOPROL XL) extended release tablet 75 mg  75 mg Oral Daily    balsum peru-castor oil (VENELEX) ointment   Topical BID    aspirin EC tablet 81 mg  81 mg Oral Daily    sodium chloride flush 0.9 % injection 5-40 mL  5-40 mL IntraVENous 2 times per day    sodium chloride flush 0.9 % injection 5-40 mL  5-40 mL IntraVENous PRN    0.9 % sodium chloride infusion   IntraVENous PRN    arformoterol tartrate (BROVANA) nebulizer solution 15 mcg  15 mcg Nebulization BID RT    And    ipratropium (ATROVENT) 0.02 % nebulizer solution 0.5 mg  0.5 mg Nebulization BID    acetaminophen (TYLENOL) tablet 1,000 mg  1,000 mg Oral 3 times per day    oxyCODONE (ROXICODONE) immediate release tablet 10 mg  10 mg Oral Q4H PRN    oxyCODONE (ROXICODONE) immediate release tablet 5 mg  5 mg Oral Q4H PRN    HYDROmorphone HCl PF (DILAUDID) injection 1 mg  1 mg IntraVENous Q4H PRN    citalopram (CELEXA) tablet 10 mg  10 mg Oral Daily    nicotine (NICODERM CQ) 21 MG/24HR 1 patch  1 patch TransDERmal Daily    ondansetron (ZOFRAN) injection 4 mg  4 mg IntraVENous Q6H PRN    albuterol (PROVENTIL) (2.5 MG/3ML) 0.083% nebulizer solution 2.5 mg  2.5 mg Nebulization Q6H PRN       Labs:  ABG Invalid input(s): \"PHI\", \"PCO2I\", \"PO2I\", \"HCO3I\", \"SO2I\", \"FIO2I\"     CBC Recent Labs     01/29/25  0532 01/31/25  0433   WBC 14.4* 15.4*   HGB 10.1* 9.8*   HCT 32.6* 32.3*    280   MCV 76.5* 79.2*   MCH 23.7* 24.0*        Metabolic  Panel Recent Labs     01/29/25  0532 01/31/25  0433    139   K 4.3 3.9    104   CO2 31 32   BUN 32* 22*   ALT 18  --    INR 1.1  --         Pertinent Labs                SAFIA Morales  1/31/2025

## 2025-01-31 NOTE — PLAN OF CARE
Problem: Occupational Therapy - Adult  Goal: By Discharge: Performs self-care activities at highest level of function for planned discharge setting.  See evaluation for individualized goals.  Description: FUNCTIONAL STATUS PRIOR TO ADMISSION:  Patient was independent to mod I for ADLs and functional mobility. He reported mainly furniture walking in his house but also reported having rolling walker if needed.   Receives Help From: Family, Prior Level of Assist for ADLs: Independent,  ,  ,  ,  ,  , Prior Level of Assist for Homemaking: Independent, Ambulation Assistance: Independent, Prior Level of Assist for Transfers: Independent, Active : Yes     HOME SUPPORT: Patient lived alone.    Occupational Therapy Goals:  Initiated 1/31/2025  1.  Patient will perform grooming in standing with Supervision within 7 day(s).  2.  Patient will perform upper body dressing with Supervision within 7 day(s).  3.  Patient will perform lower body dressing using AE PRN with Moderate Assist within 7 day(s).  4.  Patient will perform toilet transfers with Minimal Assist  within 7 day(s).  5.  Patient will perform all aspects of toileting with Minimal Assist within 7 day(s).  6.  Patient will participate in upper extremity therapeutic exercise/activities with Stand by Assist for 10 minutes within 7 day(s).    Outcome: Progressing   OCCUPATIONAL THERAPY EVALUATION    Patient: Nasir Hatfield (74 y.o. male)  Date: 1/31/2025  Primary Diagnosis: Critical lower limb ischemia (HCC) [I70.229]  Acute lower limb ischemia [I99.8]  Procedure(s) (LRB):  LEFT LOWER EXTREMITY TIBIAL BYPASS WITH VEIN (N/A) 2 Days Post-Op     Precautions: Weight Bearing, Bed Alarm, Fall Risk   Left Lower Extremity Weight Bearing: Weight Bearing As Tolerated              ASSESSMENT :  The patient is limited by decreased functional mobility, independence in ADLs, high-level IADLs, ROM, strength, body mechanics, activity tolerance, endurance, safety awareness,  therapist and registered nurse    Patient Education  Education Given To: Patient  Education Provided: Role of Therapy;Plan of Care;Precautions;ADL Adaptive Strategies;Energy Conservation;Transfer Training  Education Method: Verbal;Demonstration  Barriers to Learning: Cognition  Education Outcome: Verbalized understanding;Continued education needed    Thank you for this referral.  Diane Lorenzo OT  Minutes: 27    Occupational Therapy Evaluation Charge Determination   History Examination Decision-Making   MEDIUM Complexity : Expanded review of history including physical, cognitive and psychocial history  MEDIUM Complexity: 3-5 Performance deficits relating to physical, cognitive, or psychosocial skills that result in activity limitations and/or participation restrictions MEDIUM Complexity: Patient may present with comorbidities that affect occupational performance. Minimal to moderate modifications of tasks or assist (eg. physical or verbal) with assist is necessary to enable pt to complete eval   Based on the above components, the patient evaluation is determined to be of the following complexity level: Medium

## 2025-02-01 LAB
ANION GAP SERPL CALC-SCNC: 0 MMOL/L (ref 2–12)
BASOPHILS # BLD: 0.06 K/UL (ref 0–0.1)
BASOPHILS NFR BLD: 0.5 % (ref 0–1)
BUN SERPL-MCNC: 23 MG/DL (ref 6–20)
BUN/CREAT SERPL: 37 (ref 12–20)
CALCIUM SERPL-MCNC: 8.7 MG/DL (ref 8.5–10.1)
CHLORIDE SERPL-SCNC: 103 MMOL/L (ref 97–108)
CO2 SERPL-SCNC: 34 MMOL/L (ref 21–32)
CREAT SERPL-MCNC: 0.63 MG/DL (ref 0.7–1.3)
DIFFERENTIAL METHOD BLD: ABNORMAL
EKG ATRIAL RATE: 81 BPM
EKG DIAGNOSIS: NORMAL
EKG Q-T INTERVAL: 392 MS
EKG QRS DURATION: 82 MS
EKG QTC CALCULATION (BAZETT): 540 MS
EKG R AXIS: -85 DEGREES
EKG T AXIS: 82 DEGREES
EKG VENTRICULAR RATE: 114 BPM
EOSINOPHIL # BLD: 0.37 K/UL (ref 0–0.4)
EOSINOPHIL NFR BLD: 3.3 % (ref 0–7)
ERYTHROCYTE [DISTWIDTH] IN BLOOD BY AUTOMATED COUNT: 18.5 % (ref 11.5–14.5)
GLUCOSE SERPL-MCNC: 98 MG/DL (ref 65–100)
HCT VFR BLD AUTO: 30 % (ref 36.6–50.3)
HGB BLD-MCNC: 9.1 G/DL (ref 12.1–17)
IMM GRANULOCYTES # BLD AUTO: 0.08 K/UL (ref 0–0.04)
IMM GRANULOCYTES NFR BLD AUTO: 0.7 % (ref 0–0.5)
LYMPHOCYTES # BLD: 1.12 K/UL (ref 0.8–3.5)
LYMPHOCYTES NFR BLD: 10 % (ref 12–49)
MCH RBC QN AUTO: 23.5 PG (ref 26–34)
MCHC RBC AUTO-ENTMCNC: 30.3 G/DL (ref 30–36.5)
MCV RBC AUTO: 77.5 FL (ref 80–99)
MONOCYTES # BLD: 0.93 K/UL (ref 0–1)
MONOCYTES NFR BLD: 8.3 % (ref 5–13)
NEUTS SEG # BLD: 8.69 K/UL (ref 1.8–8)
NEUTS SEG NFR BLD: 77.2 % (ref 32–75)
NRBC # BLD: 0 K/UL (ref 0–0.01)
NRBC BLD-RTO: 0 PER 100 WBC
PLATELET # BLD AUTO: 302 K/UL (ref 150–400)
PMV BLD AUTO: 10.3 FL (ref 8.9–12.9)
POTASSIUM SERPL-SCNC: 4.5 MMOL/L (ref 3.5–5.1)
RBC # BLD AUTO: 3.87 M/UL (ref 4.1–5.7)
SODIUM SERPL-SCNC: 137 MMOL/L (ref 136–145)
WBC # BLD AUTO: 11.3 K/UL (ref 4.1–11.1)

## 2025-02-01 PROCEDURE — 6370000000 HC RX 637 (ALT 250 FOR IP): Performed by: INTERNAL MEDICINE

## 2025-02-01 PROCEDURE — 2060000000 HC ICU INTERMEDIATE R&B

## 2025-02-01 PROCEDURE — 94640 AIRWAY INHALATION TREATMENT: CPT

## 2025-02-01 PROCEDURE — 6370000000 HC RX 637 (ALT 250 FOR IP): Performed by: STUDENT IN AN ORGANIZED HEALTH CARE EDUCATION/TRAINING PROGRAM

## 2025-02-01 PROCEDURE — 80048 BASIC METABOLIC PNL TOTAL CA: CPT

## 2025-02-01 PROCEDURE — 36415 COLL VENOUS BLD VENIPUNCTURE: CPT

## 2025-02-01 PROCEDURE — 6370000000 HC RX 637 (ALT 250 FOR IP): Performed by: SURGERY

## 2025-02-01 PROCEDURE — 6360000002 HC RX W HCPCS: Performed by: PHYSICIAN ASSISTANT

## 2025-02-01 PROCEDURE — 2500000003 HC RX 250 WO HCPCS: Performed by: ANESTHESIOLOGY

## 2025-02-01 PROCEDURE — 6370000000 HC RX 637 (ALT 250 FOR IP): Performed by: PHYSICIAN ASSISTANT

## 2025-02-01 PROCEDURE — 2700000000 HC OXYGEN THERAPY PER DAY

## 2025-02-01 PROCEDURE — 85025 COMPLETE CBC W/AUTO DIFF WBC: CPT

## 2025-02-01 RX ORDER — ATORVASTATIN CALCIUM 10 MG/1
10 TABLET, FILM COATED ORAL NIGHTLY
Status: DISCONTINUED | OUTPATIENT
Start: 2025-02-01 | End: 2025-02-15 | Stop reason: HOSPADM

## 2025-02-01 RX ADMIN — ACETAMINOPHEN 1000 MG: 500 TABLET ORAL at 07:15

## 2025-02-01 RX ADMIN — GUAIFENESIN 1200 MG: 600 TABLET ORAL at 08:39

## 2025-02-01 RX ADMIN — CITALOPRAM HYDROBROMIDE 10 MG: 20 TABLET ORAL at 08:40

## 2025-02-01 RX ADMIN — Medication: at 11:32

## 2025-02-01 RX ADMIN — APIXABAN 5 MG: 5 TABLET, FILM COATED ORAL at 08:39

## 2025-02-01 RX ADMIN — APIXABAN 5 MG: 5 TABLET, FILM COATED ORAL at 20:10

## 2025-02-01 RX ADMIN — ACETAMINOPHEN 1000 MG: 500 TABLET ORAL at 15:27

## 2025-02-01 RX ADMIN — BUDESONIDE 500 MCG: 0.5 INHALANT RESPIRATORY (INHALATION) at 21:19

## 2025-02-01 RX ADMIN — IPRATROPIUM BROMIDE 0.5 MG: 0.5 SOLUTION RESPIRATORY (INHALATION) at 12:11

## 2025-02-01 RX ADMIN — Medication: at 21:00

## 2025-02-01 RX ADMIN — IPRATROPIUM BROMIDE 0.5 MG: 0.5 SOLUTION RESPIRATORY (INHALATION) at 09:01

## 2025-02-01 RX ADMIN — ATORVASTATIN CALCIUM 10 MG: 10 TABLET, FILM COATED ORAL at 20:10

## 2025-02-01 RX ADMIN — DILTIAZEM HYDROCHLORIDE 120 MG: 120 CAPSULE, COATED, EXTENDED RELEASE ORAL at 08:39

## 2025-02-01 RX ADMIN — ARFORMOTEROL TARTRATE 15 MCG: 15 SOLUTION RESPIRATORY (INHALATION) at 21:19

## 2025-02-01 RX ADMIN — ACETAMINOPHEN 1000 MG: 500 TABLET ORAL at 20:09

## 2025-02-01 RX ADMIN — GUAIFENESIN 1200 MG: 600 TABLET ORAL at 20:10

## 2025-02-01 RX ADMIN — SODIUM CHLORIDE, PRESERVATIVE FREE 10 ML: 5 INJECTION INTRAVENOUS at 09:04

## 2025-02-01 RX ADMIN — ASPIRIN 81 MG: 81 TABLET, COATED ORAL at 08:39

## 2025-02-01 RX ADMIN — OXYCODONE 10 MG: 5 TABLET ORAL at 02:12

## 2025-02-01 RX ADMIN — SODIUM CHLORIDE, PRESERVATIVE FREE 10 ML: 5 INJECTION INTRAVENOUS at 22:48

## 2025-02-01 RX ADMIN — ARFORMOTEROL TARTRATE 15 MCG: 15 SOLUTION RESPIRATORY (INHALATION) at 09:01

## 2025-02-01 RX ADMIN — OXYCODONE 10 MG: 5 TABLET ORAL at 08:48

## 2025-02-01 RX ADMIN — BUDESONIDE 500 MCG: 0.5 INHALANT RESPIRATORY (INHALATION) at 09:01

## 2025-02-01 RX ADMIN — IPRATROPIUM BROMIDE 0.5 MG: 0.5 SOLUTION RESPIRATORY (INHALATION) at 21:19

## 2025-02-01 RX ADMIN — IPRATROPIUM BROMIDE 0.5 MG: 0.5 SOLUTION RESPIRATORY (INHALATION) at 15:47

## 2025-02-01 RX ADMIN — OXYCODONE 10 MG: 5 TABLET ORAL at 16:43

## 2025-02-01 ASSESSMENT — PAIN DESCRIPTION - ORIENTATION
ORIENTATION: LEFT
ORIENTATION: LEFT;UPPER
ORIENTATION: LEFT
ORIENTATION: LEFT

## 2025-02-01 ASSESSMENT — PAIN DESCRIPTION - LOCATION
LOCATION: LEG
LOCATION: FOOT;LEG;TOE (COMMENT WHICH ONE)
LOCATION: FOOT;LEG
LOCATION: FOOT;TOE (COMMENT WHICH ONE)
LOCATION: LEG;FOOT
LOCATION: FOOT;LEG
LOCATION: FOOT;LEG
LOCATION: FOOT;TOE (COMMENT WHICH ONE)

## 2025-02-01 ASSESSMENT — PAIN SCALES - GENERAL
PAINLEVEL_OUTOF10: 6
PAINLEVEL_OUTOF10: 7
PAINLEVEL_OUTOF10: 5
PAINLEVEL_OUTOF10: 0
PAINLEVEL_OUTOF10: 5
PAINLEVEL_OUTOF10: 5
PAINLEVEL_OUTOF10: 7

## 2025-02-01 ASSESSMENT — PAIN DESCRIPTION - DESCRIPTORS
DESCRIPTORS: THROBBING;DISCOMFORT
DESCRIPTORS: ACHING
DESCRIPTORS: THROBBING;DISCOMFORT
DESCRIPTORS: TENDER;SORE;ACHING;DISCOMFORT
DESCRIPTORS: ACHING
DESCRIPTORS: ACHING
DESCRIPTORS: THROBBING;DISCOMFORT

## 2025-02-01 ASSESSMENT — PAIN - FUNCTIONAL ASSESSMENT: PAIN_FUNCTIONAL_ASSESSMENT: ACTIVITIES ARE NOT PREVENTED

## 2025-02-01 NOTE — PROGRESS NOTES
End of Shift Note    Bedside shift change report given to Jannie PETERSON (oncoming nurse) by Haritha Guardado RN (offgoing nurse).  Report included the following information SBAR, Kardex, MAR, Recent Results, and Cardiac Rhythm NSR    Shift worked:  [id number]     Shift summary and any significant changes:    Lipitor 10 mg ordered nightly  Patient received Roxicodone twice today last dose given at 1643  Patient currently on 8L HF oxygen      Concerns for physician to address:  None      Zone phone for oncoming shift:          Activity:  Level of Assistance: Minimal assist, patient does 75% or more  Number times ambulated in hallways past shift: 0  Number of times OOB to chair past shift: Patient has sat up in chair through out the day with feet elevated.     Cardiac:   Cardiac Monitoring: Yes      Cardiac Rhythm: Sinus rhythm with PVC    Access:  Current line(s): PIV     Genitourinary:   Urinary Status: Voiding    Respiratory:   O2 Device: High flow nasal cannula  Chronic home O2 use?: YES  Incentive spirometer at bedside: YES    GI:  Last BM (including prior to admit): 01/31/25  Current diet:  ADULT DIET; Regular  ADULT ORAL NUTRITION SUPPLEMENT; Breakfast, Dinner; Standard High Calorie/High Protein Oral Supplement  Passing flatus: YES    Pain Management:   Patient states pain is manageable on current regimen: YES    Skin:  Alfred Scale Score: 19  Interventions: Wound Offloading (Prevention Methods): Pillows, Repositioning, Chair cushion, Elevate heels    Patient Safety:  Fall Risk: Nursing Judgement-Fall Risk High(Add Comments): Yes  Fall Risk Interventions  Nursing Judgement-Fall Risk High(Add Comments): Yes  Toilet Every 2 Hours-In Advance of Need: Yes  Hourly Visual Checks: Awake  Fall Visual Posted: Socks, Armband, Fall sign posted  Room Door Open: Yes  Alarm On: Bed  Patient Moved Closer to Nursing Station: No    Active Consults:   IP CONSULT TO VASCULAR SURGERY  IP CONSULT TO PHARMACY  IP CONSULT TO PULMONOLOGY  IP

## 2025-02-01 NOTE — PLAN OF CARE
End of Shift Note    Bedside shift change report given to RN (oncoming nurse) by Angy Bradshaw RN (offgoing nurse).  Report included the following information SBAR and Procedure Summary    Shift worked:  [id number]     Shift summary and any significant changes:    Pt's VSS throughout shift. Oxycodone 10 mg satisfying left foot pain.  Pt on 9L high flow throughout shift.     0345 RN wasted remaining Marcaine appox 30ml with second RN Emilie LIM Vickaine was d/c on prior shift and left in pump.      0530 Left groin and upper thigh dressings changed.     0630 Pt up to recliner, pt ambulated with recliner and RN stand by   Concerns for physician to address:  None     Zone phone for oncoming shift:   1720       Activity:  Level of Assistance: Minimal assist, patient does 75% or more  Number times ambulated in hallways past shift: 0  Number of times OOB to chair past shift: 1    Cardiac:   Cardiac Monitoring: Yes      Cardiac Rhythm: Sinus rhythm with PAC    Access:  Current line(s): PIV     Genitourinary:   Urinary Status: Voiding    Respiratory:   O2 Device: High flow nasal cannula  Chronic home O2 use?: YES  Incentive spirometer at bedside: YES    GI:  Last BM (including prior to admit): 01/31/25  Current diet:  ADULT DIET; Regular  ADULT ORAL NUTRITION SUPPLEMENT; Breakfast, Dinner; Standard High Calorie/High Protein Oral Supplement  Passing flatus: YES    Pain Management:   Patient states pain is manageable on current regimen: YES    Skin:  Alfred Scale Score: 19  Interventions: Wound Offloading (Prevention Methods): Chair cushion, Elevate heels, Repositioning    Patient Safety:  Fall Risk: Nursing Judgement-Fall Risk High(Add Comments): Yes  Fall Risk Interventions  Nursing Judgement-Fall Risk High(Add Comments): Yes  Toilet Every 2 Hours-In Advance of Need: Yes  Hourly Visual Checks: In bed, Awake  Fall Visual Posted: Socks  Room Door Open: Yes  Alarm On: Bed  Patient Moved Closer to Nursing Station: No    Active  Consults:   IP CONSULT TO VASCULAR SURGERY  IP CONSULT TO PHARMACY  IP CONSULT TO PULMONOLOGY  IP CONSULT TO CARDIOLOGY  IP CONSULT TO CASE MANAGEMENT    Length of Stay:  Expected LOS: 8  Actual LOS: 6    Angy Bradshaw RN      Problem: Skin/Tissue Integrity  Goal: Skin integrity remains intact  Description: 1.  Monitor for areas of redness and/or skin breakdown  2.  Assess vascular access sites hourly  3.  Every 4-6 hours minimum:  Change oxygen saturation probe site  4.  Every 4-6 hours:  If on nasal continuous positive airway pressure, respiratory therapy assess nares and determine need for appliance change or resting period  Outcome: Progressing     Problem: Safety - Adult  Goal: Free from fall injury  Outcome: Progressing     Problem: Discharge Planning  Goal: Discharge to home or other facility with appropriate resources  Outcome: Progressing       Predictive Model Details          43 (Caution)  Factor Value    Calculated 2/1/2025 03:53 27% Age 74 years old    Deterioration Index Model 26% Supplemental oxygen High flow nasal cannula     21% Neurological exam X     9% Cardiac rhythm Sinus rhythm with PAC     8% WBC count abnormal (15.4 K/uL)     3% Systolic 137     2% BUN abnormal (22 MG/DL)     1% Hematocrit abnormal (32.3 %)     1% Blood pH 7.43       1% Sodium 139 mmol/L     0% Temperature 98.9 °F (37.2 °C)     0% Pulse oximetry 98 %     0% Potassium 3.9 mmol/L     0% Respiratory rate 16     0% Pulse 72

## 2025-02-01 NOTE — PROGRESS NOTES
Physical Therapy:    Chart reviewed and attempted to see this pt for PT session. Pt resting in bedside chair on 7L HHF. Pt politely refusing therapy today 2/2 feeling worn out and tired. Offered to check back later however patient stated \"I cannot do therapy today\". Pt reports transfer to chair went better today than during eval, able to tolerate more weight through LLE. Encouraged for continued mobility throughout the day with nursing, patient in agreement. Will defer per patient request and continue to follow as appropriate. Thanks.    Madeline Sy, DINO  Time at bedside: 10 minutes

## 2025-02-01 NOTE — PROGRESS NOTES
0735 Bedside shift change report given to Haritha RN (oncoming nurse) by Angy RN (offgoing nurse). Report included the following information Nurse Handoff Report, MAR, Recent Results, and Cardiac Rhythm NSR .

## 2025-02-01 NOTE — PROGRESS NOTES
Progress Note      2/1/2025 11:06 AM  NAME: Nasir Hatfield   MRN:  [id number]   Admit Diagnosis: Critical lower limb ischemia (HCC) [I70.229]  Acute lower limb ischemia [I99.8]         Primary Cardiologist:  Physician Requesting consult:      Assessment:   Multifocal atrial tachycardia  Rhythm is more consistent with MFAT and not AF  This is usually driven by a primary pulmonary process and hypoxia  Patient is in hypoxemic respiratory failure on high flow O2  Critical limb ischemia   Status post fem pop by Dr. Lorenzo.   Severe COPD  Acute on chronic respiratory failure  L foot cellulitis  smoking          Recommendations:    Continue diltiazem 120 mg p.o. daily  No convincing episode of atrial fibrillation, no indication for anticoagulation from cardiac standpoint.,  Continue anticoagulation if needed for peripheral artery disease.  Vascular following  Continue aspirin  Should be on statin for PAD, will add Lipitor 10 mg daily  Antibiotic per primary team              [x]        High complexity decision making was performed    Subjective:     HPI:  No chest pain or shortness of breath  Normal sinus rhythm with PACs    ROS: No CP, SOB, Abd pain, nausea, vomiting, syncope, palpitations, new focal neurological symptoms     Objective:      Physical Exam:    Last 24hrs VS reviewed since prior progress note. Most recent are:    BP (!) 130/95   Pulse 86   Temp 98.2 °F (36.8 °C) (Oral)   Resp 15   Ht 1.778 m (5' 10\")   Wt 70.3 kg (155 lb)   SpO2 97%   BMI 22.24 kg/m²     Intake/Output Summary (Last 24 hours) at 2/1/2025 1106  Last data filed at 2/1/2025 0530  Gross per 24 hour   Intake 440 ml   Output 425 ml   Net 15 ml         General: Alert and oriented x3, no acute distress   Neck: Supple   Respiratory: No respiratory distress,wheezing   Cardiovascular: Regular rate rhythm, S1S2, no murmur   Abdomen: soft, non tender, non distended   Neuro: moves all extremities, oriented x3   Skin: warm and dry   Extremity:  Oral Q4H PRN    oxyCODONE (ROXICODONE) immediate release tablet 5 mg  5 mg Oral Q4H PRN    HYDROmorphone HCl PF (DILAUDID) injection 1 mg  1 mg IntraVENous Q4H PRN    citalopram (CELEXA) tablet 10 mg  10 mg Oral Daily    nicotine (NICODERM CQ) 21 MG/24HR 1 patch  1 patch TransDERmal Daily    ondansetron (ZOFRAN) injection 4 mg  4 mg IntraVENous Q6H PRN    albuterol (PROVENTIL) (2.5 MG/3ML) 0.083% nebulizer solution 2.5 mg  2.5 mg Nebulization Q6H PRN              Bart Valencia MD

## 2025-02-01 NOTE — PROGRESS NOTES
Hospitalist Progress Note    NAME:   Nasir Hatfield   : 1950   MRN: [id number]     Date/Time: 2025 9:02 AM  Patient PCP: Annie Mcnair APRN - NP    Estimated discharge date: 2/3, IPR recommended by Pt/Ot  Barriers: vascular surgery clearance, oxygen titration      Assessment / Plan:    Critical limb ischemia- L foot POA- s/p Left CFA=>PT bypass w/ipsilateral NRGSV by Dr Lorenzo   - Here with worsening L leg swelling, pain, redness recently  - CTA runoff showing occluded left superficial femoral artery, distal vessels difficult to evaluation, probably reconstitution at level of popliteal artery    IP Vascular Surgery consulted- S/p Left CFA=>PT bypass w/ipsilateral NRGSV  by Dr Lorenzo, no cleared from their standpoint for DC planning  Cont ASA & resumed lovenox SQ  S/p IV heparin drip - Dcd post op  Off IVF post op  Cont post op pain management as per surgical team-  cont PO Oxycodone + IV dilaudid prn , pt off epidural drip  S/p booker out today post op- voiding today  BM today per pt  IP Pt/Ot eval for DC planning noted- IPR recommended    L foot cellulitis with Toe necrosis POA  -WBC 23.5->19.9->16.8->14.4 ->15.4  -Color change to LLE however no madison erythema, leg is cold   - Blood cultures sent- neg > 24 hrs  - crp elevated as expected 17.7  CXR - severe Bullous Emphysema noted  S/p IV Vanc and Zosyn for now - empiric IV Abx stopped now  Blood Cx neg x 4 days now  Remains Afebrile      Acute on Chronic hypoxic & hypercapneic respiratory failure POA- Increased oxygen demand post op today- up to 10L/m today AM  , on 3L/m NC home oxygen at baseline  Hx of severe COPD with Emphysema POA- pt of Dr Ritter ([facility])  Afib RVR  - Continue home inhalers  - Lung sounds coarse  - Continue PRN duonebs as needed for wheezing   CXR today AM with RR @ 10:30 AM noted- neg acute but severe Emphysema noted  ABG= 7.43/50/42/78% on 3L/m   Pulm consulted for further recommendations  IV cardizem 5mg x 1 now, resume    01/31/25 1500 (!) 155/75 97.8 °F (36.6 °C) Oral (!) 105 18 94 % --   01/31/25 1450 (!) 141/68 -- -- -- 18 -- --   01/31/25 1411 -- -- -- -- -- -- 1.778 m (5' 10\")   01/31/25 1200 (!) 142/78 98 °F (36.7 °C) Oral (!) 126 20 92 % --   01/31/25 1100 (!) 133/94 -- -- (!) 114 16 (!) 64 % --   01/31/25 1000 131/79 -- -- (!) 110 17 (!) 68 % --         Intake/Output Summary (Last 24 hours) at 2/1/2025 0902  Last data filed at 2/1/2025 0530  Gross per 24 hour   Intake 440 ml   Output 825 ml   Net -385 ml        I had a face to face encounter and independently examined this patient on 2/1/2025, as outlined below:  PHYSICAL EXAM:  General: Alert, cooperative, follows over commands appropriately  Resp:  CTA bilaterally, no wheezing or rales.  No accessory muscle use  CV:  Regular  rhythm,  No edema  GI:  Soft, Non distended, Non tender.  +Bowel sounds  Neurologic:  Alert and oriented X 3, normal speech,   Psych:   Good insight. Not anxious nor agitated    Reviewed most current lab test results and cultures  YES  Reviewed most current radiology test results   YES  Review and summation of old records today    NO  Reviewed patient's current orders and MAR    YES  PMH/SH reviewed - no change compared to H&P    Procedures: see electronic medical records for all procedures/Xrays and details which were not copied into this note but were reviewed prior to creation of Plan.      LABS:  I reviewed today's most current labs and imaging studies.  Pertinent labs include:  Recent Labs     01/31/25 0433 02/01/25 0448   WBC 15.4* 11.3*   HGB 9.8* 9.1*   HCT 32.3* 30.0*    302     Recent Labs     01/31/25  0433 02/01/25  0448    137   K 3.9 4.5    103   CO2 32 34*   GLUCOSE 88 98   BUN 22* 23*   CREATININE 0.62* 0.63*   CALCIUM 8.7 8.7     Total time greater than 35 minutes    Signed: Kimo Valencia MD        Disclaimer: Sections of this note are dictated using utilizing voice recognition software, which may have  resulted in some phonetic based errors in grammar and contents. Even though attempts were made to correct all the mistakes, some may have been missed, and remained in the body of the document. If questions arise, please contact our department.

## 2025-02-02 LAB
EKG ATRIAL RATE: 122 BPM
EKG DIAGNOSIS: NORMAL
EKG P AXIS: 70 DEGREES
EKG P-R INTERVAL: 176 MS
EKG Q-T INTERVAL: 340 MS
EKG QRS DURATION: 84 MS
EKG QTC CALCULATION (BAZETT): 470 MS
EKG R AXIS: -82 DEGREES
EKG T AXIS: 78 DEGREES
EKG VENTRICULAR RATE: 115 BPM

## 2025-02-02 PROCEDURE — 6370000000 HC RX 637 (ALT 250 FOR IP): Performed by: INTERNAL MEDICINE

## 2025-02-02 PROCEDURE — 6360000002 HC RX W HCPCS: Performed by: PHYSICIAN ASSISTANT

## 2025-02-02 PROCEDURE — 2500000003 HC RX 250 WO HCPCS: Performed by: ANESTHESIOLOGY

## 2025-02-02 PROCEDURE — 6370000000 HC RX 637 (ALT 250 FOR IP): Performed by: SURGERY

## 2025-02-02 PROCEDURE — 6360000002 HC RX W HCPCS: Performed by: STUDENT IN AN ORGANIZED HEALTH CARE EDUCATION/TRAINING PROGRAM

## 2025-02-02 PROCEDURE — 6370000000 HC RX 637 (ALT 250 FOR IP): Performed by: STUDENT IN AN ORGANIZED HEALTH CARE EDUCATION/TRAINING PROGRAM

## 2025-02-02 PROCEDURE — 2700000000 HC OXYGEN THERAPY PER DAY

## 2025-02-02 PROCEDURE — 94640 AIRWAY INHALATION TREATMENT: CPT

## 2025-02-02 PROCEDURE — 6360000002 HC RX W HCPCS: Performed by: SURGERY

## 2025-02-02 PROCEDURE — 6370000000 HC RX 637 (ALT 250 FOR IP): Performed by: PHYSICIAN ASSISTANT

## 2025-02-02 PROCEDURE — 2060000000 HC ICU INTERMEDIATE R&B

## 2025-02-02 RX ORDER — ARFORMOTEROL TARTRATE 15 UG/2ML
15 SOLUTION RESPIRATORY (INHALATION)
Status: DISCONTINUED | OUTPATIENT
Start: 2025-02-02 | End: 2025-02-05

## 2025-02-02 RX ORDER — PANTOPRAZOLE SODIUM 40 MG/1
40 TABLET, DELAYED RELEASE ORAL
Status: DISCONTINUED | OUTPATIENT
Start: 2025-02-02 | End: 2025-02-15 | Stop reason: HOSPADM

## 2025-02-02 RX ADMIN — GUAIFENESIN 1200 MG: 600 TABLET ORAL at 08:10

## 2025-02-02 RX ADMIN — ARFORMOTEROL TARTRATE 15 MCG: 15 SOLUTION RESPIRATORY (INHALATION) at 22:55

## 2025-02-02 RX ADMIN — BUDESONIDE 500 MCG: 0.5 INHALANT RESPIRATORY (INHALATION) at 08:47

## 2025-02-02 RX ADMIN — SODIUM CHLORIDE, PRESERVATIVE FREE 10 ML: 5 INJECTION INTRAVENOUS at 08:12

## 2025-02-02 RX ADMIN — IPRATROPIUM BROMIDE 0.5 MG: 0.5 SOLUTION RESPIRATORY (INHALATION) at 22:49

## 2025-02-02 RX ADMIN — GUAIFENESIN 1200 MG: 600 TABLET ORAL at 19:52

## 2025-02-02 RX ADMIN — DILTIAZEM HYDROCHLORIDE 120 MG: 120 CAPSULE, COATED, EXTENDED RELEASE ORAL at 08:11

## 2025-02-02 RX ADMIN — ACETAMINOPHEN 1000 MG: 500 TABLET ORAL at 22:29

## 2025-02-02 RX ADMIN — SODIUM CHLORIDE, PRESERVATIVE FREE 10 ML: 5 INJECTION INTRAVENOUS at 21:36

## 2025-02-02 RX ADMIN — ACETAMINOPHEN 1000 MG: 500 TABLET ORAL at 05:05

## 2025-02-02 RX ADMIN — ASPIRIN 81 MG: 81 TABLET, COATED ORAL at 08:11

## 2025-02-02 RX ADMIN — Medication: at 21:37

## 2025-02-02 RX ADMIN — OXYCODONE 10 MG: 5 TABLET ORAL at 08:11

## 2025-02-02 RX ADMIN — HYDROMORPHONE HYDROCHLORIDE 1 MG: 1 INJECTION, SOLUTION INTRAMUSCULAR; INTRAVENOUS; SUBCUTANEOUS at 17:32

## 2025-02-02 RX ADMIN — OXYCODONE 10 MG: 5 TABLET ORAL at 23:02

## 2025-02-02 RX ADMIN — ARFORMOTEROL TARTRATE 15 MCG: 15 SOLUTION RESPIRATORY (INHALATION) at 08:47

## 2025-02-02 RX ADMIN — ACETAMINOPHEN 1000 MG: 500 TABLET ORAL at 15:13

## 2025-02-02 RX ADMIN — ATORVASTATIN CALCIUM 10 MG: 10 TABLET, FILM COATED ORAL at 19:52

## 2025-02-02 RX ADMIN — PANTOPRAZOLE SODIUM 40 MG: 40 TABLET, DELAYED RELEASE ORAL at 08:10

## 2025-02-02 RX ADMIN — Medication: at 08:12

## 2025-02-02 RX ADMIN — APIXABAN 5 MG: 5 TABLET, FILM COATED ORAL at 08:10

## 2025-02-02 RX ADMIN — IPRATROPIUM BROMIDE 0.5 MG: 0.5 SOLUTION RESPIRATORY (INHALATION) at 08:47

## 2025-02-02 RX ADMIN — BUDESONIDE 500 MCG: 0.5 INHALANT RESPIRATORY (INHALATION) at 22:55

## 2025-02-02 RX ADMIN — OXYCODONE 10 MG: 5 TABLET ORAL at 15:13

## 2025-02-02 RX ADMIN — CITALOPRAM HYDROBROMIDE 10 MG: 20 TABLET ORAL at 08:10

## 2025-02-02 RX ADMIN — APIXABAN 5 MG: 5 TABLET, FILM COATED ORAL at 19:52

## 2025-02-02 ASSESSMENT — PAIN DESCRIPTION - ORIENTATION
ORIENTATION: LEFT

## 2025-02-02 ASSESSMENT — PAIN DESCRIPTION - LOCATION
LOCATION: LEG
LOCATION: FOOT;LEG

## 2025-02-02 ASSESSMENT — PAIN DESCRIPTION - DESCRIPTORS
DESCRIPTORS: ACHING;DISCOMFORT;SORE;TENDER
DESCRIPTORS: ACHING;DISCOMFORT;SORE;TENDER
DESCRIPTORS: ACHING
DESCRIPTORS: ACHING;DISCOMFORT;SORE;TENDER

## 2025-02-02 ASSESSMENT — PAIN SCALES - GENERAL
PAINLEVEL_OUTOF10: 5
PAINLEVEL_OUTOF10: 5
PAINLEVEL_OUTOF10: 3
PAINLEVEL_OUTOF10: 7

## 2025-02-02 ASSESSMENT — PAIN - FUNCTIONAL ASSESSMENT
PAIN_FUNCTIONAL_ASSESSMENT: ACTIVITIES ARE NOT PREVENTED
PAIN_FUNCTIONAL_ASSESSMENT: ACTIVITIES ARE NOT PREVENTED
PAIN_FUNCTIONAL_ASSESSMENT: PREVENTS OR INTERFERES WITH MANY ACTIVE NOT PASSIVE ACTIVITIES
PAIN_FUNCTIONAL_ASSESSMENT: ACTIVITIES ARE NOT PREVENTED

## 2025-02-02 NOTE — PROGRESS NOTES
Predictive Model Details          47 (Caution)  Factor Value    Calculated 2/2/2025 08:59 25% Age 74 years old    Deterioration Index Model 23% Supplemental oxygen Oxygen humidified     19% Respiratory rate 23     8% Cardiac rhythm Sinus rhythm with PVC     5% Potassium 4.5 mmol/L     5% Pulse oximetry 91 %     5% WBC count abnormal (11.3 K/uL)     2% Hematocrit abnormal (30.0 %)     2% BUN abnormal (23 MG/DL)     2% Sodium 137 mmol/L     1% Pulse 91     1% Systolic 118     1% Blood pH 7.43       0% Temperature 98.2 °F (36.8 °C)      End of Shift Note    Bedside shift change report given to NICHOLAS Valderrama (oncoming nurse) by Argentina Brooks RN (offgoing nurse).  Report included the following information SBAR    Shift worked:  7A-7P     Shift summary and any significant changes:     PRN/schedule pain meds given.    MD Guerrero suspecting TMA is needed.   O2 Weened to 6L HI Flow, neb schedule modified by MD     Concerns for physician to address:  Ween o2, pain management, vascular      Zone phone for oncoming shift:   [phone]       Activity:  Level of Assistance: Moderate assist, patient does 50-74%  Number times ambulated in hallways past shift: 0  Number of times OOB to chair past shift: 0    Cardiac:   Cardiac Monitoring: Yes      Cardiac Rhythm: Sinus rhythm with PVC    Access:  Current line(s): PIV     Genitourinary:   Urinary Status: Voiding    Respiratory:   O2 Device: High flow nasal cannula  Chronic home O2 use?: YES  Incentive spirometer at bedside: NO    GI:  Last BM (including prior to admit): 01/31/25  Current diet:  ADULT DIET; Regular  ADULT ORAL NUTRITION SUPPLEMENT; Breakfast, Dinner; Standard High Calorie/High Protein Oral Supplement  Passing flatus: YES    Pain Management:   Patient states pain is manageable on current regimen: NO    Skin:  Alfred Scale Score: 17  Interventions: Wound Offloading (Prevention Methods): Repositioning, Bed, pressure redistribution/air, Turning    Patient Safety:  Fall Risk:  Nursing Judgement-Fall Risk High(Add Comments): Yes  Fall Risk Interventions  Nursing Judgement-Fall Risk High(Add Comments): Yes  Toilet Every 2 Hours-In Advance of Need: Yes  Hourly Visual Checks: Awake, In bed  Fall Visual Posted: Socks, Armband, Fall sign posted  Room Door Open: Yes  Alarm On: Bed  Patient Moved Closer to Nursing Station: No    Active Consults:   IP CONSULT TO VASCULAR SURGERY  IP CONSULT TO PHARMACY  IP CONSULT TO PULMONOLOGY  IP CONSULT TO CARDIOLOGY  IP CONSULT TO CASE MANAGEMENT    Length of Stay:  Expected LOS: 8  Actual LOS: 7    Argenitna Brooks RN

## 2025-02-02 NOTE — PROGRESS NOTES
Progress Note      2/2/2025 8:23 AM  NAME: Nasir Hatfield   MRN:  [id number]   Admit Diagnosis: Critical lower limb ischemia (HCC) [I70.229]  Acute lower limb ischemia [I99.8]         Primary Cardiologist:  Physician Requesting consult:      Assessment:   Multifocal atrial tachycardia  Rhythm is more consistent with MFAT and not AF  This is usually driven by a primary pulmonary process and hypoxia  Patient is in hypoxemic respiratory failure on high flow O2  Critical limb ischemia   Status post fem pop by Dr. Lorenzo.   Severe COPD  Acute on chronic respiratory failure  L foot cellulitis  smoking          Recommendations:    Continue diltiazem 120 mg p.o. daily  No convincing episode of atrial fibrillation, no indication for anticoagulation from cardiac standpoint.,  Continue anticoagulation if needed for peripheral artery disease.  Vascular following  Continue aspirin  Should be on statin for PAD,   added Lipitor 10 mg daily  Antibiotic, COPD Rx per primary team              [x]        High complexity decision making was performed    Subjective:     HPI:  No chest pain    SOB   Normal sinus rhythm with PACs    ROS: No CP, SOB, Abd pain, nausea, vomiting, syncope, palpitations, new focal neurological symptoms     Objective:      Physical Exam:    Last 24hrs VS reviewed since prior progress note. Most recent are:    /67   Pulse 91   Temp 98.2 °F (36.8 °C) (Oral)   Resp 23   Ht 1.778 m (5' 10\")   Wt 70.3 kg (155 lb)   SpO2 90%   BMI 22.24 kg/m²     Intake/Output Summary (Last 24 hours) at 2/2/2025 0823  Last data filed at 2/2/2025 0343  Gross per 24 hour   Intake --   Output 575 ml   Net -575 ml         General: Alert and oriented x3, no acute distress   Neck: Supple   Respiratory: No respiratory distress,wheezing   Cardiovascular: Regular rate rhythm, S1S2   Abdomen: soft, non tender, non distended   Neuro: moves all extremities  Skin: warm and dry   Extremity: dressing on left leg      Data  times per day    oxyCODONE (ROXICODONE) immediate release tablet 10 mg  10 mg Oral Q4H PRN    oxyCODONE (ROXICODONE) immediate release tablet 5 mg  5 mg Oral Q4H PRN    HYDROmorphone HCl PF (DILAUDID) injection 1 mg  1 mg IntraVENous Q4H PRN    citalopram (CELEXA) tablet 10 mg  10 mg Oral Daily    nicotine (NICODERM CQ) 21 MG/24HR 1 patch  1 patch TransDERmal Daily    ondansetron (ZOFRAN) injection 4 mg  4 mg IntraVENous Q6H PRN    albuterol (PROVENTIL) (2.5 MG/3ML) 0.083% nebulizer solution 2.5 mg  2.5 mg Nebulization Q6H PRN              Bart Valencia MD

## 2025-02-02 NOTE — PLAN OF CARE
Problem: Skin/Tissue Integrity  Goal: Skin integrity remains intact  Description: 1.  Monitor for areas of redness and/or skin breakdown  2.  Assess vascular access sites hourly  3.  Every 4-6 hours minimum:  Change oxygen saturation probe site  4.  Every 4-6 hours:  If on nasal continuous positive airway pressure, respiratory therapy assess nares and determine need for appliance change or resting period  Outcome: Progressing     Problem: Respiratory - Adult  Goal: Achieves optimal ventilation and oxygenation  2/1/2025 2217 by Alberto Miller RN  Outcome: Progressing  2/1/2025 0902 by Julisa Mendoza RCP  Outcome: Progressing     Problem: Pain  Goal: Verbalizes/displays adequate comfort level or baseline comfort level  Outcome: Progressing     Problem: Safety - Adult  Goal: Free from fall injury  Outcome: Progressing     Problem: ABCDS Injury Assessment  Goal: Absence of physical injury  Outcome: Progressing     Problem: Nutrition Deficit:  Goal: Optimize nutritional status  Outcome: Progressing     Problem: Discharge Planning  Goal: Discharge to home or other facility with appropriate resources  Outcome: Progressing

## 2025-02-02 NOTE — PROGRESS NOTES
End of Shift Note    Bedside shift change report given to NICHOLAS Elaine (oncoming nurse) by Alberto Miller RN (offgoing nurse).  Report included the following information SBAR, ED Summary, Procedure Summary, Intake/Output, MAR, Recent Results, Cardiac Rhythm SR with PVCs, Quality Measures, and Dual Neuro Assessment    Shift worked:  7:00 PM-7:30 AM     Shift summary and any significant changes:     Patient received scheduled nebulizer treatments. Patient's oxygenation remained stable throughout the shift on High Flow. Patient did not complain of any pain. Patient still has productive cough.    Concerns for physician to address:  Oxygen Requirements/PT/OT      Zone phone for oncoming shift:   N/A       Activity:  Level of Assistance: Moderate assist, patient does 50-74%  Number times ambulated in hallways past shift: 0  Number of times OOB to chair past shift: 0    Cardiac:   Cardiac Monitoring: Yes      Cardiac Rhythm: Sinus rhythm with PVC    Access:  Current line(s): PIV     Genitourinary:   Urinary Status: Voiding, Bathroom privileges    Respiratory:   O2 Device: High flow nasal cannula  Chronic home O2 use?: YES  Incentive spirometer at bedside: YES    GI:  Last BM (including prior to admit): 01/31/25  Current diet:  ADULT DIET; Regular  ADULT ORAL NUTRITION SUPPLEMENT; Breakfast, Dinner; Standard High Calorie/High Protein Oral Supplement  Passing flatus: YES    Pain Management:   Patient states pain is manageable on current regimen: YES    Skin:  Alfred Scale Score: 19  Interventions: Wound Offloading (Prevention Methods): Bed, pressure redistribution/air, Bed, pressure reduction mattress, Elevate heels, Pillows, Repositioning, Turning    Patient Safety:  Fall Risk: Nursing Judgement-Fall Risk High(Add Comments): Yes  Fall Risk Interventions  Nursing Judgement-Fall Risk High(Add Comments): Yes  Toilet Every 2 Hours-In Advance of Need: Yes  Hourly Visual Checks: Awake, In bed  Fall Visual Posted: Socks,

## 2025-02-02 NOTE — PROGRESS NOTES
Vascular    Foot feels better  All toes remain dark  Bypass patent  Likely needs TMA  Further plans per Dr Lorenzo tomorrow

## 2025-02-02 NOTE — PROGRESS NOTES
This RT was a few minutes late for nebulizer. RN Jannie called to see \"if\" I was coming. I told her he was on my list. Transported a pt to CATH LAB (intubated and very critical) from ER bed 20 at 1900. I then went back to ER (bed 12) at 1930 for a separate intubation and ventilator plcmt so this RT could not have possibly arrived before I did. Was called for stat ABG as well (ER room 23) in the interim.      Gely Delong RRT

## 2025-02-02 NOTE — PROGRESS NOTES
Hospitalist Progress Note    NAME:   Nasir Hatfield   : 1950   MRN: [id number]     Date/Time: 2025 7:46 AM  Patient PCP: Annie Mcnair APRN - NP    Estimated discharge date:  Barriers:       Assessment / Plan:      Critical limb ischemia- L foot POA- s/p Left CFA=>PT bypass w/ipsilateral NRGSV by Dr Lorenzo   - Here with worsening L leg swelling, pain, redness recently  - CTA runoff showing occluded left superficial femoral artery, distal vessels difficult to evaluation, probably reconstitution at level of popliteal artery   IP Vascular Surgery consulted- S/p Left CFA=>PT bypass w/ipsilateral NRGSV  by Dr Lorenzo, no cleared from their standpoint for DC planning  Cont ASA & resumed lovenox SQ  S/p IV heparin drip - Dcd post op  Off IVF post op  Cont post op pain management as per surgical team-  cont PO Oxycodone + IV dilaudid prn , pt off epidural drip  S/p booker out today post op- voiding today  BM today per pt  IP Pt/Ot eval for DC planning noted- IPR recommended  25  Patient doing well less rest pain in foot     L foot cellulitis with Toe necrosis POA  -WBC 23.5->19.9->16.8->14.4 ->15.4  -Color change to LLE however no [place] erythema, leg is cold   - Blood cultures sent- neg > 24 hrs  - crp elevated as expected 17.7  CXR - severe Bullous Emphysema noted  S/p IV Vanc and Zosyn for now - empiric IV Abx stopped now  25 Blood Cx neg x 5 days now  Remains Afebrile      Acute on Chronic hypoxic & hypercapneic respiratory failure POA-   Increased oxygen demand post day 1- up to 10L/m today AM  ,   on 3L/m NC home oxygen at baseline  Hx of severe COPD with Emphysema POA- pt of Dr Ritter ([facility])  Afib RVR  - Continue home inhalers  - Lung sounds coarse  - Continue PRN duonebs as needed for wheezing   CXR today AM with RR @ 10:30 AM noted- neg acute but severe Emphysema noted  ABG= 7.43/50/42/78% on 3L/m   Pulm consulted for further recommendations  IV cardizem 5mg x 1 now, resume Po  sclerae.  Resp:  CTA bilaterally, no wheezing or rales.  No accessory muscle use  CV:  Regular  rhythm,  No edema  GI:  Soft, Non distended, Non tender.  +Bowel sounds  Neurologic:  Alert and oriented X 3, normal speech,   Psych:   Good insight. Not anxious nor agitated  Skin:  No rashes.  No jaundice    Reviewed most current lab test results and cultures  YES  Reviewed most current radiology test results   YES  Review and summation of old records today    NO  Reviewed patient's current orders and MAR    YES  PMH/SH reviewed - no change compared to H&P    Procedures: see electronic medical records for all procedures/Xrays and details which were not copied into this note but were reviewed prior to creation of Plan.      LABS:  I reviewed today's most current labs and imaging studies.  Pertinent labs include:  Recent Labs     01/31/25 0433 02/01/25 0448   WBC 15.4* 11.3*   HGB 9.8* 9.1*   HCT 32.3* 30.0*    302     Recent Labs     01/31/25 0433 02/01/25 0448    137   K 3.9 4.5    103   CO2 32 34*   GLUCOSE 88 98   BUN 22* 23*   CREATININE 0.62* 0.63*   CALCIUM 8.7 8.7       Current Facility-Administered Medications:     pantoprazole (PROTONIX) tablet 40 mg, 40 mg, Oral, QA AC, Jack Huynh MD, 40 mg at 02/02/25 0810    atorvastatin (LIPITOR) tablet 10 mg, 10 mg, Oral, Nightly, Bart Valencia MD, 10 mg at 02/01/25 2010    apixaban (ELIQUIS) tablet 5 mg, 5 mg, Oral, BID, Hossein Valencia MD, 5 mg at 02/02/25 0810    dilTIAZem injection 5 mg, 5 mg, IntraVENous, Once, Hossein Valencia MD    budesonide (PULMICORT) nebulizer suspension 500 mcg, 0.5 mg, Nebulization, BID RT, Mally Barba PA, 500 mcg at 02/02/25 0847    arformoterol tartrate (BROVANA) nebulizer solution 15 mcg, 15 mcg, Nebulization, BID RT, 15 mcg at 02/02/25 0847 **AND** ipratropium (ATROVENT) 0.02 % nebulizer solution 0.5 mg, 0.5 mg, Nebulization, 4x Daily RT, Mally Barba PA, 0.5 mg at 02/02/25 0847     guaiFENesin (MUCINEX) extended release tablet 1,200 mg, 1,200 mg, Oral, BID, Mally Barba PA, 1,200 mg at 02/02/25 0810    dilTIAZem (CARDIZEM CD) extended release capsule 120 mg, 120 mg, Oral, Daily, Miguel Shea MD, 120 mg at 02/02/25 0811    balsum peru-castor oil (VENELEX) ointment, , Topical, BID, Hossein Valencia MD, Given at 02/02/25 0812    aspirin EC tablet 81 mg, 81 mg, Oral, Daily, Harris Lorenzo MD, 81 mg at 02/02/25 0811    sodium chloride flush 0.9 % injection 5-40 mL, 5-40 mL, IntraVENous, 2 times per day, Tony Avelar MD, 10 mL at 02/02/25 0812    sodium chloride flush 0.9 % injection 5-40 mL, 5-40 mL, IntraVENous, PRN, Tony Avelar MD    0.9 % sodium chloride infusion, , IntraVENous, PRN, Tony Avelar MD    acetaminophen (TYLENOL) tablet 1,000 mg, 1,000 mg, Oral, 3 times per day, Harris Lorenzo MD, 1,000 mg at 02/02/25 0505    oxyCODONE (ROXICODONE) immediate release tablet 10 mg, 10 mg, Oral, Q4H PRN, Harris Lorenzo MD, 10 mg at 02/02/25 0811    oxyCODONE (ROXICODONE) immediate release tablet 5 mg, 5 mg, Oral, Q4H PRN, Harris Lorenzo MD, 5 mg at 01/29/25 0046    HYDROmorphone HCl PF (DILAUDID) injection 1 mg, 1 mg, IntraVENous, Q4H PRN, Harris Lorenzo MD, 1 mg at 01/30/25 1218    citalopram (CELEXA) tablet 10 mg, 10 mg, Oral, Daily, Harris Lorenzo MD, 10 mg at 02/02/25 0810    nicotine (NICODERM CQ) 21 MG/24HR 1 patch, 1 patch, TransDERmal, Daily, Harris Lorenzo MD, 1 patch at 02/02/25 0812    ondansetron (ZOFRAN) injection 4 mg, 4 mg, IntraVENous, Q6H PRN, Harris Lorenzo MD    albuterol (PROVENTIL) (2.5 MG/3ML) 0.083% nebulizer solution 2.5 mg, 2.5 mg, Nebulization, Q6H PRN, Harris Lorenzo MD    Signed: Jack Nash MD

## 2025-02-02 NOTE — PLAN OF CARE
Problem: Skin/Tissue Integrity  Goal: Skin integrity remains intact  Description: 1.  Monitor for areas of redness and/or skin breakdown  2.  Assess vascular access sites hourly  3.  Every 4-6 hours minimum:  Change oxygen saturation probe site  4.  Every 4-6 hours:  If on nasal continuous positive airway pressure, respiratory therapy assess nares and determine need for appliance change or resting period  2/2/2025 0900 by Argentina Brooks RN  Outcome: Progressing  Flowsheets (Taken 2/2/2025 0720)  Skin Integrity Remains Intact: Monitor for areas of redness and/or skin breakdown  2/1/2025 2217 by Alberto Miller RN  Outcome: Progressing     Problem: Respiratory - Adult  Goal: Achieves optimal ventilation and oxygenation  2/2/2025 0900 by Argentina Brooks RN  Outcome: Progressing  Flowsheets (Taken 2/2/2025 0720)  Achieves optimal ventilation and oxygenation: Assess for changes in respiratory status  2/2/2025 0121 by Gely Delong RCP  Outcome: Progressing  2/1/2025 2217 by Alberto Miller RN  Outcome: Progressing     Problem: Pain  Goal: Verbalizes/displays adequate comfort level or baseline comfort level  2/2/2025 0900 by Argentina Brooks RN  Outcome: Progressing  2/1/2025 2217 by Alberto Miller RN  Outcome: Progressing     Problem: Safety - Adult  Goal: Free from fall injury  2/2/2025 0900 by Argentina Brooks RN  Outcome: Progressing  2/1/2025 2217 by Alberto Miller RN  Outcome: Progressing     Problem: ABCDS Injury Assessment  Goal: Absence of physical injury  2/2/2025 0900 by Argentina Brooks RN  Outcome: Progressing  2/1/2025 2217 by Alberto Miller RN  Outcome: Progressing     Problem: Nutrition Deficit:  Goal: Optimize nutritional status  2/2/2025 0900 by Argentina Brooks RN  Outcome: Progressing  2/1/2025 2217 by Alberto Miller RN  Outcome: Progressing     Problem: Discharge Planning  Goal: Discharge to home or other facility with appropriate  resources  2/2/2025 0900 by Argentina Brooks, RN  Outcome: Progressing  Flowsheets (Taken 2/2/2025 0720)  Discharge to home or other facility with appropriate resources: Identify barriers to discharge with patient and caregiver  2/1/2025 2217 by Alberto Miller, RN  Outcome: Progressing

## 2025-02-02 NOTE — PLAN OF CARE
Problem: Respiratory - Adult  Goal: Achieves optimal ventilation and oxygenation  2/2/2025 0121 by Gely Delong RCP  Outcome: Progressing

## 2025-02-02 NOTE — PROGRESS NOTES
Vascular    Seen this AM  Up in chair  Looks good  Foot warm w/ PT signal  All toes deeply cyanotic  No surgical issues  Bypass appears to be patent  Cont current care

## 2025-02-03 LAB
ALBUMIN SERPL-MCNC: 2.1 G/DL (ref 3.5–5)
ANION GAP SERPL CALC-SCNC: 2 MMOL/L (ref 2–12)
BASOPHILS # BLD: 0.06 K/UL (ref 0–0.1)
BASOPHILS NFR BLD: 0.5 % (ref 0–1)
BUN SERPL-MCNC: 19 MG/DL (ref 6–20)
BUN/CREAT SERPL: 29 (ref 12–20)
CALCIUM SERPL-MCNC: 8.3 MG/DL (ref 8.5–10.1)
CHLORIDE SERPL-SCNC: 100 MMOL/L (ref 97–108)
CO2 SERPL-SCNC: 33 MMOL/L (ref 21–32)
CREAT SERPL-MCNC: 0.66 MG/DL (ref 0.7–1.3)
DIFFERENTIAL METHOD BLD: ABNORMAL
EOSINOPHIL # BLD: 0.32 K/UL (ref 0–0.4)
EOSINOPHIL NFR BLD: 2.9 % (ref 0–7)
ERYTHROCYTE [DISTWIDTH] IN BLOOD BY AUTOMATED COUNT: 18.5 % (ref 11.5–14.5)
GLUCOSE SERPL-MCNC: 89 MG/DL (ref 65–100)
HCT VFR BLD AUTO: 28.7 % (ref 36.6–50.3)
HGB BLD-MCNC: 8.9 G/DL (ref 12.1–17)
IMM GRANULOCYTES # BLD AUTO: 0.09 K/UL (ref 0–0.04)
IMM GRANULOCYTES NFR BLD AUTO: 0.8 % (ref 0–0.5)
LYMPHOCYTES # BLD: 0.85 K/UL (ref 0.8–3.5)
LYMPHOCYTES NFR BLD: 7.7 % (ref 12–49)
MAGNESIUM SERPL-MCNC: 2 MG/DL (ref 1.6–2.4)
MCH RBC QN AUTO: 23.5 PG (ref 26–34)
MCHC RBC AUTO-ENTMCNC: 31 G/DL (ref 30–36.5)
MCV RBC AUTO: 75.9 FL (ref 80–99)
MONOCYTES # BLD: 0.82 K/UL (ref 0–1)
MONOCYTES NFR BLD: 7.4 % (ref 5–13)
NEUTS SEG # BLD: 8.87 K/UL (ref 1.8–8)
NEUTS SEG NFR BLD: 80.7 % (ref 32–75)
NRBC # BLD: 0 K/UL (ref 0–0.01)
NRBC BLD-RTO: 0 PER 100 WBC
PHOSPHATE SERPL-MCNC: 2.9 MG/DL (ref 2.6–4.7)
PLATELET # BLD AUTO: 380 K/UL (ref 150–400)
PMV BLD AUTO: 10.2 FL (ref 8.9–12.9)
POTASSIUM SERPL-SCNC: 4.2 MMOL/L (ref 3.5–5.1)
RBC # BLD AUTO: 3.78 M/UL (ref 4.1–5.7)
SODIUM SERPL-SCNC: 135 MMOL/L (ref 136–145)
WBC # BLD AUTO: 11 K/UL (ref 4.1–11.1)

## 2025-02-03 PROCEDURE — 97530 THERAPEUTIC ACTIVITIES: CPT

## 2025-02-03 PROCEDURE — 2060000000 HC ICU INTERMEDIATE R&B

## 2025-02-03 PROCEDURE — 2700000000 HC OXYGEN THERAPY PER DAY

## 2025-02-03 PROCEDURE — 6370000000 HC RX 637 (ALT 250 FOR IP): Performed by: SURGERY

## 2025-02-03 PROCEDURE — 6370000000 HC RX 637 (ALT 250 FOR IP): Performed by: INTERNAL MEDICINE

## 2025-02-03 PROCEDURE — 2500000003 HC RX 250 WO HCPCS: Performed by: ANESTHESIOLOGY

## 2025-02-03 PROCEDURE — 6370000000 HC RX 637 (ALT 250 FOR IP): Performed by: PHYSICIAN ASSISTANT

## 2025-02-03 PROCEDURE — 94640 AIRWAY INHALATION TREATMENT: CPT

## 2025-02-03 PROCEDURE — 6360000002 HC RX W HCPCS: Performed by: PHYSICIAN ASSISTANT

## 2025-02-03 PROCEDURE — 36415 COLL VENOUS BLD VENIPUNCTURE: CPT

## 2025-02-03 PROCEDURE — 6370000000 HC RX 637 (ALT 250 FOR IP): Performed by: STUDENT IN AN ORGANIZED HEALTH CARE EDUCATION/TRAINING PROGRAM

## 2025-02-03 PROCEDURE — 85025 COMPLETE CBC W/AUTO DIFF WBC: CPT

## 2025-02-03 PROCEDURE — 6360000002 HC RX W HCPCS: Performed by: STUDENT IN AN ORGANIZED HEALTH CARE EDUCATION/TRAINING PROGRAM

## 2025-02-03 PROCEDURE — 80069 RENAL FUNCTION PANEL: CPT

## 2025-02-03 PROCEDURE — 97535 SELF CARE MNGMENT TRAINING: CPT

## 2025-02-03 PROCEDURE — 83735 ASSAY OF MAGNESIUM: CPT

## 2025-02-03 RX ADMIN — IPRATROPIUM BROMIDE 0.5 MG: 0.5 SOLUTION RESPIRATORY (INHALATION) at 09:10

## 2025-02-03 RX ADMIN — CITALOPRAM HYDROBROMIDE 10 MG: 20 TABLET ORAL at 09:24

## 2025-02-03 RX ADMIN — APIXABAN 5 MG: 5 TABLET, FILM COATED ORAL at 21:06

## 2025-02-03 RX ADMIN — Medication: at 21:08

## 2025-02-03 RX ADMIN — ASPIRIN 81 MG: 81 TABLET, COATED ORAL at 09:24

## 2025-02-03 RX ADMIN — OXYCODONE 10 MG: 5 TABLET ORAL at 18:05

## 2025-02-03 RX ADMIN — ARFORMOTEROL TARTRATE 15 MCG: 15 SOLUTION RESPIRATORY (INHALATION) at 21:40

## 2025-02-03 RX ADMIN — ARFORMOTEROL TARTRATE 15 MCG: 15 SOLUTION RESPIRATORY (INHALATION) at 09:16

## 2025-02-03 RX ADMIN — ACETAMINOPHEN 1000 MG: 500 TABLET ORAL at 15:27

## 2025-02-03 RX ADMIN — ATORVASTATIN CALCIUM 10 MG: 10 TABLET, FILM COATED ORAL at 21:06

## 2025-02-03 RX ADMIN — PANTOPRAZOLE SODIUM 40 MG: 40 TABLET, DELAYED RELEASE ORAL at 12:12

## 2025-02-03 RX ADMIN — GUAIFENESIN 1200 MG: 600 TABLET ORAL at 21:05

## 2025-02-03 RX ADMIN — Medication: at 09:25

## 2025-02-03 RX ADMIN — APIXABAN 5 MG: 5 TABLET, FILM COATED ORAL at 09:23

## 2025-02-03 RX ADMIN — ACETAMINOPHEN 1000 MG: 500 TABLET ORAL at 05:15

## 2025-02-03 RX ADMIN — OXYCODONE 10 MG: 5 TABLET ORAL at 12:11

## 2025-02-03 RX ADMIN — OXYCODONE 10 MG: 5 TABLET ORAL at 21:06

## 2025-02-03 RX ADMIN — GUAIFENESIN 1200 MG: 600 TABLET ORAL at 09:24

## 2025-02-03 RX ADMIN — SODIUM CHLORIDE, PRESERVATIVE FREE 10 ML: 5 INJECTION INTRAVENOUS at 09:26

## 2025-02-03 RX ADMIN — IPRATROPIUM BROMIDE 0.5 MG: 0.5 SOLUTION RESPIRATORY (INHALATION) at 21:40

## 2025-02-03 RX ADMIN — DILTIAZEM HYDROCHLORIDE 120 MG: 120 CAPSULE, COATED, EXTENDED RELEASE ORAL at 09:24

## 2025-02-03 RX ADMIN — OXYCODONE 10 MG: 5 TABLET ORAL at 09:23

## 2025-02-03 RX ADMIN — IPRATROPIUM BROMIDE 0.5 MG: 0.5 SOLUTION RESPIRATORY (INHALATION) at 14:05

## 2025-02-03 RX ADMIN — ACETAMINOPHEN 1000 MG: 500 TABLET ORAL at 21:05

## 2025-02-03 RX ADMIN — SODIUM CHLORIDE, PRESERVATIVE FREE 10 ML: 5 INJECTION INTRAVENOUS at 21:09

## 2025-02-03 RX ADMIN — BUDESONIDE 500 MCG: 0.5 INHALANT RESPIRATORY (INHALATION) at 09:16

## 2025-02-03 RX ADMIN — BUDESONIDE 500 MCG: 0.5 INHALANT RESPIRATORY (INHALATION) at 21:40

## 2025-02-03 ASSESSMENT — PAIN SCALES - GENERAL
PAINLEVEL_OUTOF10: 3
PAINLEVEL_OUTOF10: 7
PAINLEVEL_OUTOF10: 8
PAINLEVEL_OUTOF10: 7
PAINLEVEL_OUTOF10: 8
PAINLEVEL_OUTOF10: 7

## 2025-02-03 ASSESSMENT — PAIN DESCRIPTION - DESCRIPTORS
DESCRIPTORS: TINGLING;BURNING
DESCRIPTORS: TINGLING;DISCOMFORT
DESCRIPTORS: SHOOTING;ACHING
DESCRIPTORS: TINGLING
DESCRIPTORS: DISCOMFORT;TINGLING
DESCRIPTORS: TINGLING;DISCOMFORT
DESCRIPTORS: DISCOMFORT;TINGLING
DESCRIPTORS: ACHING;DISCOMFORT;TENDER;SORE
DESCRIPTORS: TINGLING;BURNING

## 2025-02-03 ASSESSMENT — PAIN DESCRIPTION - LOCATION
LOCATION: LEG
LOCATION: FOOT;LEG
LOCATION: LEG
LOCATION: FOOT;LEG

## 2025-02-03 ASSESSMENT — PAIN DESCRIPTION - ORIENTATION
ORIENTATION: LEFT

## 2025-02-03 ASSESSMENT — PAIN DESCRIPTION - PAIN TYPE: TYPE: SURGICAL PAIN

## 2025-02-03 ASSESSMENT — PAIN - FUNCTIONAL ASSESSMENT: PAIN_FUNCTIONAL_ASSESSMENT: ACTIVITIES ARE NOT PREVENTED

## 2025-02-03 ASSESSMENT — PAIN DESCRIPTION - FREQUENCY: FREQUENCY: CONTINUOUS

## 2025-02-03 NOTE — PLAN OF CARE
Problem: Physical Therapy - Adult  Goal: By Discharge: Performs mobility at highest level of function for planned discharge setting.  See evaluation for individualized goals.  Description: FUNCTIONAL STATUS PRIOR TO ADMISSION: Patient was independent and active without use of DME. Patient admits to furniture walking in the home. He is a retired . Patient wears 3L O2 at baseline.     HOME SUPPORT PRIOR TO ADMISSION: The patient lived alone with friends and neighbors to provide assistance.    Physical Therapy Goals  Initiated 1/31/2025  1.  Patient will move from supine to sit and sit to supine, scoot up and down, and roll side to side in bed with modified independence within 7 day(s).    2.  Patient will perform sit to stand with modified independence within 7 day(s).  3.  Patient will transfer from bed to chair and chair to bed with modified independence using the least restrictive device within 7 day(s).  4.  Patient will ambulate with modified independence for 100 feet with the least restrictive device within 7 day(s).   5.  Patient will ascend/descend 15 stairs with bilateral handrail(s) with modified independence within 7 day(s).   Outcome: Progressing   PHYSICAL THERAPY TREATMENT    Patient: Nasir Hatfield (74 y.o. male)  Date: 2/3/2025  Diagnosis: Critical lower limb ischemia (HCC) [I70.229]  Acute lower limb ischemia [I99.8] Critical lower limb ischemia (HCC)  Procedure(s) (LRB):  LEFT LOWER EXTREMITY TIBIAL BYPASS WITH VEIN (N/A) 5 Days Post-Op  Precautions: Weight Bearing, Bed Alarm, Fall Risk   Left Lower Extremity Weight Bearing: Weight Bearing As Tolerated                  ASSESSMENT:  Patient continues to benefit from skilled PT services and is slowly progressing towards goals. Patient received sitting in the chair, with PCT present and performing CHG bathing. Patient on 4L O2, O2 sats at 82-88% at rest and with activity. Patient's HR elevated to 140-150's bpm with minimal activity and RN  informed. Patient continues to require min A for transfers with RW, still not willing to put any weight on LLE due to throbbing pain. Patient with poor RW management and safety with transfers, as patient continues to pull up from RW despite education. Patient lives alone and not sure how much additional support he has.          PLAN:  Patient continues to benefit from skilled intervention to address the above impairments.  Continue treatment per established plan of care.    Recommendations for staff mobility and toileting assistance:  Recommend that staff completes patient mobility with assist x1 using gait belt and rolling walker.      Recommendation for discharge: (in order for the patient to meet his/her long term goals):   High intensity/comprehensive skilled physical therapy in a multidisciplinary setting as patient is working towards tolerating up to 3 hours of therapy/day 5-7x/week    Other factors to consider for discharge: lives alone, poor safety awareness, impaired cognition, not safe to be alone, and concern for safely navigating or managing the home environment    IF patient discharges home will need the following DME: bedside commode, rolling walker, and wheelchair 16 inch       SUBJECTIVE:   Patient stated, \"I think that is good enough. There is a lot going on today.\"    OBJECTIVE DATA SUMMARY:   Critical Behavior:          Functional Mobility Training:  Bed Mobility:     Transfers:  Transfer Training  Transfer Training: Yes  Overall Level of Assistance: Minimum assistance  Sit to Stand: Minimum assistance  Stand to Sit: Minimum assistance  Balance:  Balance  Sitting: Intact  Standing: Impaired  Standing - Static: Fair;Constant support  Standing - Dynamic: Fair;Constant support   Ambulation/Gait Training:     Gait  Gait Training: No            Pain Rating:  Continues to report severe throbbing pain with standing and with LLE in dependent position    Activity Tolerance:   Fair , requires rest breaks,

## 2025-02-03 NOTE — PROGRESS NOTES
Hospitalist Progress Note    NAME:   Nasir Hatfield   : 1950   MRN: [id number]     Date/Time: 2/3/2025 6:03 PM  Patient PCP: Annie Mcnair APRN - NP    Estimated discharge date:  Barriers:       Assessment / Plan:  Critical limb ischemia- L foot POA- s/p Left CFA=>PT bypass w/ipsilateral NRGSV by Dr Lorenzo   - Here with worsening L leg swelling, pain, redness recently  - CTA runoff showing occluded left superficial femoral artery, distal vessels difficult to evaluation, probably reconstitution at level of popliteal artery   IP Vascular Surgery consulted- S/p Left CFA=>PT bypass w/ipsilateral NRGSV  by Dr Lorenzo, no cleared from their standpoint for DC planning  Cont ASA & resumed lovenox SQ  S/p IV heparin drip - Dcd post op  Off IVF post op  Cont post op pain management as per surgical team-  cont PO Oxycodone + IV dilaudid prn , pt off epidural drip  S/p booker out today post op- voiding today  BM today per pt  IP Pt/Ot eval for DC planning noted- IPR recommended  25  Patient doing well less rest pain in foot  2/3/25: will need a TMA prior to discharge     L foot cellulitis with Toe necrosis POA  -WBC 23.5->19.9->16.8->14.4 ->15.4  -Color change to LLE however no madison erythema, leg is cold   - Blood cultures sent- neg > 24 hrs  - crp elevated as expected 17.7  CXR - severe Bullous Emphysema noted  S/p IV Vanc and Zosyn for now - empiric IV Abx stopped now  25 Blood Cx neg x 5 days now  Remains Afebrile   2/3/25 BC NGTD     Acute on Chronic hypoxic & hypercapneic respiratory failure POA-   Increased oxygen demand post day 1- up to 10L/m today AM  ,   on 3L/m NC home oxygen at baseline  Hx of severe COPD with Emphysema POA- pt of Dr Ritter ([facility])  Afib RVR  - Continue home inhalers  - Lung sounds coarse  - Continue PRN duonebs as needed for wheezing   CXR today AM with RR @ 10:30 AM noted- neg acute but severe Emphysema noted  ABG= 7.43/50/42/78% on 3L/m   Pulm consulted for further  recommendations  IV cardizem 5mg x 1 now, resume Po metoprolol, if not slowed down - start cardizem drip, resumed home eliquis post op now  Will consult cardiology for further recc  2/1: Patient is currently on 7 L oxygen.  He uses 3 L oxygen at home.  He also uses trilogy inhaler.  Continue current management per pulmonologist and wean down oxygen to home setting.  No wheezing on my examination currently.  2/2/25 Patient comfortanle O2 weaning  2/3/25 patient on RA     Active Smoking POA  - Nicotine patch  - Needs to quit, discussed with him  at length                 Medical Decision Making:   I personally reviewed labs:Yes  I personally reviewed imaging:NA  I personally reviewed EKG: aly reviewed  Toxic drug monitoring: Dilaudid for pain  Discussed case with: RN           Code Status: Full  DVT Prophylaxis: Eliquis  GI Prophylaxis: PPI protonix      Subjective:     Chief Complaint / Reason for Physician Visit  \"Patient spirits are good and He wants to go to [facility] when ready, pain controlled\".  Discussed with RN events overnight.       Objective:     VITALS:   Last 24hrs VS reviewed since prior progress note. Most recent are:  Patient Vitals for the past 24 hrs:   BP Temp Temp src Pulse Resp SpO2   02/03/25 1415 138/82 97.7 °F (36.5 °C) Oral (!) 128 23 90 %   02/03/25 1241 -- -- -- -- 14 --   02/03/25 1211 -- -- -- -- 15 --   02/03/25 1200 130/85 98 °F (36.7 °C) Oral (!) 101 15 91 %   02/03/25 0923 (!) 153/95 -- -- 97 13 --   02/03/25 0830 (!) 145/81 98 °F (36.7 °C) Oral 95 23 93 %   02/03/25 0250 128/74 -- Oral 87 12 94 %   02/02/25 2332 -- -- -- -- 20 --   02/02/25 2315 135/85 98 °F (36.7 °C) Oral 89 18 (!) 85 %   02/02/25 2306 -- -- -- 89 14 97 %   02/02/25 2302 -- -- -- -- 22 --   02/02/25 2115 (!) 154/96 97.9 °F (36.6 °C) Oral 88 14 97 %         Intake/Output Summary (Last 24 hours) at 2/3/2025 1803  Last data filed at 2/3/2025 1205  Gross per 24 hour   Intake --   Output 2200 ml   Net -2200 ml        I had a

## 2025-02-03 NOTE — PLAN OF CARE
Problem: Occupational Therapy - Adult  Goal: By Discharge: Performs self-care activities at highest level of function for planned discharge setting.  See evaluation for individualized goals.  Description: FUNCTIONAL STATUS PRIOR TO ADMISSION:  Patient was independent to mod I for ADLs and functional mobility. He reported mainly furniture walking in his house but also reported having rolling walker if needed.   Receives Help From: Family, Prior Level of Assist for ADLs: Independent,  ,  ,  ,  ,  , Prior Level of Assist for Homemaking: Independent, Ambulation Assistance: Independent, Prior Level of Assist for Transfers: Independent, Active : Yes     HOME SUPPORT: Patient lived alone.    Occupational Therapy Goals:  Initiated 1/31/2025  1.  Patient will perform grooming in standing with Supervision within 7 day(s).  2.  Patient will perform upper body dressing with Supervision within 7 day(s).  3.  Patient will perform lower body dressing using AE PRN with Moderate Assist within 7 day(s).  4.  Patient will perform toilet transfers with Minimal Assist  within 7 day(s).  5.  Patient will perform all aspects of toileting with Minimal Assist within 7 day(s).  6.  Patient will participate in upper extremity therapeutic exercise/activities with Stand by Assist for 10 minutes within 7 day(s).    Outcome: Progressing   OCCUPATIONAL THERAPY TREATMENT  Patient: Nasir Hatfield (74 y.o. male)  Date: 2/3/2025  Primary Diagnosis: Critical lower limb ischemia (HCC) [I70.229]  Acute lower limb ischemia [I99.8]  Procedure(s) (LRB):  LEFT LOWER EXTREMITY TIBIAL BYPASS WITH VEIN (N/A) 5 Days Post-Op   Precautions: Weight Bearing, Bed Alarm, Fall Risk   Left Lower Extremity Weight Bearing: Weight Bearing As Tolerated            Chart, occupational therapy assessment, plan of care, and goals were reviewed.    ASSESSMENT  Patient continues to benefit from skilled OT services and is slowly progressing towards goals. Patient received  registered nurse    Patient Education  Education Given To: Patient  Education Provided: Role of Therapy;Plan of Care;ADL Adaptive Strategies;Energy Conservation;Transfer Training  Education Method: Verbal;Demonstration  Barriers to Learning: Cognition  Education Outcome: Continued education needed    Thank you for this referral.  Diane Lorenzo OT  Minutes: 13

## 2025-02-03 NOTE — CARE COORDINATION
Transition of Care Plan:    RUR: 19% \"medium risk\"  Prior Level of Functioning: Independent with ADL's  Disposition: Home with HH services (referrals pending)  FELA: 2/4  If SNF or IPR: Date FOC offered: 1/31  Date FOC received: 1/31 - pt declining IPR/SNF   Accepting facility: N/A  Date authorization started with reference number: N/A  Date authorization received and expires: N/A  Follow up appointments: PCP/Specialists as indicated  DME needed: None - pt has cane, walker, and 3L O2 through [employer] at home  Transportation at discharge: Family vs. [employer] ride   IM/IMM Medicare/ letter given: 2nd IM needed prior to d/c   Is patient a Rachel and connected with VA? No  Caregiver Contact: Skye Turk (child), [phone]  Discharge Caregiver contacted prior to discharge? To be contacted   Care Conference needed? Not at this time   Barriers to discharge: Vascular, pulmonology, PT/OT progress, wean O2 to baseline     1634 PM: Chart reviewed. CM met with pt at the bedside to discuss d/c plan. Pt still declining IPR at this time. CM to follow up on HH referrals.     HERBERT Patel  Care Management  [facility]   [phone]

## 2025-02-03 NOTE — PROGRESS NOTES
End of Shift Note    Bedside shift change report given to NICHOLAS Quezada (oncoming nurse) by Alberto Miller RN (offgoing nurse).  Report included the following information SBAR, ED Summary, Procedure Summary, Intake/Output, MAR, Recent Results, Cardiac Rhythm Sinus Rhythm with PVCs, Quality Measures, and Dual Neuro Assessment    Shift worked:  7:00 PM-7:30 AM     Shift summary and any significant changes:     Patient requested PRN Oxycodone at the beginning of the shift with his scheduled Tylenol.     Patient's labs were drawn and resulted. Patient's Albumin was 2.1; Patient's Hemoglobin was 8.9; Patient's Hematocrit was 28.7.     Patient remains on 6L O2.     Patient has not slept much at all during his hospital stay. He states he is \"delirious\".      Concerns for physician to address: Oxygenation requirements/Patient refusing PT/OT and not wanting to get out of the bed/Pain Management; Insomnia    Zone phone for oncoming shift:   N/A       Activity:  Level of Assistance: Moderate assist, patient does 50-74%  Number times ambulated in hallways past shift: 0  Number of times OOB to chair past shift: 0    Cardiac:   Cardiac Monitoring: Yes      Cardiac Rhythm: Sinus rhythm with PVC    Access:  Current line(s): PIV     Genitourinary:   Urinary Status: Voiding    Respiratory:   O2 Device: High flow nasal cannula  Chronic home O2 use?: YES  Incentive spirometer at bedside: YES    GI:  Last BM (including prior to admit): 01/31/25  Current diet:  ADULT DIET; Regular  ADULT ORAL NUTRITION SUPPLEMENT; Breakfast, Dinner; Standard High Calorie/High Protein Oral Supplement  Passing flatus: YES    Pain Management:   Patient states pain is manageable on current regimen: YES    Skin:  Alfred Scale Score: 18  Interventions: Wound Offloading (Prevention Methods): Bed, pressure redistribution/air, Bed, pressure reduction mattress, Elevate heels, Pillows, Repositioning, Turning    Patient Safety:  Fall Risk: Nursing Judgement-Fall

## 2025-02-03 NOTE — PROGRESS NOTES
Pulmonary, Critical Care, and Sleep Medicine~progress Note    Name: Nasir Hatfield MRN: [id number]   : 1950 Hospital: [facility]   Date: 2/3/2025 11:57 AM Admission: 2025     Impression Plan   Acute on chronic hypoxemic and chronic hypercapnic respiratory failure- baseline 3L NC  Afib RVR  COPD--not in acute exacerbation  Critical limb ischemia s/p left CF to PT bypass w/ ipsilateral NRGSV on \  H/o CVA  HTN  Tobacco abuse Supp o2 prn goal sats >88%--weaned to 4L NC  Continue nebulizers in place of home Trelegy  mucinex  Nicotine patch  Encourage IS, EMILY Sofia    Will sign off. Please call with questions. Will arrange outpatient follow up with our office.      Daily Progression:    2/3 patient seen this morning lying in bed. States he is feeling \"high.\" He denies dyspnea, chest pain, cough. Satting high 90s on 5L NC. Weaned to 4L NC.     Consult for acute on chronic resp failure w/ hypoxia and hypercapnia post op, severe empysema on home oxygen    HPI: 73 y/o M with PMH COPD (FEV1 1.75L, 53% in 2016), chronic hypoxemic respiratory failure, HTN, CVA, PAD, depression who is admitted with critical limb ischemia s/p left CF to PT bypass w/ ipsilateral NRGSV on .  Rapid response called earlier today d/t hypoxia (60s) on 3.5L O2 and new afib RVR.   O2 sats improved to the 90s after increasing O2 to midflow 10L.   HR improved following administration of PO metoprolol.  CXR no acute process  ABG with chronic compensated hypercapnia and hypoxia.    ROS: he is seen in bed, breathing comfortably. He reports that his breathing is at his baseline. Chronic congested cough, nonproductive. He denies leg pain, chest pain. For the past few weeks he has been using O2 continuously at home (3L).     He follows with Dr. Ritter, last visit 2024. He was stable at that time. Using 5L O2 w/ exertion and at night. Continued on Trelegy inhaler.     Social hx: smokes  HFA (PROVENTIL;VENTOLIN;PROAIR) 108 (90 Base) MCG/ACT inhaler Inhale into the lungs   Yes Automatic Reconciliation, Ar   citalopram (CELEXA) 10 MG tablet Take by mouth daily   Yes Automatic Reconciliation, Ar   umeclidinium-vilanterol (ANORO ELLIPTA) 62.5-25 MCG/ACT inhaler Inhale 1 puff into the lungs daily    Automatic Reconciliation, Ar     No Known Allergies   Social History     Tobacco Use    Smoking status: Every Day     Current packs/day: 0.50     Types: Cigarettes    Smokeless tobacco: Not on file   Substance Use Topics    Alcohol use: Yes     Alcohol/week: 16.0 standard drinks of alcohol     Comment: liquor - pint per week (whiskey)      Family History   Problem Relation Age of Onset    Other Mother         developed aortic tear after hip fracture    Stroke Father      OBJECTIVE:     Vital Signs:     BP (!) 153/95   Pulse 97   Temp 98 °F (36.7 °C) (Oral)   Resp 13   Ht 1.778 m (5' 10\")   Wt 70.3 kg (155 lb)   SpO2 93%   BMI 22.24 kg/m²    Temp (24hrs), Av.9 °F (36.6 °C), Min:97.5 °F (36.4 °C), Max:98 °F (36.7 °C)     Intake/Output:     Last shift:  07 -  1900  In: -   Out: 875 [Urine:875]    Last 3 shifts:  190 -  0700  In: -   Out: 1960 [Urine:1960]        Intake/Output Summary (Last 24 hours) at 2/3/2025 1157  Last data filed at 2/3/2025 1145  Gross per 24 hour   Intake --   Output 2260 ml   Net -2260 ml       Physical Exam:                                        Exam Findings Other   General: No resp distress noted, appears stated age    HEENT:  NCAT    Chest: No pectus deformity, normal chest rise b/l    HEART:  RRR    Lungs:  Diminished bilaterally     ABD: Soft/NT    EXT: RLE wrapped. S/p left great toe amputation, remaining toes necrotic, pedal erythema, edema     Skin: No rashes     Neuro: A/O x 3        Medications:  Current Facility-Administered Medications   Medication Dose Route Frequency    pantoprazole (PROTONIX) tablet 40 mg  40 mg Oral QAM AC    arformoterol

## 2025-02-03 NOTE — PLAN OF CARE
Problem: Skin/Tissue Integrity  Goal: Skin integrity remains intact  Description: 1.  Monitor for areas of redness and/or skin breakdown  2.  Assess vascular access sites hourly  3.  Every 4-6 hours minimum:  Change oxygen saturation probe site  4.  Every 4-6 hours:  If on nasal continuous positive airway pressure, respiratory therapy assess nares and determine need for appliance change or resting period  2/2/2025 2129 by Alberto Miller RN  Outcome: Progressing  2/2/2025 0900 by Argentina Brooks RN  Outcome: Progressing  Flowsheets (Taken 2/2/2025 0720)  Skin Integrity Remains Intact: Monitor for areas of redness and/or skin breakdown     Problem: Respiratory - Adult  Goal: Achieves optimal ventilation and oxygenation  2/2/2025 2129 by Alberto Miller RN  Outcome: Progressing  2/2/2025 0900 by Argentina Brooks RN  Outcome: Progressing  Flowsheets (Taken 2/2/2025 0720)  Achieves optimal ventilation and oxygenation: Assess for changes in respiratory status     Problem: Pain  Goal: Verbalizes/displays adequate comfort level or baseline comfort level  2/2/2025 2129 by Alberto Miller RN  Outcome: Progressing  2/2/2025 0900 by Argentina Brooks RN  Outcome: Progressing     Problem: Safety - Adult  Goal: Free from fall injury  2/2/2025 2129 by Alberto Miller RN  Outcome: Progressing  2/2/2025 0900 by Argentina Brooks RN  Outcome: Progressing     Problem: ABCDS Injury Assessment  Goal: Absence of physical injury  2/2/2025 2129 by Alberto Miller RN  Outcome: Progressing  2/2/2025 0900 by Argentina Brooks RN  Outcome: Progressing     Problem: Nutrition Deficit:  Goal: Optimize nutritional status  2/2/2025 2129 by Alberto Miller RN  Outcome: Progressing  2/2/2025 0900 by Argentina Brooks RN  Outcome: Progressing     Problem: Discharge Planning  Goal: Discharge to home or other facility with appropriate resources  2/2/2025 2129 by Alberto Miller RN  Outcome:

## 2025-02-04 LAB
ALBUMIN SERPL-MCNC: 2.3 G/DL (ref 3.5–5)
ANION GAP SERPL CALC-SCNC: 2 MMOL/L (ref 2–12)
BASOPHILS # BLD: 0.07 K/UL (ref 0–0.1)
BASOPHILS NFR BLD: 0.7 % (ref 0–1)
BUN SERPL-MCNC: 19 MG/DL (ref 6–20)
BUN/CREAT SERPL: 22 (ref 12–20)
CALCIUM SERPL-MCNC: 9.1 MG/DL (ref 8.5–10.1)
CHLORIDE SERPL-SCNC: 100 MMOL/L (ref 97–108)
CO2 SERPL-SCNC: 35 MMOL/L (ref 21–32)
CREAT SERPL-MCNC: 0.87 MG/DL (ref 0.7–1.3)
DIFFERENTIAL METHOD BLD: ABNORMAL
EOSINOPHIL # BLD: 0.27 K/UL (ref 0–0.4)
EOSINOPHIL NFR BLD: 2.8 % (ref 0–7)
ERYTHROCYTE [DISTWIDTH] IN BLOOD BY AUTOMATED COUNT: 19.4 % (ref 11.5–14.5)
GLUCOSE SERPL-MCNC: 90 MG/DL (ref 65–100)
HCT VFR BLD AUTO: 33.2 % (ref 36.6–50.3)
HGB BLD-MCNC: 10.2 G/DL (ref 12.1–17)
IMM GRANULOCYTES # BLD AUTO: 0.06 K/UL (ref 0–0.04)
IMM GRANULOCYTES NFR BLD AUTO: 0.6 % (ref 0–0.5)
LYMPHOCYTES # BLD: 1.1 K/UL (ref 0.8–3.5)
LYMPHOCYTES NFR BLD: 11.6 % (ref 12–49)
MCH RBC QN AUTO: 23.5 PG (ref 26–34)
MCHC RBC AUTO-ENTMCNC: 30.7 G/DL (ref 30–36.5)
MCV RBC AUTO: 76.5 FL (ref 80–99)
MONOCYTES # BLD: 0.72 K/UL (ref 0–1)
MONOCYTES NFR BLD: 7.6 % (ref 5–13)
NEUTS SEG # BLD: 7.26 K/UL (ref 1.8–8)
NEUTS SEG NFR BLD: 76.7 % (ref 32–75)
NRBC # BLD: 0 K/UL (ref 0–0.01)
NRBC BLD-RTO: 0 PER 100 WBC
PHOSPHATE SERPL-MCNC: 3.2 MG/DL (ref 2.6–4.7)
PLATELET # BLD AUTO: 480 K/UL (ref 150–400)
PMV BLD AUTO: 9.8 FL (ref 8.9–12.9)
POTASSIUM SERPL-SCNC: 4.6 MMOL/L (ref 3.5–5.1)
RBC # BLD AUTO: 4.34 M/UL (ref 4.1–5.7)
SODIUM SERPL-SCNC: 137 MMOL/L (ref 136–145)
WBC # BLD AUTO: 9.5 K/UL (ref 4.1–11.1)

## 2025-02-04 PROCEDURE — 2060000000 HC ICU INTERMEDIATE R&B

## 2025-02-04 PROCEDURE — 6360000002 HC RX W HCPCS: Performed by: SURGERY

## 2025-02-04 PROCEDURE — 80069 RENAL FUNCTION PANEL: CPT

## 2025-02-04 PROCEDURE — 94640 AIRWAY INHALATION TREATMENT: CPT

## 2025-02-04 PROCEDURE — 6370000000 HC RX 637 (ALT 250 FOR IP): Performed by: SURGERY

## 2025-02-04 PROCEDURE — 6360000002 HC RX W HCPCS: Performed by: STUDENT IN AN ORGANIZED HEALTH CARE EDUCATION/TRAINING PROGRAM

## 2025-02-04 PROCEDURE — 6370000000 HC RX 637 (ALT 250 FOR IP): Performed by: INTERNAL MEDICINE

## 2025-02-04 PROCEDURE — 6370000000 HC RX 637 (ALT 250 FOR IP): Performed by: STUDENT IN AN ORGANIZED HEALTH CARE EDUCATION/TRAINING PROGRAM

## 2025-02-04 PROCEDURE — 97530 THERAPEUTIC ACTIVITIES: CPT

## 2025-02-04 PROCEDURE — 2700000000 HC OXYGEN THERAPY PER DAY

## 2025-02-04 PROCEDURE — 2500000003 HC RX 250 WO HCPCS: Performed by: ANESTHESIOLOGY

## 2025-02-04 PROCEDURE — 5A0945A ASSISTANCE WITH RESPIRATORY VENTILATION, 24-96 CONSECUTIVE HOURS, HIGH NASAL FLOW/VELOCITY: ICD-10-PCS | Performed by: INTERNAL MEDICINE

## 2025-02-04 PROCEDURE — 36415 COLL VENOUS BLD VENIPUNCTURE: CPT

## 2025-02-04 PROCEDURE — 97530 THERAPEUTIC ACTIVITIES: CPT | Performed by: PHYSICAL THERAPIST

## 2025-02-04 PROCEDURE — 6360000002 HC RX W HCPCS: Performed by: PHYSICIAN ASSISTANT

## 2025-02-04 PROCEDURE — 6370000000 HC RX 637 (ALT 250 FOR IP): Performed by: PHYSICIAN ASSISTANT

## 2025-02-04 PROCEDURE — 97535 SELF CARE MNGMENT TRAINING: CPT

## 2025-02-04 PROCEDURE — 97116 GAIT TRAINING THERAPY: CPT | Performed by: PHYSICAL THERAPIST

## 2025-02-04 PROCEDURE — 85025 COMPLETE CBC W/AUTO DIFF WBC: CPT

## 2025-02-04 PROCEDURE — 2500000003 HC RX 250 WO HCPCS: Performed by: SURGERY

## 2025-02-04 RX ADMIN — BUDESONIDE 500 MCG: 0.5 INHALANT RESPIRATORY (INHALATION) at 09:09

## 2025-02-04 RX ADMIN — OXYCODONE 10 MG: 5 TABLET ORAL at 23:42

## 2025-02-04 RX ADMIN — ASPIRIN 81 MG: 81 TABLET, COATED ORAL at 08:05

## 2025-02-04 RX ADMIN — WATER 2000 MG: 1 INJECTION INTRAMUSCULAR; INTRAVENOUS; SUBCUTANEOUS at 14:19

## 2025-02-04 RX ADMIN — DILTIAZEM HYDROCHLORIDE 120 MG: 120 CAPSULE, COATED, EXTENDED RELEASE ORAL at 08:04

## 2025-02-04 RX ADMIN — GUAIFENESIN 1200 MG: 600 TABLET ORAL at 08:04

## 2025-02-04 RX ADMIN — ACETAMINOPHEN 1000 MG: 500 TABLET ORAL at 21:22

## 2025-02-04 RX ADMIN — ACETAMINOPHEN 1000 MG: 500 TABLET ORAL at 14:15

## 2025-02-04 RX ADMIN — OXYCODONE 10 MG: 5 TABLET ORAL at 11:33

## 2025-02-04 RX ADMIN — OXYCODONE 10 MG: 5 TABLET ORAL at 17:06

## 2025-02-04 RX ADMIN — APIXABAN 5 MG: 5 TABLET, FILM COATED ORAL at 08:05

## 2025-02-04 RX ADMIN — ATORVASTATIN CALCIUM 10 MG: 10 TABLET, FILM COATED ORAL at 21:23

## 2025-02-04 RX ADMIN — WATER 2000 MG: 1 INJECTION INTRAMUSCULAR; INTRAVENOUS; SUBCUTANEOUS at 23:37

## 2025-02-04 RX ADMIN — SODIUM CHLORIDE, PRESERVATIVE FREE 10 ML: 5 INJECTION INTRAVENOUS at 21:24

## 2025-02-04 RX ADMIN — ARFORMOTEROL TARTRATE 15 MCG: 15 SOLUTION RESPIRATORY (INHALATION) at 09:09

## 2025-02-04 RX ADMIN — Medication: at 21:25

## 2025-02-04 RX ADMIN — BUDESONIDE 500 MCG: 0.5 INHALANT RESPIRATORY (INHALATION) at 20:54

## 2025-02-04 RX ADMIN — WATER 2000 MG: 1 INJECTION INTRAMUSCULAR; INTRAVENOUS; SUBCUTANEOUS at 08:05

## 2025-02-04 RX ADMIN — ACETAMINOPHEN 1000 MG: 500 TABLET ORAL at 06:46

## 2025-02-04 RX ADMIN — IPRATROPIUM BROMIDE 0.5 MG: 0.5 SOLUTION RESPIRATORY (INHALATION) at 09:09

## 2025-02-04 RX ADMIN — IPRATROPIUM BROMIDE 0.5 MG: 0.5 SOLUTION RESPIRATORY (INHALATION) at 13:48

## 2025-02-04 RX ADMIN — PANTOPRAZOLE SODIUM 40 MG: 40 TABLET, DELAYED RELEASE ORAL at 06:54

## 2025-02-04 RX ADMIN — ARFORMOTEROL TARTRATE 15 MCG: 15 SOLUTION RESPIRATORY (INHALATION) at 20:54

## 2025-02-04 RX ADMIN — APIXABAN 5 MG: 5 TABLET, FILM COATED ORAL at 21:22

## 2025-02-04 RX ADMIN — IPRATROPIUM BROMIDE 0.5 MG: 0.5 SOLUTION RESPIRATORY (INHALATION) at 20:49

## 2025-02-04 RX ADMIN — CITALOPRAM HYDROBROMIDE 10 MG: 20 TABLET ORAL at 08:05

## 2025-02-04 RX ADMIN — SODIUM CHLORIDE, PRESERVATIVE FREE 10 ML: 5 INJECTION INTRAVENOUS at 08:06

## 2025-02-04 RX ADMIN — OXYCODONE 10 MG: 5 TABLET ORAL at 04:11

## 2025-02-04 RX ADMIN — Medication: at 08:05

## 2025-02-04 RX ADMIN — GUAIFENESIN 1200 MG: 600 TABLET ORAL at 21:22

## 2025-02-04 ASSESSMENT — PAIN SCALES - GENERAL
PAINLEVEL_OUTOF10: 7
PAINLEVEL_OUTOF10: 8
PAINLEVEL_OUTOF10: 7
PAINLEVEL_OUTOF10: 8
PAINLEVEL_OUTOF10: 4
PAINLEVEL_OUTOF10: 7
PAINLEVEL_OUTOF10: 4

## 2025-02-04 ASSESSMENT — PAIN DESCRIPTION - LOCATION
LOCATION: LEG;FOOT
LOCATION: LEG;FOOT
LOCATION: FOOT;LEG
LOCATION: LEG;FOOT

## 2025-02-04 ASSESSMENT — PAIN DESCRIPTION - DESCRIPTORS
DESCRIPTORS: ACHING

## 2025-02-04 ASSESSMENT — PAIN DESCRIPTION - ORIENTATION
ORIENTATION: LEFT

## 2025-02-04 NOTE — PLAN OF CARE
Problem: Occupational Therapy - Adult  Goal: By Discharge: Performs self-care activities at highest level of function for planned discharge setting.  See evaluation for individualized goals.  Description: FUNCTIONAL STATUS PRIOR TO ADMISSION:  Patient was independent to mod I for ADLs and functional mobility. He reported mainly furniture walking in his house but also reported having rolling walker if needed.   Receives Help From: Family, Prior Level of Assist for ADLs: Independent,  ,  ,  ,  ,  , Prior Level of Assist for Homemaking: Independent, Ambulation Assistance: Independent, Prior Level of Assist for Transfers: Independent, Active : Yes     HOME SUPPORT: Patient lived alone.    Occupational Therapy Goals:  Initiated 1/31/2025  1.  Patient will perform grooming in standing with Supervision within 7 day(s).  2.  Patient will perform upper body dressing with Supervision within 7 day(s).  3.  Patient will perform lower body dressing using AE PRN with Moderate Assist within 7 day(s).  4.  Patient will perform toilet transfers with Minimal Assist  within 7 day(s).  5.  Patient will perform all aspects of toileting with Minimal Assist within 7 day(s).  6.  Patient will participate in upper extremity therapeutic exercise/activities with Stand by Assist for 10 minutes within 7 day(s).    Outcome: Progressing   OCCUPATIONAL THERAPY TREATMENT  Patient: Nasir Hatfield (74 y.o. male)  Date: 2/4/2025  Primary Diagnosis: Critical lower limb ischemia (HCC) [I70.229]  Acute lower limb ischemia [I99.8]  Procedure(s) (LRB):  LEFT LOWER EXTREMITY TIBIAL BYPASS WITH VEIN (N/A) 6 Days Post-Op   Precautions: Weight Bearing, Bed Alarm, Fall Risk   Left Lower Extremity Weight Bearing: Weight Bearing As Tolerated            Chart, occupational therapy assessment, plan of care, and goals were reviewed.    ASSESSMENT  Patient continues to benefit from skilled OT services and is progressing towards goals. Patient received  Strategies;Transfer Training  Education Method: Verbal;Demonstration  Barriers to Learning: Cognition  Education Outcome: Verbalized understanding;Demonstrated understanding;Continued education needed    Thank you for this referral.  Diane Lorenzo OT  Minutes: 23

## 2025-02-04 NOTE — PROGRESS NOTES
.End of Shift Note    Bedside shift change report given to NICHOLAS Funk (oncoming nurse) by LEMUEL LOMELI RN (offgoing nurse).  Report included the following information SBAR, Cardiac Rhythm  , and Dual Neuro Assessment    Shift worked:  7p-7a     Shift summary and any significant changes:     Pt c/o  significant pain overnight.  Pt falls  asleep after taking  pain med  but  c/o high pain when awake.   Concerns for physician to address:  Elevated  HR frequently overnight.     Zone phone for oncoming shift:   [phone]       Activity:     Number times ambulated in hallways past shift: 0  Number of times OOB to chair past shift: 3    Cardiac:   Cardiac Monitoring: Yes           Access:  Current line(s): PIV     Genitourinary:   Urinary status: voiding    Respiratory:      Chronic home O2 use?: NO  Incentive spirometer at bedside: NO       GI:     Current diet:  ADULT DIET; Regular  ADULT ORAL NUTRITION SUPPLEMENT; Breakfast, Dinner; Standard High Calorie/High Protein Oral Supplement  Passing flatus: YES  Tolerating current diet: YES       Pain Management:   Patient states pain is manageable on current regimen: YES    Skin:     Interventions: float heels and increase time out of bed    Patient Safety:  Fall Score:    Interventions: gripper socks and pt to call before getting OOB       Length of Stay:  Expected LOS: 9  Actual LOS: 9      LEMUEL LOMELI RN

## 2025-02-04 NOTE — PLAN OF CARE
Problem: Physical Therapy - Adult  Goal: By Discharge: Performs mobility at highest level of function for planned discharge setting.  See evaluation for individualized goals.  Description: FUNCTIONAL STATUS PRIOR TO ADMISSION: Patient was independent and active without use of DME. Patient admits to furniture walking in the home. He is a retired . Patient wears 3L O2 at baseline.     HOME SUPPORT PRIOR TO ADMISSION: The patient lived alone with friends and neighbors to provide assistance.    Physical Therapy Goals  Initiated 1/31/2025  1.  Patient will move from supine to sit and sit to supine, scoot up and down, and roll side to side in bed with modified independence within 7 day(s).    2.  Patient will perform sit to stand with modified independence within 7 day(s).  3.  Patient will transfer from bed to chair and chair to bed with modified independence using the least restrictive device within 7 day(s).  4.  Patient will ambulate with modified independence for 100 feet with the least restrictive device within 7 day(s).   5.  Patient will ascend/descend 15 stairs with bilateral handrail(s) with modified independence within 7 day(s).   Outcome: Progressing   PHYSICAL THERAPY TREATMENT    Patient: Nasir Hatfield (74 y.o. male)  Date: 2/4/2025  Diagnosis: Critical lower limb ischemia (HCC) [I70.229]  Acute lower limb ischemia [I99.8] Critical lower limb ischemia (HCC)  Procedure(s) (LRB):  LEFT LOWER EXTREMITY TIBIAL BYPASS WITH VEIN (N/A) 6 Days Post-Op  Precautions: Weight Bearing, Bed Alarm, Fall Risk   Left Lower Extremity Weight Bearing: Weight Bearing As Tolerated                  ASSESSMENT:  Patient continues to benefit from skilled PT services and is slowly progressing towards goals. Patient in semi-escobar's upon arrival; agreeable to mobility.  Able to come to EOB with CGA and additional time.  Good sitting balance noted. Patient required brief rest and verbal cues for pursed lip breathing as  Training  Bed Mobility Training: Yes  Supine to Sit: Additional time;Contact-guard assistance  Scooting: Supervision  Transfers:  Transfer Training  Transfer Training: Yes  Sit to Stand: Minimum assistance (CGA from higher surface)  Stand to Sit: Contact-guard assistance  Bed to Chair: Minimum assistance  Balance:  Balance  Sitting: Intact  Sitting - Static: Good (unsupported)  Sitting - Dynamic: Good (unsupported)  Standing: Impaired  Standing - Static: Constant support;Fair  Standing - Dynamic: Fair;Constant support   Ambulation/Gait Training:     Gait  Gait Training: Yes  Left Side Weight Bearing: As tolerated (patient remains NWB due to pain)  Right Side Weight Bearing: Full  Distance (ft): 5 Feet (2', 3')  Assistive Device: Gait belt;Walker, rolling  Interventions: Verbal cues  Speed/Corazon: Slow  Step Length: Right shortened  Gait Abnormalities: Decreased step clearance        Neuro Re-Education:                    Pain Rating:  Number not provided  Pain Intervention(s):   patient medicated for pain prior to session    Activity Tolerance:   Good    After treatment:   Patient left in no apparent distress sitting up in chair, Call bell within reach, Bed/ chair alarm activated, and Heels elevated for pressure relief      COMMUNICATION/EDUCATION:   The patient's plan of care was discussed with: occupational therapist and registered nurse    Patient Education  Education Given To: Patient  Education Provided: Role of Therapy;Plan of Care;Transfer Training;Mobility Training  Education Method: Verbal  Barriers to Learning: None  Education Outcome: Verbalized understanding;Continued education needed      Alessandra Caputo, PT  Minutes: 23

## 2025-02-04 NOTE — PROGRESS NOTES
Comfortable  Breathing better  HR variable   Good graft pulse at knee  Hot hot  Toes demarcated and beginning to have some erythema on forefoot    Restart IV antibiotics  Cardiology input  Needs TMA this week  Willl update daughter

## 2025-02-04 NOTE — PROGRESS NOTES
Hospitalist Progress Note    NAME:   Nasir Hatfield   : 1950   MRN: [id number]     Date/Time: 2025 9:32 AM  Patient PCP: Annie Mcnair APRN - NP    Estimated discharge date: unknown probably 2/10  Barriers: Amputation and then IPR placement      Assessment / Plan:    Critical limb ischemia- L foot POA- s/p Left CFA=>PT bypass w/ipsilateral NRGSV by Dr Lorenzo   - Here with worsening L leg swelling, pain, redness recently  - CTA runoff showing occluded left superficial femoral artery, distal vessels difficult to evaluation, probably reconstitution at level of popliteal artery   IP Vascular Surgery consulted- S/p Left CFA=>PT bypass w/ipsilateral NRGSV  by Dr Lorenzo, no cleared from their standpoint for DC planning  Cont ASA & resumed lovenox SQ  S/p IV heparin drip - Dcd post op  Off IVF post op  Cont post op pain management as per surgical team-  cont PO Oxycodone + IV dilaudid prn , pt off epidural drip  S/p booker out today post op- voiding today  BM today per pt  IP Pt/Ot eval for DC planning noted- IPR recommended  25  Patient doing well less rest pain in foot  2/3/25: will need a TMA prior to discharge  25: Amputaion planned     L foot cellulitis with Toe necrosis POA  -WBC 23.5->19.9->16.8->14.4 ->15.4  -Color change to LLE however no madison erythema, leg is cold   - Blood cultures sent- neg > 24 hrs  - crp elevated as expected 17.7  CXR - severe Bullous Emphysema noted  S/p IV Vanc and Zosyn for now - empiric IV Abx stopped now  25 Blood Cx neg x 5 days now  Remains Afebrile   2/3/25 BC NGTD  25 BC NGTD, Abx restarted in anticipation of OR and cellulitis rash     Acute on Chronic hypoxic & hypercapneic respiratory failure POA-   Increased oxygen demand post day 1- up to 10L/m today AM  ,   on 3L/m NC home oxygen at baseline  Hx of severe COPD with Emphysema POA- pt of Dr Ritter ([facility])  Afib RVR  - Continue home inhalers  - Lung sounds coarse  - Continue PRN duonebs as          Intake/Output Summary (Last 24 hours) at 2/4/2025 0932  Last data filed at 2/4/2025 0648  Gross per 24 hour   Intake --   Output 2025 ml   Net -2025 ml        I had a face to face encounter and independently examined this patient on 2/4/2025, as outlined below:  PHYSICAL EXAM:  General: Alert, cooperative  EENT:  EOMI. Anicteric sclerae.  Resp:  CTA bilaterally, no wheezing or rales.  No accessory muscle use  CV:  Regular  rhythm,  No edema  GI:  Soft, Non distended, Non tender.  +Bowel sounds  Neurologic:  Alert and oriented X 3, normal speech,   Psych:   Good insight. Not anxious nor agitated  Skin:  No rashes.  No jaundice    Reviewed most current lab test results and cultures  YES  Reviewed most current radiology test results   YES  Review and summation of old records today    NO  Reviewed patient's current orders and MAR    YES  PMH/SH reviewed - no change compared to H&P    Procedures: see electronic medical records for all procedures/Xrays and details which were not copied into this note but were reviewed prior to creation of Plan.      LABS:  I reviewed today's most current labs and imaging studies.  Pertinent labs include:  Recent Labs     02/03/25  0043 02/04/25  0404   WBC 11.0 9.5   HGB 8.9* 10.2*   HCT 28.7* 33.2*    480*     Recent Labs     02/03/25  0043 02/04/25  0404   * 137   K 4.2 4.6    100   CO2 33* 35*   GLUCOSE 89 90   BUN 19 19   CREATININE 0.66* 0.87   CALCIUM 8.3* 9.1   MG 2.0  --    PHOS 2.9 3.2       Signed: Jack Nash MD

## 2025-02-04 NOTE — PROGRESS NOTES
.End of Shift Note    Bedside shift change report given to Derrek PETERSON (oncoming nurse) by Pilar Conklin RN (offgoing nurse).  Report included the following information SBAR, Cardiac Rhythm  , and Dual Neuro Assessment    Shift worked:  5205-3675     Shift summary and any significant changes:     Wound care completed on insicion sites.      Concerns for physician to address:       Zone phone for oncoming shift:   [phone]       Activity:     Number times ambulated in hallways past shift: 0  Number of times OOB to chair past shift: 3    Cardiac:   Cardiac Monitoring: Yes           Access:  Current line(s): PIV     Genitourinary:   Urinary status: voiding    Respiratory:      Chronic home O2 use?: NO  Incentive spirometer at bedside: NO       GI:     Current diet:  ADULT DIET; Regular  ADULT ORAL NUTRITION SUPPLEMENT; Breakfast, Dinner; Standard High Calorie/High Protein Oral Supplement  Passing flatus: YES  Tolerating current diet: YES       Pain Management:   Patient states pain is manageable on current regimen: YES    Skin:     Interventions: float heels and increase time out of bed    Patient Safety:  Fall Score:    Interventions: gripper socks and pt to call before getting OOB       Length of Stay:  Expected LOS: 9  Actual LOS: 8      Pilar Conklin, RN

## 2025-02-05 ENCOUNTER — ANESTHESIA (OUTPATIENT)
Facility: HOSPITAL | Age: 75
End: 2025-02-05
Payer: MEDICARE

## 2025-02-05 ENCOUNTER — ANESTHESIA EVENT (OUTPATIENT)
Facility: HOSPITAL | Age: 75
End: 2025-02-05
Payer: MEDICARE

## 2025-02-05 LAB
ALBUMIN SERPL-MCNC: 2.3 G/DL (ref 3.5–5)
ANION GAP SERPL CALC-SCNC: 2 MMOL/L (ref 2–12)
BASOPHILS # BLD: 0.08 K/UL (ref 0–0.1)
BASOPHILS NFR BLD: 0.8 % (ref 0–1)
BUN SERPL-MCNC: 24 MG/DL (ref 6–20)
BUN/CREAT SERPL: 29 (ref 12–20)
CALCIUM SERPL-MCNC: 9.1 MG/DL (ref 8.5–10.1)
CHLORIDE SERPL-SCNC: 100 MMOL/L (ref 97–108)
CO2 SERPL-SCNC: 35 MMOL/L (ref 21–32)
CREAT SERPL-MCNC: 0.82 MG/DL (ref 0.7–1.3)
DIFFERENTIAL METHOD BLD: ABNORMAL
EOSINOPHIL # BLD: 0.35 K/UL (ref 0–0.4)
EOSINOPHIL NFR BLD: 3.5 % (ref 0–7)
ERYTHROCYTE [DISTWIDTH] IN BLOOD BY AUTOMATED COUNT: 18.9 % (ref 11.5–14.5)
GLUCOSE SERPL-MCNC: 100 MG/DL (ref 65–100)
HCT VFR BLD AUTO: 32.3 % (ref 36.6–50.3)
HGB BLD-MCNC: 9.8 G/DL (ref 12.1–17)
IMM GRANULOCYTES # BLD AUTO: 0.05 K/UL (ref 0–0.04)
IMM GRANULOCYTES NFR BLD AUTO: 0.5 % (ref 0–0.5)
LYMPHOCYTES # BLD: 1.16 K/UL (ref 0.8–3.5)
LYMPHOCYTES NFR BLD: 11.7 % (ref 12–49)
MCH RBC QN AUTO: 23.7 PG (ref 26–34)
MCHC RBC AUTO-ENTMCNC: 30.3 G/DL (ref 30–36.5)
MCV RBC AUTO: 78.2 FL (ref 80–99)
MONOCYTES # BLD: 0.83 K/UL (ref 0–1)
MONOCYTES NFR BLD: 8.4 % (ref 5–13)
NEUTS SEG # BLD: 7.41 K/UL (ref 1.8–8)
NEUTS SEG NFR BLD: 75.1 % (ref 32–75)
NRBC # BLD: 0 K/UL (ref 0–0.01)
NRBC BLD-RTO: 0 PER 100 WBC
PHOSPHATE SERPL-MCNC: 3.7 MG/DL (ref 2.6–4.7)
PLATELET # BLD AUTO: 545 K/UL (ref 150–400)
PMV BLD AUTO: 10.3 FL (ref 8.9–12.9)
POTASSIUM SERPL-SCNC: 4.7 MMOL/L (ref 3.5–5.1)
RBC # BLD AUTO: 4.13 M/UL (ref 4.1–5.7)
SODIUM SERPL-SCNC: 137 MMOL/L (ref 136–145)
WBC # BLD AUTO: 9.9 K/UL (ref 4.1–11.1)

## 2025-02-05 PROCEDURE — 2709999900 HC NON-CHARGEABLE SUPPLY: Performed by: SURGERY

## 2025-02-05 PROCEDURE — 6360000002 HC RX W HCPCS: Performed by: SURGERY

## 2025-02-05 PROCEDURE — 0Y6N0ZC DETACHMENT AT LEFT FOOT, PARTIAL 3RD RAY, OPEN APPROACH: ICD-10-PCS | Performed by: SURGERY

## 2025-02-05 PROCEDURE — 2700000000 HC OXYGEN THERAPY PER DAY

## 2025-02-05 PROCEDURE — 7100000001 HC PACU RECOVERY - ADDTL 15 MIN: Performed by: SURGERY

## 2025-02-05 PROCEDURE — 6370000000 HC RX 637 (ALT 250 FOR IP): Performed by: SURGERY

## 2025-02-05 PROCEDURE — 88305 TISSUE EXAM BY PATHOLOGIST: CPT

## 2025-02-05 PROCEDURE — 2500000003 HC RX 250 WO HCPCS: Performed by: SURGERY

## 2025-02-05 PROCEDURE — 85025 COMPLETE CBC W/AUTO DIFF WBC: CPT

## 2025-02-05 PROCEDURE — 87075 CULTR BACTERIA EXCEPT BLOOD: CPT

## 2025-02-05 PROCEDURE — 0Y6N0ZF DETACHMENT AT LEFT FOOT, PARTIAL 5TH RAY, OPEN APPROACH: ICD-10-PCS | Performed by: SURGERY

## 2025-02-05 PROCEDURE — 6360000002 HC RX W HCPCS: Performed by: REGISTERED NURSE

## 2025-02-05 PROCEDURE — 3700000000 HC ANESTHESIA ATTENDED CARE: Performed by: SURGERY

## 2025-02-05 PROCEDURE — 3600000002 HC SURGERY LEVEL 2 BASE: Performed by: SURGERY

## 2025-02-05 PROCEDURE — 6360000002 HC RX W HCPCS: Performed by: PHYSICIAN ASSISTANT

## 2025-02-05 PROCEDURE — 3700000001 HC ADD 15 MINUTES (ANESTHESIA): Performed by: SURGERY

## 2025-02-05 PROCEDURE — 6360000002 HC RX W HCPCS: Performed by: ANESTHESIOLOGY

## 2025-02-05 PROCEDURE — 88311 DECALCIFY TISSUE: CPT

## 2025-02-05 PROCEDURE — 7100000000 HC PACU RECOVERY - FIRST 15 MIN: Performed by: SURGERY

## 2025-02-05 PROCEDURE — 2580000003 HC RX 258: Performed by: SURGERY

## 2025-02-05 PROCEDURE — 2060000000 HC ICU INTERMEDIATE R&B

## 2025-02-05 PROCEDURE — 80069 RENAL FUNCTION PANEL: CPT

## 2025-02-05 PROCEDURE — 36415 COLL VENOUS BLD VENIPUNCTURE: CPT

## 2025-02-05 PROCEDURE — 0Y6N0ZB DETACHMENT AT LEFT FOOT, PARTIAL 2ND RAY, OPEN APPROACH: ICD-10-PCS | Performed by: SURGERY

## 2025-02-05 PROCEDURE — 6360000002 HC RX W HCPCS: Performed by: STUDENT IN AN ORGANIZED HEALTH CARE EDUCATION/TRAINING PROGRAM

## 2025-02-05 PROCEDURE — 87070 CULTURE OTHR SPECIMN AEROBIC: CPT

## 2025-02-05 PROCEDURE — 94640 AIRWAY INHALATION TREATMENT: CPT

## 2025-02-05 PROCEDURE — 3600000012 HC SURGERY LEVEL 2 ADDTL 15MIN: Performed by: SURGERY

## 2025-02-05 PROCEDURE — 87205 SMEAR GRAM STAIN: CPT

## 2025-02-05 PROCEDURE — 0Y6N0ZD DETACHMENT AT LEFT FOOT, PARTIAL 4TH RAY, OPEN APPROACH: ICD-10-PCS | Performed by: SURGERY

## 2025-02-05 RX ORDER — NALOXONE HYDROCHLORIDE 0.4 MG/ML
INJECTION, SOLUTION INTRAMUSCULAR; INTRAVENOUS; SUBCUTANEOUS PRN
Status: DISCONTINUED | OUTPATIENT
Start: 2025-02-05 | End: 2025-02-05 | Stop reason: HOSPADM

## 2025-02-05 RX ORDER — SODIUM CHLORIDE 0.9 % (FLUSH) 0.9 %
5-40 SYRINGE (ML) INJECTION EVERY 12 HOURS SCHEDULED
Status: DISCONTINUED | OUTPATIENT
Start: 2025-02-05 | End: 2025-02-05 | Stop reason: HOSPADM

## 2025-02-05 RX ORDER — ARFORMOTEROL TARTRATE 15 UG/2ML
15 SOLUTION RESPIRATORY (INHALATION)
Status: DISCONTINUED | OUTPATIENT
Start: 2025-02-05 | End: 2025-02-15 | Stop reason: HOSPADM

## 2025-02-05 RX ORDER — SODIUM CHLORIDE 0.9 % (FLUSH) 0.9 %
5-40 SYRINGE (ML) INJECTION PRN
Status: DISCONTINUED | OUTPATIENT
Start: 2025-02-05 | End: 2025-02-05 | Stop reason: HOSPADM

## 2025-02-05 RX ORDER — PROPOFOL 10 MG/ML
INJECTION, EMULSION INTRAVENOUS
Status: DISCONTINUED | OUTPATIENT
Start: 2025-02-05 | End: 2025-02-05 | Stop reason: SDUPTHER

## 2025-02-05 RX ORDER — HYDRALAZINE HYDROCHLORIDE 20 MG/ML
10 INJECTION INTRAMUSCULAR; INTRAVENOUS ONCE
Status: DISCONTINUED | OUTPATIENT
Start: 2025-02-05 | End: 2025-02-05 | Stop reason: HOSPADM

## 2025-02-05 RX ORDER — HYDROMORPHONE HYDROCHLORIDE 1 MG/ML
0.5 INJECTION, SOLUTION INTRAMUSCULAR; INTRAVENOUS; SUBCUTANEOUS EVERY 5 MIN PRN
Status: DISCONTINUED | OUTPATIENT
Start: 2025-02-05 | End: 2025-02-05 | Stop reason: HOSPADM

## 2025-02-05 RX ORDER — ONDANSETRON 2 MG/ML
4 INJECTION INTRAMUSCULAR; INTRAVENOUS
Status: DISCONTINUED | OUTPATIENT
Start: 2025-02-05 | End: 2025-02-05 | Stop reason: HOSPADM

## 2025-02-05 RX ORDER — PROCHLORPERAZINE EDISYLATE 5 MG/ML
5 INJECTION INTRAMUSCULAR; INTRAVENOUS
Status: DISCONTINUED | OUTPATIENT
Start: 2025-02-05 | End: 2025-02-05 | Stop reason: HOSPADM

## 2025-02-05 RX ORDER — FENTANYL CITRATE 50 UG/ML
25 INJECTION, SOLUTION INTRAMUSCULAR; INTRAVENOUS EVERY 5 MIN PRN
Status: COMPLETED | OUTPATIENT
Start: 2025-02-05 | End: 2025-02-05

## 2025-02-05 RX ORDER — SODIUM CHLORIDE, SODIUM LACTATE, POTASSIUM CHLORIDE, CALCIUM CHLORIDE 600; 310; 30; 20 MG/100ML; MG/100ML; MG/100ML; MG/100ML
INJECTION, SOLUTION INTRAVENOUS CONTINUOUS
Status: DISCONTINUED | OUTPATIENT
Start: 2025-02-05 | End: 2025-02-06

## 2025-02-05 RX ORDER — LIDOCAINE HYDROCHLORIDE 10 MG/ML
INJECTION, SOLUTION EPIDURAL; INFILTRATION; INTRACAUDAL; PERINEURAL PRN
Status: DISCONTINUED | OUTPATIENT
Start: 2025-02-05 | End: 2025-02-05 | Stop reason: ALTCHOICE

## 2025-02-05 RX ORDER — SODIUM CHLORIDE 9 MG/ML
INJECTION, SOLUTION INTRAVENOUS PRN
Status: DISCONTINUED | OUTPATIENT
Start: 2025-02-05 | End: 2025-02-05 | Stop reason: HOSPADM

## 2025-02-05 RX ORDER — LIDOCAINE HYDROCHLORIDE 20 MG/ML
INJECTION, SOLUTION EPIDURAL; INFILTRATION; INTRACAUDAL; PERINEURAL
Status: DISCONTINUED | OUTPATIENT
Start: 2025-02-05 | End: 2025-02-05 | Stop reason: SDUPTHER

## 2025-02-05 RX ORDER — OXYCODONE HYDROCHLORIDE 5 MG/1
5 TABLET ORAL
Status: DISCONTINUED | OUTPATIENT
Start: 2025-02-05 | End: 2025-02-05 | Stop reason: HOSPADM

## 2025-02-05 RX ADMIN — BUDESONIDE 500 MCG: 0.5 INHALANT RESPIRATORY (INHALATION) at 20:02

## 2025-02-05 RX ADMIN — PROPOFOL 25 MG: 10 INJECTION, EMULSION INTRAVENOUS at 09:43

## 2025-02-05 RX ADMIN — ATORVASTATIN CALCIUM 10 MG: 10 TABLET, FILM COATED ORAL at 22:00

## 2025-02-05 RX ADMIN — WATER 2000 MG: 1 INJECTION INTRAMUSCULAR; INTRAVENOUS; SUBCUTANEOUS at 09:10

## 2025-02-05 RX ADMIN — LIDOCAINE HYDROCHLORIDE 40 MG: 20 INJECTION, SOLUTION EPIDURAL; INFILTRATION; INTRACAUDAL; PERINEURAL at 09:21

## 2025-02-05 RX ADMIN — PROPOFOL 20 MG: 10 INJECTION, EMULSION INTRAVENOUS at 09:45

## 2025-02-05 RX ADMIN — PROPOFOL 25 MCG/KG/MIN: 10 INJECTION, EMULSION INTRAVENOUS at 09:22

## 2025-02-05 RX ADMIN — APIXABAN 5 MG: 5 TABLET, FILM COATED ORAL at 11:57

## 2025-02-05 RX ADMIN — WATER 2000 MG: 1 INJECTION INTRAMUSCULAR; INTRAVENOUS; SUBCUTANEOUS at 23:40

## 2025-02-05 RX ADMIN — BUDESONIDE 500 MCG: 0.5 INHALANT RESPIRATORY (INHALATION) at 07:30

## 2025-02-05 RX ADMIN — CITALOPRAM HYDROBROMIDE 10 MG: 20 TABLET ORAL at 11:57

## 2025-02-05 RX ADMIN — ASPIRIN 81 MG: 81 TABLET, COATED ORAL at 11:56

## 2025-02-05 RX ADMIN — OXYCODONE 10 MG: 5 TABLET ORAL at 18:43

## 2025-02-05 RX ADMIN — IPRATROPIUM BROMIDE 0.5 MG: 0.5 SOLUTION RESPIRATORY (INHALATION) at 20:02

## 2025-02-05 RX ADMIN — GUAIFENESIN 1200 MG: 600 TABLET ORAL at 22:00

## 2025-02-05 RX ADMIN — ACETAMINOPHEN 1000 MG: 500 TABLET ORAL at 22:00

## 2025-02-05 RX ADMIN — HYDROMORPHONE HYDROCHLORIDE 1 MG: 1 INJECTION, SOLUTION INTRAMUSCULAR; INTRAVENOUS; SUBCUTANEOUS at 16:12

## 2025-02-05 RX ADMIN — Medication: at 22:41

## 2025-02-05 RX ADMIN — ACETAMINOPHEN 1000 MG: 500 TABLET ORAL at 14:01

## 2025-02-05 RX ADMIN — HYDROMORPHONE HYDROCHLORIDE 0.1 MG: 1 INJECTION, SOLUTION INTRAMUSCULAR; INTRAVENOUS; SUBCUTANEOUS at 10:19

## 2025-02-05 RX ADMIN — FENTANYL CITRATE 25 MCG: 50 INJECTION INTRAMUSCULAR; INTRAVENOUS at 10:53

## 2025-02-05 RX ADMIN — FENTANYL CITRATE 25 MCG: 50 INJECTION INTRAMUSCULAR; INTRAVENOUS at 10:58

## 2025-02-05 RX ADMIN — Medication: at 12:01

## 2025-02-05 RX ADMIN — HYDROMORPHONE HYDROCHLORIDE 1 MG: 1 INJECTION, SOLUTION INTRAMUSCULAR; INTRAVENOUS; SUBCUTANEOUS at 22:37

## 2025-02-05 RX ADMIN — ARFORMOTEROL TARTRATE 15 MCG: 15 SOLUTION RESPIRATORY (INHALATION) at 07:30

## 2025-02-05 RX ADMIN — WATER 2000 MG: 1 INJECTION INTRAMUSCULAR; INTRAVENOUS; SUBCUTANEOUS at 15:50

## 2025-02-05 RX ADMIN — IPRATROPIUM BROMIDE 0.5 MG: 0.5 SOLUTION RESPIRATORY (INHALATION) at 07:30

## 2025-02-05 RX ADMIN — ARFORMOTEROL TARTRATE 15 MCG: 15 SOLUTION RESPIRATORY (INHALATION) at 20:02

## 2025-02-05 RX ADMIN — OXYCODONE 10 MG: 5 TABLET ORAL at 23:47

## 2025-02-05 RX ADMIN — OXYCODONE 10 MG: 5 TABLET ORAL at 11:58

## 2025-02-05 RX ADMIN — HYDROMORPHONE HYDROCHLORIDE 0.3 MG: 1 INJECTION, SOLUTION INTRAMUSCULAR; INTRAVENOUS; SUBCUTANEOUS at 10:26

## 2025-02-05 RX ADMIN — DILTIAZEM HYDROCHLORIDE 120 MG: 120 CAPSULE, COATED, EXTENDED RELEASE ORAL at 11:56

## 2025-02-05 RX ADMIN — FENTANYL CITRATE 25 MCG: 50 INJECTION INTRAMUSCULAR; INTRAVENOUS at 10:48

## 2025-02-05 RX ADMIN — HYDROMORPHONE HYDROCHLORIDE 0.2 MG: 1 INJECTION, SOLUTION INTRAMUSCULAR; INTRAVENOUS; SUBCUTANEOUS at 10:05

## 2025-02-05 RX ADMIN — APIXABAN 5 MG: 5 TABLET, FILM COATED ORAL at 22:00

## 2025-02-05 RX ADMIN — PROPOFOL 50 MG: 10 INJECTION, EMULSION INTRAVENOUS at 09:21

## 2025-02-05 RX ADMIN — SODIUM CHLORIDE, SODIUM LACTATE, POTASSIUM CHLORIDE, CALCIUM CHLORIDE: 600; 310; 30; 20 INJECTION, SOLUTION INTRAVENOUS at 08:50

## 2025-02-05 RX ADMIN — FENTANYL CITRATE 25 MCG: 50 INJECTION INTRAMUSCULAR; INTRAVENOUS at 11:03

## 2025-02-05 RX ADMIN — ACETAMINOPHEN 1000 MG: 500 TABLET ORAL at 05:13

## 2025-02-05 ASSESSMENT — PAIN - FUNCTIONAL ASSESSMENT
PAIN_FUNCTIONAL_ASSESSMENT: PREVENTS OR INTERFERES SOME ACTIVE ACTIVITIES AND ADLS
PAIN_FUNCTIONAL_ASSESSMENT: ACTIVITIES ARE NOT PREVENTED
PAIN_FUNCTIONAL_ASSESSMENT: ACTIVITIES ARE NOT PREVENTED
PAIN_FUNCTIONAL_ASSESSMENT: PREVENTS OR INTERFERES SOME ACTIVE ACTIVITIES AND ADLS
PAIN_FUNCTIONAL_ASSESSMENT: ACTIVITIES ARE NOT PREVENTED
PAIN_FUNCTIONAL_ASSESSMENT: PREVENTS OR INTERFERES SOME ACTIVE ACTIVITIES AND ADLS
PAIN_FUNCTIONAL_ASSESSMENT: ACTIVITIES ARE NOT PREVENTED
PAIN_FUNCTIONAL_ASSESSMENT: ACTIVITIES ARE NOT PREVENTED
PAIN_FUNCTIONAL_ASSESSMENT: PREVENTS OR INTERFERES WITH MANY ACTIVE NOT PASSIVE ACTIVITIES
PAIN_FUNCTIONAL_ASSESSMENT: ACTIVITIES ARE NOT PREVENTED

## 2025-02-05 ASSESSMENT — PAIN DESCRIPTION - DESCRIPTORS
DESCRIPTORS: THROBBING

## 2025-02-05 ASSESSMENT — PAIN DESCRIPTION - LOCATION
LOCATION: FOOT

## 2025-02-05 ASSESSMENT — PAIN SCALES - GENERAL
PAINLEVEL_OUTOF10: 8
PAINLEVEL_OUTOF10: 7
PAINLEVEL_OUTOF10: 6
PAINLEVEL_OUTOF10: 7
PAINLEVEL_OUTOF10: 4
PAINLEVEL_OUTOF10: 7
PAINLEVEL_OUTOF10: 4
PAINLEVEL_OUTOF10: 8
PAINLEVEL_OUTOF10: 7
PAINLEVEL_OUTOF10: 7
PAINLEVEL_OUTOF10: 5
PAINLEVEL_OUTOF10: 8

## 2025-02-05 ASSESSMENT — PAIN DESCRIPTION - ONSET
ONSET: GRADUAL

## 2025-02-05 ASSESSMENT — PAIN DESCRIPTION - ORIENTATION
ORIENTATION: LEFT

## 2025-02-05 ASSESSMENT — PAIN DESCRIPTION - PAIN TYPE
TYPE: SURGICAL PAIN

## 2025-02-05 ASSESSMENT — PAIN DESCRIPTION - DIRECTION
RADIATING_TOWARDS: LEFT FOOT

## 2025-02-05 ASSESSMENT — COPD QUESTIONNAIRES: CAT_SEVERITY: SEVERE

## 2025-02-05 ASSESSMENT — PAIN DESCRIPTION - FREQUENCY
FREQUENCY: CONTINUOUS

## 2025-02-05 NOTE — PROGRESS NOTES
Occupational Therapy note:    Chart reviewed and patient currently NAHOMY for procedure. Will defer and follow up tomorrow for therapy.    Diane Lorenzo, OTR/L, OTD

## 2025-02-05 NOTE — PROGRESS NOTES
Physical Therapy note:    Chart reviewed and patient currently NAHOMY for procedure. Will defer and follow up tomorrow for therapy.    KIKI KEE, PT, DPT

## 2025-02-05 NOTE — PROGRESS NOTES
Bedside and Verbal shift change report given to Isaac Decker (oncoming nurse) by Isaac Souza (offgoing nurse). Report included the following information Nurse Handoff Report, Adult Overview, Surgery Report, Intake/Output, MAR, Recent Results, and Cardiac Rhythm ST/SR .   0820: Patient off unit to OR  1107: TRANSFER - IN REPORT:  Verbal report received from Pacu,Rn on Nasir Hatfield  being received from Pacu for routine progression of patient care    Report consisted of patient's Situation, Background, Assessment and   Recommendations(SBAR). Information from the following report(s) Nurse Handoff Report, Surgery Report, Intake/Output, MAR, and Cardiac Rhythm SR w/pac  was reviewed with the receiving nurse.Opportunity for questions and clarification was provided. Assessment completed upon patient's arrival to unit and care assumed.   1307:Break through drainage noted at patients procedural site. MD Lorenzo paged via Vascular office.   1544: L foot dressing saturated in sanguinous drainage. MD Lorenzo paged via vascular office.  1600: Per MD Lorenzo, redress with petroleum gauze, gauze packing,roll gauze, and reinforce with Ace bandage.   1610: Per MD Lorenzo, only redress L foot if dressing becomes saturated.  End of Shift Note    Bedside shift change report given to ISAAC Muniz (oncoming nurse) by Brianne Thorpe RN (offgoing nurse).  Report included the following information SBAR, Procedure Summary, Intake/Output, MAR, Recent Results, and Cardiac Rhythm SR/ST    Shift worked:  3509-2380     Shift summary and any significant changes:     See above note     Concerns for physician to address:       Zone phone for oncoming shift:   [phone]       Activity:     Number times ambulated in hallways past shift: 0  Number of times OOB to chair past shift: 0    Cardiac:   Cardiac Monitoring: Yes           Access:  Current line(s): PIV     Genitourinary:   Urinary status: voiding    Respiratory:      Chronic home O2 use?: YES  Incentive  spirometer at bedside: YES       GI:     Current diet:  ADULT DIET; Regular; 3 carb choices (45 gm/meal)  Passing flatus: YES  Tolerating current diet: YES       Pain Management:   Patient states pain is manageable on current regimen: YES    Skin:     Interventions: float heels, increase time out of bed, and PT/OT consult    Patient Safety:  Fall Score:    Interventions: bed/chair alarm, assistive device (walker, cane. etc), gripper socks, pt to call before getting OOB, and stay with me (per policy)       Length of Stay:  Expected LOS: 12  Actual LOS: 10    Predictive Model Details          42 (Caution)  Factor Value    Calculated 2/5/2025 18:15 28% Age 74 years old    Deterioration Index Model 26% Supplemental oxygen High flow nasal cannula     13% Respiratory rate 20     9% Cardiac rhythm Sinus rhythm with PAC     8% Potassium 4.7 mmol/L     3% Pulse 96     3% Systolic 137     3% WBC count 9.9 K/uL     2% BUN abnormal (24 MG/DL)     2% Sodium 137 mmol/L     1% Hematocrit abnormal (32.3 %)     1% Platelet count abnormal (545 K/uL)     0% Pulse oximetry 95 %     0% Temperature 98.1 °F (36.7 °C)          Brianne Thorpe RN

## 2025-02-05 NOTE — CARE COORDINATION
Transition of Care Plan:    RUR: 18% \"medium risk\"  Prior Level of Functioning: Independent with ADL's  Disposition: Home with [facility] services (pending clinical improvement)  FELA: 2/7  If SNF or IPR: Date FOC offered: 1/31  Date FOC received: 1/31 - pt declining IPR/SNF   Accepting facility: N/A  Date authorization started with reference number: N/A  Date authorization received and expires: N/A  Follow up appointments: PCP/Specialists as indicated  DME needed: None - pt has cane, walker, and 3L O2 through [employer] at home  Transportation at discharge: Family vs. [employer] ride   IM/IMM Medicare/ letter given: 2nd IM needed prior to d/c   Is patient a [place] and connected with [facility]? No  Caregiver Contact: Skye Turk (child), [phone]  Discharge Caregiver contacted prior to discharge? To be contacted   Care Conference needed? Not at this time   Barriers to discharge: Vascular, pulmonology, PT/OT progress, wean O2 to baseline     0853 AM: Chart reviewed. CM received  acceptance for PT/OT/SN services from [facility]. CM to continue to follow post pt vascular procedure today.     HERBERT Patel  Care Management  [facility]   [phone]

## 2025-02-05 NOTE — ANESTHESIA PRE PROCEDURE
Department of Anesthesiology  Preprocedure Note       Name:  Nasir Hatfield   Age:  74 y.o.  :  1950                                          MRN:  [id number]         Date:  2025      Surgeon: Surgeon(s):  Harris Lorenzo MD    Procedure: Procedure(s):  LEFT 1ST , 2ND, 3RD AND 4TH TOE AMPUTATIONS ( MAC WITH BLOCK)    Medications prior to admission:   Prior to Admission medications    Medication Sig Start Date End Date Taking? Authorizing Provider   metoprolol succinate (TOPROL XL) 25 MG extended release tablet Take 3 tablets by mouth daily 24  Yes Tonya Cohn APRN - NP   nicotine (NICODERM CQ) 21 MG/24HR Place 1 patch onto the skin daily 24  Yes Tonya Cohn APRN - NP   apixaban (ELIQUIS) 5 MG TABS tablet Take 1 tablet by mouth 2 times daily 24  Yes Tonya Cohn APRN - NP   OXYGEN Inhale 3 L into the lungs daily   Yes Provider, MD Liane   Multiple Vitamins-Minerals (MENS 50+ MULTI VITAMIN/MIN PO) Take by mouth daily   Yes ProviderLiane MD   clopidogrel (PLAVIX) 75 MG tablet Take 1 tablet by mouth daily 24  Yes Staci Matos MD   albuterol sulfate HFA (PROVENTIL;VENTOLIN;PROAIR) 108 (90 Base) MCG/ACT inhaler Inhale into the lungs   Yes Automatic Reconciliation, Ar   citalopram (CELEXA) 10 MG tablet Take by mouth daily   Yes Automatic Reconciliation, Ar   umeclidinium-vilanterol (ANORO ELLIPTA) 62.5-25 MCG/ACT inhaler Inhale 1 puff into the lungs daily    Automatic Reconciliation, Ar       Current medications:    Current Facility-Administered Medications   Medication Dose Route Frequency Provider Last Rate Last Admin   • ceFAZolin (ANCEF) 2,000 mg in sterile water 20 mL IV syringe  2,000 mg IntraVENous Q8H Harris Lorenzo MD   2,000 mg at 25 5167   • pantoprazole (PROTONIX) tablet 40 mg  40 mg Oral QAM AC Jack Huynh MD   40 mg at 25 0654   • arformoterol tartrate (BROVANA) nebulizer solution 15 mcg  15 mcg Nebulization BID RT

## 2025-02-05 NOTE — PROGRESS NOTES
End of Shift Note    Bedside shift change report given to Linda PETERSON (oncoming nurse) by NADINE GROVE RN (offgoing nurse).  Report included the following information SBAR, Intake/Output, MAR, Recent Results, Med Rec Status, and Cardiac Rhythm SR/ST    Shift worked:  7p-730a     Shift summary and any significant changes:     Patient's pain well controlled with oxy     Concerns for physician to address:       Zone phone for oncoming shift:          Activity:  Level of Assistance: Moderate assist, patient does 50-74%  Number times ambulated in hallways past shift: 0  Number of times OOB to chair past shift: 0    Cardiac:   Cardiac Monitoring: Yes      Cardiac Rhythm: Sinus tachy    Access:  Current line(s): PIV     Genitourinary:   Urinary Status: Voiding    Respiratory:   O2 Device: High flow nasal cannula  Chronic home O2 use?: YES  Incentive spirometer at bedside: YES    GI:  Last BM (including prior to admit): 02/01/25 (Pt stated)  Current diet:  Diet NPO Exceptions are: Sips of Water with Meds  Passing flatus: YES    Pain Management:   Patient states pain is manageable on current regimen: YES    Skin:  Alfred Scale Score: 18  Interventions: Wound Offloading (Prevention Methods): Repositioning    Patient Safety:  Fall Risk: Nursing Judgement-Fall Risk High(Add Comments): Yes  Fall Risk Interventions  Nursing Judgement-Fall Risk High(Add Comments): Yes  Toilet Every 2 Hours-In Advance of Need: Yes  Hourly Visual Checks: Awake, In bed  Fall Visual Posted: Socks, Fall sign posted  Room Door Open: Yes  Alarm On: Bed  Patient Moved Closer to Nursing Station: No    Active Consults:   IP CONSULT TO VASCULAR SURGERY  IP CONSULT TO PHARMACY  IP CONSULT TO PULMONOLOGY  IP CONSULT TO CARDIOLOGY  IP CONSULT TO CASE MANAGEMENT    Length of Stay:  Expected LOS: 12  Actual LOS: 10    NADINE GROVE RN

## 2025-02-05 NOTE — BRIEF OP NOTE
Brief Postoperative Note      Patient: Nasir Hatfield  YOB: 1950  MRN: [id number]    Date of Procedure: 2/5/2025    Pre-Op Diagnosis: Gangrene L forefoot    Post-Op Diagnosis: Same       Proc: Amputation L 2- 5 toes    Surgeon(s):  Harris Lorenzo MD    Anesthesia: Monitor Anesthesia Care    Estimated Blood Loss (mL): Minimal    Complications: None    Specimens:   ID Type Source Tests Collected by Time Destination   1 : left 2nd, 3rd, 4th, and 5th toes Tissue Toe SURGICAL PATHOLOGY Harris Lorenzo MD 2/5/2025 0935    A : Left foot Swab Foot CULTURE, ANAEROBIC, CULTURE, WOUND (WITH GRAM STAIN) Harris Lorenzo MD 2/5/2025 0940      Findings: Intra-op C&S sent. 5th toe left open/packed    Electronically signed by Harris Lorenzo MD on 2/5/2025 at 11:16 AM

## 2025-02-05 NOTE — PLAN OF CARE
Problem: Skin/Tissue Integrity  Goal: Skin integrity remains intact  Description: 1.  Monitor for areas of redness and/or skin breakdown  2.  Assess vascular access sites hourly  3.  Every 4-6 hours minimum:  Change oxygen saturation probe site  4.  Every 4-6 hours:  If on nasal continuous positive airway pressure, respiratory therapy assess nares and determine need for appliance change or resting period  Outcome: Progressing     Problem: Respiratory - Adult  Goal: Achieves optimal ventilation and oxygenation  2/4/2025 2203 by Nita Nielson RN  Outcome: Progressing  2/4/2025 2056 by Ormond, Brittany Michelle, RCP  Outcome: Progressing  2/4/2025 1021 by Giuliana Pate, RT  Outcome: Progressing     Problem: Pain  Goal: Verbalizes/displays adequate comfort level or baseline comfort level  Outcome: Progressing     Problem: Safety - Adult  Goal: Free from fall injury  Outcome: Progressing     Problem: ABCDS Injury Assessment  Goal: Absence of physical injury  Outcome: Progressing

## 2025-02-05 NOTE — ANESTHESIA POSTPROCEDURE EVALUATION
Department of Anesthesiology  Postprocedure Note    Patient: Nasir Hatfield  MRN: [id number]  YOB: 1950  Date of evaluation: 2/5/2025    Procedure Summary       Date: 02/05/25 Room / Location: [facility] MAIN OR M9 / [facility] MAIN OR    Anesthesia Start: 0910 Anesthesia Stop: 1033    Procedure: LEFT 2ND, 3RD, 4TH, AND 5TH TOE AMPUTATIONS ( MAC WITH BLOCK) (Left: Toes) Diagnosis:       Peripheral vascular disease, unspecified (HCC)      (Peripheral vascular disease, unspecified (HCC) [I73.9])    Providers: Harris Lorenzo MD Responsible Provider: Flavio Madera MD    Anesthesia Type: MAC ASA Status: 3            Anesthesia Type: MAC    Carmel Phase I: Carmel Score: 10    Carmel Phase II:      Anesthesia Post Evaluation    Patient location during evaluation: PACU  Patient participation: complete - patient participated  Level of consciousness: responsive to verbal stimuli and sleepy but conscious  Pain score: 2  Airway patency: patent  Cardiovascular status: blood pressure returned to baseline  Respiratory status: acceptable  Hydration status: stable  Comments: +Post-Anesthesia Evaluation and Assessment    Patient: Nasir Hatfield MRN: [id number]  SSN: [id number]   YOB: 1950  Age: 74 y.o.  Sex: male          Cardiovascular Function/Vital Signs    BP (!) 155/81   Pulse (!) 102   Temp 98 °F (36.7 °C) (Oral)   Resp 20   Ht 1.778 m (5' 10\")   Wt 70.3 kg (155 lb)   SpO2 95%   BMI 22.24 kg/m²     Patient is status post Procedure(s):  LEFT 2ND, 3RD, 4TH, AND 5TH TOE AMPUTATIONS ( MAC WITH BLOCK).    Nausea/Vomiting: Controlled.    Postoperative hydration reviewed and adequate.    Pain:      Managed.    Neurological Status:       At baseline.    Mental Status and Level of Consciousness: Arousable.    Pulmonary Status:       Adequate oxygenation and airway patent.    Complications related to anesthesia: None    Post-anesthesia assessment completed. No concerns.    I have evaluated the patient and the

## 2025-02-06 PROCEDURE — 6370000000 HC RX 637 (ALT 250 FOR IP): Performed by: SURGERY

## 2025-02-06 PROCEDURE — 97164 PT RE-EVAL EST PLAN CARE: CPT

## 2025-02-06 PROCEDURE — 97535 SELF CARE MNGMENT TRAINING: CPT

## 2025-02-06 PROCEDURE — 97530 THERAPEUTIC ACTIVITIES: CPT

## 2025-02-06 PROCEDURE — 6360000002 HC RX W HCPCS: Performed by: SURGERY

## 2025-02-06 PROCEDURE — 94640 AIRWAY INHALATION TREATMENT: CPT

## 2025-02-06 PROCEDURE — 97168 OT RE-EVAL EST PLAN CARE: CPT

## 2025-02-06 PROCEDURE — 2700000000 HC OXYGEN THERAPY PER DAY

## 2025-02-06 PROCEDURE — 2500000003 HC RX 250 WO HCPCS: Performed by: SURGERY

## 2025-02-06 PROCEDURE — 2060000000 HC ICU INTERMEDIATE R&B

## 2025-02-06 RX ADMIN — APIXABAN 5 MG: 5 TABLET, FILM COATED ORAL at 10:16

## 2025-02-06 RX ADMIN — ACETAMINOPHEN 1000 MG: 500 TABLET ORAL at 14:33

## 2025-02-06 RX ADMIN — IPRATROPIUM BROMIDE 0.5 MG: 0.5 SOLUTION RESPIRATORY (INHALATION) at 21:38

## 2025-02-06 RX ADMIN — ATORVASTATIN CALCIUM 10 MG: 10 TABLET, FILM COATED ORAL at 20:33

## 2025-02-06 RX ADMIN — WATER 2000 MG: 1 INJECTION INTRAMUSCULAR; INTRAVENOUS; SUBCUTANEOUS at 23:10

## 2025-02-06 RX ADMIN — DILTIAZEM HYDROCHLORIDE 120 MG: 120 CAPSULE, COATED, EXTENDED RELEASE ORAL at 10:16

## 2025-02-06 RX ADMIN — CITALOPRAM HYDROBROMIDE 10 MG: 20 TABLET ORAL at 10:16

## 2025-02-06 RX ADMIN — BUDESONIDE 500 MCG: 0.5 INHALANT RESPIRATORY (INHALATION) at 21:45

## 2025-02-06 RX ADMIN — ACETAMINOPHEN 1000 MG: 500 TABLET ORAL at 20:32

## 2025-02-06 RX ADMIN — GUAIFENESIN 1200 MG: 600 TABLET ORAL at 20:33

## 2025-02-06 RX ADMIN — GUAIFENESIN 1200 MG: 600 TABLET ORAL at 10:16

## 2025-02-06 RX ADMIN — ARFORMOTEROL TARTRATE 15 MCG: 15 SOLUTION RESPIRATORY (INHALATION) at 21:45

## 2025-02-06 RX ADMIN — ARFORMOTEROL TARTRATE 15 MCG: 15 SOLUTION RESPIRATORY (INHALATION) at 07:44

## 2025-02-06 RX ADMIN — WATER 2000 MG: 1 INJECTION INTRAMUSCULAR; INTRAVENOUS; SUBCUTANEOUS at 16:04

## 2025-02-06 RX ADMIN — APIXABAN 5 MG: 5 TABLET, FILM COATED ORAL at 20:33

## 2025-02-06 RX ADMIN — OXYCODONE 10 MG: 5 TABLET ORAL at 22:29

## 2025-02-06 RX ADMIN — ASPIRIN 81 MG: 81 TABLET, COATED ORAL at 10:16

## 2025-02-06 RX ADMIN — IPRATROPIUM BROMIDE 0.5 MG: 0.5 SOLUTION RESPIRATORY (INHALATION) at 07:44

## 2025-02-06 RX ADMIN — WATER 2000 MG: 1 INJECTION INTRAMUSCULAR; INTRAVENOUS; SUBCUTANEOUS at 07:31

## 2025-02-06 RX ADMIN — PANTOPRAZOLE SODIUM 40 MG: 40 TABLET, DELAYED RELEASE ORAL at 06:35

## 2025-02-06 RX ADMIN — OXYCODONE 5 MG: 5 TABLET ORAL at 11:38

## 2025-02-06 RX ADMIN — Medication: at 20:34

## 2025-02-06 RX ADMIN — OXYCODONE 10 MG: 5 TABLET ORAL at 16:44

## 2025-02-06 RX ADMIN — BUDESONIDE 500 MCG: 0.5 INHALANT RESPIRATORY (INHALATION) at 07:44

## 2025-02-06 RX ADMIN — ACETAMINOPHEN 1000 MG: 500 TABLET ORAL at 06:35

## 2025-02-06 RX ADMIN — Medication: at 10:18

## 2025-02-06 RX ADMIN — HYDROMORPHONE HYDROCHLORIDE 1 MG: 1 INJECTION, SOLUTION INTRAMUSCULAR; INTRAVENOUS; SUBCUTANEOUS at 02:59

## 2025-02-06 ASSESSMENT — PAIN SCALES - GENERAL
PAINLEVEL_OUTOF10: 8
PAINLEVEL_OUTOF10: 7
PAINLEVEL_OUTOF10: 4
PAINLEVEL_OUTOF10: 7
PAINLEVEL_OUTOF10: 6
PAINLEVEL_OUTOF10: 5
PAINLEVEL_OUTOF10: 7
PAINLEVEL_OUTOF10: 4
PAINLEVEL_OUTOF10: 6
PAINLEVEL_OUTOF10: 7
PAINLEVEL_OUTOF10: 4
PAINLEVEL_OUTOF10: 4

## 2025-02-06 ASSESSMENT — PAIN DESCRIPTION - LOCATION
LOCATION: FOOT
LOCATION: LEG
LOCATION: FOOT
LOCATION: LEG
LOCATION: FOOT

## 2025-02-06 ASSESSMENT — PAIN - FUNCTIONAL ASSESSMENT

## 2025-02-06 ASSESSMENT — PAIN DESCRIPTION - ONSET
ONSET: GRADUAL

## 2025-02-06 ASSESSMENT — PAIN DESCRIPTION - ORIENTATION
ORIENTATION: LEFT

## 2025-02-06 ASSESSMENT — PAIN DESCRIPTION - FREQUENCY
FREQUENCY: CONTINUOUS

## 2025-02-06 ASSESSMENT — PAIN SCALES - WONG BAKER: WONGBAKER_NUMERICALRESPONSE: HURTS A LITTLE BIT

## 2025-02-06 ASSESSMENT — PAIN DESCRIPTION - DESCRIPTORS
DESCRIPTORS: THROBBING
DESCRIPTORS: ACHING;DISCOMFORT;SORE;TENDER
DESCRIPTORS: THROBBING
DESCRIPTORS: THROBBING;ACHING
DESCRIPTORS: ACHING;DISCOMFORT;SORE;TENDER
DESCRIPTORS: THROBBING
DESCRIPTORS: THROBBING

## 2025-02-06 ASSESSMENT — PAIN DESCRIPTION - PAIN TYPE
TYPE: SURGICAL PAIN

## 2025-02-06 ASSESSMENT — PAIN DESCRIPTION - DIRECTION
RADIATING_TOWARDS: LEFT FOOT

## 2025-02-06 NOTE — PROGRESS NOTES
Bedside and Verbal shift change report given to ISAAC Decker (oncoming nurse) by Isaac Muniz (offgoing nurse). Report included the following information Nurse Handoff Report, Intake/Output, and MAR.     End of Shift Note    Bedside shift change report given to Isaac Valderrama (oncoming nurse) by Brianne Thorpe RN (offgoing nurse).  Report included the following information SBAR, Procedure Summary, Intake/Output, MAR, Recent Results, and Cardiac Rhythm SR w/PVC    Shift worked:  5495-7506     Shift summary and any significant changes:     No significant changes. Patient up to chair with meals, tolerated poorly. Dyspnea with activity. On 7L oxygen. LLE dressing clean dry and intact. C/o intermittent 7.5 throbbing pain in L foot. Repositioned; heels elevated while in bed/chair. Prn pain medication given. Per MD Bj gay L foot 2/7/25.     Concerns for physician to address:  Pain management    Zone phone for oncoming shift:   [phone]       Activity:     Number times ambulated in hallways past shift: 0  Number of times OOB to chair past shift: 2    Cardiac:   Cardiac Monitoring: Yes           Access:  Current line(s): PIV     Genitourinary:   Urinary status: voiding    Respiratory:      Chronic home O2 use?: YES  Incentive spirometer at bedside: YES       GI:     Current diet:  ADULT DIET; Regular; 3 carb choices (45 gm/meal)  Passing flatus: YES  Tolerating current diet: YES       Pain Management:   Patient states pain is manageable on current regimen: YES    Skin:     Interventions: increase time out of bed    Patient Safety:  Fall Score:    Interventions: pt to call before getting OOB       Length of Stay:  Expected LOS: 13  Actual LOS: 11    Predictive Model Details          52 (Warning)  Factor Value    Calculated 2/6/2025 19:12 22% Age 74 years old    Deterioration Index Model 21% Supplemental oxygen High flow nasal cannula     17% Neurological exam X     12% Respiratory rate 13     8% Cardiac rhythm Multiform PVCs

## 2025-02-06 NOTE — PLAN OF CARE
Problem: Physical Therapy - Adult  Goal: By Discharge: Performs mobility at highest level of function for planned discharge setting.  See evaluation for individualized goals.  Description: FUNCTIONAL STATUS PRIOR TO ADMISSION: Patient was independent and active without use of DME. Patient admits to furniture walking in the home. He is a retired . Patient wears 3L O2 at baseline.     HOME SUPPORT PRIOR TO ADMISSION: The patient lived alone with friends and neighbors to provide assistance.    Physical Therapy Goals  Initiated 1/31/2025  Re-evaluation 2/6/2025 s/p amputation L toes 2-5: goals remain appropriate   1.  Patient will move from supine to sit and sit to supine, scoot up and down, and roll side to side in bed with modified independence within 7 day(s).    2.  Patient will perform sit to stand with modified independence within 7 day(s).  3.  Patient will transfer from bed to chair and chair to bed with modified independence using the least restrictive device within 7 day(s).  4.  Patient will ambulate with modified independence for 100 feet with the least restrictive device within 7 day(s).   5.  Patient will ascend/descend 15 stairs with bilateral handrail(s) with modified independence within 7 day(s).   Outcome: Progressing     PHYSICAL THERAPY RE-EVALUATION    Patient: Nasir Hatfield (74 y.o. male)  Date: 2/6/2025  Primary Diagnosis: Critical lower limb ischemia (HCC) [I70.229]  Acute lower limb ischemia [I99.8]  Procedure(s) (LRB):  LEFT 2ND, 3RD, 4TH, AND 5TH TOE AMPUTATIONS ( MAC WITH BLOCK) (Left) 1 Day Post-Op   Precautions: Weight Bearing, Fall Risk, Bed Alarm   Left Lower Extremity Weight Bearing: Non Weight Bearing                ASSESSMENT :  Pt seen for PT re-evaluation s/p L toe amputations (toes 2-5) POD 1. Pt encountered seated on bedside commode in NAD, cleared by RN and agreeable to participate in therapy. Educated regarding LLE NWB precautions, transferred to bedside chair with  Behavior:  Orientation  Overall Orientation Status: Within Functional Limits  Orientation Level: Oriented X4  Cognition  Overall Cognitive Status: Exceptions  Arousal/Alertness: Appears intact  Following Commands: Follows multistep commands with repitition;Follows multistep commands with increased time  Attention Span: Attends with cues to redirect  Memory: Impaired  Safety Judgement: Impaired;Decreased awareness of need for safety;Decreased awareness of need for assistance  Problem Solving: Impaired;Decreased awareness of errors  Insights: Decreased awareness of deficits  Initiation: Requires cues for some  Sequencing: Requires cues for some    Hearing:   Hearing  Hearing: Within functional limits    Vision/Perceptual:                  Strength:         Tone & Sensation:           Coordination:       Range Of Motion:          Functional Mobility:  Bed Mobility:     Bed Mobility Training  Bed Mobility Training: No (pt recevied sitting on BSC)  Transfers:     Transfer Training  Transfer Training: Yes  Overall Level of Assistance: Contact guard assistance  Interventions: Safety awareness training;Verbal cues;Demonstration  Sit to Stand: Contact guard assistance  Stand to Sit: Minimal assistance  Toilet Transfer: Minimal assistance  Balance:               Balance  Sitting: Intact  Standing: Impaired;With support  Standing - Static: Constant support;Fair  Standing - Dynamic: Fair;Constant support  Ambulation/Gait Training:                       Gait  Gait Training: Yes  Left Side Weight Bearing: Non-weight bearing  Right Side Weight Bearing: Full  Overall Level of Assistance: Contact guard assistance  Distance (ft): 2 Feet (commode > chair)  Assistive Device: Walker, rolling  Interventions: Verbal cues;Demonstration;Safety awareness training  Speed/Corazon: Shuffled  Step Length: Right shortened  Gait Abnormalities: Decreased step clearance (hopping forward with shuffling pattern despite cues for increased step

## 2025-02-06 NOTE — PLAN OF CARE
Problem: Occupational Therapy - Adult  Goal: By Discharge: Performs self-care activities at highest level of function for planned discharge setting.  See evaluation for individualized goals.  Description: FUNCTIONAL STATUS PRIOR TO ADMISSION:  Patient was independent to mod I for ADLs and functional mobility. He reported mainly furniture walking in his house but also reported having rolling walker if needed.   Receives Help From: Family, Prior Level of Assist for ADLs: Independent,  ,  ,  ,  ,  , Prior Level of Assist for Homemaking: Independent, Ambulation Assistance: Independent, Prior Level of Assist for Transfers: Independent, Active : Yes     HOME SUPPORT: Patient lived alone.    Occupational Therapy Goals:  1.  Patient will perform grooming in standing with Minimal Assist within 7 day(s).  2.  Patient will perform upper body dressing with Modified [place] within 7 day(s).  3.  Patient will perform lower body dressing using AE PRN with Minimal Assist within 7 day(s).  4.  Patient will perform toilet transfers with Contact Guard Assist within 7 day(s).  5.  Patient will perform all aspects of toileting with Moderate Assist within 7 day(s).  6.  Patient will participate in upper extremity therapeutic exercise/activities with Modified [place] for 10 minutes within 7 day(s).      Initiated 1/31/2025; Discontinued and revised above during re-eval 2/6/2025  1.  Patient will perform grooming in standing with Supervision within 7 day(s).  2.  Patient will perform upper body dressing with Supervision within 7 day(s).  3.  Patient will perform lower body dressing using AE PRN with Moderate Assist within 7 day(s).  4.  Patient will perform toilet transfers with Minimal Assist  within 7 day(s).  5.  Patient will perform all aspects of toileting with Minimal Assist within 7 day(s).  6.  Patient will participate in upper extremity therapeutic exercise/activities with Stand by Assist for 10 minutes within 7

## 2025-02-06 NOTE — PROGRESS NOTES
Bedside shift change report given to Flavio Napoles RN (oncoming nurse) by Brianne Thorpe RN  (offgoing nurse). Report included the following information Nurse Handoff Report, Index, ED Encounter Summary, ED SBAR, Adult Overview, Intake/Output, MAR, Recent Results, Med Rec Status, Cardiac Rhythm NSR, Sinus Tach, and Alarm Parameters.

## 2025-02-06 NOTE — WOUND CARE
Wound care nurse consult for red sacrum    73 y/o male admitted for critical lower limb ischemia.  1/29/25 Left CFA to PT bypass w/ipsilateral NRGSV  2/5/25 Amputation left 2-5 toes secondary to gangrene  Surgeon: Dr SALEEM Lorenzo  Please defer to Dr Lorenzo for any surgical related wound care.    Past Medical History:   Diagnosis Date    Chronic obstructive pulmonary disease (HCC)     Hx of blood clots 08/29/2024    Left lower leg    Hypertension      Past Surgical History:   Procedure Laterality Date    AMPUTATION Left 2024    Left Big Toe    ANGIOPLASTY Left 05/20/2024    LEFT FEMORAL ANGIOGRAM BALLOON  ANGIOPLASTY AND STENTING performed by Harris Lorenzo MD at [facility] MAIN OR    COLONOSCOPY      EYE SURGERY Left     cataract surgery    FEMORAL-TIBIAL BYPASS GRAFT N/A 1/29/2025    LEFT LOWER EXTREMITY TIBIAL BYPASS WITH VEIN performed by Harris Lorenzo MD at [facility] MAIN OR    FOOT DEBRIDEMENT Left 05/22/2024    BONE BIOPSY LEFT FOOT performed by Thony Chisholm DPM at [facility] MAIN OR    TOE AMPUTATION Left 2/5/2025    LEFT 2ND, 3RD, 4TH, AND 5TH TOE AMPUTATIONS ( MAC WITH BLOCK) performed by Harris Lorenzo MD at [facility] MAIN OR    VASCULAR SURGERY Right 9/7/2024    RE INJECT INFUSION CATHETER performed by Harris Lorenzo MD at [facility] MAIN OR    VASCULAR SURGERY Right 9/8/2024    REINJECT AND REMOVE INFUSION CATHETER performed by Harris Lorenzo MD at [facility] MAIN OR    VASCULAR SURGERY Left 9/6/2024    LEFT LOWER EXTREMITY THROMBECTOMY performed by Harris Lorenzo MD at [facility] MAIN OR     Patient is a smoker.    Patient able to stand on right foot with 2 person assist for assessment. Blanchable erythema with some abraded areas of skin from friction. No pressure injury at this time.      Patient is continent of urine and stool.    Recommend;    Sacrum/buttocks blanchable abraded skin: daily, cleanse skin with either soap and water, or NS moist 4x4 and cover with a sacral foam dressing to prevent further friction.    MURALI LEY  CRISTY RN, CWON

## 2025-02-06 NOTE — PROGRESS NOTES
Hospitalist Progress Note    NAME:   Nasir Hatfield   : 1950   MRN: [id number]     Date/Time: 2025 7:45 PM  Patient PCP: Annie Mcnair APRN - NP      Estimated discharge date: unknown probably 2/10  Barriers: Amputation and then IPR placement        Assessment / Plan:    Critical limb ischemia- L foot POA- s/p Left CFA=>PT bypass w/ipsilateral NRGSV by Dr Lorenzo   - Here with worsening L leg swelling, pain, redness recently  - CTA runoff showing occluded left superficial femoral artery, distal vessels difficult to evaluation, probably reconstitution at level of popliteal artery   IP Vascular Surgery consulted- S/p Left CFA=>PT bypass w/ipsilateral NRGSV  by Dr Lorenzo, no cleared from their standpoint for DC planning  Cont ASA & resumed lovenox SQ  S/p IV heparin drip - Dcd post op  Off IVF post op  Cont post op pain management as per surgical team-  cont PO Oxycodone + IV dilaudid prn , pt off epidural drip  S/p booker out today post op- voiding today  BM today per pt  IP Pt/Ot eval for DC planning noted- IPR recommended  25  Patient doing well less rest pain in foot  2/3/25: will need a TMA prior to discharge  25: Amputaion planned  25 Amputation completed     L foot cellulitis with Toe necrosis POA  -WBC 23.5->19.9->16.8->14.4 ->15.4  -Color change to LLE however no madison erythema, leg is cold   - Blood cultures sent- neg > 24 hrs  - crp elevated as expected 17.7  CXR - severe Bullous Emphysema noted  S/p IV Vanc and Zosyn for now - empiric IV Abx stopped now  25 Blood Cx neg x 5 days now  Remains Afebrile   2/3/25 BC NGTD  25  Abx restarted in anticipation of OR and cellulitis rash  25  DOS     Acute on Chronic hypoxic & hypercapneic respiratory failure POA-   Increased oxygen demand post day 1- up to 10L/m today AM  ,   on 3L/m NC home oxygen at baseline  Hx of severe COPD with Emphysema POA- pt of Dr Ritter (PAR)  Afib RVR  - Continue home inhalers  - Lung sounds

## 2025-02-06 NOTE — PROGRESS NOTES
End of Shift Note    Bedside shift change report given to Brianne Thorpe RN  (oncoming nurse) by Flavio Napoles RN (offgoing nurse).  Report included the following information SBAR, Kardex, ED Summary, Procedure Summary, Intake/Output, MAR, Accordion, Recent Results, Med Rec Status, Cardiac Rhythm NSR, Sinus Tach, and Alarm Parameters     Shift worked:  4216-4864     Shift summary and any significant changes:     No significant change.  Vital sings are stable.     Concerns for physician to address:  Pain management.     Zone phone for oncoming shift:          Activity:  Level of Assistance: Moderate assist, patient does 50-74%  Number times ambulated in hallways past shift: 0  Number of times OOB to chair past shift: 0    Cardiac:   Cardiac Monitoring: Yes      Cardiac Rhythm: Sinus rhythm, Sinus rhythm with PAC, Sinus tachy    Access:  Current line(s): PIV     Genitourinary:   Urinary Status: Voiding    Respiratory:   O2 Device: High flow nasal cannula  Chronic home O2 use?: YES  Incentive spirometer at bedside: YES    GI:  Last BM (including prior to admit): 02/04/25 (per pt. report)  Current diet:  ADULT DIET; Regular; 3 carb choices (45 gm/meal)  Passing flatus: YES    Pain Management:   Patient states pain is manageable on current regimen: YES    Skin:  Aflred Scale Score: 16  Interventions: Wound Offloading (Prevention Methods): Repositioning, Turning, Elevate heels    Patient Safety:  Fall Risk: Nursing Judgement-Fall Risk High(Add Comments): No  Fall Risk Interventions  Nursing Judgement-Fall Risk High(Add Comments): No  Toilet Every 2 Hours-In Advance of Need: Yes  Hourly Visual Checks: Awake, In bed  Fall Visual Posted: Socks, Fall sign posted, Armband  Room Door Open: Yes  Alarm On: Bed  Patient Moved Closer to Nursing Station: No    Active Consults:   IP CONSULT TO VASCULAR SURGERY  IP CONSULT TO PHARMACY  IP CONSULT TO PULMONOLOGY  IP CONSULT TO CARDIOLOGY  IP CONSULT TO CASE  MANAGEMENT    Length of Stay:  Expected LOS: 12  Actual LOS: 10    Flavio Napoles, RN

## 2025-02-06 NOTE — PLAN OF CARE
Problem: Skin/Tissue Integrity  Goal: Skin integrity remains intact  Description: 1.  Monitor for areas of redness and/or skin breakdown  2.  Assess vascular access sites hourly  3.  Every 4-6 hours minimum:  Change oxygen saturation probe site  4.  Every 4-6 hours:  If on nasal continuous positive airway pressure, respiratory therapy assess nares and determine need for appliance change or resting period  Outcome: Progressing  Flowsheets (Taken 2/5/2025 1930)  Skin Integrity Remains Intact: Monitor for areas of redness and/or skin breakdown     Problem: Respiratory - Adult  Goal: Achieves optimal ventilation and oxygenation  Outcome: Progressing  Flowsheets (Taken 2/5/2025 1930)  Achieves optimal ventilation and oxygenation: Assess for changes in respiratory status     Problem: Pain  Goal: Verbalizes/displays adequate comfort level or baseline comfort level  Outcome: Progressing     Problem: Safety - Adult  Goal: Free from fall injury  Outcome: Progressing  Flowsheets (Taken 2/5/2025 1930)  Free From Fall Injury:   Instruct family/caregiver on patient safety   Based on caregiver fall risk screen, instruct family/caregiver to ask for assistance with transferring infant if caregiver noted to have fall risk factors     Problem: ABCDS Injury Assessment  Goal: Absence of physical injury  Outcome: Progressing  Flowsheets (Taken 2/5/2025 1930)  Absence of Physical Injury: Implement safety measures based on patient assessment     Problem: Nutrition Deficit:  Goal: Optimize nutritional status  Outcome: Progressing     Problem: Discharge Planning  Goal: Discharge to home or other facility with appropriate resources  Outcome: Progressing  Flowsheets (Taken 2/5/2025 1930)  Discharge to home or other facility with appropriate resources: Identify barriers to discharge with patient and caregiver

## 2025-02-07 ENCOUNTER — APPOINTMENT (OUTPATIENT)
Facility: HOSPITAL | Age: 75
DRG: 239 | End: 2025-02-07
Payer: MEDICARE

## 2025-02-07 LAB
ANION GAP SERPL CALC-SCNC: 3 MMOL/L (ref 2–12)
BASOPHILS # BLD: 0.07 K/UL (ref 0–0.1)
BASOPHILS NFR BLD: 0.9 % (ref 0–1)
BUN SERPL-MCNC: 19 MG/DL (ref 6–20)
BUN/CREAT SERPL: 22 (ref 12–20)
CALCIUM SERPL-MCNC: 9.1 MG/DL (ref 8.5–10.1)
CHLORIDE SERPL-SCNC: 98 MMOL/L (ref 97–108)
CO2 SERPL-SCNC: 32 MMOL/L (ref 21–32)
CREAT SERPL-MCNC: 0.86 MG/DL (ref 0.7–1.3)
DIFFERENTIAL METHOD BLD: ABNORMAL
EOSINOPHIL # BLD: 0.4 K/UL (ref 0–0.4)
EOSINOPHIL NFR BLD: 5 % (ref 0–7)
ERYTHROCYTE [DISTWIDTH] IN BLOOD BY AUTOMATED COUNT: 19.3 % (ref 11.5–14.5)
GLUCOSE SERPL-MCNC: 101 MG/DL (ref 65–100)
HCT VFR BLD AUTO: 30.2 % (ref 36.6–50.3)
HGB BLD-MCNC: 9.4 G/DL (ref 12.1–17)
IMM GRANULOCYTES # BLD AUTO: 0.03 K/UL (ref 0–0.04)
IMM GRANULOCYTES NFR BLD AUTO: 0.4 % (ref 0–0.5)
LYMPHOCYTES # BLD: 1.08 K/UL (ref 0.8–3.5)
LYMPHOCYTES NFR BLD: 13.4 % (ref 12–49)
MCH RBC QN AUTO: 23.6 PG (ref 26–34)
MCHC RBC AUTO-ENTMCNC: 31.1 G/DL (ref 30–36.5)
MCV RBC AUTO: 75.9 FL (ref 80–99)
MONOCYTES # BLD: 0.79 K/UL (ref 0–1)
MONOCYTES NFR BLD: 9.8 % (ref 5–13)
NEUTS SEG # BLD: 5.67 K/UL (ref 1.8–8)
NEUTS SEG NFR BLD: 70.5 % (ref 32–75)
NRBC # BLD: 0 K/UL (ref 0–0.01)
NRBC BLD-RTO: 0 PER 100 WBC
PLATELET # BLD AUTO: 589 K/UL (ref 150–400)
PMV BLD AUTO: 9.2 FL (ref 8.9–12.9)
POTASSIUM SERPL-SCNC: 4.7 MMOL/L (ref 3.5–5.1)
RBC # BLD AUTO: 3.98 M/UL (ref 4.1–5.7)
SODIUM SERPL-SCNC: 133 MMOL/L (ref 136–145)
WBC # BLD AUTO: 8 K/UL (ref 4.1–11.1)

## 2025-02-07 PROCEDURE — 6370000000 HC RX 637 (ALT 250 FOR IP): Performed by: SURGERY

## 2025-02-07 PROCEDURE — 71045 X-RAY EXAM CHEST 1 VIEW: CPT

## 2025-02-07 PROCEDURE — 6360000002 HC RX W HCPCS: Performed by: SURGERY

## 2025-02-07 PROCEDURE — 2060000000 HC ICU INTERMEDIATE R&B

## 2025-02-07 PROCEDURE — 2500000003 HC RX 250 WO HCPCS: Performed by: SURGERY

## 2025-02-07 PROCEDURE — 2700000000 HC OXYGEN THERAPY PER DAY

## 2025-02-07 PROCEDURE — 36415 COLL VENOUS BLD VENIPUNCTURE: CPT

## 2025-02-07 PROCEDURE — 97535 SELF CARE MNGMENT TRAINING: CPT

## 2025-02-07 PROCEDURE — 6360000002 HC RX W HCPCS: Performed by: INTERNAL MEDICINE

## 2025-02-07 PROCEDURE — 94640 AIRWAY INHALATION TREATMENT: CPT

## 2025-02-07 PROCEDURE — 97530 THERAPEUTIC ACTIVITIES: CPT

## 2025-02-07 PROCEDURE — 85025 COMPLETE CBC W/AUTO DIFF WBC: CPT

## 2025-02-07 PROCEDURE — 80048 BASIC METABOLIC PNL TOTAL CA: CPT

## 2025-02-07 RX ORDER — FUROSEMIDE 10 MG/ML
20 INJECTION INTRAMUSCULAR; INTRAVENOUS ONCE
Status: COMPLETED | OUTPATIENT
Start: 2025-02-07 | End: 2025-02-07

## 2025-02-07 RX ADMIN — ACETAMINOPHEN 1000 MG: 500 TABLET ORAL at 14:19

## 2025-02-07 RX ADMIN — Medication: at 09:23

## 2025-02-07 RX ADMIN — APIXABAN 5 MG: 5 TABLET, FILM COATED ORAL at 20:30

## 2025-02-07 RX ADMIN — HYDROMORPHONE HYDROCHLORIDE 1 MG: 1 INJECTION, SOLUTION INTRAMUSCULAR; INTRAVENOUS; SUBCUTANEOUS at 16:13

## 2025-02-07 RX ADMIN — GUAIFENESIN 1200 MG: 600 TABLET ORAL at 20:31

## 2025-02-07 RX ADMIN — CITALOPRAM HYDROBROMIDE 10 MG: 20 TABLET ORAL at 08:44

## 2025-02-07 RX ADMIN — OXYCODONE 10 MG: 5 TABLET ORAL at 20:31

## 2025-02-07 RX ADMIN — BUDESONIDE 500 MCG: 0.5 INHALANT RESPIRATORY (INHALATION) at 08:48

## 2025-02-07 RX ADMIN — ATORVASTATIN CALCIUM 10 MG: 10 TABLET, FILM COATED ORAL at 20:31

## 2025-02-07 RX ADMIN — HYDROMORPHONE HYDROCHLORIDE 1 MG: 1 INJECTION, SOLUTION INTRAMUSCULAR; INTRAVENOUS; SUBCUTANEOUS at 04:02

## 2025-02-07 RX ADMIN — ACETAMINOPHEN 1000 MG: 500 TABLET ORAL at 23:08

## 2025-02-07 RX ADMIN — OXYCODONE 5 MG: 5 TABLET ORAL at 14:19

## 2025-02-07 RX ADMIN — ACETAMINOPHEN 1000 MG: 500 TABLET ORAL at 05:42

## 2025-02-07 RX ADMIN — BUDESONIDE 500 MCG: 0.5 INHALANT RESPIRATORY (INHALATION) at 20:12

## 2025-02-07 RX ADMIN — IPRATROPIUM BROMIDE 0.5 MG: 0.5 SOLUTION RESPIRATORY (INHALATION) at 08:48

## 2025-02-07 RX ADMIN — FUROSEMIDE 20 MG: 10 INJECTION, SOLUTION INTRAMUSCULAR; INTRAVENOUS at 14:20

## 2025-02-07 RX ADMIN — ARFORMOTEROL TARTRATE 15 MCG: 15 SOLUTION RESPIRATORY (INHALATION) at 20:12

## 2025-02-07 RX ADMIN — GUAIFENESIN 1200 MG: 600 TABLET ORAL at 08:44

## 2025-02-07 RX ADMIN — APIXABAN 5 MG: 5 TABLET, FILM COATED ORAL at 08:44

## 2025-02-07 RX ADMIN — Medication: at 20:29

## 2025-02-07 RX ADMIN — WATER 2000 MG: 1 INJECTION INTRAMUSCULAR; INTRAVENOUS; SUBCUTANEOUS at 15:13

## 2025-02-07 RX ADMIN — WATER 2000 MG: 1 INJECTION INTRAMUSCULAR; INTRAVENOUS; SUBCUTANEOUS at 23:07

## 2025-02-07 RX ADMIN — ASPIRIN 81 MG: 81 TABLET, COATED ORAL at 08:44

## 2025-02-07 RX ADMIN — OXYCODONE 10 MG: 5 TABLET ORAL at 03:31

## 2025-02-07 RX ADMIN — DILTIAZEM HYDROCHLORIDE 120 MG: 120 CAPSULE, COATED, EXTENDED RELEASE ORAL at 08:44

## 2025-02-07 RX ADMIN — IPRATROPIUM BROMIDE 0.5 MG: 0.5 SOLUTION RESPIRATORY (INHALATION) at 20:07

## 2025-02-07 RX ADMIN — PANTOPRAZOLE SODIUM 40 MG: 40 TABLET, DELAYED RELEASE ORAL at 08:44

## 2025-02-07 RX ADMIN — ARFORMOTEROL TARTRATE 15 MCG: 15 SOLUTION RESPIRATORY (INHALATION) at 08:48

## 2025-02-07 RX ADMIN — WATER 2000 MG: 1 INJECTION INTRAMUSCULAR; INTRAVENOUS; SUBCUTANEOUS at 08:43

## 2025-02-07 ASSESSMENT — PAIN DESCRIPTION - LOCATION
LOCATION: FOOT;LEG
LOCATION: LEG;FOOT
LOCATION: LEG;FOOT
LOCATION: FOOT;LEG
LOCATION: LEG;FOOT
LOCATION: LEG;FOOT
LOCATION: FOOT;LEG
LOCATION: LEG;FOOT

## 2025-02-07 ASSESSMENT — PAIN DESCRIPTION - DESCRIPTORS
DESCRIPTORS: ACHING;DISCOMFORT;SORE;TENDER;THROBBING
DESCRIPTORS: THROBBING
DESCRIPTORS: ACHING;DISCOMFORT;SORE;TENDER;THROBBING

## 2025-02-07 ASSESSMENT — PAIN DESCRIPTION - ORIENTATION
ORIENTATION: LEFT

## 2025-02-07 ASSESSMENT — PAIN - FUNCTIONAL ASSESSMENT
PAIN_FUNCTIONAL_ASSESSMENT: PREVENTS OR INTERFERES WITH MANY ACTIVE NOT PASSIVE ACTIVITIES
PAIN_FUNCTIONAL_ASSESSMENT: PREVENTS OR INTERFERES SOME ACTIVE ACTIVITIES AND ADLS
PAIN_FUNCTIONAL_ASSESSMENT: ACTIVITIES ARE NOT PREVENTED
PAIN_FUNCTIONAL_ASSESSMENT: PREVENTS OR INTERFERES SOME ACTIVE ACTIVITIES AND ADLS
PAIN_FUNCTIONAL_ASSESSMENT: ACTIVITIES ARE NOT PREVENTED
PAIN_FUNCTIONAL_ASSESSMENT: PREVENTS OR INTERFERES SOME ACTIVE ACTIVITIES AND ADLS
PAIN_FUNCTIONAL_ASSESSMENT: PREVENTS OR INTERFERES SOME ACTIVE ACTIVITIES AND ADLS
PAIN_FUNCTIONAL_ASSESSMENT: ACTIVITIES ARE NOT PREVENTED

## 2025-02-07 ASSESSMENT — PAIN SCALES - GENERAL
PAINLEVEL_OUTOF10: 6
PAINLEVEL_OUTOF10: 10
PAINLEVEL_OUTOF10: 7
PAINLEVEL_OUTOF10: 3
PAINLEVEL_OUTOF10: 7
PAINLEVEL_OUTOF10: 10
PAINLEVEL_OUTOF10: 9
PAINLEVEL_OUTOF10: 8
PAINLEVEL_OUTOF10: 7
PAINLEVEL_OUTOF10: 7

## 2025-02-07 NOTE — PROGRESS NOTES
Hospitalist Progress Note    NAME:   Nasir Hatfield   : 1950   MRN: [id number]     Date/Time: 2025 10:04 PM  Patient PCP: Annie Mcnair APRN - NP      Estimated discharge date: 24- 48 hrs  Barriers: IPR placement, final cx        Assessment / Plan:    Critical limb ischemia- L foot POA- s/p Left CFA=>PT bypass w/ipsilateral NRGSV by Dr Lorenzo   - Here with worsening L leg swelling, pain, redness recently  - CTA runoff showing occluded left superficial femoral artery, distal vessels difficult to evaluation, probably reconstitution at level of popliteal artery   IP Vascular Surgery consulted- S/p Left CFA=>PT bypass w/ipsilateral NRGSV  by Dr Lorenzo, no cleared from their standpoint for DC planning  Cont ASA & resumed lovenox SQ  S/p IV heparin drip - Dcd post op  Off IVF post op  Cont post op pain management as per surgical team-  cont PO Oxycodone + IV dilaudid prn , pt off epidural drip  S/p booker out today post op- voiding today  BM today per pt  IP Pt/Ot eval for DC planning noted- IPR recommended  25  Patient doing well less rest pain in foot  2/3/25: will need a TMA prior to discharge  25: Amputaion planned  25 Amputation completed  25 denies any pain.  Appreciate vascular following, PT/OT, dispo     L foot cellulitis with Toe necrosis POA  -WBC 23.5->19.9->16.8->14.4 ->15.4  -Color change to LLE however no madison erythema, leg is cold   - Blood cultures sent- neg > 24 hrs  - crp elevated as expected 17.7  CXR - severe Bullous Emphysema noted  S/p IV Vanc and Zosyn for now - empiric IV Abx stopped now  25 Blood Cx neg x 5 days now  Remains Afebrile   2/3/25 BC NGTD  25  Abx restarted in anticipation of OR and cellulitis rash  25  DOS  - afebrile, cont' empiric IV abx.  L foot culture from  remains pending.      Acute on Chronic hypoxic & hypercapneic respiratory failure POA-   Increased oxygen demand post day 1- up to 10L/m today AM  ,   on 3L/m NC home    PHOS 3.2 3.7       Signed: Beatriz Clay MD FACP

## 2025-02-07 NOTE — PROGRESS NOTES
Hospitalist Progress Note    NAME:   Nasir Hatfield   : 1950   MRN: [id number]     Date/Time: 2025 1:42 PM  Patient PCP: Annie Mcnair APRN - NP      Estimated discharge date:   Barriers:         Assessment / Plan:    Critical limb ischemia- L foot POA- s/p Left CFA=>PT bypass w/ipsilateral NRGSV by Dr Lorenzo   - Here with worsening L leg swelling, pain, redness recently  - CTA runoff showing occluded left superficial femoral artery, distal vessels difficult to evaluation, probably reconstitution at level of popliteal artery   IP Vascular Surgery consulted- S/p Left CFA=>PT bypass w/ipsilateral NRGSV  by Dr Lorenzo, no cleared from their standpoint for DC planning  Cont ASA & resumed lovenox SQ  S/p IV heparin drip - Dcd post op  Off IVF post op  Cont post op pain management as per surgical team-  cont PO Oxycodone + IV dilaudid prn , pt off epidural drip  S/p booker out today post op- voiding today  BM today per pt  IP Pt/Ot eval for DC planning noted- IPR recommended  25  Patient doing well less rest pain in foot  2/3/25: will need a TMA prior to discharge  25: Amputaion planned  25 Amputation completed  25 denies any pain.  Appreciate vascular following, PT/OT, dispo  : Medically cleared for discharge.  Dr. Lorenzo cleared the patient for discharge.  Notified case management that patient is ready for discharge.     L foot cellulitis with Toe necrosis POA  -WBC 23.5->19.9->16.8->14.4 ->15.4  -Color change to LLE however no madison erythema, leg is cold   - Blood cultures sent- neg > 24 hrs  - crp elevated as expected 17.7  CXR - severe Bullous Emphysema noted  S/p IV Vanc and Zosyn for now - empiric IV Abx stopped now  25 Blood Cx neg x 5 days now  Remains Afebrile   2/3/25 BC NGTD  25  Abx restarted in anticipation of OR and cellulitis rash  25  DOS  - afebrile, cont' empiric IV abx.  L foot culture from  remains pending.   2/7: Wound cultures negative thus far.  were not copied into this note but were reviewed prior to creation of Plan.      LABS:  I reviewed today's most current labs and imaging studies.  Pertinent labs include:  Recent Labs     02/05/25 0527 02/07/25  0235   WBC 9.9 8.0   HGB 9.8* 9.4*   HCT 32.3* 30.2*   * 589*     Recent Labs     02/05/25 0527 02/07/25  0235    133*   K 4.7 4.7    98   CO2 35* 32   GLUCOSE 100 101*   BUN 24* 19   CREATININE 0.82 0.86   CALCIUM 9.1 9.1   PHOS 3.7  --        Signed: Korey Paulson MD FACP

## 2025-02-07 NOTE — PROGRESS NOTES
End of Shift Note    Bedside shift change report given to NICHOLAS Hernandez (oncoming nurse) by Alberto Miller RN (offgoing nurse).  Report included the following information SBAR, Intake/Output, MAR, Recent Results, Cardiac Rhythm Sinus Rhythm with PVCs, Quality Measures, and Dual Neuro Assessment    Shift worked:  7:00 PM-7:30 AM     Shift summary and any significant changes:    Patient was in pain and restless throughout the night. I gave PRN Oxycodone PO twice and gave IV Dilauid x1. Patient's left leg was throbbing and he was in 10/10 pain when I gave the IV Dilaudid. Patient's labs were drawn and resulted. Patient's Sodium was 133 and Hemoglobin was 9.4.    Concerns for physician to address: Oxygenation requirements; Pain management; Difficulty ambulating; Inpatient Rehab?    Zone phone for oncoming shift:   N/A       Activity:  Level of Assistance: Moderate assist, patient does 50-74%  Number times ambulated in hallways past shift: 0  Number of times OOB to chair past shift: 0    Cardiac:   Cardiac Monitoring: Yes      Cardiac Rhythm: Sinus rhythm    Access:  Current line(s): PIV     Genitourinary:   Urinary Status: Voiding    Respiratory:   O2 Device: High flow nasal cannula  Chronic home O2 use?: YES  Incentive spirometer at bedside: YES    GI:  Last BM (including prior to admit): 02/04/25  Current diet:  ADULT DIET; Regular; 3 carb choices (45 gm/meal)  Passing flatus: YES    Pain Management:   Patient states pain is manageable on current regimen: YES    Skin:  Alfred Scale Score: 16  Interventions: Wound Offloading (Prevention Methods): Bed, pressure redistribution/air, Bed, pressure reduction mattress, Elevate heels, Pillows, Repositioning, Turning    Patient Safety:  Fall Risk: Nursing Judgement-Fall Risk High(Add Comments): Yes  Fall Risk Interventions  Nursing Judgement-Fall Risk High(Add Comments): Yes  Toilet Every 2 Hours-In Advance of Need: Yes  Hourly Visual Checks: In bed, Eyes closed  Fall Visual

## 2025-02-07 NOTE — PLAN OF CARE
Problem: Occupational Therapy - Adult  Goal: By Discharge: Performs self-care activities at highest level of function for planned discharge setting.  See evaluation for individualized goals.  Description: FUNCTIONAL STATUS PRIOR TO ADMISSION:  Patient was independent to mod I for ADLs and functional mobility. He reported mainly furniture walking in his house but also reported having rolling walker if needed.   Receives Help From: Family, Prior Level of Assist for ADLs: Independent,  ,  ,  ,  ,  , Prior Level of Assist for Homemaking: Independent, Ambulation Assistance: Independent, Prior Level of Assist for Transfers: Independent, Active : Yes     HOME SUPPORT: Patient lived alone.    Occupational Therapy Goals:  1.  Patient will perform grooming in standing with Minimal Assist within 7 day(s).  2.  Patient will perform upper body dressing with Modified [place] within 7 day(s).  3.  Patient will perform lower body dressing using AE PRN with Minimal Assist within 7 day(s).  4.  Patient will perform toilet transfers with Contact Guard Assist within 7 day(s).  5.  Patient will perform all aspects of toileting with Moderate Assist within 7 day(s).  6.  Patient will participate in upper extremity therapeutic exercise/activities with Modified [place] for 10 minutes within 7 day(s).      Initiated 1/31/2025; Discontinued and revised above during re-eval 2/6/2025  1.  Patient will perform grooming in standing with Supervision within 7 day(s).  2.  Patient will perform upper body dressing with Supervision within 7 day(s).  3.  Patient will perform lower body dressing using AE PRN with Moderate Assist within 7 day(s).  4.  Patient will perform toilet transfers with Minimal Assist  within 7 day(s).  5.  Patient will perform all aspects of toileting with Minimal Assist within 7 day(s).  6.  Patient will participate in upper extremity therapeutic exercise/activities with Stand by Assist for 10 minutes within 7  and not over top of L bandage    Toileting: Maximum assistance  Toileting Skilled Clinical Factors: rear pericare in standing    Pain Rating:  Patient c/o LLE pain   Pain Intervention(s):   patient medicated for pain prior to session, elevation, and repositioning      Activity Tolerance:   Fair , requires frequent rest breaks, observed shortness of breath on exertion, and desaturates with activity and requires oxygen  Please refer to the flowsheet for vital signs taken during this treatment.    After treatment:   Patient left in no apparent distress sitting up in chair, Call bell within reach, Bed/ chair alarm activated, and Updated patient's board on functional status and mobility recommendations    COMMUNICATION/EDUCATION:   The patient's plan of care was discussed with: physical therapist and registered nurse    Patient Education  Education Given To: Patient  Education Provided: Role of Therapy;Plan of Care;Precautions;ADL Adaptive Strategies;Energy Conservation;Transfer Training  Education Method: Verbal;Demonstration  Barriers to Learning: Cognition  Education Outcome: Verbalized understanding;Continued education needed    Thank you for this referral.  Diane Lorenzo OT  Minutes: 18

## 2025-02-07 NOTE — CARE COORDINATION
Transition of Care Plan:    RUR: 18% \"medium risk\"  Prior Level of Functioning: Independent with ADL's  Disposition: High intensity/comprehensive skilled physical therapy in a multidisciplinary setting vs. Home with [facility] HH services   FELA: 2/10  If SNF or IPR: Date FOC offered: 1/31  Date FOC received: 1/31, 2/7 - pt now agreeable  Accepting facility: N/A  Date authorization started with reference number: N/A  Date authorization received and expires: N/A  Follow up appointments: PCP/Specialists as indicated  DME needed: None - pt has cane, walker, and 3L O2 through [employer] at home  Transportation at discharge: Family vs. BLS  IM/IMM Medicare/ letter given: 2nd IM needed prior to d/c   Is patient a  and connected with VA? No  Caregiver Contact: Skye Turk (child), [phone]  Discharge Caregiver contacted prior to discharge? To be contacted   Care Conference needed? Not at this time   Barriers to discharge: Vascular, pulmonology, wean O2 to baseline, auth/placement      1120 AM: Chart reviewed. CM met with pt at the bedside to discuss d/c plan. Pt stated he is now agreeable to IPR or SNF placement pending insurance auth. CM received IPR choice from pt, pending SNF choices as a back up plan. CM to send IPR referral and initiate auth upon acceptance.    HERBERT Patel  Care Management  [facility]   [phone]

## 2025-02-07 NOTE — PLAN OF CARE
Problem: Skin/Tissue Integrity  Goal: Skin integrity remains intact  Description: 1.  Monitor for areas of redness and/or skin breakdown  2.  Assess vascular access sites hourly  3.  Every 4-6 hours minimum:  Change oxygen saturation probe site  4.  Every 4-6 hours:  If on nasal continuous positive airway pressure, respiratory therapy assess nares and determine need for appliance change or resting period  Outcome: Progressing  Flowsheets (Taken 2/6/2025 0730)  Skin Integrity Remains Intact: Monitor for areas of redness and/or skin breakdown     Problem: Respiratory - Adult  Goal: Achieves optimal ventilation and oxygenation  2/6/2025 1927 by Brianne Thorpe RN  Outcome: Progressing  2/6/2025 1011 by Gilma Coffman, RT  Outcome: Progressing     Problem: Pain  Goal: Verbalizes/displays adequate comfort level or baseline comfort level  Outcome: Progressing     Problem: Safety - Adult  Goal: Free from fall injury  Outcome: Progressing     Problem: ABCDS Injury Assessment  Goal: Absence of physical injury  Outcome: Progressing     Problem: Nutrition Deficit:  Goal: Optimize nutritional status  Outcome: Progressing     Problem: Discharge Planning  Goal: Discharge to home or other facility with appropriate resources  Outcome: Progressing     Problem: Occupational Therapy - Adult  Goal: By Discharge: Performs self-care activities at highest level of function for planned discharge setting.  See evaluation for individualized goals.  Description: FUNCTIONAL STATUS PRIOR TO ADMISSION:  Patient was independent to mod I for ADLs and functional mobility. He reported mainly furniture walking in his house but also reported having rolling walker if needed.   Receives Help From: Family, Prior Level of Assist for ADLs: Independent,  ,  ,  ,  ,  , Prior Level of Assist for Homemaking: Independent, Ambulation Assistance: Independent, Prior Level of Assist for Transfers: Independent, Active : Yes     HOME SUPPORT: Patient

## 2025-02-07 NOTE — PLAN OF CARE
Problem: Physical Therapy - Adult  Goal: By Discharge: Performs mobility at highest level of function for planned discharge setting.  See evaluation for individualized goals.  Description: FUNCTIONAL STATUS PRIOR TO ADMISSION: Patient was independent and active without use of DME. Patient admits to furniture walking in the home. He is a retired . Patient wears 3L O2 at baseline.     HOME SUPPORT PRIOR TO ADMISSION: The patient lived alone with friends and neighbors to provide assistance.    Physical Therapy Goals  Initiated 1/31/2025  Re-evaluation 2/6/2025 s/p amputation L toes 2-5: goals remain appropriate   1.  Patient will move from supine to sit and sit to supine, scoot up and down, and roll side to side in bed with modified independence within 7 day(s).    2.  Patient will perform sit to stand with modified independence within 7 day(s).  3.  Patient will transfer from bed to chair and chair to bed with modified independence using the least restrictive device within 7 day(s).  4.  Patient will ambulate with modified independence for 100 feet with the least restrictive device within 7 day(s).   5.  Patient will ascend/descend 15 stairs with bilateral handrail(s) with modified independence within 7 day(s).   Outcome: Progressing   PHYSICAL THERAPY TREATMENT    Patient: Nasir Hatfield (74 y.o. male)  Date: 2/7/2025  Diagnosis: Critical lower limb ischemia (HCC) [I70.229]  Acute lower limb ischemia [I99.8] Critical lower limb ischemia (HCC)  Procedure(s) (LRB):  LEFT 2ND, 3RD, 4TH, AND 5TH TOE AMPUTATIONS ( MAC WITH BLOCK) (Left) 2 Days Post-Op  Precautions: Weight Bearing, Fall Risk, Bed Alarm   Left Lower Extremity Weight Bearing: Non Weight Bearing                  ASSESSMENT:  Patient continues to benefit from skilled PT services and is progressing towards goals. Patient received resting in bed, agreeable and cleared for therapy. Patient appears with mild confusion, requiring additional time for  processing and problem solving. He required overall CGA-min A x 1 and RW for transfers to the chair. Patient tolerated standing x 2 minutes with CGA and RW, maintaining non-weightbearing on LLE. O2 sats dropped to 82% on 7L high flow, requiring 2 min to recover to 90%. Reviewed and educated on incentive spirometry. Patient continues to be functioning below his baseline and not safe to return home at this time.          PLAN:  Patient continues to benefit from skilled intervention to address the above impairments.  Continue treatment per established plan of care.        Recommendation for discharge: (in order for the patient to meet his/her long term goals):   High intensity/comprehensive skilled physical therapy in a multidisciplinary setting as patient is working towards tolerating up to 3 hours of therapy/day 5-7x/week    Other factors to consider for discharge: high risk for falls, not safe to be alone, and concern for safely navigating or managing the home environment, NWB    IF patient discharges home will need the following DME: wheelchair 16 inch, stair lift       SUBJECTIVE:   Patient stated, \"I need to figure out this rehab thing.\"    OBJECTIVE DATA SUMMARY:   Critical Behavior:          Functional Mobility Training:  Bed Mobility:  Bed Mobility Training  Overall Level of Assistance: Supervision  Rolling: Stand by assistance  Supine to Sit: Stand by assistance  Scooting: Stand by assistance  Transfers:  Transfer Training  Transfer Training: Yes  Overall Level of Assistance: Contact guard assistance  Interventions: Safety awareness training;Verbal cues  Sit to Stand: Contact guard assistance  Stand to Sit: Contact guard assistance;Minimal assistance  Bed to Chair: Minimal assistance  Balance:  Balance  Sitting: Intact  Standing: Impaired  Standing - Static: Fair;Constant support  Standing - Dynamic: Fair;Constant support   Ambulation/Gait Training:     Gait  Gait Training: Yes  Left Side Weight Bearing:

## 2025-02-07 NOTE — PROGRESS NOTES
Bedside and Verbal shift change report given to NICHOLAS Decker (oncoming nurse) by NICHOLAS Valderrama (offgoing nurse). Report included the following information Nurse Handoff Report, Adult Overview, Intake/Output, and MAR.      End of Shift Note    Bedside shift change report given to NICHOLAS Valderrama (oncoming nurse) by Brianne Thorpe RN (offgoing nurse).  Report included the following information SBAR, Intake/Output, MAR, and Recent Results    Shift worked:  [id number]     Shift summary and any significant changes:    No significant changes. Patient up to chair with meals. Intermittent c/o of 7.5/10 LLE pain. LLE dressings changed. Echo pending.      Concerns for physician to address:       Zone phone for oncoming shift:   [phone]       Activity:     Number times ambulated in hallways past shift: 0  Number of times OOB to chair past shift: 2    Cardiac:   Cardiac Monitoring: Yes           Access:  Current line(s): PIV     Genitourinary:   Urinary status: voiding    Respiratory:      Chronic home O2 use?: YES  Incentive spirometer at bedside: YES       GI:     Current diet:  ADULT DIET; Regular; 3 carb choices (45 gm/meal)  Passing flatus: YES  Tolerating current diet: YES       Pain Management:   Patient states pain is manageable on current regimen: YES    Skin:     Interventions: float heels and increase time out of bed    Patient Safety:  Fall Score:    Interventions: bed/chair alarm, assistive device (walker, cane. etc), gripper socks, pt to call before getting OOB, and stay with me (per policy)       Length of Stay:  Expected LOS: 15  Actual LOS: 12    Predictive Model Details          49  Factor Value    Calculated 2/7/2025 17:03 24% Age 74 years old    Deterioration Index Model 22% Supplemental oxygen High flow nasal cannula     18% Neurological exam X     8% Respiratory rate 19     8% Cardiac rhythm Atrial fib     7% Potassium 4.7 mmol/L     4% Sodium abnormal (133 mmol/L)     2% Hematocrit abnormal (30.2 %)     2% Systolic

## 2025-02-08 LAB
ANION GAP SERPL CALC-SCNC: 4 MMOL/L (ref 2–12)
BASOPHILS # BLD: 0.09 K/UL (ref 0–0.1)
BASOPHILS NFR BLD: 1.4 % (ref 0–1)
BUN SERPL-MCNC: 23 MG/DL (ref 6–20)
BUN/CREAT SERPL: 26 (ref 12–20)
CALCIUM SERPL-MCNC: 8.5 MG/DL (ref 8.5–10.1)
CHLORIDE SERPL-SCNC: 100 MMOL/L (ref 97–108)
CO2 SERPL-SCNC: 32 MMOL/L (ref 21–32)
CREAT SERPL-MCNC: 0.87 MG/DL (ref 0.7–1.3)
DIFFERENTIAL METHOD BLD: ABNORMAL
EOSINOPHIL # BLD: 0.4 K/UL (ref 0–0.4)
EOSINOPHIL NFR BLD: 6 % (ref 0–7)
ERYTHROCYTE [DISTWIDTH] IN BLOOD BY AUTOMATED COUNT: 19.4 % (ref 11.5–14.5)
GLUCOSE SERPL-MCNC: 103 MG/DL (ref 65–100)
HCT VFR BLD AUTO: 29.7 % (ref 36.6–50.3)
HGB BLD-MCNC: 9.3 G/DL (ref 12.1–17)
IMM GRANULOCYTES # BLD AUTO: 0.03 K/UL (ref 0–0.04)
IMM GRANULOCYTES NFR BLD AUTO: 0.5 % (ref 0–0.5)
LYMPHOCYTES # BLD: 1.03 K/UL (ref 0.8–3.5)
LYMPHOCYTES NFR BLD: 15.5 % (ref 12–49)
MCH RBC QN AUTO: 23.6 PG (ref 26–34)
MCHC RBC AUTO-ENTMCNC: 31.3 G/DL (ref 30–36.5)
MCV RBC AUTO: 75.4 FL (ref 80–99)
MONOCYTES # BLD: 0.68 K/UL (ref 0–1)
MONOCYTES NFR BLD: 10.2 % (ref 5–13)
NEUTS SEG # BLD: 4.43 K/UL (ref 1.8–8)
NEUTS SEG NFR BLD: 66.4 % (ref 32–75)
NRBC # BLD: 0 K/UL (ref 0–0.01)
NRBC BLD-RTO: 0 PER 100 WBC
NT PRO BNP: 583 PG/ML
PLATELET # BLD AUTO: 557 K/UL (ref 150–400)
PMV BLD AUTO: 9 FL (ref 8.9–12.9)
POTASSIUM SERPL-SCNC: 4.1 MMOL/L (ref 3.5–5.1)
RBC # BLD AUTO: 3.94 M/UL (ref 4.1–5.7)
SODIUM SERPL-SCNC: 136 MMOL/L (ref 136–145)
WBC # BLD AUTO: 6.7 K/UL (ref 4.1–11.1)

## 2025-02-08 PROCEDURE — 6360000002 HC RX W HCPCS: Performed by: SURGERY

## 2025-02-08 PROCEDURE — 2060000000 HC ICU INTERMEDIATE R&B

## 2025-02-08 PROCEDURE — 6370000000 HC RX 637 (ALT 250 FOR IP): Performed by: SURGERY

## 2025-02-08 PROCEDURE — 85025 COMPLETE CBC W/AUTO DIFF WBC: CPT

## 2025-02-08 PROCEDURE — 36415 COLL VENOUS BLD VENIPUNCTURE: CPT

## 2025-02-08 PROCEDURE — 80048 BASIC METABOLIC PNL TOTAL CA: CPT

## 2025-02-08 PROCEDURE — 83880 ASSAY OF NATRIURETIC PEPTIDE: CPT

## 2025-02-08 PROCEDURE — 2500000003 HC RX 250 WO HCPCS: Performed by: SURGERY

## 2025-02-08 PROCEDURE — 6370000000 HC RX 637 (ALT 250 FOR IP): Performed by: INTERNAL MEDICINE

## 2025-02-08 PROCEDURE — 2700000000 HC OXYGEN THERAPY PER DAY

## 2025-02-08 PROCEDURE — 94640 AIRWAY INHALATION TREATMENT: CPT

## 2025-02-08 RX ORDER — PREDNISONE 20 MG/1
40 TABLET ORAL DAILY
Status: DISCONTINUED | OUTPATIENT
Start: 2025-02-08 | End: 2025-02-11

## 2025-02-08 RX ADMIN — ATORVASTATIN CALCIUM 10 MG: 10 TABLET, FILM COATED ORAL at 20:29

## 2025-02-08 RX ADMIN — IPRATROPIUM BROMIDE 0.5 MG: 0.5 SOLUTION RESPIRATORY (INHALATION) at 07:25

## 2025-02-08 RX ADMIN — ASPIRIN 81 MG: 81 TABLET, COATED ORAL at 08:37

## 2025-02-08 RX ADMIN — DILTIAZEM HYDROCHLORIDE 120 MG: 120 CAPSULE, COATED, EXTENDED RELEASE ORAL at 08:37

## 2025-02-08 RX ADMIN — PANTOPRAZOLE SODIUM 40 MG: 40 TABLET, DELAYED RELEASE ORAL at 08:37

## 2025-02-08 RX ADMIN — OXYCODONE 10 MG: 5 TABLET ORAL at 11:03

## 2025-02-08 RX ADMIN — BUDESONIDE 500 MCG: 0.5 INHALANT RESPIRATORY (INHALATION) at 07:20

## 2025-02-08 RX ADMIN — Medication: at 20:31

## 2025-02-08 RX ADMIN — GUAIFENESIN 1200 MG: 600 TABLET ORAL at 20:31

## 2025-02-08 RX ADMIN — WATER 2000 MG: 1 INJECTION INTRAMUSCULAR; INTRAVENOUS; SUBCUTANEOUS at 15:54

## 2025-02-08 RX ADMIN — ACETAMINOPHEN 1000 MG: 500 TABLET ORAL at 05:39

## 2025-02-08 RX ADMIN — WATER 2000 MG: 1 INJECTION INTRAMUSCULAR; INTRAVENOUS; SUBCUTANEOUS at 08:32

## 2025-02-08 RX ADMIN — APIXABAN 5 MG: 5 TABLET, FILM COATED ORAL at 08:37

## 2025-02-08 RX ADMIN — Medication: at 08:38

## 2025-02-08 RX ADMIN — ACETAMINOPHEN 1000 MG: 500 TABLET ORAL at 20:30

## 2025-02-08 RX ADMIN — ACETAMINOPHEN 1000 MG: 500 TABLET ORAL at 15:39

## 2025-02-08 RX ADMIN — ARFORMOTEROL TARTRATE 15 MCG: 15 SOLUTION RESPIRATORY (INHALATION) at 07:20

## 2025-02-08 RX ADMIN — OXYCODONE 10 MG: 5 TABLET ORAL at 20:29

## 2025-02-08 RX ADMIN — CITALOPRAM HYDROBROMIDE 10 MG: 20 TABLET ORAL at 08:37

## 2025-02-08 RX ADMIN — APIXABAN 5 MG: 5 TABLET, FILM COATED ORAL at 20:28

## 2025-02-08 RX ADMIN — GUAIFENESIN 1200 MG: 600 TABLET ORAL at 08:37

## 2025-02-08 RX ADMIN — PREDNISONE 40 MG: 20 TABLET ORAL at 15:39

## 2025-02-08 ASSESSMENT — PAIN SCALES - GENERAL
PAINLEVEL_OUTOF10: 7
PAINLEVEL_OUTOF10: 5
PAINLEVEL_OUTOF10: 6
PAINLEVEL_OUTOF10: 7
PAINLEVEL_OUTOF10: 5

## 2025-02-08 ASSESSMENT — PAIN DESCRIPTION - LOCATION
LOCATION: FOOT
LOCATION: FOOT
LOCATION: FOOT;LEG
LOCATION: LEG;FOOT

## 2025-02-08 ASSESSMENT — PAIN DESCRIPTION - DESCRIPTORS
DESCRIPTORS: THROBBING
DESCRIPTORS: ACHING;DISCOMFORT;SORE;TENDER;THROBBING

## 2025-02-08 ASSESSMENT — PAIN DESCRIPTION - ORIENTATION
ORIENTATION: LEFT

## 2025-02-08 NOTE — PROGRESS NOTES
End of Shift Note    Bedside shift change report given to NICHOLAS Barrios (oncoming nurse) by Alberto Miller RN (offgoing nurse).  Report included the following information SBAR, OR Summary, Procedure Summary, Intake/Output, MAR, Recent Results, Cardiac Rhythm A-Fib, Quality Measures, and Dual Neuro Assessment    Shift worked:  7:00 PM-7:30 AM     Shift summary and any significant changes:     Patient had an uneventful shift. Patient did not complain of pain nearly as much as he did last night, so pain management is improving. Patient's oxygenation was above 90% the entire night. Labs were drawn and resulted. Patient's BUN was 23 and BNP was 583. Patient finally slept for more than 30 minute increments throughout the night.    Concerns for physician to address:  Patient is awaiting placement and insurance authorization for Inpatient Rehab.    Zone phone for oncoming shift:   N/A       Activity:  Level of Assistance: Moderate assist, patient does 50-74%  Number times ambulated in hallways past shift: 0  Number of times OOB to chair past shift: 0    Cardiac:   Cardiac Monitoring: Yes      Cardiac Rhythm: Atrial fib    Access:  Current line(s): PIV     Genitourinary:   Urinary Status: Voiding    Respiratory:   O2 Device: High flow nasal cannula  Chronic home O2 use?: YES  Incentive spirometer at bedside: YES    GI:  Last BM (including prior to admit): 02/06/25  Current diet:  ADULT DIET; Regular; 3 carb choices (45 gm/meal)  Passing flatus: YES    Pain Management:   Patient states pain is manageable on current regimen: YES    Skin:  Alfred Scale Score: 18  Interventions: Wound Offloading (Prevention Methods): Bed, pressure redistribution/air, Bed, pressure reduction mattress, Elevate heels, Pillows, Repositioning, Turning    Patient Safety:  Fall Risk: Nursing Judgement-Fall Risk High(Add Comments): Yes  Fall Risk Interventions  Nursing Judgement-Fall Risk High(Add Comments): Yes  Toilet Every 2 Hours-In Advance of

## 2025-02-08 NOTE — PROGRESS NOTES
Hospitalist Progress Note    NAME:   Nasir Hatfield   : 1950   MRN: [id number]     Date/Time: 2025 1:47 PM  Patient PCP: Annie Mcnair APRN - NP      Estimated discharge date:   Barriers:   Respiratory status improvement  Echo pending-if respiratory status improves and stable echo can be done as outpatient  Pending placement     Assessment / Plan:    Critical limb ischemia- L foot POA- s/p Left CFA=>PT bypass w/ipsilateral NRGSV by Dr Lorenzo   - Here with worsening L leg swelling, pain, redness recently  - CTA runoff showing occluded left superficial femoral artery, distal vessels difficult to evaluation, probably reconstitution at level of popliteal artery   IP Vascular Surgery consulted- S/p Left CFA=>PT bypass w/ipsilateral NRGSV  by Dr Lorenzo, no cleared from their standpoint for DC planning  Cont ASA & resumed lovenox SQ  S/p IV heparin drip - Dcd post op  Off IVF post op  Cont post op pain management as per surgical team-  cont PO Oxycodone + IV dilaudid prn , pt off epidural drip  S/p booker out today post op- voiding today  BM today per pt  IP Pt/Ot eval for DC planning noted- IPR recommended  25  Patient doing well less rest pain in foot  2/3/25: will need a TMA prior to discharge  25: Amputaion planned  25 Amputation completed  25 denies any pain.  Appreciate vascular following, PT/OT, dispo  : Cleared from vascular standpoint for discharge      L foot cellulitis with Toe necrosis POA  -WBC 23.5->19.9->16.8->14.4 ->15.4  -Color change to LLE however no madison erythema, leg is cold   - Blood cultures sent- neg  - crp elevated as expected 17.7  CXR - severe Bullous Emphysema noted  S/p IV Vanc and Zosyn for now - empiric IV Abx stopped now  25 Blood Cx neg x 5 days now  Remains Afebrile   2/3/25 BC NGTD  25  Abx restarted in anticipation of OR and cellulitis rash    Wound cultures remain negative so far will be completing 5 days of cefazolin    Acute on  were not copied into this note but were reviewed prior to creation of Plan.      LABS:  I reviewed today's most current labs and imaging studies.  Pertinent labs include:  Recent Labs     02/07/25 0235 02/08/25 0237   WBC 8.0 6.7   HGB 9.4* 9.3*   HCT 30.2* 29.7*   * 557*     Recent Labs     02/07/25 0235 02/08/25 0237   * 136   K 4.7 4.1   CL 98 100   CO2 32 32   GLUCOSE 101* 103*   BUN 19 23*   CREATININE 0.86 0.87   CALCIUM 9.1 8.5       Signed: Radha Diamond MD FACP

## 2025-02-09 LAB
ANION GAP SERPL CALC-SCNC: 4 MMOL/L (ref 2–12)
BACTERIA SPEC CULT: NORMAL
BACTERIA SPEC CULT: NORMAL
BASOPHILS # BLD: 0.03 K/UL (ref 0–0.1)
BASOPHILS NFR BLD: 0.6 % (ref 0–1)
BUN SERPL-MCNC: 25 MG/DL (ref 6–20)
BUN/CREAT SERPL: 26 (ref 12–20)
CALCIUM SERPL-MCNC: 8.7 MG/DL (ref 8.5–10.1)
CHLORIDE SERPL-SCNC: 99 MMOL/L (ref 97–108)
CO2 SERPL-SCNC: 31 MMOL/L (ref 21–32)
CREAT SERPL-MCNC: 0.98 MG/DL (ref 0.7–1.3)
DIFFERENTIAL METHOD BLD: ABNORMAL
EOSINOPHIL # BLD: 0.01 K/UL (ref 0–0.4)
EOSINOPHIL NFR BLD: 0.2 % (ref 0–7)
ERYTHROCYTE [DISTWIDTH] IN BLOOD BY AUTOMATED COUNT: 19.2 % (ref 11.5–14.5)
GLUCOSE SERPL-MCNC: 146 MG/DL (ref 65–100)
GRAM STN SPEC: NORMAL
GRAM STN SPEC: NORMAL
HCT VFR BLD AUTO: 31.5 % (ref 36.6–50.3)
HGB BLD-MCNC: 9.9 G/DL (ref 12.1–17)
IMM GRANULOCYTES # BLD AUTO: 0.03 K/UL (ref 0–0.04)
IMM GRANULOCYTES NFR BLD AUTO: 0.6 % (ref 0–0.5)
LYMPHOCYTES # BLD: 0.37 K/UL (ref 0.8–3.5)
LYMPHOCYTES NFR BLD: 7.3 % (ref 12–49)
MCH RBC QN AUTO: 23.7 PG (ref 26–34)
MCHC RBC AUTO-ENTMCNC: 31.4 G/DL (ref 30–36.5)
MCV RBC AUTO: 75.4 FL (ref 80–99)
MONOCYTES # BLD: 0.11 K/UL (ref 0–1)
MONOCYTES NFR BLD: 2.2 % (ref 5–13)
NEUTS SEG # BLD: 4.54 K/UL (ref 1.8–8)
NEUTS SEG NFR BLD: 89.1 % (ref 32–75)
NRBC # BLD: 0 K/UL (ref 0–0.01)
NRBC BLD-RTO: 0 PER 100 WBC
PLATELET # BLD AUTO: 591 K/UL (ref 150–400)
PMV BLD AUTO: 9 FL (ref 8.9–12.9)
POTASSIUM SERPL-SCNC: 4.5 MMOL/L (ref 3.5–5.1)
RBC # BLD AUTO: 4.18 M/UL (ref 4.1–5.7)
RBC MORPH BLD: ABNORMAL
SERVICE CMNT-IMP: NORMAL
SERVICE CMNT-IMP: NORMAL
SODIUM SERPL-SCNC: 134 MMOL/L (ref 136–145)
WBC # BLD AUTO: 5.1 K/UL (ref 4.1–11.1)

## 2025-02-09 PROCEDURE — 2060000000 HC ICU INTERMEDIATE R&B

## 2025-02-09 PROCEDURE — 6360000002 HC RX W HCPCS: Performed by: SURGERY

## 2025-02-09 PROCEDURE — 85025 COMPLETE CBC W/AUTO DIFF WBC: CPT

## 2025-02-09 PROCEDURE — 6370000000 HC RX 637 (ALT 250 FOR IP): Performed by: INTERNAL MEDICINE

## 2025-02-09 PROCEDURE — 6370000000 HC RX 637 (ALT 250 FOR IP): Performed by: SURGERY

## 2025-02-09 PROCEDURE — 80048 BASIC METABOLIC PNL TOTAL CA: CPT

## 2025-02-09 PROCEDURE — 36415 COLL VENOUS BLD VENIPUNCTURE: CPT

## 2025-02-09 PROCEDURE — 2500000003 HC RX 250 WO HCPCS: Performed by: SURGERY

## 2025-02-09 PROCEDURE — 94640 AIRWAY INHALATION TREATMENT: CPT

## 2025-02-09 RX ADMIN — ACETAMINOPHEN 1000 MG: 500 TABLET ORAL at 21:32

## 2025-02-09 RX ADMIN — GUAIFENESIN 1200 MG: 600 TABLET ORAL at 21:32

## 2025-02-09 RX ADMIN — APIXABAN 5 MG: 5 TABLET, FILM COATED ORAL at 08:24

## 2025-02-09 RX ADMIN — Medication: at 21:32

## 2025-02-09 RX ADMIN — IPRATROPIUM BROMIDE 0.5 MG: 0.5 SOLUTION RESPIRATORY (INHALATION) at 07:25

## 2025-02-09 RX ADMIN — ARFORMOTEROL TARTRATE 15 MCG: 15 SOLUTION RESPIRATORY (INHALATION) at 20:18

## 2025-02-09 RX ADMIN — DILTIAZEM HYDROCHLORIDE 120 MG: 120 CAPSULE, COATED, EXTENDED RELEASE ORAL at 08:23

## 2025-02-09 RX ADMIN — GUAIFENESIN 1200 MG: 600 TABLET ORAL at 08:23

## 2025-02-09 RX ADMIN — BUDESONIDE 500 MCG: 0.5 INHALANT RESPIRATORY (INHALATION) at 07:20

## 2025-02-09 RX ADMIN — IPRATROPIUM BROMIDE 0.5 MG: 0.5 SOLUTION RESPIRATORY (INHALATION) at 20:18

## 2025-02-09 RX ADMIN — ALBUTEROL SULFATE 2.5 MG: 2.5 SOLUTION RESPIRATORY (INHALATION) at 00:16

## 2025-02-09 RX ADMIN — PANTOPRAZOLE SODIUM 40 MG: 40 TABLET, DELAYED RELEASE ORAL at 08:23

## 2025-02-09 RX ADMIN — ARFORMOTEROL TARTRATE 15 MCG: 15 SOLUTION RESPIRATORY (INHALATION) at 07:20

## 2025-02-09 RX ADMIN — ACETAMINOPHEN 1000 MG: 500 TABLET ORAL at 05:31

## 2025-02-09 RX ADMIN — OXYCODONE 5 MG: 5 TABLET ORAL at 02:19

## 2025-02-09 RX ADMIN — ATORVASTATIN CALCIUM 10 MG: 10 TABLET, FILM COATED ORAL at 21:32

## 2025-02-09 RX ADMIN — WATER 2000 MG: 1 INJECTION INTRAMUSCULAR; INTRAVENOUS; SUBCUTANEOUS at 00:01

## 2025-02-09 RX ADMIN — Medication: at 09:57

## 2025-02-09 RX ADMIN — ASPIRIN 81 MG: 81 TABLET, COATED ORAL at 08:26

## 2025-02-09 RX ADMIN — BUDESONIDE 500 MCG: 0.5 INHALANT RESPIRATORY (INHALATION) at 20:18

## 2025-02-09 RX ADMIN — CITALOPRAM HYDROBROMIDE 10 MG: 20 TABLET ORAL at 08:24

## 2025-02-09 RX ADMIN — PREDNISONE 40 MG: 20 TABLET ORAL at 08:23

## 2025-02-09 RX ADMIN — APIXABAN 5 MG: 5 TABLET, FILM COATED ORAL at 21:32

## 2025-02-09 ASSESSMENT — PAIN - FUNCTIONAL ASSESSMENT
PAIN_FUNCTIONAL_ASSESSMENT: PREVENTS OR INTERFERES SOME ACTIVE ACTIVITIES AND ADLS

## 2025-02-09 ASSESSMENT — PAIN DESCRIPTION - FREQUENCY: FREQUENCY: CONTINUOUS

## 2025-02-09 ASSESSMENT — PAIN DESCRIPTION - ORIENTATION
ORIENTATION: LEFT

## 2025-02-09 ASSESSMENT — PAIN DESCRIPTION - ONSET: ONSET: GRADUAL

## 2025-02-09 ASSESSMENT — PAIN SCALES - GENERAL
PAINLEVEL_OUTOF10: 5
PAINLEVEL_OUTOF10: 0
PAINLEVEL_OUTOF10: 8
PAINLEVEL_OUTOF10: 3
PAINLEVEL_OUTOF10: 0
PAINLEVEL_OUTOF10: 7
PAINLEVEL_OUTOF10: 5

## 2025-02-09 ASSESSMENT — PAIN DESCRIPTION - DESCRIPTORS
DESCRIPTORS: ACHING;DISCOMFORT;SORE;TENDER
DESCRIPTORS: ACHING;DISCOMFORT;TENDER;SORE;THROBBING
DESCRIPTORS: ACHING;DISCOMFORT

## 2025-02-09 ASSESSMENT — PAIN DESCRIPTION - LOCATION
LOCATION: LEG;FOOT

## 2025-02-09 ASSESSMENT — PAIN DESCRIPTION - DIRECTION: RADIATING_TOWARDS: LEFT FOOT

## 2025-02-09 ASSESSMENT — PAIN DESCRIPTION - PAIN TYPE: TYPE: SURGICAL PAIN

## 2025-02-09 NOTE — PROGRESS NOTES
0016: PRN Albuterol treatment given to patient because Respiratory cancelled his scheduled treatments. Patient needs his scheduled treatments due to chronic oxygen use at home and currently being on 7L High Flow with severe COPD. Patient requested his treatments and is satisfied with PRN treatment for now.       End of Shift Note    Bedside shift change report given to NICHOLAS Fair (oncoming nurse) by Alberto Miller RN (offgoing nurse).  Report included the following information SBAR, OR Summary, Procedure Summary, Intake/Output, MAR, Recent Results, Cardiac Rhythm A-Fib/Sinus Rhythm with PVCs, Quality Measures, and Dual Neuro Assessment    Shift worked:  7:00 PM-7:30 AM     Shift summary and any significant changes:    Patient had an uneventful shift. Patient received PRN breathing treatment as stated above. Patient's vital signs were stable. Patient remains on 7L High Flow O2. Labs were drawn and resulted.    Concerns for physician to address: Awaiting insurance authorization/placement for Inpatient Rehab; Monitoring Oxygenation, Pain, and surgical sites    Zone phone for oncoming shift:   N/A       Activity:  Level of Assistance: Minimal assist, patient does 75% or more  Number times ambulated in hallways past shift: 0  Number of times OOB to chair past shift: 0    Cardiac:   Cardiac Monitoring: Yes      Cardiac Rhythm: Sinus rhythm with PVC    Access:  Current line(s): PIV     Genitourinary:   Urinary Status: Voiding    Respiratory:   O2 Device: High flow nasal cannula  Chronic home O2 use?: YES  Incentive spirometer at bedside: YES    GI:  Last BM (including prior to admit): 02/08/25  Current diet:  ADULT DIET; Regular; 3 carb choices (45 gm/meal)  Passing flatus: YES    Pain Management:   Patient states pain is manageable on current regimen: YES    Skin:  Alfred Scale Score: 18  Interventions: Wound Offloading (Prevention Methods): Bed, pressure redistribution/air, Bed, pressure reduction mattress, Elevate

## 2025-02-09 NOTE — PROGRESS NOTES
Hospitalist Progress Note    NAME:   Nasir Hatfield   : 1950   MRN: [id number]     Date/Time: 2025 10:31 AM  Patient PCP: Annie Mcnair APRN - NP      Estimated discharge date: 1-2 days  Barriers:   Respiratory status improvement-on 7l, baseline on 3l  Echo pending-if respiratory status improves and stable echo can be done as outpatient  Pending placement  Pulm reconsult     Assessment / Plan:    Critical limb ischemia- L foot POA- s/p Left CFA=>PT bypass w/ipsilateral NRGSV by Dr Lorenzo   - Here with worsening L leg swelling, pain, redness recently  - CTA runoff showing occluded left superficial femoral artery, distal vessels difficult to evaluation, probably reconstitution at level of popliteal artery   IP Vascular Surgery consulted- S/p Left CFA=>PT bypass w/ipsilateral NRGSV  by Dr Lorenzo,  25 TMA Amputation completed  25 denies any pain.  Appreciate vascular following, PT/OT, dispo ipr  : Cleared from vascular standpoint for discharge      L foot cellulitis with Toe necrosis POA  -WBC 23.5->19.9->16.8->14.4 ->15.4  -Color change to LLE however no madison erythema, leg is cold   - Blood cultures sent- neg  - crp elevated as expected 17.7  CXR - severe Bullous Emphysema noted  S/p IV Vanc and Zosyn for now - empiric IV Abx stopped now  25  Abx restarted in anticipation of OR and cellulitis rash  Wound cultures remain negative so far will be completed 5 days of cefazolin    Acute on Chronic hypoxic & hypercapneic respiratory failure POA-   Increased oxygen demand post day 1- up to 10L/[place]  ,   on 3L/m NC home oxygen at baseline  Hx of severe COPD with Emphysema POA- pt of Dr Ritter ([facility])  Afib RVR  - Continue home inhalers  - Lung sounds coarse  - Continue PRN duonebs as needed for wheezing   CXR today AM with RR @ 10:30 AM noted- neg acute but severe Emphysema noted  ABG= 7.43/50/42/78% on 3L/m   Pulm consulted for further recommendations  IV cardizem 5mg x 1 now, resume Po

## 2025-02-09 NOTE — PROGRESS NOTES
Contacted by RN regarding cancelled neb tx; advised maintenance nebs cancelled due to RT being unavailable and that a PRN can be given if needed. Advised maintenance nebs are not fast acting and do not give immediate relief for difficulty breathing and are 12hr doses. Advised if dose is past the scheduled administration time it is typically not given or held until the next scheduled dose. Offered to come or have someone and administer a PRN and she declined indicating\" I will take care of it.\"

## 2025-02-09 NOTE — PROGRESS NOTES
Bedside shift report received from NICHOLAS Dominguez. Report included the following information Nurse Handoff Report, MAR, Telemetry status, Recent Results, and plan of care. This RN verbalized understanding of plan of care with opportunity for clarification and questions. Care of patient assumed at this time. Dual skin check performed at this time.       End of Shift Note  Bedside shift report given to  NICHOLAS Lofton. Report included the following information Nurse Handoff Report, MAR, Telemetry status, Recent Results, and plan of care. Oncoming RN verbalized understanding of plan of care with opportunity for clarification and questions. Dual skin check performed at this time.     Shift worked:  0700 - 1930     Shift summary and any significant changes:     1126 pulmonology reconsult completed at this time to on-call provider. Dressing change completed to L femoral/groin sight, see flowsheet. Patient OOB to chair today. Foam sacral dressing applied to redness on sacrum for extra cushion, support; patient reports this helping.       Concerns for physician to address:  Insurance auth and placement for [facility]     Zone phone for oncoming shift:   N/A       Activity:  Level of Assistance: Minimal assist, patient does 75% or more  Number times ambulated in hallways past shift: 0  Number of times OOB to chair past shift: 2    Cardiac:   Cardiac Monitoring: Yes      Cardiac Rhythm: Sinus rhythm with 1st degree Heart Block    Access:  Current line(s): PIV     Genitourinary:   Urinary Status: Voiding    Respiratory:   O2 Device: High flow nasal cannula  Chronic home O2 use?: YES  Incentive spirometer at bedside: YES    GI:  Last BM (including prior to admit): 02/08/25  Current diet:  ADULT DIET; Regular; 3 carb choices (45 gm/meal)  Passing flatus: YES    Pain Management:   Patient states pain is manageable on current regimen: YES    Skin:  Alfred Scale Score: 18  Interventions: Wound Offloading (Prevention Methods): Bed, pressure  redistribution/air, Bed, pressure reduction mattress, Elevate heels, Pillows, Repositioning, Turning, Walker    Patient Safety:  Fall Risk: Nursing Judgement-Fall Risk High(Add Comments): Yes  Fall Risk Interventions  Nursing Judgement-Fall Risk High(Add Comments): Yes  Toilet Every 2 Hours-In Advance of Need: Yes  Hourly Visual Checks: Awake, In bed  Fall Visual Posted: Socks, Fall sign posted  Room Door Open: Yes  Alarm On: Bed, Chair  Patient Moved Closer to Nursing Station: No    Active Consults:   IP CONSULT TO VASCULAR SURGERY  IP CONSULT TO PHARMACY  IP CONSULT TO PULMONOLOGY  IP CONSULT TO CARDIOLOGY  IP CONSULT TO CASE MANAGEMENT  IP CONSULT TO SOCIAL WORK    Length of Stay:  Expected LOS: 15  Actual LOS: 14    ADORE ANGLIN RN

## 2025-02-09 NOTE — PROGRESS NOTES
Pulmonary, Critical Care, and Sleep Medicine~progress Note    Name: Nasir Hatfield MRN: [id number]   : 1950 Hospital: [facility]   Date: 2025 4:19 PM Admission: 2025     Impression Plan   Acute on chronic hypoxemic and chronic hypercapnic respiratory failure- baseline 3L NC. Secondary to suspected atelectasis and mild volume overload  Afib RVR  COPD--not in acute exacerbation  Critical limb ischemia s/p left CF to PT bypass w/ ipsilateral NRGSV on \  H/o CVA  HTN  Tobacco abuse-ongoing 1 PPD x 50 yrs Supp o2 prn goal sats >88%--currently on 7 l nc  Arformoterol/budesonide nebs in place of home Trelegy  DuoNebs as needed  Mucinex  Empiric broad-spectrum antibiotics  Monitor volume status closely-goal net even to slightly negative  Nicotine patch  Aggressive pulmonary toilet  (ie encourage IS, OOB  Prednisone 40 mg daily-anticipate slow taper over the next 7-10 days  Eliquis    Pulmonary will follow along the please call with any questions or clinical changes     Daily Progression:    : Pulmonary has been reconsulted due to persistent oxygen requirement.  At baseline the patient is on 3 L nasal cannula and has been titrated up to 7 L.  The patient feels better overall and has no shortness of breath, cough, sputum production or wheeze.  Chest x-ray demonstrates no obvious acute airspace disease although there may be small pleural effusions present    2/3 patient seen this morning lying in bed. States he is feeling \"high.\" He denies dyspnea, chest pain, cough. Satting high 90s on 5L NC. Weaned to 4L NC.     Consult for acute on chronic resp failure w/ hypoxia and hypercapnia post op, severe empysema on home oxygen    HPI: 75 y/o M with PMH COPD (FEV1 1.75L, 53% in 2016), chronic hypoxemic respiratory failure, HTN, CVA, PAD, depression who is admitted with critical limb ischemia s/p left CF to PT bypass w/ ipsilateral NRGSV on .  Rapid response

## 2025-02-09 NOTE — PLAN OF CARE
Problem: Skin/Tissue Integrity  Goal: Skin integrity remains intact  Description: 1.  Monitor for areas of redness and/or skin breakdown  2.  Assess vascular access sites hourly  3.  Every 4-6 hours minimum:  Change oxygen saturation probe site  4.  Every 4-6 hours:  If on nasal continuous positive airway pressure, respiratory therapy assess nares and determine need for appliance change or resting period  2/9/2025 0941 by Diane Desouza, RN  Outcome: Progressing  Flowsheets (Taken 2/9/2025 0715)  Skin Integrity Remains Intact: Monitor for areas of redness and/or skin breakdown  2/8/2025 2242 by Alberto Miller, RN  Outcome: Progressing     Problem: Respiratory - Adult  Goal: Achieves optimal ventilation and oxygenation  2/9/2025 0941 by Diane Desouza, RN  Outcome: Progressing  Flowsheets (Taken 2/9/2025 0715)  Achieves optimal ventilation and oxygenation:   Assess for changes in respiratory status   Assess for changes in mentation and behavior   Position to facilitate oxygenation and minimize respiratory effort   Oxygen supplementation based on oxygen saturation or arterial blood gases   Initiate smoking cessation protocol as indicated   Encourage broncho-pulmonary hygiene including cough, deep breathe, incentive spirometry  2/8/2025 2242 by Alberto Miller, RN  Outcome: Progressing     Problem: Pain  Goal: Verbalizes/displays adequate comfort level or baseline comfort level  2/9/2025 0941 by Diane Desouza, RN  Outcome: Progressing  Flowsheets (Taken 2/9/2025 0715)  Verbalizes/displays adequate comfort level or baseline comfort level:   Encourage patient to monitor pain and request assistance   Assess pain using appropriate pain scale   Implement non-pharmacological measures as appropriate and evaluate response   Administer analgesics based on type and severity of pain and evaluate response   Consider cultural and social influences on pain and pain management   Notify Licensed Independent Practitioner if

## 2025-02-09 NOTE — PLAN OF CARE
Problem: Skin/Tissue Integrity  Goal: Skin integrity remains intact  Description: 1.  Monitor for areas of redness and/or skin breakdown  2.  Assess vascular access sites hourly  3.  Every 4-6 hours minimum:  Change oxygen saturation probe site  4.  Every 4-6 hours:  If on nasal continuous positive airway pressure, respiratory therapy assess nares and determine need for appliance change or resting period  Outcome: Progressing     Problem: Respiratory - Adult  Goal: Achieves optimal ventilation and oxygenation  2/8/2025 2242 by Alberto Miller RN  Outcome: Progressing  2/8/2025 1050 by Giuliana Pate, RT  Outcome: Progressing     Problem: Pain  Goal: Verbalizes/displays adequate comfort level or baseline comfort level  Outcome: Progressing     Problem: Safety - Adult  Goal: Free from fall injury  Outcome: Progressing     Problem: ABCDS Injury Assessment  Goal: Absence of physical injury  Outcome: Progressing     Problem: Nutrition Deficit:  Goal: Optimize nutritional status  Outcome: Progressing     Problem: Discharge Planning  Goal: Discharge to home or other facility with appropriate resources  Outcome: Progressing

## 2025-02-10 LAB
ANION GAP SERPL CALC-SCNC: 5 MMOL/L (ref 2–12)
BASOPHILS # BLD: 0.02 K/UL (ref 0–0.1)
BASOPHILS NFR BLD: 0.2 % (ref 0–1)
BUN SERPL-MCNC: 29 MG/DL (ref 6–20)
BUN/CREAT SERPL: 35 (ref 12–20)
CALCIUM SERPL-MCNC: 8.8 MG/DL (ref 8.5–10.1)
CHLORIDE SERPL-SCNC: 102 MMOL/L (ref 97–108)
CO2 SERPL-SCNC: 31 MMOL/L (ref 21–32)
CREAT SERPL-MCNC: 0.84 MG/DL (ref 0.7–1.3)
DIFFERENTIAL METHOD BLD: ABNORMAL
EOSINOPHIL # BLD: 0.03 K/UL (ref 0–0.4)
EOSINOPHIL NFR BLD: 0.3 % (ref 0–7)
ERYTHROCYTE [DISTWIDTH] IN BLOOD BY AUTOMATED COUNT: 19.6 % (ref 11.5–14.5)
GLUCOSE SERPL-MCNC: 103 MG/DL (ref 65–100)
HCT VFR BLD AUTO: 27.9 % (ref 36.6–50.3)
HGB BLD-MCNC: 8.7 G/DL (ref 12.1–17)
IMM GRANULOCYTES # BLD AUTO: 0.07 K/UL (ref 0–0.04)
IMM GRANULOCYTES NFR BLD AUTO: 0.8 % (ref 0–0.5)
LYMPHOCYTES # BLD: 1 K/UL (ref 0.8–3.5)
LYMPHOCYTES NFR BLD: 11.6 % (ref 12–49)
MCH RBC QN AUTO: 23.7 PG (ref 26–34)
MCHC RBC AUTO-ENTMCNC: 31.2 G/DL (ref 30–36.5)
MCV RBC AUTO: 76 FL (ref 80–99)
MONOCYTES # BLD: 0.71 K/UL (ref 0–1)
MONOCYTES NFR BLD: 8.2 % (ref 5–13)
NEUTS SEG # BLD: 6.82 K/UL (ref 1.8–8)
NEUTS SEG NFR BLD: 78.9 % (ref 32–75)
NRBC # BLD: 0 K/UL (ref 0–0.01)
NRBC BLD-RTO: 0 PER 100 WBC
PLATELET # BLD AUTO: 589 K/UL (ref 150–400)
PMV BLD AUTO: 9.3 FL (ref 8.9–12.9)
POTASSIUM SERPL-SCNC: 3.9 MMOL/L (ref 3.5–5.1)
RBC # BLD AUTO: 3.67 M/UL (ref 4.1–5.7)
SODIUM SERPL-SCNC: 138 MMOL/L (ref 136–145)
WBC # BLD AUTO: 8.7 K/UL (ref 4.1–11.1)

## 2025-02-10 PROCEDURE — 80048 BASIC METABOLIC PNL TOTAL CA: CPT

## 2025-02-10 PROCEDURE — 2060000000 HC ICU INTERMEDIATE R&B

## 2025-02-10 PROCEDURE — 94640 AIRWAY INHALATION TREATMENT: CPT

## 2025-02-10 PROCEDURE — 97116 GAIT TRAINING THERAPY: CPT

## 2025-02-10 PROCEDURE — 6370000000 HC RX 637 (ALT 250 FOR IP): Performed by: SURGERY

## 2025-02-10 PROCEDURE — 6370000000 HC RX 637 (ALT 250 FOR IP): Performed by: PHYSICIAN ASSISTANT

## 2025-02-10 PROCEDURE — 36415 COLL VENOUS BLD VENIPUNCTURE: CPT

## 2025-02-10 PROCEDURE — 6370000000 HC RX 637 (ALT 250 FOR IP): Performed by: INTERNAL MEDICINE

## 2025-02-10 PROCEDURE — 6360000002 HC RX W HCPCS: Performed by: SURGERY

## 2025-02-10 PROCEDURE — 85025 COMPLETE CBC W/AUTO DIFF WBC: CPT

## 2025-02-10 PROCEDURE — 97530 THERAPEUTIC ACTIVITIES: CPT

## 2025-02-10 RX ORDER — AZITHROMYCIN 250 MG/1
500 TABLET, FILM COATED ORAL DAILY
Status: COMPLETED | OUTPATIENT
Start: 2025-02-10 | End: 2025-02-12

## 2025-02-10 RX ADMIN — APIXABAN 5 MG: 5 TABLET, FILM COATED ORAL at 09:26

## 2025-02-10 RX ADMIN — DILTIAZEM HYDROCHLORIDE 120 MG: 120 CAPSULE, COATED, EXTENDED RELEASE ORAL at 09:26

## 2025-02-10 RX ADMIN — ATORVASTATIN CALCIUM 10 MG: 10 TABLET, FILM COATED ORAL at 21:09

## 2025-02-10 RX ADMIN — PANTOPRAZOLE SODIUM 40 MG: 40 TABLET, DELAYED RELEASE ORAL at 06:23

## 2025-02-10 RX ADMIN — ACETAMINOPHEN 1000 MG: 500 TABLET ORAL at 16:30

## 2025-02-10 RX ADMIN — IPRATROPIUM BROMIDE 0.5 MG: 0.5 SOLUTION RESPIRATORY (INHALATION) at 07:06

## 2025-02-10 RX ADMIN — AZITHROMYCIN 500 MG: 250 TABLET, FILM COATED ORAL at 18:56

## 2025-02-10 RX ADMIN — ARFORMOTEROL TARTRATE 15 MCG: 15 SOLUTION RESPIRATORY (INHALATION) at 21:42

## 2025-02-10 RX ADMIN — PREDNISONE 40 MG: 20 TABLET ORAL at 09:26

## 2025-02-10 RX ADMIN — ARFORMOTEROL TARTRATE 15 MCG: 15 SOLUTION RESPIRATORY (INHALATION) at 07:03

## 2025-02-10 RX ADMIN — ACETAMINOPHEN 1000 MG: 500 TABLET ORAL at 06:23

## 2025-02-10 RX ADMIN — BUDESONIDE 500 MCG: 0.5 INHALANT RESPIRATORY (INHALATION) at 07:03

## 2025-02-10 RX ADMIN — OXYCODONE 10 MG: 5 TABLET ORAL at 00:57

## 2025-02-10 RX ADMIN — BUDESONIDE 500 MCG: 0.5 INHALANT RESPIRATORY (INHALATION) at 21:42

## 2025-02-10 RX ADMIN — OXYCODONE 10 MG: 5 TABLET ORAL at 04:12

## 2025-02-10 RX ADMIN — OXYCODONE 10 MG: 5 TABLET ORAL at 10:23

## 2025-02-10 RX ADMIN — CITALOPRAM HYDROBROMIDE 10 MG: 20 TABLET ORAL at 09:26

## 2025-02-10 RX ADMIN — IPRATROPIUM BROMIDE 0.5 MG: 0.5 SOLUTION RESPIRATORY (INHALATION) at 21:37

## 2025-02-10 RX ADMIN — GUAIFENESIN 1200 MG: 600 TABLET ORAL at 09:26

## 2025-02-10 RX ADMIN — ACETAMINOPHEN 1000 MG: 500 TABLET ORAL at 21:08

## 2025-02-10 RX ADMIN — APIXABAN 5 MG: 5 TABLET, FILM COATED ORAL at 21:09

## 2025-02-10 RX ADMIN — ASPIRIN 81 MG: 81 TABLET, COATED ORAL at 09:26

## 2025-02-10 RX ADMIN — GUAIFENESIN 1200 MG: 600 TABLET ORAL at 21:08

## 2025-02-10 RX ADMIN — Medication: at 09:29

## 2025-02-10 RX ADMIN — Medication: at 20:03

## 2025-02-10 ASSESSMENT — PAIN DESCRIPTION - DIRECTION: RADIATING_TOWARDS: LEFT FOOT

## 2025-02-10 ASSESSMENT — PAIN SCALES - GENERAL
PAINLEVEL_OUTOF10: 0
PAINLEVEL_OUTOF10: 0
PAINLEVEL_OUTOF10: 7
PAINLEVEL_OUTOF10: 7
PAINLEVEL_OUTOF10: 0

## 2025-02-10 ASSESSMENT — PAIN DESCRIPTION - FREQUENCY: FREQUENCY: CONTINUOUS

## 2025-02-10 ASSESSMENT — PAIN DESCRIPTION - DESCRIPTORS
DESCRIPTORS: THROBBING
DESCRIPTORS: THROBBING;ACHING;DISCOMFORT

## 2025-02-10 ASSESSMENT — PAIN DESCRIPTION - PAIN TYPE: TYPE: SURGICAL PAIN

## 2025-02-10 ASSESSMENT — PAIN - FUNCTIONAL ASSESSMENT
PAIN_FUNCTIONAL_ASSESSMENT: ACTIVITIES ARE NOT PREVENTED
PAIN_FUNCTIONAL_ASSESSMENT: PREVENTS OR INTERFERES SOME ACTIVE ACTIVITIES AND ADLS
PAIN_FUNCTIONAL_ASSESSMENT: ACTIVITIES ARE NOT PREVENTED

## 2025-02-10 ASSESSMENT — PAIN DESCRIPTION - ORIENTATION
ORIENTATION: LEFT

## 2025-02-10 ASSESSMENT — PAIN DESCRIPTION - ONSET: ONSET: GRADUAL

## 2025-02-10 ASSESSMENT — PAIN DESCRIPTION - LOCATION
LOCATION: LEG;FOOT
LOCATION: FOOT
LOCATION: FOOT

## 2025-02-10 NOTE — CARE COORDINATION
Transition of Care Plan:    RUR: 19% \"medium risk\"  Prior Level of Functioning: Independent with ADL's  Disposition: High intensity/comprehensive skilled physical therapy in a multidisciplinary setting vs. Home with [facility] HH services   FELA: 2/12  If SNF or IPR: Date FOC offered: 1/31  Date FOC received: 1/31, 2/7 - pt now agreeable  Accepting facility: N/A  Date authorization started with reference number: N/A  Date authorization received and expires: N/A  Follow up appointments: PCP/Specialists as indicated  DME needed: None - pt has cane, walker, and 3L O2 through [employer] at home  Transportation at discharge: BLS anticipated   IM/IMM Medicare/ letter given: 2nd IM needed prior to d/c   Is patient a  and connected with [facility]? No  Caregiver Contact: Skye Turk (child), [phone]  Discharge Caregiver contacted prior to discharge? To be contacted   Care Conference needed? Not at this time   Barriers to discharge: Vascular, pulmonology, wean O2, auth/placement      0830 AM: Chart reviewed. CM following for d/c planning. CM checked update from IPR regarding IPR acceptance. Pt needs to be weaned to atleast 5L of O2 for IPR to be able to accept. Pt will need insurance auth upon IPR acceptance. CM to continue to follow.     HERBERT Patel  Care Management  [facility]   [phone]

## 2025-02-10 NOTE — OP NOTE
[facility]              [address] [id number]                            OPERATIVE REPORT      PATIENT NAME: ANGEL OAKES               : 1950  MED REC NO: [id number]                       ROOM: 2219  ACCOUNT NO: [id number]                       ADMIT DATE: 2025  PROVIDER: Harris Lorenzo MD    DATE OF SERVICE:  2025    PREOPERATIVE DIAGNOSES:  Gangrene of left forefoot.    POSTOPERATIVE DIAGNOSES:  Gangrene of left forefoot.    PROCEDURES PERFORMED:       1. Simple ray amputation toes #2, #3, and #4.     2. Open transmetatarsal amputation, 5th toe.    SURGEON:  Harris Lorenzo MD    ANESTHESIA:  Local plus IV sedation.    ESTIMATED BLOOD LOSS:  Minimal.    COMPLICATIONS:  None.    INDICATIONS:  The patient is a 74-year-old long-time heavy smoker, who approximately a week prior underwent a semi-urgent left femoral to posterior tibial vein graft for forefoot ischemia.  His graft has remained open, and in the interim, rather than drying up, desiccated and demarcating his gangrenous toes have become blistered, wet, malodorous with some cellulitis onto the forefoot at the base of the 5th toe.    DESCRIPTION OF PROCEDURE:  After obtaining informed consent, the patient was placed supine on the operating table.  After adequate induction of IV sedation, the left foot was prepped and draped in a sterile fashion.  1% local lidocaine was used for digital block of the 2nd through 5th toes.  The 2nd, 3rd, and 4th toes were all amputated at their base with the entire digit removed back to the metatarsal heads.  The skin was carefully approximated with fine nylon sutures.  Bleeding was good.  The 5th toe was amputated in open fashion because of fluctuance and purulence onto the dorsum of the foot.  The metatarsal head was resected as well.  The wound was packed with saline-soaked gauze and a bulky dressing was applied.  The patient tolerated the

## 2025-02-10 NOTE — PLAN OF CARE
Problem: Skin/Tissue Integrity  Goal: Skin integrity remains intact  Description: 1.  Monitor for areas of redness and/or skin breakdown  2.  Assess vascular access sites hourly  3.  Every 4-6 hours minimum:  Change oxygen saturation probe site  4.  Every 4-6 hours:  If on nasal continuous positive airway pressure, respiratory therapy assess nares and determine need for appliance change or resting period  Outcome: Progressing  Flowsheets (Taken 2/9/2025 1940)  Skin Integrity Remains Intact: Monitor for areas of redness and/or skin breakdown     Problem: Respiratory - Adult  Goal: Achieves optimal ventilation and oxygenation  2/10/2025 0116 by Marietta Philip, RN  Outcome: Progressing  2/9/2025 2021 by Maria Guadalupe Clemente, RT  Outcome: Progressing  Flowsheets (Taken 2/9/2025 1940 by Marietta Philip, RN)  Achieves optimal ventilation and oxygenation: Assess for changes in respiratory status  2/9/2025 1342 by Giuliana Pate, RT  Outcome: Progressing     Problem: Pain  Goal: Verbalizes/displays adequate comfort level or baseline comfort level  Outcome: Progressing     Problem: Safety - Adult  Goal: Free from fall injury  Outcome: Progressing     Problem: ABCDS Injury Assessment  Goal: Absence of physical injury  Outcome: Progressing     Problem: Nutrition Deficit:  Goal: Optimize nutritional status  Outcome: Progressing     Problem: Discharge Planning  Goal: Discharge to home or other facility with appropriate resources  Outcome: Progressing  Flowsheets (Taken 2/9/2025 1940)  Discharge to home or other facility with appropriate resources: Identify barriers to discharge with patient and caregiver

## 2025-02-10 NOTE — PLAN OF CARE
Problem: Skin/Tissue Integrity  Goal: Skin integrity remains intact  Description: 1.  Monitor for areas of redness and/or skin breakdown  2.  Assess vascular access sites hourly  3.  Every 4-6 hours minimum:  Change oxygen saturation probe site  4.  Every 4-6 hours:  If on nasal continuous positive airway pressure, respiratory therapy assess nares and determine need for appliance change or resting period  2/10/2025 1325 by Brianne Thorpe RN  Outcome: Progressing  Flowsheets (Taken 2/10/2025 0745)  Skin Integrity Remains Intact: Monitor for areas of redness and/or skin breakdown  2/10/2025 0116 by Marietta Philip RN  Outcome: Progressing  Flowsheets (Taken 2/9/2025 1940)  Skin Integrity Remains Intact: Monitor for areas of redness and/or skin breakdown     Problem: Respiratory - Adult  Goal: Achieves optimal ventilation and oxygenation  2/10/2025 1325 by Brianne Thorpe RN  Outcome: Progressing  2/10/2025 0708 by Skye Martin RCP  Outcome: Progressing  2/10/2025 0116 by Marietta Philip RN  Outcome: Progressing     Problem: Pain  Goal: Verbalizes/displays adequate comfort level or baseline comfort level  2/10/2025 1325 by Brianne Thorpe RN  Outcome: Progressing  2/10/2025 0116 by Marietta Philip RN  Outcome: Progressing     Problem: Safety - Adult  Goal: Free from fall injury  2/10/2025 1325 by Brianne Thorpe RN  Outcome: Progressing  2/10/2025 0116 by Marietta Philip RN  Outcome: Progressing     Problem: ABCDS Injury Assessment  Goal: Absence of physical injury  2/10/2025 1325 by Brianne Thorpe RN  Outcome: Progressing  2/10/2025 0116 by Marietta hPilip RN  Outcome: Progressing     Problem: Nutrition Deficit:  Goal: Optimize nutritional status  2/10/2025 1325 by Brianne Thorpe RN  Outcome: Progressing  2/10/2025 0116 by Marietta Philip RN  Outcome: Progressing     Problem: Discharge Planning  Goal: Discharge to home or other facility with appropriate

## 2025-02-10 NOTE — PROGRESS NOTES
Pulmonary, Critical Care, and Sleep Medicine~progress Note    Name: Nasir Hatfield MRN: [id number]   : 1950 Hospital: [facility]   Date: 2/10/2025 4:22 PM Admission: 2025     Impression Plan   Acute on chronic hypoxemic and chronic hypercapnic respiratory failure- baseline 3L NC. Secondary to suspected atelectasis and mild volume overload  Afib RVR  COPD--not in acute exacerbation  Critical limb ischemia s/p left CF to PT bypass w/ ipsilateral NRGSV on \  H/o CVA  HTN  Tobacco abuse-ongoing 1 PPD x 50 yrs Supp o2 prn goal sats >88%--currently on 5 l nc  Arformoterol/budesonide nebs in place of home Trelegy  DuoNebs as needed  Mucinex  Monitor volume status closely-goal net even to slightly negative  Nicotine patch  Aggressive pulmonary toilet  (ie encourage IS, OOB  Continue prednisone 40mg today   Add azithro   Completed cefazolin for SSTI   Eliquis     Daily Progression:    2/10 Patient seen this afternoon. Family at bedside. Patient reports feeling well. Has been out of bed. No significant cough, shortness of breath. Has been using IS about 4 times per day. Demonstrated use. On 5L NC.     : Pulmonary has been reconsulted due to persistent oxygen requirement.  At baseline the patient is on 3 L nasal cannula and has been titrated up to 7 L.  The patient feels better overall and has no shortness of breath, cough, sputum production or wheeze.  Chest x-ray demonstrates no obvious acute airspace disease although there may be small pleural effusions present    2/3 patient seen this morning lying in bed. States he is feeling \"high.\" He denies dyspnea, chest pain, cough. Satting high 90s on 5L NC. Weaned to 4L NC.     Consult for acute on chronic resp failure w/ hypoxia and hypercapnia post op, severe empysema on home oxygen    HPI: 75 y/o M with PMH COPD (FEV1 1.75L, 53% in 2016), chronic hypoxemic respiratory failure, HTN, CVA, PAD, depression who is  PRN    HYDROmorphone HCl PF (DILAUDID) injection 1 mg  1 mg IntraVENous Q4H PRN    citalopram (CELEXA) tablet 10 mg  10 mg Oral Daily    nicotine (NICODERM CQ) 21 MG/24HR 1 patch  1 patch TransDERmal Daily    ondansetron (ZOFRAN) injection 4 mg  4 mg IntraVENous Q6H PRN    albuterol (PROVENTIL) (2.5 MG/3ML) 0.083% nebulizer solution 2.5 mg  2.5 mg Nebulization Q6H PRN       Labs:  ABG Invalid input(s): \"PHI\", \"PCO2I\", \"PO2I\", \"HCO3I\", \"SO2I\", \"FIO2I\"     CBC Recent Labs     02/08/25  0237 02/09/25  0210 02/10/25  0358   WBC 6.7 5.1 8.7   HGB 9.3* 9.9* 8.7*   HCT 29.7* 31.5* 27.9*   * 591* 589*   MCV 75.4* 75.4* 76.0*   MCH 23.6* 23.7* 23.7*        Metabolic  Panel Recent Labs     02/08/25 0237 02/09/25  0210 02/10/25  0358    134* 138   K 4.1 4.5 3.9    99 102   CO2 32 31 31   BUN 23* 25* 29*        Pertinent Labs                oSphie Hopkins PA-C  2/10/2025

## 2025-02-10 NOTE — PLAN OF CARE
Problem: Physical Therapy - Adult  Goal: By Discharge: Performs mobility at highest level of function for planned discharge setting.  See evaluation for individualized goals.  Description: FUNCTIONAL STATUS PRIOR TO ADMISSION: Patient was independent and active without use of DME. Patient admits to furniture walking in the home. He is a retired . Patient wears 3L O2 at baseline.     HOME SUPPORT PRIOR TO ADMISSION: The patient lived alone with friends and neighbors to provide assistance.    Physical Therapy Goals  Initiated 1/31/2025  Re-evaluation 2/6/2025 s/p amputation L toes 2-5: goals remain appropriate   1.  Patient will move from supine to sit and sit to supine, scoot up and down, and roll side to side in bed with modified independence within 7 day(s).    2.  Patient will perform sit to stand with modified independence within 7 day(s).  3.  Patient will transfer from bed to chair and chair to bed with modified independence using the least restrictive device within 7 day(s).  4.  Patient will ambulate with modified independence for 100 feet with the least restrictive device within 7 day(s).   5.  Patient will ascend/descend 15 stairs with bilateral handrail(s) with modified independence within 7 day(s).   Outcome: Progressing     PHYSICAL THERAPY TREATMENT    Patient: Nasir Hatfield (74 y.o. male)  Date: 2/10/2025  Diagnosis: Critical lower limb ischemia (HCC) [I70.229]  Acute lower limb ischemia [I99.8] Critical lower limb ischemia (HCC)  Procedure(s) (LRB):  LEFT 2ND, 3RD, 4TH, AND 5TH TOE AMPUTATIONS ( MAC WITH BLOCK) (Left) 5 Days Post-Op  Precautions: Weight Bearing, Fall Risk, Bed Alarm   Left Lower Extremity Weight Bearing: Non Weight Bearing                ASSESSMENT:  Patient continues to benefit from skilled PT services and is progressing towards goals. Able to progress gait distance this session. Arrived to patient who had just returned to bed from ambulating to bathroom with nursing -  unclear if he was able to maintain weightbearing status for this gait distance. Nursing attempting to wean patient to 5LPM via high flow nasal cannula tubing. Patient able to maintain SpO2>90% at rest on 5LPM but desaturates to mid 80s with light activity requiring up to 2 minutes for recovery. Titrated oxygen up to 6LPM then 7LPM with subsequent gait trials and patient desaturates to mid 80s with light activity on 6LPM with quicker recovery than on 5LPM and desaturates to 87% briefly with quick recovery on 7LPM. He requires initial cues for positioning of L LE for transfers and gait to maintain L LE NWB status but able to maintain. Needs increased time with all tasks. Anticipate steady progress towards goals pending improvement in medical status. He is motivated to participate and progress mobility.     PLAN:  Patient continues to benefit from skilled intervention to address the above impairments.  Continue treatment per established plan of care.    Recommendations for staff mobility and toileting assistance:  Recommend that staff completes patient mobility with assist x1 using gait belt and rolling walker.  oxygen  Recommend for next PT session: further progression of gait with existing device and monitor O2 saturations during activty    Recommendation for discharge: (in order for the patient to meet his/her long term goals):   High intensity/comprehensive skilled physical therapy in a multidisciplinary setting as patient is working towards tolerating up to 3 hours of therapy/day 5-7x/week    Other factors to consider for discharge: high risk for falls and oxygen requirement    IF patient discharges home will need the following DME: continuing to assess with progress     SUBJECTIVE:   Patient stated, \"whatever you want me to do you're the boss.\"    OBJECTIVE DATA SUMMARY:   Critical Behavior:  Orientation  Overall Orientation Status: Within Functional Limits  Cognition  Arousal/Alertness: Appears intact  Following

## 2025-02-10 NOTE — PROGRESS NOTES
End of Shift Note    Bedside shift change report given to NICHOLAS Hernandez (oncoming nurse) by Marietta Philip RN (offgoing nurse).  Report included the following information Nurse Handoff Report, Recent Results, and Cardiac Rhythm Sinus rhythm with PVC and intermittent A-fib    Shift worked:  7p-7a     Shift summary and any significant changes:    Pt O2 saturation fluctuating between mid 70s-mid80s % sustained. Increased hi-flow to 8L  0057 Pt stated 8/10 pain in multiple surgical sites on left leg \"throbbing\". Given 10mg Gisell PO.      Concerns for physician to address:  Increased hi-flow demand     Zone phone for oncoming shift:   [phone]       Activity:  Activity: In bed  Number times ambulated in hallways past shift: 0  Number of times OOB to chair past shift: 1    Cardiac:   Cardiac Monitoring: Yes      Cardiac Rhythm: Sinus rhythm with PVC, Atrial fib    Access:  Current line(s): PIV     Genitourinary:   Urinary status: voiding    Respiratory:   O2 Device: Nasal cannula  Chronic home O2 use?: YES  Incentive spirometer at bedside: YES  Maximum Achieved Volume (mL): 1000 mL    GI:  Last BM (including prior to admit): 02/09/25  Current diet:    ADULT DIET; Regular; 3 carb choices (45 gm/meal)  Passing flatus: YES  Tolerating current diet: YES       Pain Management:   Patient states pain is manageable on current regimen: YES    Skin:  Alfred Scale Score: 17  Interventions: float heels, increase time out of bed, and foam dressing    Patient Safety:  Fall Score: Brand Total Score: 85  Interventions: assistive device (walker, cane. etc), gripper socks, and pt to call before getting OOB       Length of Stay:  Expected LOS: 15  Actual LOS: 15      Marietta Philip RN  Predictive Model Details          45 (Caution)  Factor Value    Calculated 2/10/2025 01:26 26% Age 74 years old    Deterioration Index Model 24% Supplemental oxygen Oxygen humidified     20% Neurological exam X     9% Cardiac rhythm Sinus rhythm with PVC;Atrial fib      6% Potassium 4.5 mmol/L     4% Sodium abnormal (134 mmol/L)     4% Pulse oximetry 92 %     3% Systolic 140     2% BUN abnormal (25 MG/DL)     2% Hematocrit abnormal (31.5 %)     1% Platelet count abnormal (591 K/uL)     0% Pulse 84     0% WBC count 5.1 K/uL     0% Respiratory rate 16     0% Temperature 98.2 °F (36.8 °C)

## 2025-02-10 NOTE — PROGRESS NOTES
Bedside and Verbal shift change report given to NICHOLAS Decker (oncoming nurse) by NICHOLAS Lofton (offgoing nurse). Report included the following information Nurse Handoff Report, Adult Overview, Intake/Output, MAR, and Cardiac Rhythm SR with pvc/ Afib .     End of Shift Note    Bedside shift change report given to NICHOLAS Lofton (oncoming nurse) by Brianne Thorpe RN (offgoing nurse).  Report included the following information SBAR, Intake/Output, and MAR    Shift worked:  [id number]     Shift summary and any significant changes:     Oxygen at 5L. L foot dressing changed by MD Lorenzo. Pending wound care consult for wound vac of L foot.      Concerns for physician to address:       Zone phone for oncoming shift:   [phone]       Activity:     Number times ambulated in hallways past shift: 0  Number of times OOB to chair past shift: 3    Cardiac:   Cardiac Monitoring: Yes           Access:  Current line(s): PIV     Genitourinary:   Urinary status: voiding    Respiratory:      Chronic home O2 use?: YES  Incentive spirometer at bedside: YES       GI:     Current diet:  ADULT DIET; Regular; 3 carb choices (45 gm/meal)  ADULT ORAL NUTRITION SUPPLEMENT; Breakfast, Dinner; Standard High Calorie/High Protein Oral Supplement  ADULT ORAL NUTRITION SUPPLEMENT; Breakfast, Dinner; Wound Healing Oral Supplement  Passing flatus: YES  Tolerating current diet: YES       Pain Management:   Patient states pain is manageable on current regimen: YES    Skin:     Interventions: float heels and increase time out of bed    Patient Safety:  Fall Score:    Interventions: assistive device (walker, cane. etc), gripper socks, pt to call before getting OOB, and stay with me (per policy)       Length of Stay:  Expected LOS: 17  Actual LOS: 15    Predictive Model Details          47 (Caution)  Factor Value    Calculated 2/10/2025 20:21 25% Age 74 years old    Deterioration Index Model 24% Supplemental oxygen High flow nasal cannula     19% Neurological exam X

## 2025-02-10 NOTE — PROGRESS NOTES
Hospitalist Progress Note    NAME:   Nasir Hatfield   : 1950   MRN: [id number]     Date/Time: 2/10/2025 3:03 PM  Patient PCP: Annie Mcnair APRN - NP      Estimated discharge date:   Barriers:   Respiratory status improvement-on 7l, baseline on 3l  Echo pending-if respiratory status improves and stable echo can be done as outpatient  Pending placement  Pulm reconsult     Assessment / Plan:    Critical limb ischemia- L foot POA- s/p Left CFA=>PT bypass w/ipsilateral NRGSV by Dr Lorenzo   - Here with worsening L leg swelling, pain, redness recently  - CTA runoff showing occluded left superficial femoral artery, distal vessels difficult to evaluation, probably reconstitution at level of popliteal artery   IP Vascular Surgery consulted- S/p Left CFA=>PT bypass w/ipsilateral NRGSV  by Dr Lorenzo,  25 TMA Amputation completed  25 denies any pain.  Appreciate vascular following, PT/OT, dispo ipr  : Cleared from vascular standpoint for discharge    L foot cellulitis with Toe necrosis POA  -WBC 23.5->19.9->16.8->14.4 ->15.4  -Color change to LLE however no madison erythema, leg is cold   - Blood cultures sent- neg  - crp elevated as expected 17.7  CXR - severe Bullous Emphysema noted  S/p IV Vanc and Zosyn for now - empiric IV Abx stopped now  25  Abx restarted in anticipation of OR and cellulitis rash  Wound cultures remain negative so far will be completed 5 days of cefazolin    Acute on Chronic hypoxic & hypercapneic respiratory failure POA-   Increased oxygen demand post day 1- up to 10L/[place]  ,   on 3L/m NC home oxygen at baseline  Hx of severe COPD with Emphysema POA- pt of Dr Ritter (PAR)  Afib RVR  - Continue home inhalers  - Lung sounds coarse  - Continue PRN duonebs as needed for wheezing   CXR today AM with RR @ 10:30 AM noted- neg acute but severe Emphysema noted  ABG= 7.43/50/42/78% on 3L/m   Pulm consulted for further recommendations  IV cardizem 5mg x 1 now, resume Po  (36.4 °C) Oral 93 15 92 %   02/10/25 0703 -- -- -- -- -- 96 %   02/10/25 0252 101/62 98.2 °F (36.8 °C) Oral 90 25 (!) 85 %   02/09/25 2250 (!) 140/82 98.2 °F (36.8 °C) Oral 84 16 92 %   02/09/25 2224 -- -- -- 85 16 94 %   02/09/25 2132 127/88 98.2 °F (36.8 °C) Oral (!) 105 20 (!) 84 %   02/09/25 1530 133/78 98.3 °F (36.8 °C) Oral 84 16 98 %         Intake/Output Summary (Last 24 hours) at 2/10/2025 1503  Last data filed at 2/10/2025 1130  Gross per 24 hour   Intake 920 ml   Output 1150 ml   Net -230 ml        I had a face to face encounter and independently examined this patient on 2/10/2025, as outlined below:  PHYSICAL EXAM:  General: Alert, cooperative  EENT:  EOMI. Anicteric sclerae.  Resp:  CTA bilaterally, coarse breath sounds CV:  Regular  rhythm,  No edema  GI:  Soft, Non distended, Non tender.  +Bowel sounds  Neurologic:  Alert and oriented X 3, normal speech,   Psych:   Good insight. Not anxious nor agitated  Skin:  Left leg incision intact, no erythema    Reviewed most current lab test results and cultures  YES  Reviewed most current radiology test results   YES  Review and summation of old records today    NO  Reviewed patient's current orders and MAR    YES  PMH/SH reviewed - no change compared to H&P    Procedures: see electronic medical records for all procedures/Xrays and details which were not copied into this note but were reviewed prior to creation of Plan.      LABS:  I reviewed today's most current labs and imaging studies.  Pertinent labs include:  Recent Labs     02/08/25  0237 02/09/25  0210 02/10/25  0358   WBC 6.7 5.1 8.7   HGB 9.3* 9.9* 8.7*   HCT 29.7* 31.5* 27.9*   * 591* 589*     Recent Labs     02/08/25  0237 02/09/25  0210 02/10/25  0358    134* 138   K 4.1 4.5 3.9    99 102   CO2 32 31 31   GLUCOSE 103* 146* 103*   BUN 23* 25* 29*   CREATININE 0.87 0.98 0.84   CALCIUM 8.5 8.7 8.8       Signed: Shaun Alex MD [facility]

## 2025-02-11 PROCEDURE — 97605 NEG PRS WND THER DME<=50SQCM: CPT

## 2025-02-11 PROCEDURE — 6370000000 HC RX 637 (ALT 250 FOR IP): Performed by: SURGERY

## 2025-02-11 PROCEDURE — 2060000000 HC ICU INTERMEDIATE R&B

## 2025-02-11 PROCEDURE — 94640 AIRWAY INHALATION TREATMENT: CPT

## 2025-02-11 PROCEDURE — 6360000002 HC RX W HCPCS: Performed by: SURGERY

## 2025-02-11 PROCEDURE — 2700000000 HC OXYGEN THERAPY PER DAY

## 2025-02-11 PROCEDURE — 97530 THERAPEUTIC ACTIVITIES: CPT

## 2025-02-11 PROCEDURE — 6370000000 HC RX 637 (ALT 250 FOR IP): Performed by: PHYSICIAN ASSISTANT

## 2025-02-11 PROCEDURE — 97535 SELF CARE MNGMENT TRAINING: CPT

## 2025-02-11 PROCEDURE — 6370000000 HC RX 637 (ALT 250 FOR IP): Performed by: INTERNAL MEDICINE

## 2025-02-11 RX ORDER — PREDNISONE 5 MG/1
10 TABLET ORAL DAILY
Status: DISCONTINUED | OUTPATIENT
Start: 2025-02-18 | End: 2025-02-15 | Stop reason: HOSPADM

## 2025-02-11 RX ORDER — PREDNISONE 5 MG/1
10 TABLET ORAL DAILY
Status: DISCONTINUED | OUTPATIENT
Start: 2025-02-17 | End: 2025-02-11

## 2025-02-11 RX ORDER — PREDNISONE 20 MG/1
20 TABLET ORAL DAILY
Status: DISCONTINUED | OUTPATIENT
Start: 2025-02-15 | End: 2025-02-15 | Stop reason: HOSPADM

## 2025-02-11 RX ORDER — PREDNISONE 20 MG/1
20 TABLET ORAL DAILY
Status: DISCONTINUED | OUTPATIENT
Start: 2025-02-14 | End: 2025-02-11

## 2025-02-11 RX ADMIN — ACETAMINOPHEN 1000 MG: 500 TABLET ORAL at 06:00

## 2025-02-11 RX ADMIN — BUDESONIDE 500 MCG: 0.5 INHALANT RESPIRATORY (INHALATION) at 07:22

## 2025-02-11 RX ADMIN — ARFORMOTEROL TARTRATE 15 MCG: 15 SOLUTION RESPIRATORY (INHALATION) at 07:22

## 2025-02-11 RX ADMIN — DILTIAZEM HYDROCHLORIDE 120 MG: 120 CAPSULE, COATED, EXTENDED RELEASE ORAL at 09:14

## 2025-02-11 RX ADMIN — PREDNISONE 40 MG: 20 TABLET ORAL at 09:14

## 2025-02-11 RX ADMIN — CITALOPRAM HYDROBROMIDE 10 MG: 20 TABLET ORAL at 09:14

## 2025-02-11 RX ADMIN — ATORVASTATIN CALCIUM 10 MG: 10 TABLET, FILM COATED ORAL at 20:15

## 2025-02-11 RX ADMIN — APIXABAN 5 MG: 5 TABLET, FILM COATED ORAL at 20:15

## 2025-02-11 RX ADMIN — OXYCODONE 10 MG: 5 TABLET ORAL at 09:27

## 2025-02-11 RX ADMIN — BUDESONIDE 500 MCG: 0.5 INHALANT RESPIRATORY (INHALATION) at 22:51

## 2025-02-11 RX ADMIN — ASPIRIN 81 MG: 81 TABLET, COATED ORAL at 09:14

## 2025-02-11 RX ADMIN — GUAIFENESIN 1200 MG: 600 TABLET ORAL at 09:14

## 2025-02-11 RX ADMIN — APIXABAN 5 MG: 5 TABLET, FILM COATED ORAL at 09:13

## 2025-02-11 RX ADMIN — IPRATROPIUM BROMIDE 0.5 MG: 0.5 SOLUTION RESPIRATORY (INHALATION) at 07:22

## 2025-02-11 RX ADMIN — Medication: at 07:30

## 2025-02-11 RX ADMIN — GUAIFENESIN 1200 MG: 600 TABLET ORAL at 20:15

## 2025-02-11 RX ADMIN — PANTOPRAZOLE SODIUM 40 MG: 40 TABLET, DELAYED RELEASE ORAL at 06:00

## 2025-02-11 RX ADMIN — AZITHROMYCIN 500 MG: 250 TABLET, FILM COATED ORAL at 09:14

## 2025-02-11 RX ADMIN — Medication: at 20:18

## 2025-02-11 RX ADMIN — ACETAMINOPHEN 1000 MG: 500 TABLET ORAL at 20:14

## 2025-02-11 RX ADMIN — ARFORMOTEROL TARTRATE 15 MCG: 15 SOLUTION RESPIRATORY (INHALATION) at 22:50

## 2025-02-11 RX ADMIN — IPRATROPIUM BROMIDE 0.5 MG: 0.5 SOLUTION RESPIRATORY (INHALATION) at 22:46

## 2025-02-11 RX ADMIN — ACETAMINOPHEN 1000 MG: 500 TABLET ORAL at 13:44

## 2025-02-11 ASSESSMENT — PAIN DESCRIPTION - ORIENTATION
ORIENTATION: LEFT
ORIENTATION: LEFT

## 2025-02-11 ASSESSMENT — PAIN DESCRIPTION - DESCRIPTORS
DESCRIPTORS: THROBBING
DESCRIPTORS: THROBBING

## 2025-02-11 ASSESSMENT — PAIN SCALES - GENERAL
PAINLEVEL_OUTOF10: 7
PAINLEVEL_OUTOF10: 8

## 2025-02-11 ASSESSMENT — PAIN DESCRIPTION - LOCATION
LOCATION: FOOT
LOCATION: FOOT

## 2025-02-11 NOTE — WOUND CARE
Wound care nurse consulted by Dr Lorenzo to place NPWT/wound vac to left foot surgical wound    75 y/o male admitted 1/26/25 for left critical limb ischemia and gangrene of left forefoot.  Hx severe COPD on 3 L NC at baseline  Heavy smoker - nicotine patch in place  OR 1/29/25 Left CFA to PT bypass wi/ipsilateral NRGSV. Surgeon: Dr Lorenzo  OR 2/5/25 simple ray amputation of toes #2, #3 and #4 and open Transmetatarsal amputation of left 5th toe.  Surgeon Dr Lorenzo    Past Medical History:   Diagnosis Date    Chronic obstructive pulmonary disease (HCC)     Hx of blood clots 08/29/2024    Left lower leg    Hypertension      Past Surgical History:   Procedure Laterality Date    AMPUTATION Left 2024    Left Big Toe    ANGIOPLASTY Left 05/20/2024    LEFT FEMORAL ANGIOGRAM BALLOON  ANGIOPLASTY AND STENTING performed by Harris Lorenzo MD at [facility] MAIN OR    COLONOSCOPY      EYE SURGERY Left     cataract surgery    FEMORAL-TIBIAL BYPASS GRAFT N/A 1/29/2025    LEFT LOWER EXTREMITY TIBIAL BYPASS WITH VEIN performed by Harris Lorenzo MD at [facility] MAIN OR    FOOT DEBRIDEMENT Left 05/22/2024    BONE BIOPSY LEFT FOOT performed by Thony Chisholm DPM at [facility] MAIN OR    TOE AMPUTATION Left 2/5/2025    LEFT 2ND, 3RD, 4TH, AND 5TH TOE AMPUTATIONS ( MAC WITH BLOCK) performed by Harris Lorenzo MD at [facility] MAIN OR    VASCULAR SURGERY Right 9/7/2024    RE INJECT INFUSION CATHETER performed by Harris Lorenzo MD at [facility] MAIN OR    VASCULAR SURGERY Right 9/8/2024    REINJECT AND REMOVE INFUSION CATHETER performed by Harris Lorenzo MD at [facility] MAIN OR    VASCULAR SURGERY Left 9/6/2024    LEFT LOWER EXTREMITY THROMBECTOMY performed by [facility] MD at [facility] OR     Dr Lorenzo had ordered packing to left open 5th TMA and now feels that he will have better results of granulation tissue with wound vac.  Noted that patient has foot drop to left foot.  Sutures to other toe amputation sites well approximated    Left lateral foot wound  dusky with some pale areas of granulation: open area measures 4 x 2.3 x 1 cm      Left dorsal foot      Left plantar foot      Staples to medial LE - well approximated and dry     Staples to left medial thigh - well approximated and dry      Wound Care Documentation:  Negative Pressure Wound Therapy Foot Left;Lateral (Active)   Unit Type Ulta traditional therapy 02/11/25 1047   Mode Continuous 02/11/25 1047   Target Pressure (mmHg) 125 02/11/25 1047   Intensity Low 02/11/25 1047   $$ Dressing Changed & Charged $ Standard NPWT less than or equal to 50 sq cm PER TX 02/11/25 1047   Number of pieces placed 2 02/11/25 1047   Dressing Type Black foam 02/11/25 1047   Dressing Change Due 02/14/25 02/11/25 1047   Number of days: 0       Wound 05/16/24 Toe (Comment  which one) Left (Active)   Number of days: 271       Wound 09/10/24 Buttocks Blanchable erythema (Active)   Wound Image   02/05/25 1930   Dressing Status Dry;Intact;Clean 02/11/25 0745   Wound Cleansed Not Cleansed 02/11/25 0745   Dressing/Treatment Pharmaceutical agent (see MAR) 02/10/25 1940   Wound Assessment Pink/red 02/10/25 1940   Drainage Amount None (dry) 02/10/25 1940   Odor None 02/10/25 1940   Divya-wound Assessment Blanchable erythema 02/10/25 1940   Number of days: 153       Wound 09/10/24 Heel Left;Plantar blanchable redness (Active)   Number of days: 154       Wound 09/10/24 Heel Right;Plantar blanchable redness (Active)   Number of days: 154       Wound 01/28/25 Arm Proximal;Right (Active)   Wound Etiology Skin Tear 02/10/25 1940   Dressing Status Dry;Intact;Clean 02/11/25 0745   Wound Cleansed Not Cleansed 02/10/25 1940   Dressing/Treatment Foam 02/10/25 1940   Dressing Change Due 02/11/25 02/10/25 0745   Wound Assessment Slough;Prosperity/red 02/10/25 1940   Drainage Amount None (dry) 02/10/25 1940   Drainage Description Serosanguinous 02/10/25 1940   Odor None 02/10/25 1940   Divya-wound Assessment Intact 02/10/25 1940   Number of days: 13       Wound

## 2025-02-11 NOTE — PROGRESS NOTES
Pulmonary, Critical Care, and Sleep Medicine~progress Note    Name: Nasir Hatfield MRN: [id number]   : 1950 Hospital: [facility]   Date: 2025 12:08 PM Admission: 2025     Impression Plan   Acute on chronic hypoxemic and chronic hypercapnic respiratory failure- baseline 3L NC. Secondary to suspected atelectasis and mild volume overload  Afib RVR  COPD--not in acute exacerbation  Critical limb ischemia s/p left CF to PT bypass w/ ipsilateral NRGSV on \  H/o CVA  HTN  Tobacco abuse-ongoing 1 PPD x 50 yrs Supp o2 prn goal sats >88%- weaned to 3.5L NC  Arformoterol/budesonide nebs in place of home Trelegy  DuoNebs as needed  Mucinex  Monitor volume status closely-goal net even to slightly negative  Nicotine patch  Aggressive pulmonary toilet  (ie encourage IS, OOB  Taper prednisone   Continue azithro x 1 day   Completed cefazolin for SSTI   Eliquis     Daily Progression:     Patient seen this afternoon sitting up in chair. Reports feeling good. Has been using IS more. Satting 94% on 5L NC. Weaned to 3.5L NC. Pleths not accurate with in room pulse ox.     2/10 Patient seen this afternoon. Family at bedside. Patient reports feeling well. Has been out of bed. No significant cough, shortness of breath. Has been using IS about 4 times per day. Demonstrated use. On 5L NC.     : Pulmonary has been reconsulted due to persistent oxygen requirement.  At baseline the patient is on 3 L nasal cannula and has been titrated up to 7 L.  The patient feels better overall and has no shortness of breath, cough, sputum production or wheeze.  Chest x-ray demonstrates no obvious acute airspace disease although there may be small pleural effusions present    2/3 patient seen this morning lying in bed. States he is feeling \"high.\" He denies dyspnea, chest pain, cough. Satting high 90s on 5L NC. Weaned to 4L NC.     Consult for acute on chronic resp failure w/ hypoxia  VASCULAR SURGERY Right 9/7/2024    RE INJECT INFUSION CATHETER performed by Harris Lorenzo MD at [facility] MAIN OR    VASCULAR SURGERY Right 9/8/2024    REINJECT AND REMOVE INFUSION CATHETER performed by Harris Lorenzo MD at [facility] MAIN OR    VASCULAR SURGERY Left 9/6/2024    LEFT LOWER EXTREMITY THROMBECTOMY performed by Harris Lorenzo MD at [facility] MAIN OR      Prior to Admission medications    Medication Sig Start Date End Date Taking? Authorizing Provider   metoprolol succinate (TOPROL XL) 25 MG extended release tablet Take 3 tablets by mouth daily 9/14/24  Yes Tonya Cohn APRN - NP   nicotine (NICODERM CQ) 21 MG/24HR Place 1 patch onto the skin daily 9/14/24  Yes Tonya Cohn APRN - NP   apixaban (ELIQUIS) 5 MG TABS tablet Take 1 tablet by mouth 2 times daily 9/13/24  Yes Tonya Cohn APRN - NP   OXYGEN Inhale 3 L into the lungs daily   Yes Provider, MD Liane   Multiple Vitamins-Minerals (MENS 50+ MULTI VITAMIN/MIN PO) Take by mouth daily   Yes Provider, MD Liane   clopidogrel (PLAVIX) 75 MG tablet Take 1 tablet by mouth daily 5/23/24  Yes Staci Matos MD   albuterol sulfate HFA (PROVENTIL;VENTOLIN;PROAIR) 108 (90 Base) MCG/ACT inhaler Inhale into the lungs   Yes Automatic Reconciliation, Ar   citalopram (CELEXA) 10 MG tablet Take by mouth daily   Yes Automatic Reconciliation, Ar   umeclidinium-vilanterol (ANORO ELLIPTA) 62.5-25 MCG/ACT inhaler Inhale 1 puff into the lungs daily    Automatic Reconciliation, Ar     No Known Allergies   Social History     Tobacco Use    Smoking status: Every Day     Current packs/day: 0.50     Types: Cigarettes    Smokeless tobacco: Not on file   Substance Use Topics    Alcohol use: Yes     Alcohol/week: 16.0 standard drinks of alcohol     Comment: liquor - pint per week (mavisey)      Family History   Problem Relation Age of Onset    Other Mother         developed aortic tear after hip fracture    Stroke Father      OBJECTIVE:     Vital  Signs:     /74   Pulse 91   Temp 98.3 °F (36.8 °C) (Oral)   Resp 17   Ht 1.778 m (5' 10\")   Wt 59.9 kg (132 lb 0.9 oz)   SpO2 97%   BMI 18.95 kg/m²    Temp (24hrs), Av.8 °F (36.6 °C), Min:97.3 °F (36.3 °C), Max:98.3 °F (36.8 °C)     Intake/Output:     Last shift:  07 - 1900  In: -   Out: 350 [Urine:350]    Last 3 shifts:  190 -  0700  In: 1400 [P.O.:1400]  Out: 1150 [Urine:1150]        Intake/Output Summary (Last 24 hours) at 2025 1208  Last data filed at 2025 1030  Gross per 24 hour   Intake 480 ml   Output 350 ml   Net 130 ml       Physical Exam:                                        Exam Findings Other   General: No resp distress noted, appears stated age    HEENT:  NCAT    Chest: No pectus deformity, normal chest rise b/l    HEART:  RRR    Lungs:  Diminished bilaterally, crackles in bilateral bases    ABD: Soft/NT    EXT: RLE wrapped. S/p left great toe amputation, remaining toes necrotic, pedal erythema, edema     Skin: No rashes     Neuro: A/O x 3        Medications:  Current Facility-Administered Medications   Medication Dose Route Frequency    azithromycin (ZITHROMAX) tablet 500 mg  500 mg Oral Daily    predniSONE (DELTASONE) tablet 40 mg  40 mg Oral Daily    arformoterol tartrate (BROVANA) nebulizer solution 15 mcg  15 mcg Nebulization BID RT    And    ipratropium (ATROVENT) 0.02 % nebulizer solution 0.5 mg  0.5 mg Nebulization BID RT    pantoprazole (PROTONIX) tablet 40 mg  40 mg Oral QAM AC    atorvastatin (LIPITOR) tablet 10 mg  10 mg Oral Nightly    apixaban (ELIQUIS) tablet 5 mg  5 mg Oral BID    dilTIAZem injection 5 mg  5 mg IntraVENous Once    budesonide (PULMICORT) nebulizer suspension 500 mcg  0.5 mg Nebulization BID RT    guaiFENesin (MUCINEX) extended release tablet 1,200 mg  1,200 mg Oral BID    dilTIAZem (CARDIZEM CD) extended release capsule 120 mg  120 mg Oral Daily    balsum peru-castor oil (VENELEX) ointment   Topical BID    aspirin EC

## 2025-02-11 NOTE — PROGRESS NOTES
Hospitalist Progress Note    NAME:   Nasir Hatfield   : 1950   MRN: [id number]     Date/Time: 2025 3:16 PM  Patient PCP: Annie Mcnair APRN - NP      Estimated discharge date:   Barriers: Weaning down oxygen     Assessment / Plan:    Critical limb ischemia- L foot POA- s/p Left CFA=>PT bypass w/ipsilateral NRGSV by Dr Lorenzo   - Here with worsening L leg swelling, pain, redness recently  - CTA runoff showing occluded left superficial femoral artery, distal vessels difficult to evaluation, probably reconstitution at level of popliteal artery   IP Vascular Surgery consulted- S/p Left CFA=>PT bypass w/ipsilateral NRGSV  by Dr Lorenzo,  25 TMA Amputation completed  25 denies any pain.  Appreciate vascular following, PT/OT, dispo ipr  : Cleared from vascular standpoint for discharge    L foot cellulitis with Toe necrosis POA  -WBC 23.5->19.9->16.8->14.4 ->15.4  -Color change to LLE however no madison erythema, leg is cold   - Blood cultures sent- neg  - crp elevated as expected 17.7  CXR - severe Bullous Emphysema noted  S/p IV Vanc and Zosyn for now - empiric IV Abx stopped now  25  Abx restarted in anticipation of OR and cellulitis rash  Wound cultures remain negative so far will be completed 5 days of cefazolin    Acute on Chronic hypoxic & hypercapneic respiratory failure POA-   Increased oxygen demand post day 1- up to 10L/[place]  ,   on 3L/m NC home oxygen at baseline  Hx of severe COPD with Emphysema POA- pt of Dr Ritter (PAR)  Afib RVR  - Continue home inhalers  - Lung sounds coarse  - Continue PRN duonebs as needed for wheezing   CXR today AM with RR @ 10:30 AM noted- neg acute but severe Emphysema noted  ABG= 7.43/50/42/78% on 3L/m   Pulm consulted for further recommendations  IV cardizem 5mg x 1 now, resume Po metoprolol, if not slowed down - start cardizem drip, resumed home eliquis post op now  Will consult cardiology for further recc  : Patient is currently on 7  134* 138   K 4.5 3.9   CL 99 102   CO2 31 31   GLUCOSE 146* 103*   BUN 25* 29*   CREATININE 0.98 0.84   CALCIUM 8.7 8.8       Signed: Shaun Alex MD FACP

## 2025-02-11 NOTE — PROGRESS NOTES
Physical Therapy  Attempted to see patient several times this a.m.  Patient initially eating breakfast, then later just had wound vac placed.  Patient currently declining due to fatigue from just completed OT session.  Will defer today and continue to follow.

## 2025-02-11 NOTE — PROGRESS NOTES
Comprehensive Nutrition Assessment    Type and Reason for Visit:  Reassess    Nutrition Recommendations/Plan:   Continue current diet and supplements  Please document % meals and supplements consumed in flowsheet I/O's under intake      Malnutrition Assessment:  Malnutrition Status:  Severe malnutrition (01/27/25 1435)    Context:  Chronic Illness     Findings of the 6 clinical characteristics of malnutrition:  Energy Intake:  Mild decrease in energy intake  Weight Loss:  No weight loss     Body Fat Loss:  Mild body fat loss Fat Overlying Ribs, Orbital   Muscle Mass Loss:  Severe muscle mass loss Clavicles (pectoralis & deltoids)  Fluid Accumulation:  Severe Generalized   Strength:  Not Performed    Nutrition Assessment:    Chart reviewed for follow up. Pt s/p amputation of L toes 2,3,4 and TMA of 5th. He has been eating well consistently and supplements have been resumed post-op to support wound healing needs. Will continue monitoring.     Patient Vitals for the past 120 hrs:   PO Meals Eaten (%)   02/10/25 1527 76 - 100%   02/10/25 1130 76 - 100%   02/09/25 0847 76 - 100%   02/07/25 1521 76 - 100%   02/07/25 1210 76 - 100%   02/06/25 1527 76 - 100%       Nutrition Related Findings:    Labs: reviewed.   Meds: Lipitor, Zithromax, Protonix, Prednisone, Roxicodone.   Edema: +2 generalized, trace all extremities.   BM 2/10.   Wound Type: Multiple, Surgical Incision, Skin Tears, Wound Vac       Current Nutrition Intake & Therapies:    Average Meal Intake: %  Average Supplements Intake: Unable to assess  ADULT DIET; Regular; 3 carb choices (45 gm/meal)  ADULT ORAL NUTRITION SUPPLEMENT; Breakfast, Dinner; Standard High Calorie/High Protein Oral Supplement  ADULT ORAL NUTRITION SUPPLEMENT; Breakfast, Dinner; Wound Healing Oral Supplement    Anthropometric Measures:  Height: 177.8 cm (5' 10\")  Ideal Body Weight (IBW): 166 lbs (75 kg)       Current Body Weight: 59.9 kg (132 lb 0.9 oz), 93.4 % IBW. Weight Source:  Standing scale  Current BMI (kg/m2): 18.9           Weight Adjustment For: No Adjustment                 BMI Categories: Underweight (BMI less than 22) age over 65    Estimated Daily Nutrient Needs:  Energy Requirements Based On: Kcal/kg  Weight Used for Energy Requirements: Current  Energy (kcal/day): 8593-6856 kcals (30-35 kcals/kg)  Weight Used for Protein Requirements: Current  Protein (g/day): 72-90g (1.2-1.5g/kg)  Method Used for Fluid Requirements: 1 ml/kcal  Fluid (ml/day): 2100 mL    Nutrition Diagnosis:   Severe malnutrition related to catabolic illness, impaired respiratory function as evidenced by criteria as identified in malnutrition assessment    Nutrition Interventions:   Food and/or Nutrient Delivery: Continue Current Diet, Continue Oral Nutrition Supplement  Nutrition Education/Counseling: No recommendation at this time  Coordination of Nutrition Care: Continue to monitor while inpatient       Goals:  Goals: PO intake 75% or greater, by next RD assessment  Type of Goal: Continue current goal  Previous Goal Met: Progressing toward Goal(s)    Nutrition Monitoring and Evaluation:   Behavioral-Environmental Outcomes: None Identified  Food/Nutrient Intake Outcomes: Food and Nutrient Intake, Supplement Intake  Physical Signs/Symptoms Outcomes: Meal Time Behavior, Weight, Biochemical Data    Discharge Planning:    Continue current diet, Continue Oral Nutrition Supplement     Tamara Montiel RD  Contact: [phone]

## 2025-02-11 NOTE — PROGRESS NOTES
End of Shift Note    Bedside shift change report given to NICHOLAS Quezada (oncoming nurse) by Marietta Philip RN (offgoing nurse).  Report included the following information Nurse Handoff Report, Recent Results, and Cardiac Rhythm Sinus rhythm with PVC, intermittent A-fib    Shift worked:  7p-7a     Shift summary and any significant changes:     Vitals stable throughout shift. Tylenol for pain. Blanchable sacrum- (repositioning with pillow support, venelex)     Concerns for physician to address:  None at this time.     Zone phone for oncoming shift:   [phone]       Activity:  Activity: Ambulate in room, In bed  Number times ambulated in hallways past shift: 0  Number of times OOB to chair past shift: 0    Cardiac:   Cardiac Monitoring: Yes      Cardiac Rhythm: Sinus rhythm with PVC, Atrial fib    Access:  Current line(s): PIV     Genitourinary:   Urinary status: voiding    Respiratory:   O2 Device: High flow nasal cannula  Chronic home O2 use?: YES  Incentive spirometer at bedside: YES  Maximum Achieved Volume (mL): 1000 mL    GI:  Last BM (including prior to admit): 02/09/25  Current diet:    ADULT DIET; Regular; 3 carb choices (45 gm/meal)  ADULT ORAL NUTRITION SUPPLEMENT; Breakfast, Dinner; Standard High Calorie/High Protein Oral Supplement  ADULT ORAL NUTRITION SUPPLEMENT; Breakfast, Dinner; Wound Healing Oral Supplement  Passing flatus: YES  Tolerating current diet: YES       Pain Management:   Patient states pain is manageable on current regimen: YES    Skin:  Alfred Scale Score: 17  Interventions: specialty bed, float heels, increase time out of bed, and foam dressing    Patient Safety:  Fall Score: Brand Total Score: 85  Interventions: bed/chair alarm, assistive device (walker, cane. etc), gripper socks, and pt to call before getting OOB       Length of Stay:  Expected LOS: 17  Actual LOS: 16      Marietta Philip RN  Predictive Model Details          45 (Caution)  Factor Value    Calculated 2/11/2025 05:32 26% Age 74  years old    Deterioration Index Model 25% Supplemental oxygen Oxygen humidified     20% Neurological exam X     9% Cardiac rhythm Sinus rhythm with PVC;Atrial fib     8% Respiratory rate 14     4% Hematocrit abnormal (27.9 %)     2% BUN abnormal (29 MG/DL)     2% Systolic 122     2% Sodium 138 mmol/L     1% Platelet count abnormal (589 K/uL)     1% WBC count 8.7 K/uL     0% Pulse 84     0% Temperature 97.3 °F (36.3 °C)     0% Pulse oximetry 97 %     0% Potassium 3.9 mmol/L

## 2025-02-11 NOTE — PLAN OF CARE
Problem: Skin/Tissue Integrity  Goal: Skin integrity remains intact  Description: 1.  Monitor for areas of redness and/or skin breakdown  2.  Assess vascular access sites hourly  3.  Every 4-6 hours minimum:  Change oxygen saturation probe site  4.  Every 4-6 hours:  If on nasal continuous positive airway pressure, respiratory therapy assess nares and determine need for appliance change or resting period  Outcome: Progressing  Flowsheets (Taken 2/10/2025 1940)  Skin Integrity Remains Intact: Monitor for areas of redness and/or skin breakdown     Problem: Respiratory - Adult  Goal: Achieves optimal ventilation and oxygenation  2/11/2025 0525 by Marietta Philip, RN  Outcome: Progressing  2/10/2025 2141 by Ormond, Brittany Michelle, RCP  Outcome: Progressing  Flowsheets (Taken 2/10/2025 1940 by Marietta Philip, RN)  Achieves optimal ventilation and oxygenation: Assess for changes in respiratory status     Problem: Pain  Goal: Verbalizes/displays adequate comfort level or baseline comfort level  Outcome: Progressing     Problem: Safety - Adult  Goal: Free from fall injury  Outcome: Progressing     Problem: ABCDS Injury Assessment  Goal: Absence of physical injury  Outcome: Progressing  Flowsheets (Taken 2/10/2025 1940)  Absence of Physical Injury: Implement safety measures based on patient assessment     Problem: Nutrition Deficit:  Goal: Optimize nutritional status  Outcome: Progressing     Problem: Discharge Planning  Goal: Discharge to home or other facility with appropriate resources  Outcome: Progressing  Flowsheets (Taken 2/10/2025 1940)  Discharge to home or other facility with appropriate resources: Identify barriers to discharge with patient and caregiver

## 2025-02-11 NOTE — CARE COORDINATION
Transition of Care Plan:    RUR: 19% \"medium risk\"  Prior Level of Functioning: Independent with ADL's  Disposition: High intensity/comprehensive skilled physical therapy in a multidisciplinary setting vs. Home with [facility] HH services   FELA: 2/12  If SNF or IPR: Date FOC offered: 1/31  Date FOC received: 1/31, 2/7 - pt now agreeable  Accepting facility: N/A - pending O2 requirement  Date authorization started with reference number: N/A  Date authorization received and expires: N/A  Follow up appointments: PCP/Specialists as indicated  DME needed: None - pt has cane, walker, and 3L O2 through [employer] at home  Transportation at discharge: BLS anticipated   IM/IMM Medicare/ letter given: 2nd IM needed prior to d/c   Is patient a  and connected with [facility]? No  Caregiver Contact: Skye Turk (child), [phone]  Discharge Caregiver contacted prior to discharge? To be contacted   Care Conference needed? Not at this time   Barriers to discharge: Vascular, pulmonology, wean O2, auth/placement      1119 AM: CM acknowledged wound vac order. CM sent wound vac order in IPR referral.    0832 AM: Chart reviewed. CM reviewed pt vitals to see that pt is now maintaining 5L O2. Pt is on 3L at baseline, therefore; pt will need to continue to wean. CM to make IPR referral recipients aware.     HERBERT Patel  Care Management  [facility]   [phone]

## 2025-02-11 NOTE — PLAN OF CARE
day(s).    Outcome: Progressing   OCCUPATIONAL THERAPY TREATMENT  Patient: Nasir Hatfield (74 y.o. male)  Date: 2/11/2025  Primary Diagnosis: Critical lower limb ischemia (HCC) [I70.229]  Acute lower limb ischemia [I99.8]  Procedure(s) (LRB):  LEFT 2ND, 3RD, 4TH, AND 5TH TOE AMPUTATIONS ( MAC WITH BLOCK) (Left) 6 Days Post-Op   Precautions: Weight Bearing, Fall Risk, Bed Alarm   Left Lower Extremity Weight Bearing: Non Weight Bearing            Chart, occupational therapy assessment, plan of care, and goals were reviewed.    ASSESSMENT  Patient continues to benefit from skilled OT services and is progressing towards goals. Patient received semisupine in bed on 5L O2 and cleared for therapy by nursing. He had just received wound vac for LLE and was agreeable to attempt session. He completed bed mobility with stand-by assist and demonstrated intact sitting balance. Patient transferred to recliner chair using rolling walker demonstrating improvement in balance. SpO2 dropped to mid 80s with activity and recovered with pursed lipped breathing >90%. Patient was able to walk into bathroom with up to min assist but unable to make it to toilet secondary to length of O2 line. He returned to recliner chair and used urinal while seated, complete hand hygiene while seated. Patient was left sitting with all needs met, VSS, and chair alarmed.              PLAN :  Patient continues to benefit from skilled intervention to address the above impairments.  Continue treatment per established plan of care to address goals.    Recommendation for discharge: (in order for the patient to meet his/her long term goals):   High intensity/comprehensive skilled occupational therapy in a multidisciplinary setting as patient is working towards tolerating up to 3 hours of therapy/day 5-7x/week    Other factors to consider for discharge: lives alone, patient's current support system is unable to meet their requirements for physical assistance, high  risk for falls, not safe to be alone, and new weight bearing restrictions limiting activity or patient is unable to maintain    IF patient discharges home will need the following DME: continuing to assess with progress       SUBJECTIVE:   Patient stated “I feel better today.”    OBJECTIVE DATA SUMMARY:   Cognitive/Behavioral Status:  Orientation  Overall Orientation Status: Within Functional Limits  Orientation Level: Oriented X4  Cognition  Overall Cognitive Status: WFL    Functional Mobility and Transfers for ADLs:  Bed Mobility:  Bed Mobility Training  Bed Mobility Training: Yes  Supine to Sit: Stand by assistance  Scooting: Stand by assistance     Transfers:   Transfer Training  Transfer Training: Yes  Sit to Stand: Contact guard assistance  Stand to Sit: Contact guard assistance  Bed to Chair: Minimal assistance           Balance:     Balance  Sitting: Intact  Standing: Impaired;With support  Standing - Static: Good;Constant support  Standing - Dynamic: Fair;Constant support      ADL Intervention:    Grooming: Setup   Grooming Skilled Clinical Factors: seated    Toileting: Stand by assistance  Toileting Skilled Clinical Factors: seated using urinal    Pain Rating:  Patient c/o LLE discomfort  Pain Intervention(s):   patient medicated for pain prior to session, elevation, and repositioning      Activity Tolerance:   Fair , requires frequent rest breaks, observed shortness of breath on exertion, and desaturates with activity and requires oxygen  Please refer to the flowsheet for vital signs taken during this treatment.    After treatment:   Patient left in no apparent distress sitting up in chair, Call bell within reach, Bed/ chair alarm activated, and Updated patient's board on functional status and mobility recommendations    COMMUNICATION/EDUCATION:   The patient's plan of care was discussed with: physical therapist and registered nurse    Patient Education  Education Given To: Patient  Education Provided:  Role of Therapy;Plan of Care;Precautions;ADL Adaptive Strategies;Energy Conservation;Transfer Training  Education Method: Verbal;Demonstration  Barriers to Learning: None  Education Outcome: Verbalized understanding;Demonstrated understanding;Continued education needed    Thank you for this referral.  Diane Lorenzo OT  Minutes: 24

## 2025-02-12 PROCEDURE — 6370000000 HC RX 637 (ALT 250 FOR IP): Performed by: SURGERY

## 2025-02-12 PROCEDURE — 97605 NEG PRS WND THER DME<=50SQCM: CPT

## 2025-02-12 PROCEDURE — 2700000000 HC OXYGEN THERAPY PER DAY

## 2025-02-12 PROCEDURE — 2060000000 HC ICU INTERMEDIATE R&B

## 2025-02-12 PROCEDURE — 97116 GAIT TRAINING THERAPY: CPT

## 2025-02-12 PROCEDURE — 6370000000 HC RX 637 (ALT 250 FOR IP): Performed by: PHYSICIAN ASSISTANT

## 2025-02-12 PROCEDURE — 6360000002 HC RX W HCPCS: Performed by: SURGERY

## 2025-02-12 PROCEDURE — 94640 AIRWAY INHALATION TREATMENT: CPT

## 2025-02-12 PROCEDURE — 97530 THERAPEUTIC ACTIVITIES: CPT

## 2025-02-12 RX ADMIN — CITALOPRAM HYDROBROMIDE 10 MG: 20 TABLET ORAL at 10:06

## 2025-02-12 RX ADMIN — BUDESONIDE 500 MCG: 0.5 INHALANT RESPIRATORY (INHALATION) at 10:00

## 2025-02-12 RX ADMIN — ACETAMINOPHEN 1000 MG: 500 TABLET ORAL at 05:40

## 2025-02-12 RX ADMIN — ATORVASTATIN CALCIUM 10 MG: 10 TABLET, FILM COATED ORAL at 20:32

## 2025-02-12 RX ADMIN — ACETAMINOPHEN 1000 MG: 500 TABLET ORAL at 20:32

## 2025-02-12 RX ADMIN — IPRATROPIUM BROMIDE 0.5 MG: 0.5 SOLUTION RESPIRATORY (INHALATION) at 20:42

## 2025-02-12 RX ADMIN — Medication: at 10:11

## 2025-02-12 RX ADMIN — GUAIFENESIN 1200 MG: 600 TABLET ORAL at 10:06

## 2025-02-12 RX ADMIN — ARFORMOTEROL TARTRATE 15 MCG: 15 SOLUTION RESPIRATORY (INHALATION) at 20:42

## 2025-02-12 RX ADMIN — APIXABAN 5 MG: 5 TABLET, FILM COATED ORAL at 10:07

## 2025-02-12 RX ADMIN — DILTIAZEM HYDROCHLORIDE 120 MG: 120 CAPSULE, COATED, EXTENDED RELEASE ORAL at 10:03

## 2025-02-12 RX ADMIN — ASPIRIN 81 MG: 81 TABLET, COATED ORAL at 10:06

## 2025-02-12 RX ADMIN — PANTOPRAZOLE SODIUM 40 MG: 40 TABLET, DELAYED RELEASE ORAL at 05:40

## 2025-02-12 RX ADMIN — PREDNISONE 30 MG: 5 TABLET ORAL at 10:04

## 2025-02-12 RX ADMIN — ACETAMINOPHEN 1000 MG: 500 TABLET ORAL at 13:34

## 2025-02-12 RX ADMIN — IPRATROPIUM BROMIDE 0.5 MG: 0.5 SOLUTION RESPIRATORY (INHALATION) at 10:00

## 2025-02-12 RX ADMIN — Medication: at 20:33

## 2025-02-12 RX ADMIN — GUAIFENESIN 1200 MG: 600 TABLET ORAL at 20:31

## 2025-02-12 RX ADMIN — BUDESONIDE 500 MCG: 0.5 INHALANT RESPIRATORY (INHALATION) at 20:42

## 2025-02-12 RX ADMIN — ARFORMOTEROL TARTRATE 15 MCG: 15 SOLUTION RESPIRATORY (INHALATION) at 10:08

## 2025-02-12 RX ADMIN — APIXABAN 5 MG: 5 TABLET, FILM COATED ORAL at 20:32

## 2025-02-12 RX ADMIN — AZITHROMYCIN 500 MG: 250 TABLET, FILM COATED ORAL at 10:07

## 2025-02-12 ASSESSMENT — PAIN DESCRIPTION - ORIENTATION
ORIENTATION: LEFT
ORIENTATION: LEFT

## 2025-02-12 ASSESSMENT — PAIN DESCRIPTION - DESCRIPTORS
DESCRIPTORS: ACHING
DESCRIPTORS: ACHING

## 2025-02-12 ASSESSMENT — PAIN DESCRIPTION - LOCATION
LOCATION: LEG
LOCATION: FOOT

## 2025-02-12 ASSESSMENT — PAIN SCALES - GENERAL
PAINLEVEL_OUTOF10: 5
PAINLEVEL_OUTOF10: 1

## 2025-02-12 ASSESSMENT — PAIN - FUNCTIONAL ASSESSMENT: PAIN_FUNCTIONAL_ASSESSMENT: ACTIVITIES ARE NOT PREVENTED

## 2025-02-12 NOTE — PROGRESS NOTES
Predictive Model Details          43 (Caution)  Factor Value    Calculated 2/11/2025 23:26 27% Age 74 years old    Deterioration Index Model 26% Supplemental oxygen Nasal cannula     21% Neurological exam X     9% Cardiac rhythm Sinus rhythm with PVC     4% Hematocrit abnormal (27.9 %)     3% Systolic 134     2% Pulse 94     2% BUN abnormal (29 MG/DL)     2% Sodium 138 mmol/L     2% Respiratory rate 17     1% Platelet count abnormal (589 K/uL)     1% WBC count 8.7 K/uL     1% Pulse oximetry 94 %     0% Potassium 3.9 mmol/L     0% Temperature 98.3 °F (36.8 °C)         End of Shift Note    Bedside shift change report given to Mary (oncoming nurse) by MURALI MELLO RN (offgoing nurse).  Report included the following information Procedure Summary, Intake/Output, MAR, Recent Results, and Cardiac Rhythm nsr pvc    Shift worked:  7p     Shift summary and any significant changes:     Wound vac will not stay charged.      Concerns for physician to address:       Zone phone for oncoming shift:   [phone]       Activity:  Level of Assistance: Moderate assist, patient does 50-74%  Number times ambulated in hallways past shift: 0  Number of times OOB to chair past shift: 0    Cardiac:   Cardiac Monitoring: Yes      Cardiac Rhythm: Sinus rhythm with PVC    Access:  Current line(s): PIV     Genitourinary:   Urinary Status: Voiding    Respiratory:   O2 Device: Nasal cannula  Chronic home O2 use?: YES  Incentive spirometer at bedside: NO    GI:  Last BM (including prior to admit): 02/11/25  Current diet:  ADULT DIET; Regular; 3 carb choices (45 gm/meal)  ADULT ORAL NUTRITION SUPPLEMENT; Breakfast, Dinner; Standard High Calorie/High Protein Oral Supplement  ADULT ORAL NUTRITION SUPPLEMENT; Breakfast, Dinner; Wound Healing Oral Supplement  Passing flatus: YES    Pain Management:   Patient states pain is manageable on current regimen: YES    Skin:  Alfred Scale Score: 17  Interventions: Wound Offloading (Prevention Methods): Bed,

## 2025-02-12 NOTE — PROGRESS NOTES
Pulmonary, Critical Care, and Sleep Medicine~progress Note    Name: Nasir Hatfield MRN: [id number]   : 1950 Hospital: [facility]   Date: 2025 11:49 AM Admission: 2025     Impression Plan   Acute on chronic hypoxemic and chronic hypercapnic respiratory failure- baseline 3L NC. Secondary to suspected atelectasis and mild volume overload  Afib RVR  COPD  Critical limb ischemia s/p left CF to PT bypass w/ ipsilateral NRGSV on \  H/o CVA  HTN  Tobacco abuse-ongoing 1 PPD x 50 yrs Supp o2 prn goal sats >88%- weaned to 3.5L NC  Arformoterol/budesonide nebs in place of home Trelegy  DuoNebs as needed  Mucinex  Monitor volume status closely-goal net even to slightly negative  Nicotine patch  Aggressive pulmonary toilet  (ie encourage IS, OOB  Taper prednisone   Completed azithro    Completed cefazolin for SSTI   Eliquis    Will sign off. Please call with questions. Will arrange follow up in our office.      Daily Progression:     Patient seen this morning lying in bed. Breathing is better. Satting 98% on 3.5L NC.      Patient seen this afternoon sitting up in chair. Reports feeling good. Has been using IS more. Satting 94% on 5L NC. Weaned to 3.5L NC. Pleths not accurate with in room pulse ox.     2/10 Patient seen this afternoon. Family at bedside. Patient reports feeling well. Has been out of bed. No significant cough, shortness of breath. Has been using IS about 4 times per day. Demonstrated use. On 5L NC.     : Pulmonary has been reconsulted due to persistent oxygen requirement.  At baseline the patient is on 3 L nasal cannula and has been titrated up to 7 L.  The patient feels better overall and has no shortness of breath, cough, sputum production or wheeze.  Chest x-ray demonstrates no obvious acute airspace disease although there may be small pleural effusions present    2/3 patient seen this morning lying in bed. States he is feeling  Nebulization BID RT    guaiFENesin (MUCINEX) extended release tablet 1,200 mg  1,200 mg Oral BID    dilTIAZem (CARDIZEM CD) extended release capsule 120 mg  120 mg Oral Daily    balsum peru-castor oil (VENELEX) ointment   Topical BID    aspirin EC tablet 81 mg  81 mg Oral Daily    acetaminophen (TYLENOL) tablet 1,000 mg  1,000 mg Oral 3 times per day    oxyCODONE (ROXICODONE) immediate release tablet 10 mg  10 mg Oral Q4H PRN    oxyCODONE (ROXICODONE) immediate release tablet 5 mg  5 mg Oral Q4H PRN    HYDROmorphone HCl PF (DILAUDID) injection 1 mg  1 mg IntraVENous Q4H PRN    citalopram (CELEXA) tablet 10 mg  10 mg Oral Daily    nicotine (NICODERM CQ) 21 MG/24HR 1 patch  1 patch TransDERmal Daily    ondansetron (ZOFRAN) injection 4 mg  4 mg IntraVENous Q6H PRN    albuterol (PROVENTIL) (2.5 MG/3ML) 0.083% nebulizer solution 2.5 mg  2.5 mg Nebulization Q6H PRN       Labs:  ABG Invalid input(s): \"PHI\", \"PCO2I\", \"PO2I\", \"HCO3I\", \"SO2I\", \"FIO2I\"     CBC Recent Labs     02/10/25  0358   WBC 8.7   HGB 8.7*   HCT 27.9*   *   MCV 76.0*   MCH 23.7*        Metabolic  Panel Recent Labs     02/10/25  0358      K 3.9      CO2 31   BUN 29*        Pertinent Labs                Sophie Hopkins PA-C  2/12/2025

## 2025-02-12 NOTE — PLAN OF CARE
Problem: Physical Therapy - Adult  Goal: By Discharge: Performs mobility at highest level of function for planned discharge setting.  See evaluation for individualized goals.  Description: FUNCTIONAL STATUS PRIOR TO ADMISSION: Patient was independent and active without use of DME. Patient admits to furniture walking in the home. He is a retired . Patient wears 3L O2 at baseline.     HOME SUPPORT PRIOR TO ADMISSION: The patient lived alone with friends and neighbors to provide assistance.    Physical Therapy Goals  Initiated 1/31/2025  Re-evaluation 2/6/2025 s/p amputation L toes 2-5: goals remain appropriate   1.  Patient will move from supine to sit and sit to supine, scoot up and down, and roll side to side in bed with modified independence within 7 day(s).    2.  Patient will perform sit to stand with modified independence within 7 day(s).  3.  Patient will transfer from bed to chair and chair to bed with modified independence using the least restrictive device within 7 day(s).  4.  Patient will ambulate with modified independence for 100 feet with the least restrictive device within 7 day(s).   5.  Patient will ascend/descend 15 stairs with bilateral handrail(s) with modified independence within 7 day(s).   Outcome: Progressing     PHYSICAL THERAPY TREATMENT    Patient: Nasir Hatfield (74 y.o. male)  Date: 2/12/2025  Diagnosis: Critical lower limb ischemia (HCC) [I70.229]  Acute lower limb ischemia [I99.8] Critical lower limb ischemia (HCC)  Procedure(s) (LRB):  LEFT 2ND, 3RD, 4TH, AND 5TH TOE AMPUTATIONS ( MAC WITH BLOCK) (Left) 7 Days Post-Op  Precautions: Weight Bearing, Fall Risk, Bed Alarm   Left Lower Extremity Weight Bearing: Non Weight Bearing                  ASSESSMENT:  Patient continues to benefit from skilled PT services and is progressing towards goals. Pt encountered semi-reclined in bed in NAD on 3LNC, cleared by RN and agreeable to participate in therapy. Transferred to sit EOB  with S, ambulated in room with RW and CGA/Min A. Noted COOL, SpO2 ~84-92% during seated rest break, increased to 4LNC for optimal recovery. Required cueing for hand placement with RW during transfer to commode/chair and Min A 2/2 uncontrolled descent. Seated in bedside chair, reviewed HEP (LE, incentive spirometer), pt verbalized understanding. SpO2 97% at end of session, pt left on 3LNC, VSS, all needs met, RN aware.          PLAN:  Patient continues to benefit from skilled intervention to address the above impairments.  Continue treatment per established plan of care.        Recommendation for discharge: (in order for the patient to meet his/her long term goals):   High intensity/comprehensive skilled physical therapy in a multidisciplinary setting as patient is working towards tolerating up to 3 hours of therapy/day 5-7x/week    Other factors to consider for discharge: lives alone, high risk for falls, not safe to be alone, and concern for safely navigating or managing the home environment    IF patient discharges home will need the following DME: rolling walker       SUBJECTIVE:   Patient stated, \"I don't know why I'm so winded today.\"    OBJECTIVE DATA SUMMARY:   Critical Behavior:          Functional Mobility Training:  Bed Mobility:  Bed Mobility Training  Bed Mobility Training: Yes  Overall Level of Assistance: Supervision  Rolling: Supervision  Supine to Sit: Supervision  Scooting: Supervision  Transfers:  Transfer Training  Transfer Training: Yes (with RW)  Overall Level of Assistance: Contact guard assistance;Minimal assistance  Interventions: Safety awareness training;Verbal cues  Sit to Stand: Contact guard assistance  Stand to Sit: Contact guard assistance;Minimal assistance  Bed to Chair: Minimal assistance  Toilet Transfer: Minimal assistance  Balance:  Balance  Sitting: Intact  Standing: Impaired;With support  Standing - Static: Good;Constant support  Standing - Dynamic: Fair;Constant

## 2025-02-12 NOTE — WOUND CARE
nurse notified this am that wound vac equipment failed - wound vac lost power and would not recharge.  nurse exchanged wound vac equipment and charge cord and seal maintained on dressing and wound vac running.    MURALI MUNIZ RN, CWON

## 2025-02-12 NOTE — CARE COORDINATION
Transition of Care Plan:    RUR: 19% \"medium risk\"  Prior Level of Functioning: Independent with ADL's  Disposition: High intensity/comprehensive skilled physical therapy in a multidisciplinary setting vs. Home with [facility] HH services   FELA: 2/13  If SNF or IPR: Date FOC offered: 1/31  Date FOC received: 1/31, 2/7 - pt now agreeable  Accepting facility: [facility]   Date authorization started with reference number: 2/12, ref: [id number]    Date authorization received and expires: N/A  Follow up appointments: PCP/Specialists as indicated  DME needed: None - pt has cane, walker, and 3L O2 through [employer] at home  Transportation at discharge: BLS anticipated   IM/IMM Medicare/ letter given: 2nd IM needed prior to d/c   Is patient a  and connected with VA? No  Caregiver Contact: Skye Turk (child), [phone]  Discharge Caregiver contacted prior to discharge? To be contacted   Care Conference needed? Not at this time   Barriers to discharge: Auth/placement, wound vac at facility     Chart reviewed. CM received message from [employer] noting auth has been started with a reference number of [id number].     HERBERT Patel  Care Management  [facility]   [phone]

## 2025-02-12 NOTE — PLAN OF CARE
Problem: Skin/Tissue Integrity  Goal: Skin integrity remains intact  Description: 1.  Monitor for areas of redness and/or skin breakdown  2.  Assess vascular access sites hourly  3.  Every 4-6 hours minimum:  Change oxygen saturation probe site  4.  Every 4-6 hours:  If on nasal continuous positive airway pressure, respiratory therapy assess nares and determine need for appliance change or resting period  Outcome: Progressing  Flowsheets  Taken 2/12/2025 0821 by Opal Cisse RN  Skin Integrity Remains Intact: Monitor for areas of redness and/or skin breakdown  Taken 2/11/2025 2047 by Emilie Navarrete, RN  Skin Integrity Remains Intact: Monitor for areas of redness and/or skin breakdown     Problem: Respiratory - Adult  Goal: Achieves optimal ventilation and oxygenation  2/12/2025 1034 by Opal Cisse RN  Outcome: Progressing  2/12/2025 1013 by Gely Carlisle,   Outcome: Progressing  2/12/2025 0528 by Ormond, Brittany Michelle, RCP  Outcome: Progressing  2/11/2025 2224 by Ormond, Brittany Michelle, RCP  Outcome: Progressing  Flowsheets (Taken 2/11/2025 2025 by Emilie Navarrete, RN)  Achieves optimal ventilation and oxygenation:   Assess for changes in respiratory status   Assess for changes in mentation and behavior   Position to facilitate oxygenation and minimize respiratory effort     Problem: Pain  Goal: Verbalizes/displays adequate comfort level or baseline comfort level  Outcome: Progressing     Problem: Safety - Adult  Goal: Free from fall injury  Outcome: Progressing  Flowsheets (Taken 2/11/2025 2047 by Emilie Navarrete, RN)  Free From Fall Injury: Instruct family/caregiver on patient safety     Problem: ABCDS Injury Assessment  Goal: Absence of physical injury  Outcome: Progressing  Flowsheets (Taken 2/11/2025 2047 by Emilie Navarrete, RN)  Absence of Physical Injury: Implement safety measures based on patient assessment     Problem: Nutrition Deficit:  Goal: Optimize nutritional  status  Outcome: Progressing     Problem: Discharge Planning  Goal: Discharge to home or other facility with appropriate resources  Outcome: Progressing  Flowsheets (Taken 2/12/2025 7126)  Discharge to home or other facility with appropriate resources: Identify barriers to discharge with patient and caregiver

## 2025-02-12 NOTE — PROGRESS NOTES
End of Shift Note    Bedside shift change report given to Emilie PETERSON (oncoming nurse) by Pilar Conklin RN (offgoing nurse).  Report included the following information SBAR, Intake/Output, Cardiac Rhythm  , and Dual Neuro Assessment    Shift worked:  7245-2087     Shift summary and any significant changes:     Patient CHG bathed and wound care completed by wound care nurse. Patient maintained O2 saturations 88-90's via 3.5L via high flow NC throughout my shift.      Concerns for physician to address:       Zone phone for oncoming shift:   [phone]       Activity:     Number times ambulated in hallways past shift: 0  Number of times OOB to chair past shift: 3    Cardiac:   Cardiac Monitoring: Yes           Access:  Current line(s): PIV     Genitourinary:   Urinary status: voiding    Respiratory:      Chronic home O2 use?: YES  Incentive spirometer at bedside: YES       GI:     Current diet:  ADULT DIET; Regular; 3 carb choices (45 gm/meal)  ADULT ORAL NUTRITION SUPPLEMENT; Breakfast, Dinner; Standard High Calorie/High Protein Oral Supplement  ADULT ORAL NUTRITION SUPPLEMENT; Breakfast, Dinner; Wound Healing Oral Supplement  Passing flatus: YES  Tolerating current diet: YES       Pain Management:   Patient states pain is manageable on current regimen: YES    Skin:     Interventions: increase time out of bed and foam dressing    Patient Safety:  Fall Score:    Interventions: bed/chair alarm, assistive device (walker, cane. etc), gripper socks, and pt to call before getting OOB       Length of Stay:  Expected LOS: 18  Actual LOS: 16      Pilar Conklin, RN

## 2025-02-12 NOTE — PROGRESS NOTES
Hospitalist Progress Note    NAME:   Nasir Hatfield   : 1950   MRN: [id number]     Date/Time: 2025 3:56 PM  Patient PCP: Annie Mcnair APRN - NP      Estimated discharge date:   Barriers: Placement     Assessment / Plan:    Critical limb ischemia- L foot POA- s/p Left CFA=>PT bypass w/ipsilateral NRGSV by Dr Lorenzo   - Here with worsening L leg swelling, pain, redness recently  - CTA runoff showing occluded left superficial femoral artery, distal vessels difficult to evaluation, probably reconstitution at level of popliteal artery   IP Vascular Surgery consulted- S/p Left CFA=>PT bypass w/ipsilateral NRGSV  by Dr Lorenzo,  25 TMA Amputation completed  25 denies any pain.  Appreciate vascular following, PT/OT, dispo ipr  : Cleared from vascular standpoint for discharge    L foot cellulitis with Toe necrosis POA  -WBC 23.5->19.9->16.8->14.4 ->15.4  -Color change to LLE however no madison erythema, leg is cold   - Blood cultures sent- neg  - crp elevated as expected 17.7  CXR - severe Bullous Emphysema noted  S/p IV Vanc and Zosyn for now - empiric IV Abx stopped now  25  Abx restarted in anticipation of OR and cellulitis rash  Wound cultures remain negative so far will be completed 5 days of cefazolin  :   Acute on Chronic hypoxic & hypercapneic respiratory failure POA-   Increased oxygen demand post day 1- up to 10L/[place]  ,   on 3L/m NC home oxygen at baseline  Hx of severe COPD with Emphysema POA- pt of Dr Ritter (PAR)  Afib RVR  - Continue home inhalers  - Lung sounds coarse  - Continue PRN duonebs as needed for wheezing   CXR today AM with RR @ 10:30 AM noted- neg acute but severe Emphysema noted  ABG= 7.43/50/42/78% on 3L/m   Pulm consulted for further recommendations  IV cardizem 5mg x 1 now, resume Po metoprolol, if not slowed down - start cardizem drip, resumed home eliquis post op now  Will consult cardiology for further recc  : Patient is currently on 7 L  --   02/11/25 2301 -- -- -- 79 15 98 %   02/11/25 2256 -- -- -- 86 11 90 %   02/11/25 2251 -- -- -- 94 17 94 %   02/11/25 2246 -- -- -- 78 15 99 %   02/11/25 2223 -- -- -- 97 17 --   02/11/25 2221 -- -- -- 96 16 --   02/11/25 1915 -- 98 °F (36.7 °C) Oral -- -- --   02/11/25 1645 -- -- -- -- -- 92 %         Intake/Output Summary (Last 24 hours) at 2/12/2025 1556  Last data filed at 2/12/2025 1335  Gross per 24 hour   Intake 1740 ml   Output 1675 ml   Net 65 ml        I had a face to face encounter and independently examined this patient on 2/12/2025, as outlined below:  PHYSICAL EXAM:  General: Alert, cooperative  EENT:  EOMI. Anicteric sclerae.  Resp:  CTA bilaterally, coarse breath sounds CV:  Regular  rhythm,  No edema  GI:  Soft, Non distended, Non tender.  +Bowel sounds  Neurologic:  Alert and oriented X 3, normal speech,   Psych:   Good insight. Not anxious nor agitated  Skin:  Left leg incision intact, no erythema    Reviewed most current lab test results and cultures  YES  Reviewed most current radiology test results   YES  Review and summation of old records today    NO  Reviewed patient's current orders and MAR    YES  PMH/SH reviewed - no change compared to H&P    Procedures: see electronic medical records for all procedures/Xrays and details which were not copied into this note but were reviewed prior to creation of Plan.      LABS:  I reviewed today's most current labs and imaging studies.  Pertinent labs include:  Recent Labs     02/10/25  0358   WBC 8.7   HGB 8.7*   HCT 27.9*   *     Recent Labs     02/10/25  0358      K 3.9      CO2 31   GLUCOSE 103*   BUN 29*   CREATININE 0.84   CALCIUM 8.8       Signed: Shaun Alex MD FACP

## 2025-02-12 NOTE — PROGRESS NOTES
Predictive Model Details          48 (Caution)  Factor Value    Calculated 2/11/2025 20:50 24% Age 74 years old    Deterioration Index Model 23% Supplemental oxygen High flow nasal cannula     19% Neurological exam X     11% Respiratory rate 20     8% Cardiac rhythm Sinus rhythm with PVC     4% Pulse oximetry 92 %     4% Hematocrit abnormal (27.9 %)     2% BUN abnormal (29 MG/DL)     1% Sodium 138 mmol/L     1% Pulse 91     1% Platelet count abnormal (589 K/uL)     1% WBC count 8.7 K/uL     1% Systolic 117     0% Temperature 98 °F (36.7 °C)     0% Potassium 3.9 mmol/L

## 2025-02-13 PROCEDURE — 6370000000 HC RX 637 (ALT 250 FOR IP): Performed by: SURGERY

## 2025-02-13 PROCEDURE — 97530 THERAPEUTIC ACTIVITIES: CPT

## 2025-02-13 PROCEDURE — 97535 SELF CARE MNGMENT TRAINING: CPT

## 2025-02-13 PROCEDURE — 97116 GAIT TRAINING THERAPY: CPT

## 2025-02-13 PROCEDURE — 2060000000 HC ICU INTERMEDIATE R&B

## 2025-02-13 PROCEDURE — 6360000002 HC RX W HCPCS: Performed by: SURGERY

## 2025-02-13 PROCEDURE — 6370000000 HC RX 637 (ALT 250 FOR IP): Performed by: PHYSICIAN ASSISTANT

## 2025-02-13 PROCEDURE — 94640 AIRWAY INHALATION TREATMENT: CPT

## 2025-02-13 PROCEDURE — 6370000000 HC RX 637 (ALT 250 FOR IP): Performed by: NURSE PRACTITIONER

## 2025-02-13 RX ORDER — ACETAMINOPHEN 325 MG/1
650 TABLET ORAL EVERY 4 HOURS PRN
Status: DISCONTINUED | OUTPATIENT
Start: 2025-02-13 | End: 2025-02-15 | Stop reason: HOSPADM

## 2025-02-13 RX ADMIN — Medication: at 13:44

## 2025-02-13 RX ADMIN — ARFORMOTEROL TARTRATE 15 MCG: 15 SOLUTION RESPIRATORY (INHALATION) at 21:23

## 2025-02-13 RX ADMIN — DILTIAZEM HYDROCHLORIDE 120 MG: 120 CAPSULE, COATED, EXTENDED RELEASE ORAL at 10:09

## 2025-02-13 RX ADMIN — PREDNISONE 30 MG: 5 TABLET ORAL at 10:12

## 2025-02-13 RX ADMIN — IPRATROPIUM BROMIDE 0.5 MG: 0.5 SOLUTION RESPIRATORY (INHALATION) at 09:11

## 2025-02-13 RX ADMIN — BUDESONIDE 500 MCG: 0.5 INHALANT RESPIRATORY (INHALATION) at 21:23

## 2025-02-13 RX ADMIN — PANTOPRAZOLE SODIUM 40 MG: 40 TABLET, DELAYED RELEASE ORAL at 05:31

## 2025-02-13 RX ADMIN — APIXABAN 5 MG: 5 TABLET, FILM COATED ORAL at 19:48

## 2025-02-13 RX ADMIN — Medication: at 19:49

## 2025-02-13 RX ADMIN — Medication 3 MG: at 19:48

## 2025-02-13 RX ADMIN — ASPIRIN 81 MG: 81 TABLET, COATED ORAL at 10:11

## 2025-02-13 RX ADMIN — ACETAMINOPHEN 1000 MG: 500 TABLET ORAL at 20:38

## 2025-02-13 RX ADMIN — BUDESONIDE 500 MCG: 0.5 INHALANT RESPIRATORY (INHALATION) at 09:11

## 2025-02-13 RX ADMIN — APIXABAN 5 MG: 5 TABLET, FILM COATED ORAL at 10:09

## 2025-02-13 RX ADMIN — ACETAMINOPHEN 1000 MG: 500 TABLET ORAL at 13:43

## 2025-02-13 RX ADMIN — GUAIFENESIN 1200 MG: 600 TABLET ORAL at 10:11

## 2025-02-13 RX ADMIN — ATORVASTATIN CALCIUM 10 MG: 10 TABLET, FILM COATED ORAL at 19:48

## 2025-02-13 RX ADMIN — ARFORMOTEROL TARTRATE 15 MCG: 15 SOLUTION RESPIRATORY (INHALATION) at 09:05

## 2025-02-13 RX ADMIN — CITALOPRAM HYDROBROMIDE 10 MG: 20 TABLET ORAL at 10:12

## 2025-02-13 RX ADMIN — ACETAMINOPHEN 1000 MG: 500 TABLET ORAL at 05:31

## 2025-02-13 RX ADMIN — GUAIFENESIN 1200 MG: 600 TABLET ORAL at 19:48

## 2025-02-13 RX ADMIN — IPRATROPIUM BROMIDE 0.5 MG: 0.5 SOLUTION RESPIRATORY (INHALATION) at 21:20

## 2025-02-13 ASSESSMENT — PAIN DESCRIPTION - DESCRIPTORS
DESCRIPTORS: ACHING;THROBBING
DESCRIPTORS: ACHING
DESCRIPTORS: NAGGING;ACHING

## 2025-02-13 ASSESSMENT — PAIN - FUNCTIONAL ASSESSMENT: PAIN_FUNCTIONAL_ASSESSMENT: ACTIVITIES ARE NOT PREVENTED

## 2025-02-13 ASSESSMENT — PAIN SCALES - GENERAL
PAINLEVEL_OUTOF10: 5
PAINLEVEL_OUTOF10: 4
PAINLEVEL_OUTOF10: 6

## 2025-02-13 ASSESSMENT — PAIN DESCRIPTION - LOCATION
LOCATION: TOE (COMMENT WHICH ONE)
LOCATION: FOOT
LOCATION: LEG;FOOT

## 2025-02-13 ASSESSMENT — PAIN DESCRIPTION - ORIENTATION
ORIENTATION: LEFT

## 2025-02-13 NOTE — PLAN OF CARE
Problem: Respiratory - Adult  Goal: Achieves optimal ventilation and oxygenation  2/13/2025 0017 by Gely Delong RCP  Outcome: Progressing

## 2025-02-13 NOTE — PLAN OF CARE
End of Shift Note    Bedside shift change report given to  Opal PETERSON and Anisa LUON    (oncoming nurse) by Tamara Emanuel RN (offgoing nurse).  Report included the following information Nurse Handoff Report    Shift worked:  Night shift      Shift summary and any significant changes:     No acute changes overnight      Concerns for physician to address:  N/a     Zone phone for oncoming shift:   [phone]       Activity:  Activity: In bed  Number times ambulated in hallways past shift: 0  Number of times OOB to chair past shift: 0    Cardiac:   Cardiac Monitoring: Yes      Cardiac Rhythm: Sinus rhythm with PVC    Access:  Current line(s): PIV     Genitourinary:   Urinary status: voiding    Respiratory:   O2 Device: Nasal cannula  Chronic home O2 use?: YES  Incentive spirometer at bedside: YES  Maximum Achieved Volume (mL): 1000 mL    GI:  Last BM (including prior to admit): 02/11/25  Current diet:    ADULT DIET; Regular; 3 carb choices (45 gm/meal)  ADULT ORAL NUTRITION SUPPLEMENT; Breakfast, Dinner; Standard High Calorie/High Protein Oral Supplement  ADULT ORAL NUTRITION SUPPLEMENT; Breakfast, Dinner; Wound Healing Oral Supplement  Passing flatus: YES  Tolerating current diet: YES       Pain Management:   Patient states pain is manageable on current regimen: YES    Skin:  Alfred Scale Score: 18  Interventions: increase time out of bed    Patient Safety:  Fall Score: Brand Total Score: 85  Interventions: bed/chair alarm, assistive device (walker, cane. etc), gripper socks, and pt to call before getting OOB       Length of Stay:  Expected LOS: 18  Actual LOS: 17      Tamara Emanuel RN    Predictive Model Details          42 (Caution)  Factor Value    Calculated 2/12/2025 22:17 28% Age 74 years old    Deterioration Index Model 26% Supplemental oxygen Nasal cannula     22% Neurological exam X     9% Cardiac rhythm Sinus rhythm with PVC     4% Hematocrit abnormal (27.9 %)     2% Pulse oximetry 93 %     2% BUN abnormal (29  Instruct family/caregiver on patient safety  2/12/2025 1034 by Opal Cisse RN  Outcome: Progressing  Flowsheets (Taken 2/11/2025 2047 by Emilie Navarrete, RN)  Free From Fall Injury: Instruct family/caregiver on patient safety     Problem: ABCDS Injury Assessment  Goal: Absence of physical injury  2/12/2025 2217 by Tamara Emanuel RN  Outcome: Progressing  Flowsheets (Taken 2/12/2025 2040)  Absence of Physical Injury: Implement safety measures based on patient assessment  2/12/2025 1034 by Opal Cisse RN  Outcome: Progressing  Flowsheets (Taken 2/11/2025 2047 by Emilie Navarrete, RN)  Absence of Physical Injury: Implement safety measures based on patient assessment     Problem: Nutrition Deficit:  Goal: Optimize nutritional status  2/12/2025 2217 by Tamara Emanuel RN  Outcome: Progressing  2/12/2025 1034 by Opal Cisse RN  Outcome: Progressing     Problem: Discharge Planning  Goal: Discharge to home or other facility with appropriate resources  2/12/2025 2217 by Tamara Emanuel RN  Outcome: Progressing  Flowsheets (Taken 2/12/2025 2040)  Discharge to home or other facility with appropriate resources: Identify barriers to discharge with patient and caregiver  2/12/2025 1034 by Opal Cisse RN  Outcome: Progressing  Flowsheets (Taken 2/12/2025 0821)  Discharge to home or other facility with appropriate resources: Identify barriers to discharge with patient and caregiver     Problem: Physical Therapy - Adult  Goal: By Discharge: Performs mobility at highest level of function for planned discharge setting.  See evaluation for individualized goals.  Description: FUNCTIONAL STATUS PRIOR TO ADMISSION: Patient was independent and active without use of DME. Patient admits to furniture walking in the home. He is a retired . Patient wears 3L O2 at baseline.     HOME SUPPORT PRIOR TO ADMISSION: The patient lived alone with friends and neighbors to provide assistance.    Physical

## 2025-02-13 NOTE — PLAN OF CARE
bathroom?”    OBJECTIVE DATA SUMMARY:     Past Medical History:   Diagnosis Date    Chronic obstructive pulmonary disease (HCC)     Hx of blood clots 08/29/2024    Left lower leg    Hypertension      Past Surgical History:   Procedure Laterality Date    AMPUTATION Left 2024    Left Big Toe    ANGIOPLASTY Left 05/20/2024    LEFT FEMORAL ANGIOGRAM BALLOON  ANGIOPLASTY AND STENTING performed by Harris Lorenzo MD at [facility] MAIN OR    COLONOSCOPY      EYE SURGERY Left     cataract surgery    FEMORAL-TIBIAL BYPASS GRAFT N/A 1/29/2025    LEFT LOWER EXTREMITY TIBIAL BYPASS WITH VEIN performed by Harris Lorenzo MD at [facility] MAIN OR    FOOT DEBRIDEMENT Left 05/22/2024    BONE BIOPSY LEFT FOOT performed by Thony Chisholm DPM at [facility] MAIN OR    TOE AMPUTATION Left 2/5/2025    LEFT 2ND, 3RD, 4TH, AND 5TH TOE AMPUTATIONS ( MAC WITH BLOCK) performed by Harris Lorenzo MD at [facility] MAIN OR    VASCULAR SURGERY Right 9/7/2024    RE INJECT INFUSION CATHETER performed by Harris Lorenzo MD at [facility] MAIN OR    VASCULAR SURGERY Right 9/8/2024    REINJECT AND REMOVE INFUSION CATHETER performed by Harris Lorenzo MD at [facility] MAIN OR    VASCULAR SURGERY Left 9/6/2024    LEFT LOWER EXTREMITY THROMBECTOMY performed by Harris Lorenzo MD at [facility] MAIN OR       Home Situation:  Social/Functional History  Lives With: Alone  Type of Home: House  Home Layout: Multi-level (split level house)  Home Access: Stairs to enter with rails  Entrance Stairs - Number of Steps: 3  Bathroom Shower/Tub: Walk-in shower  Bathroom Equipment: Grab bars in shower, Shower chair  Home Equipment: Oxygen, Rollator, Walker - Rolling, Cane  Receives Help From: Family  Prior Level of Assist for ADLs: Independent  Prior Level of Assist for Homemaking: Independent  Prior Level of Assist for Transfers: Independent  Active : Yes  Mode of Transportation: Car  Occupation: Retired  Critical Behavior:  Orientation  Overall Orientation Status: Within Functional  patient medicated for pain prior to session, and pain is at a level acceptable to the patient    Activity Tolerance:   Good, requires rest breaks, and observed shortness of breath on exertion    After treatment:   Patient left in no apparent distress sitting up in chair, Call bell within reach, Bed/ chair alarm activated, and Updated patient's board on functional status and mobility recommendations    COMMUNICATION/EDUCATION:   The patient's plan of care was discussed with: occupational therapist and registered nurse    Patient Education  Education Given To: Patient  Education Provided: Role of Therapy;Plan of Care;Home Exercise Program;Precautions;Transfer Training;Fall Prevention Strategies;Mobility Training;Equipment;Energy Conservation  Education Provided Comments: safe use of RW  Education Method: Verbal;Demonstration  Barriers to Learning: None  Education Outcome: Verbalized understanding;Continued education needed    Thank you for this referral.  KIKI KEE, PT  Minutes: 15

## 2025-02-13 NOTE — PLAN OF CARE
Problem: Occupational Therapy - Adult  Goal: By Discharge: Performs self-care activities at highest level of function for planned discharge setting.  See evaluation for individualized goals.  Description: FUNCTIONAL STATUS PRIOR TO ADMISSION:  Patient was independent to mod I for ADLs and functional mobility. He reported mainly furniture walking in his house but also reported having rolling walker if needed.   Receives Help From: Family, Prior Level of Assist for ADLs: Independent,  ,  ,  ,  ,  , Prior Level of Assist for Homemaking: Independent, Ambulation Assistance: Independent, Prior Level of Assist for Transfers: Independent, Active : Yes     HOME SUPPORT: Patient lived alone.  Revised during re-eval 2/6/2025; Reviewed during weekly reassessment 2/13/2025- updated below    Occupational Therapy Goals:  1.  Patient will perform grooming in standing with Minimal Assist within 7 day(s). Not met, continue   2.  Patient will perform upper body dressing with [place] within 7 day(s). Not met, continue   3.  Patient will perform lower body dressing using AE PRN with Minimal Assist within 7 day(s). Met, upgrade to stand-by assist  4.  Patient will perform toilet transfers with Contact Guard Assist within 7 day(s). Not met, continue   5.  Patient will perform all aspects of toileting with Moderate Assist within 7 day(s). Met, upgrade to contact guard assist  6.  Patient will participate in upper extremity therapeutic exercise/activities with Modified [place] for 10 minutes within 7 day(s). Not met, continue     Initiated 1/31/2025; Discontinued and revised above during re-eval 2/6/2025  1.  Patient will perform grooming in standing with Supervision within 7 day(s).  2.  Patient will perform upper body dressing with Supervision within 7 day(s).  3.  Patient will perform lower body dressing using AE PRN with Moderate Assist within 7 day(s).  4.  Patient will perform toilet transfers with Minimal  Assist  within 7 day(s).  5.  Patient will perform all aspects of toileting with Minimal Assist within 7 day(s).  6.  Patient will participate in upper extremity therapeutic exercise/activities with Stand by Assist for 10 minutes within 7 day(s).    Outcome: Progressing   OCCUPATIONAL THERAPY TREATMENT: WEEKLY REASSESSMENT    Patient: Nasir Hatfield (74 y.o. male)  Date: 2/13/2025  Primary Diagnosis: Critical lower limb ischemia (HCC) [I70.229]  Acute lower limb ischemia [I99.8]  Procedure(s) (LRB):  LEFT 2ND, 3RD, 4TH, AND 5TH TOE AMPUTATIONS ( MAC WITH BLOCK) (Left) 8 Days Post-Op   Precautions: Weight Bearing, Fall Risk, Bed Alarm   Left Lower Extremity Weight Bearing: Non Weight Bearing            Chart, occupational therapy assessment, plan of care, and goals were reviewed.    ASSESSMENT  Patient continues to benefit from skilled OT services and is progressing towards goals. Patient received semisupine in bed on 3L O2 and cleared for therapy by nursing. He completed bed mobility with supervision and demonstrated improved sitting balance. SpO2 93% while EOB. He completed sit > stand and was able to walk to bathroom using rolling walker while maintaining NWB on LLE. Cues provided for safety awareness and up to min assist provided for transfers. He was able to complete pericare while seated with min assist and grooming standing at sink deferred secondary to increased fatigue. SpO2 dropped into mid 80s but recovered with rest break and pursed lipped breathing. Patient transferred to recliner chair and was left sitting with all needs met, VSS, and chair alarmed.              PLAN  Goals have been updated based on progression since last assessment.  Patient continues to benefit from skilled intervention to address the above impairments.    Recommendations and Planned Interventions:   self care training, therapeutic activities, functional mobility training, balance training, therapeutic exercise, endurance activities,

## 2025-02-13 NOTE — CARE COORDINATION
Transition of Care Plan:    RUR: 13% \"low risk\"  Prior Level of Functioning: Independent with ADL's  Disposition: High intensity/comprehensive skilled physical therapy in a multidisciplinary setting vs. Home with [facility] HH services   FELA: 2/13  If SNF or IPR: Date FOC offered: 1/31  Date FOC received: 1/31, 2/7 - pt now agreeable  Accepting facility: [facility]   Date authorization started with reference number: 2/12, ref: [id number], 2/13 for SNF ref: [id number]  Date authorization received and expires: 2/14 for SNF  Follow up appointments: PCP/Specialists as indicated  DME needed: None - pt has cane, walker, and 3L O2 through [employer] at home  Transportation at discharge: BLS anticipated   IM/IMM Medicare/ letter given: 2nd IM needed prior to d/c   Is patient a [place] and connected with VA? No  Caregiver Contact: Skye Turk (child), [phone]  Discharge Caregiver contacted prior to discharge? To be contacted   Care Conference needed? Not at this time   Barriers to discharge: Auth/placement, wound vac at facility     1709 PM: ELIAZAR contacted [facility] at [phone] to check on status [facility]. P2P is pending medical director review.     1620 PM: ELIAZAR sent SNF referrals as back up plan in the event IPR is denied.    1511 PM: ELIAZAR discussed plan of care with MD. MD called [facility] to complete P2P, MD waiting on return call back.    1050 AM: ELIAZAR received update from [facility] noting [facility] was requesting additional clinicals. [facility] to send to [facility].    0812 AM: Chart reviewed. ELIAZAR acknowledged d/c order, though; ELIAZAR informed MD that insurance auth is still pending for IPR. CM to provide update once available.     Rosalind Elizabeth, MSW  Care Management  [facility]   [phone]

## 2025-02-13 NOTE — PROGRESS NOTES
End of Shift Note    Bedside shift change report given to(oncoming nurse) by Opal Cisse RN (offgoing nurse).  Report included the following information Nurse Handoff Report and Index    Shift worked:  7a-7p       Shift summary and any significant changes:     Waiting for Authorization, insurance had a peer to peer meeting today with Dr. Alex. Hopeful for placement tomorrow.      Concerns for physician to address:  None      Zone phone for oncoming shift:   N/A       Activity:  Activity: In bed  Number times ambulated in hallways past shift: 0  Number of times OOB to chair past shift: 3    Cardiac:   Cardiac Monitoring: Yes      Cardiac Rhythm: Sinus rhythm    Access:  Current line(s): PIV     Genitourinary:   Urinary status: voiding    Respiratory:   O2 Device: Nasal cannula  Chronic home O2 use?: YES  Incentive spirometer at bedside: YES  Maximum Achieved Volume (mL): 1000 mL    GI:  Last BM (including prior to admit): 02/13/25  Current diet:    ADULT DIET; Regular; 3 carb choices (45 gm/meal)  ADULT ORAL NUTRITION SUPPLEMENT; Breakfast, Dinner; Standard High Calorie/High Protein Oral Supplement  ADULT ORAL NUTRITION SUPPLEMENT; Breakfast, Dinner; Wound Healing Oral Supplement  Passing flatus: YES  Tolerating current diet: YES       Pain Management:   Patient states pain is manageable on current regimen: YES    Skin:  Alfred Scale Score: 17  Interventions: float heels and PT/OT consult    Patient Safety:  Fall Score: Brand Total Score: 85  Interventions: gripper socks and pt to call before getting OOB       Length of Stay:  Expected LOS: 19  Actual LOS: 18      Opal Cisse RN

## 2025-02-13 NOTE — PROGRESS NOTES
Called insurance with number given by   to set up P2P, left a confidential voicemail with callback number

## 2025-02-13 NOTE — PROGRESS NOTES
Hospitalist Progress Note    NAME:   Nasir Hatfield   : 1950   MRN: [id number]     Date/Time: 2025 2:43 PM  Patient PCP: Annie Mcnair APRN - NP      Estimated discharge date:   Barriers: Placement     Assessment / Plan:    Critical limb ischemia- L foot POA- s/p Left CFA=>PT bypass w/ipsilateral NRGSV by Dr Lorenzo   - Here with worsening L leg swelling, pain, redness recently  - CTA runoff showing occluded left superficial femoral artery, distal vessels difficult to evaluation, probably reconstitution at level of popliteal artery   IP Vascular Surgery consulted- S/p Left CFA=>PT bypass w/ipsilateral NRGSV  by Dr Lorenzo,  25 TMA Amputation completed  25 denies any pain.  Appreciate vascular following, PT/OT, dispo ipr  : Cleared from vascular standpoint for discharge    L foot cellulitis with Toe necrosis POA  -WBC 23.5->19.9->16.8->14.4 ->15.4  -Color change to LLE however no madison erythema, leg is cold   - Blood cultures sent- neg  - crp elevated as expected 17.7  CXR - severe Bullous Emphysema noted  S/p IV Vanc and Zosyn for now - empiric IV Abx stopped now  25  Abx restarted in anticipation of OR and cellulitis rash  Wound cultures remain negative so far will be completed 5 days of cefazolin  :   Acute on Chronic hypoxic & hypercapneic respiratory failure POA-   Increased oxygen demand post day 1- up to 10L/[place]  ,   on 3L/m NC home oxygen at baseline  Hx of severe COPD with Emphysema POA- pt of Dr Ritter (PAR)  Afib RVR  - Continue home inhalers  - Lung sounds coarse  - Continue PRN duonebs as needed for wheezing   CXR today AM with RR @ 10:30 AM noted- neg acute but severe Emphysema noted  ABG= 7.43/50/42/78% on 3L/m   Pulm consulted for further recommendations  IV cardizem 5mg x 1 now, resume Po metoprolol, if not slowed down - start cardizem drip, resumed home eliquis post op now  Will consult cardiology for further recc  : Patient is currently on 7 L  133/85 97.9 °F (36.6 °C) Oral 86 17 96 %   02/12/25 2055 -- -- -- 81 17 97 %   02/12/25 2048 -- -- -- 73 13 98 %   02/12/25 2042 -- -- -- 85 17 96 %   02/12/25 2040 116/79 97.5 °F (36.4 °C) Oral 93 16 93 %   02/12/25 1604 106/62 -- -- 96 20 97 %   02/12/25 1603 106/62 98.1 °F (36.7 °C) Oral 92 20 95 %         Intake/Output Summary (Last 24 hours) at 2/13/2025 1443  Last data filed at 2/13/2025 0842  Gross per 24 hour   Intake --   Output 550 ml   Net -550 ml        I had a face to face encounter and independently examined this patient on 2/13/2025, as outlined below:  PHYSICAL EXAM:  General: Alert, cooperative  EENT:  EOMI. Anicteric sclerae.  Resp:  CTA bilaterally, coarse breath sounds CV:  Regular  rhythm,  No edema  GI:  Soft, Non distended, Non tender.  +Bowel sounds  Neurologic:  Alert and oriented X 3, normal speech,   Psych:   Good insight. Not anxious nor agitated  Skin:  Left leg incision intact, no erythema    Reviewed most current lab test results and cultures  YES  Reviewed most current radiology test results   YES  Review and summation of old records today    NO  Reviewed patient's current orders and MAR    YES  PMH/SH reviewed - no change compared to H&P    Procedures: see electronic medical records for all procedures/Xrays and details which were not copied into this note but were reviewed prior to creation of Plan.      LABS:  I reviewed today's most current labs and imaging studies.  Pertinent labs include:  No results for input(s): \"WBC\", \"HGB\", \"HCT\", \"PLT\" in the last 72 hours.    No results for input(s): \"NA\", \"K\", \"CL\", \"CO2\", \"GLUCOSE\", \"BUN\", \"CREATININE\", \"CALCIUM\", \"MG\", \"PHOS\", \"LABALBU\", \"BILITOT\", \"AST\", \"ALT\", \"INR\" in the last 72 hours.      Signed: Shaun Alex MD FACP

## 2025-02-14 ENCOUNTER — APPOINTMENT (OUTPATIENT)
Facility: HOSPITAL | Age: 75
DRG: 239 | End: 2025-02-14
Attending: INTERNAL MEDICINE
Payer: MEDICARE

## 2025-02-14 PROCEDURE — 6370000000 HC RX 637 (ALT 250 FOR IP): Performed by: SURGERY

## 2025-02-14 PROCEDURE — 6370000000 HC RX 637 (ALT 250 FOR IP): Performed by: PHYSICIAN ASSISTANT

## 2025-02-14 PROCEDURE — 6360000002 HC RX W HCPCS: Performed by: SURGERY

## 2025-02-14 PROCEDURE — 2060000000 HC ICU INTERMEDIATE R&B

## 2025-02-14 PROCEDURE — 94640 AIRWAY INHALATION TREATMENT: CPT

## 2025-02-14 PROCEDURE — 6370000000 HC RX 637 (ALT 250 FOR IP): Performed by: NURSE PRACTITIONER

## 2025-02-14 PROCEDURE — 2700000000 HC OXYGEN THERAPY PER DAY

## 2025-02-14 RX ADMIN — ARFORMOTEROL TARTRATE 15 MCG: 15 SOLUTION RESPIRATORY (INHALATION) at 22:03

## 2025-02-14 RX ADMIN — CITALOPRAM HYDROBROMIDE 10 MG: 20 TABLET ORAL at 09:36

## 2025-02-14 RX ADMIN — ATORVASTATIN CALCIUM 10 MG: 10 TABLET, FILM COATED ORAL at 20:41

## 2025-02-14 RX ADMIN — ARFORMOTEROL TARTRATE 15 MCG: 15 SOLUTION RESPIRATORY (INHALATION) at 07:08

## 2025-02-14 RX ADMIN — PREDNISONE 30 MG: 5 TABLET ORAL at 09:34

## 2025-02-14 RX ADMIN — IPRATROPIUM BROMIDE 0.5 MG: 0.5 SOLUTION RESPIRATORY (INHALATION) at 22:03

## 2025-02-14 RX ADMIN — IPRATROPIUM BROMIDE 0.5 MG: 0.5 SOLUTION RESPIRATORY (INHALATION) at 07:10

## 2025-02-14 RX ADMIN — Medication: at 09:41

## 2025-02-14 RX ADMIN — APIXABAN 5 MG: 5 TABLET, FILM COATED ORAL at 09:35

## 2025-02-14 RX ADMIN — DILTIAZEM HYDROCHLORIDE 120 MG: 120 CAPSULE, COATED, EXTENDED RELEASE ORAL at 09:37

## 2025-02-14 RX ADMIN — ACETAMINOPHEN 1000 MG: 500 TABLET ORAL at 14:11

## 2025-02-14 RX ADMIN — GUAIFENESIN 1200 MG: 600 TABLET ORAL at 09:36

## 2025-02-14 RX ADMIN — ACETAMINOPHEN 1000 MG: 500 TABLET ORAL at 05:53

## 2025-02-14 RX ADMIN — PANTOPRAZOLE SODIUM 40 MG: 40 TABLET, DELAYED RELEASE ORAL at 05:53

## 2025-02-14 RX ADMIN — APIXABAN 5 MG: 5 TABLET, FILM COATED ORAL at 20:40

## 2025-02-14 RX ADMIN — ACETAMINOPHEN 1000 MG: 500 TABLET ORAL at 20:41

## 2025-02-14 RX ADMIN — BUDESONIDE 500 MCG: 0.5 INHALANT RESPIRATORY (INHALATION) at 21:57

## 2025-02-14 RX ADMIN — GUAIFENESIN 1200 MG: 600 TABLET ORAL at 20:40

## 2025-02-14 RX ADMIN — Medication 3 MG: at 20:45

## 2025-02-14 RX ADMIN — Medication: at 20:42

## 2025-02-14 RX ADMIN — BUDESONIDE 500 MCG: 0.5 INHALANT RESPIRATORY (INHALATION) at 07:08

## 2025-02-14 RX ADMIN — ASPIRIN 81 MG: 81 TABLET, COATED ORAL at 09:36

## 2025-02-14 ASSESSMENT — PAIN DESCRIPTION - ORIENTATION
ORIENTATION: LEFT

## 2025-02-14 ASSESSMENT — PAIN DESCRIPTION - LOCATION
LOCATION: TOE (COMMENT WHICH ONE)
LOCATION: FOOT
LOCATION: LEG;TOE (COMMENT WHICH ONE)

## 2025-02-14 ASSESSMENT — PAIN DESCRIPTION - DESCRIPTORS
DESCRIPTORS: THROBBING
DESCRIPTORS: THROBBING
DESCRIPTORS: ACHING

## 2025-02-14 ASSESSMENT — PAIN SCALES - GENERAL
PAINLEVEL_OUTOF10: 6
PAINLEVEL_OUTOF10: 5
PAINLEVEL_OUTOF10: 4

## 2025-02-14 ASSESSMENT — PAIN - FUNCTIONAL ASSESSMENT: PAIN_FUNCTIONAL_ASSESSMENT: ACTIVITIES ARE NOT PREVENTED

## 2025-02-14 NOTE — WOUND CARE
Wound care nurse: wound vac to left lateral foot amputation site.    73 y/o male admitted 1/26/25 for left critical limb ischemia and gangrene of left forefoot.  Hx severe COPD on 3 L NC at baseline  Heavy smoker - nicotine patch in place  OR 1/29/25 Left CFA to PT bypass wi/ipsilateral NRGSV. Surgeon: Dr Lorenzo  OR 2/5/25 simple ray amputation of toes #2, #3 and #4 and open Transmetatarsal amputation of left 5th toe.  Surgeon Dr Lorenzo    Chart reviewed and patient will be discharged to [facility] tomorrow.  nurse to remove wound vac dressing and pack wound with silver alginate dressing that is good for 24-28 hours. SNF to place wound vac dressing to lateral foot wound when admitted.    Left  lateral foot wound scant granulation tissue with tan slough, scan sero-sang drainage      Wound noted to left medial foot measuring aprox 1 x 0.5 x 0.3 cm, pale pink base with slough edges      Recommend    Left medial foot wound: MWF, cleanse with Vashe moist 4x4, apply hydrating wound gel to wound and cover with a piece of silver alginate (Exufiber), secure with dry dressing    Left leg stapled incision: MWF, clean with NS if drainage present, cover with Post op foam dressing or DSD.     nurse removed wound vac equipment and charge cord form room after disposing of canister. Wound vac taken back to  office for cleaning and processing.    Patient for discharge tomorrow    MURALI MUNIZ RN, CWON

## 2025-02-14 NOTE — CARE COORDINATION
Pt clear from CM standpoint - [facility] transport @ 1600PM on 2/15.    Transition of Care Plan:    RUR: 13% \"low risk\"  Prior Level of Functioning: Independent with ADL's  Disposition: Moderate physical therapy in a multidisciplinary setting   Room #: 214   Report: [phone]  FELA: 2/15  If SNF or IPR: Date FOC offered: 1/31  Date FOC received: 1/31, 2/7 - pt now agreeable  Accepting facility: [facility]   Date authorization started with reference number: 2/12, ref: [id number], 2/13 for SNF ref: [id number]  Date authorization received and expires: 2/14-2/18 for SNF  Follow up appointments: PCP/Specialists as indicated  DME needed: None - pt has cane, walker, and 3L O2 through [employer] at home  Transportation at discharge: [facility] transport @ 1600PM on 2/15  IM/IMM Medicare/ letter given: 2nd IM given by  on 2/14  Is patient a [place] and connected with VA? No  Caregiver Contact: Skye Turk (child), [phone]  Discharge Caregiver contacted prior to discharge? To be contacted   Care Conference needed? Not at this time   Barriers to discharge: None     1424 PM: CM arranged [facility] transport for 1600PM tomorrow to [facility]. CM provided pt with update. CM talked with Bethanie at [facility] who noted pt wound vac will be ready by tomorrow. CM needs complete at this time.     1132 AM: CM received call from [facility] noting pt bed assignment will be 214, though; pt cannot go until tomorrow after 1600PM. CM to arrange [facility] transport for tomorrow and update pt and pt daughter.     1103 AM: Chart reviewed. CM received update from MD that P2P was denied. CM contacted [facility] to inquire about status of SNF auth. Auth was approved for pt today. CM made pt and pt daughter aware. CM to inquire about bed availability.    Transition of Care Plan to SNF/Rehab    Communication to Patient/Family:  Met with patient and family and they are agreeable to the transition plan. The Plan for Transition of Care is related to  the following treatment goals: [facility].    The Patient and/or patient representative was provided with a choice of provider and agrees  with the discharge plan.      Yes [x] No []    A Freedom of choice list was provided with basic dialogue that supports the patient's individualized plan of care/goals and shares the quality data associated with the providers.       Yes [x] No []    SNF/Rehab Transition:  Patient has been accepted to [facility] SNF/Rehab and meets criteria for admission.   Date of Inpatient Status Order: 1/26/25  Patient will transported by [facility] and expected to leave at 1600PM on 2/15/25.    Communication to SNF/Rehab:  Bedside RN has been notified to update the transition plan to the facility and call report [phone].  Discharge information has been updated on the AVS. And communicated to facility via [email or web address].   Discharge instructions to available on Epic.    Nursing Please include all hard scripts for controlled substances, med rec and dc summary, and AVS in packet.     Reviewed and confirmed with facility, [facility] can manage the patient care needs for the following:     Chip with (X) only those applicable:  Medication:  [x]Medications are available at the facility  []IV Antibiotics    []Controlled Substance - hard copies available sent.  []Weekly Labs    Equipment:  []CPAP/BiPAP  []Wound Vacuum  []Hughes or Urinary Device  []PICC/Central Line  []Nebulizer  []Ventilator    Treatment:  []Isolation (for MRSA, VRE, etc.)  []Surgical Drain Management  []Tracheostomy Care  []Dressing Changes  []Dialysis with transportation  []PEG Care  [x]Oxygen  3L of O2  []Daily Weights for Heart Failure    Dietary:  [x]Any diet limitations  Adult Diet; Regular; 3 carb choices  []Tube Feedings   []Total Parenteral Management (TPN)    Financial Resources:  []Medicaid Application Completed    []UAI Completed and copy given to pt/family  and copy given to

## 2025-02-14 NOTE — PLAN OF CARE
End of Shift Note    Bedside shift change report given to  Opal PETERSON  (oncoming nurse) by Tamara Emanuel RN (offgoing nurse).  Report included the following information Nurse Handoff Report    Shift worked:  Night shift      Shift summary and any significant changes:     No acute changes overnight      Concerns for physician to address:  N/a     Zone phone for oncoming shift:   [phone]       Activity:  Activity: In bed  Number times ambulated in hallways past shift: 0  Number of times OOB to chair past shift: 0    Cardiac:   Cardiac Monitoring: Yes      Cardiac Rhythm: Sinus rhythm    Access:  Current line(s): PIV     Genitourinary:   Urinary status: voiding    Respiratory:   O2 Device: Nasal cannula  Chronic home O2 use?: YES  Incentive spirometer at bedside: YES  Maximum Achieved Volume (mL): 1000 mL    GI:  Last BM (including prior to admit): 02/13/25  Current diet:    ADULT DIET; Regular; 3 carb choices (45 gm/meal)  ADULT ORAL NUTRITION SUPPLEMENT; Breakfast, Dinner; Standard High Calorie/High Protein Oral Supplement  ADULT ORAL NUTRITION SUPPLEMENT; Breakfast, Dinner; Wound Healing Oral Supplement  Passing flatus: YES  Tolerating current diet: YES       Pain Management:   Patient states pain is manageable on current regimen: YES    Skin:  Alfred Scale Score: 17  Interventions: increase time out of bed    Patient Safety:  Fall Score: Brand Total Score: 85  Interventions: bed/chair alarm, assistive device (walker, cane. etc), gripper socks, and pt to call before getting OOB       Length of Stay:  Expected LOS: 19  Actual LOS: 18      Tamara Emanuel RN  Predictive Model Details          29 (Normal)  Factor Value    Calculated 2/13/2025 20:59 42% Age 74 years old    Deterioration Index Model 39% Supplemental oxygen Nasal cannula     14% Respiratory rate 19     3% Pulse 93     1% Pulse oximetry 94 %     1% Systolic 115     0% Temperature 98.2 °F (36.8 °C)        Problem: Skin/Tissue Integrity  Goal: Skin  device within 7 day(s).  4.  Patient will ambulate with modified independence for 100 feet with the least restrictive device within 7 day(s).   5.  Patient will ascend/descend 15 stairs with bilateral handrail(s) with modified independence within 7 day(s).   2/13/2025 1314 by Clarisse Mireles, PT  Outcome: Progressing

## 2025-02-14 NOTE — PROGRESS NOTES
Hospitalist Progress Note    NAME:   Nasir Hatfield   : 1950   MRN: [id number]     Date/Time: 2025 4:20 PM  Patient PCP: Annie Mcnair APRN - NP      Estimated discharge date: 02/15  Barriers: Placement     Assessment / Plan:    Critical limb ischemia- L foot POA- s/p Left CFA=>PT bypass w/ipsilateral NRGSV by Dr Lorenzo   - Here with worsening L leg swelling, pain, redness recently  - CTA runoff showing occluded left superficial femoral artery, distal vessels difficult to evaluation, probably reconstitution at level of popliteal artery   IP Vascular Surgery consulted- S/p Left CFA=>PT bypass w/ipsilateral NRGSV  by Dr Lorenzo,  25 TMA Amputation completed  25 denies any pain.  Appreciate vascular following, PT/OT, dispo ipr  : Cleared from vascular standpoint for discharge    L foot cellulitis with Toe necrosis POA  -WBC 23.5->19.9->16.8->14.4 ->15.4  -Color change to LLE however no madison erythema, leg is cold   - Blood cultures sent- neg  - crp elevated as expected 17.7  CXR - severe Bullous Emphysema noted  S/p IV Vanc and Zosyn for now - empiric IV Abx stopped now  25  Abx restarted in anticipation of OR and cellulitis rash  Wound cultures remain negative so far will be completed 5 days of cefazolin  Pt has a wound  vac   Acute on Chronic hypoxic & hypercapneic respiratory failure POA-   Increased oxygen demand post day 1- up to 10L/[place]  ,   on 3L/m NC home oxygen at baseline  Hx of severe COPD with Emphysema POA- pt of Dr Ritter ([facility])  Afib RVR  - Continue home inhalers  - Lung sounds coarse  - Continue PRN duonebs as needed for wheezing   CXR today AM with RR @ 10:30 AM noted- neg acute but severe Emphysema noted  ABG= 7.43/50/42/78% on 3L/m   Pulm consulted for further recommendations  IV cardizem 5mg x 1 now, resume Po metoprolol, if not slowed down - start cardizem drip, resumed home eliquis post op now  Will consult cardiology for further recc  : Patient is

## 2025-02-14 NOTE — PROGRESS NOTES
End of Shift Note    Bedside shift change report given to  (oncoming nurse) by Opal Cisse RN (offgoing nurse).  Report included the following information Nurse Handoff Report and Index    Shift worked:  7a-7p       Shift summary and any significant changes:     Transport is scheduled for 4pm tomorrow 2/15 to take Matthieu to [facility]. The wound nurse removed the wound vac today, and all dressings were changed.      Concerns for physician to address:  None      Zone phone for oncoming shift:   N/A         Activity:  Activity: Up to chair  Number times ambulated in hallways past shift: 0  Number of times OOB to chair past shift: 1    Cardiac:   Cardiac Monitoring: Yes      Cardiac Rhythm: Sinus rhythm    Access:  Current line(s): PIV     Genitourinary:   Urinary status: voiding    Respiratory:   O2 Device: Nasal cannula  Chronic home O2 use?: YES  Incentive spirometer at bedside: YES  Maximum Achieved Volume (mL): 1000 mL    GI:  Last BM (including prior to admit): 02/14/25  Current diet:    ADULT DIET; Regular; 3 carb choices (45 gm/meal)  ADULT ORAL NUTRITION SUPPLEMENT; Breakfast, Dinner; Standard High Calorie/High Protein Oral Supplement  ADULT ORAL NUTRITION SUPPLEMENT; Breakfast, Dinner; Wound Healing Oral Supplement  Passing flatus: YES  Tolerating current diet: YES       Pain Management:   Patient states pain is manageable on current regimen: YES    Skin:  Alfred Scale Score: 17  Interventions: float heels, PT/OT consult, and nutritional support    Patient Safety:  Fall Score: Brand Total Score: 85  Interventions: bed/chair alarm, gripper socks, and pt to call before getting OOB       Length of Stay:  Expected LOS: 20  Actual LOS: 19      Opal Cisse RN

## 2025-02-14 NOTE — PROGRESS NOTES
Nurse Mcdonald contacted Nocturnist/cross cover provider via non-urgent messaging system [employer] and notified patient asking for sleep aid, possibly melatonin if able overnight for insomnia. No other concerns reported. No acute distress reported. No other information provided by nurse. VSS. Patient denies any further complaints or concerns. See prior hospitalist group notes for complete details of course of treatment. Recent lab work and documented vs reviewed.    Ordered melatonin 3mg hs prn per pt request, also added tylenol prn in case wants something less strong or additional for pain relief to his narc prn regimen. Appreciate nurse Mcdonald assistance in the care of this patient.    Continue remaining plan/orders as per dayshift team. Will defer further evaluation/management and timing of discontinuation of regimens to the day shift primary attending care team. Nursing to notify dayshift Hospitalist team in the AM of overnight events and for further/continued concerns. Will remain available overnight cross coverage for further concerns if nursing/patient needs. Please note, there are RRT systems in this hospital in place that if nursing has acute or critical patient condition change or concern, this is to help facilitate and notify that patient needs immediate bedside evaluation by a provider.    Non-billable note.

## 2025-02-15 VITALS
TEMPERATURE: 98.2 F | RESPIRATION RATE: 15 BRPM | BODY MASS INDEX: 18.91 KG/M2 | HEIGHT: 70 IN | DIASTOLIC BLOOD PRESSURE: 80 MMHG | SYSTOLIC BLOOD PRESSURE: 127 MMHG | HEART RATE: 95 BPM | OXYGEN SATURATION: 95 % | WEIGHT: 132.06 LBS

## 2025-02-15 PROCEDURE — 6370000000 HC RX 637 (ALT 250 FOR IP): Performed by: SURGERY

## 2025-02-15 PROCEDURE — 2700000000 HC OXYGEN THERAPY PER DAY

## 2025-02-15 PROCEDURE — 6370000000 HC RX 637 (ALT 250 FOR IP): Performed by: PHYSICIAN ASSISTANT

## 2025-02-15 PROCEDURE — 94640 AIRWAY INHALATION TREATMENT: CPT

## 2025-02-15 PROCEDURE — 6360000002 HC RX W HCPCS: Performed by: SURGERY

## 2025-02-15 RX ORDER — OXYCODONE HYDROCHLORIDE 5 MG/1
5 TABLET ORAL EVERY 6 HOURS PRN
Qty: 12 TABLET | Refills: 0 | Status: SHIPPED | OUTPATIENT
Start: 2025-02-15 | End: 2025-02-15

## 2025-02-15 RX ORDER — OXYCODONE HYDROCHLORIDE 5 MG/1
5 TABLET ORAL EVERY 6 HOURS PRN
Qty: 12 TABLET | Refills: 0 | Status: SHIPPED | OUTPATIENT
Start: 2025-02-15 | End: 2025-02-18

## 2025-02-15 RX ORDER — ASPIRIN 81 MG/1
81 TABLET ORAL DAILY
Qty: 30 TABLET | Refills: 3 | Status: SHIPPED | OUTPATIENT
Start: 2025-02-16

## 2025-02-15 RX ORDER — PANTOPRAZOLE SODIUM 40 MG/1
40 TABLET, DELAYED RELEASE ORAL
Qty: 30 TABLET | Refills: 3 | Status: SHIPPED | OUTPATIENT
Start: 2025-02-16

## 2025-02-15 RX ORDER — DILTIAZEM HYDROCHLORIDE 120 MG/1
120 CAPSULE, COATED, EXTENDED RELEASE ORAL DAILY
Qty: 30 CAPSULE | Refills: 3 | Status: SHIPPED | OUTPATIENT
Start: 2025-02-16

## 2025-02-15 RX ORDER — ATORVASTATIN CALCIUM 10 MG/1
10 TABLET, FILM COATED ORAL NIGHTLY
Qty: 30 TABLET | Refills: 3 | Status: SHIPPED | OUTPATIENT
Start: 2025-02-15

## 2025-02-15 RX ORDER — PREDNISONE 10 MG/1
TABLET ORAL
Qty: 9 TABLET | Refills: 0 | Status: SHIPPED
Start: 2025-02-15 | End: 2025-02-21

## 2025-02-15 RX ADMIN — BUDESONIDE 500 MCG: 0.5 INHALANT RESPIRATORY (INHALATION) at 07:51

## 2025-02-15 RX ADMIN — PREDNISONE 20 MG: 20 TABLET ORAL at 08:30

## 2025-02-15 RX ADMIN — ACETAMINOPHEN 1000 MG: 500 TABLET ORAL at 05:45

## 2025-02-15 RX ADMIN — Medication: at 08:32

## 2025-02-15 RX ADMIN — ARFORMOTEROL TARTRATE 15 MCG: 15 SOLUTION RESPIRATORY (INHALATION) at 07:51

## 2025-02-15 RX ADMIN — CITALOPRAM HYDROBROMIDE 10 MG: 20 TABLET ORAL at 08:31

## 2025-02-15 RX ADMIN — PANTOPRAZOLE SODIUM 40 MG: 40 TABLET, DELAYED RELEASE ORAL at 05:45

## 2025-02-15 RX ADMIN — GUAIFENESIN 1200 MG: 600 TABLET ORAL at 08:31

## 2025-02-15 RX ADMIN — ASPIRIN 81 MG: 81 TABLET, COATED ORAL at 08:31

## 2025-02-15 RX ADMIN — IPRATROPIUM BROMIDE 0.5 MG: 0.5 SOLUTION RESPIRATORY (INHALATION) at 07:44

## 2025-02-15 RX ADMIN — ACETAMINOPHEN 1000 MG: 500 TABLET ORAL at 13:42

## 2025-02-15 RX ADMIN — DILTIAZEM HYDROCHLORIDE 120 MG: 120 CAPSULE, COATED, EXTENDED RELEASE ORAL at 08:30

## 2025-02-15 RX ADMIN — APIXABAN 5 MG: 5 TABLET, FILM COATED ORAL at 08:30

## 2025-02-15 ASSESSMENT — PAIN DESCRIPTION - LOCATION
LOCATION: FOOT
LOCATION: LEG;TOE (COMMENT WHICH ONE)

## 2025-02-15 ASSESSMENT — PAIN DESCRIPTION - ORIENTATION
ORIENTATION: LEFT
ORIENTATION: LEFT

## 2025-02-15 ASSESSMENT — PAIN SCALES - WONG BAKER: WONGBAKER_NUMERICALRESPONSE: NO HURT

## 2025-02-15 ASSESSMENT — PAIN SCALES - GENERAL
PAINLEVEL_OUTOF10: 0
PAINLEVEL_OUTOF10: 6
PAINLEVEL_OUTOF10: 6

## 2025-02-15 ASSESSMENT — PAIN DESCRIPTION - DESCRIPTORS
DESCRIPTORS: THROBBING
DESCRIPTORS: ACHING

## 2025-02-15 NOTE — CARE COORDINATION
Called [facility] and spoke with Isaac who called to check to see if the patient would be able to come today. He states there was a patient in that room.  He called back to say the patient in that room maybe discharging later today. Asked about the wound vac and he called and spoke with the admissions nurse on call and called back to say the wound vac has not arrived and he did not know if it would be there today. Perfect served Dr Alex to alert her and informed nursing they will need to change the time for the transport to tomorrow if md is not ok with the patient going without the wound vac. Received a call from nursing stating the patient did not have the wound vac on today because it was taken off by wound care since patient was to be discharged today. Called [facility] back and spoke with the nurse manager (Ana Lilia) who states it was ordered but they did not have it there yet.        Audelia Woodard  RN BSN CRM        [phone]

## 2025-02-15 NOTE — PLAN OF CARE
End of Shift Note    Bedside shift change report given to Haritha     (oncoming nurse) by Tamara Emanuel RN (offgoing nurse).  Report included the following information Nurse Handoff Report    Shift worked: Night shift    Shift summary and any significant changes:     No acute changes overnight      Concerns for physician to address:  N/a     Zone phone for oncoming shift:   [phone]       Activity:  Activity: In bed  Number times ambulated in hallways past shift: 0  Number of times OOB to chair past shift: 0    Cardiac:   Cardiac Monitoring: Yes      Cardiac Rhythm: Sinus rhythm    Access:  Current line(s): PIV     Genitourinary:   Urinary status: voiding    Respiratory:   O2 Device: Nasal cannula  Chronic home O2 use?: YES  Incentive spirometer at bedside: YES  Maximum Achieved Volume (mL): 1000 mL    GI:  Last BM (including prior to admit): 02/14/25  Current diet:    ADULT DIET; Regular; 3 carb choices (45 gm/meal)  ADULT ORAL NUTRITION SUPPLEMENT; Breakfast, Dinner; Standard High Calorie/High Protein Oral Supplement  ADULT ORAL NUTRITION SUPPLEMENT; Breakfast, Dinner; Wound Healing Oral Supplement  Passing flatus: YES  Tolerating current diet: YES       Pain Management:   Patient states pain is manageable on current regimen: YES    Skin:  Alfred Scale Score: 18  Interventions: increase time out of bed    Patient Safety:  Fall Score: Brand Total Score: 75  Interventions: bed/chair alarm, assistive device (walker, cane. etc), gripper socks, and pt to call before getting OOB       Length of Stay:  Expected LOS: 20  Actual LOS: 19      Tamara Emanuel RN  Predictive Model Details          31 (Caution)  Factor Value    Calculated 2/14/2025 21:08 37% Age 74 years old    Deterioration Index Model 35% Supplemental oxygen Nasal cannula     21% Respiratory rate 21     3% Systolic 127     3% Pulse 93     0% Pulse oximetry 95 %     0% Temperature 98.3 °F (36.8 °C)        Problem: Skin/Tissue Integrity  Goal: Skin integrity  remains intact  Description: 1.  Monitor for areas of redness and/or skin breakdown  2.  Assess vascular access sites hourly  3.  Every 4-6 hours minimum:  Change oxygen saturation probe site  4.  Every 4-6 hours:  If on nasal continuous positive airway pressure, respiratory therapy assess nares and determine need for appliance change or resting period  Outcome: Progressing  Flowsheets  Taken 2/14/2025 1902 by Tamara Emanuel RN  Skin Integrity Remains Intact: Monitor for areas of redness and/or skin breakdown  Taken 2/14/2025 0728 by Opal Cisse, RN  Skin Integrity Remains Intact: Monitor for areas of redness and/or skin breakdown     Problem: Respiratory - Adult  Goal: Achieves optimal ventilation and oxygenation  2/14/2025 2108 by Tamara Emanuel RN  Outcome: Progressing  Flowsheets (Taken 2/14/2025 1902)  Achieves optimal ventilation and oxygenation: Assess for changes in respiratory status  2/14/2025 0713 by Skye Martin RCP  Outcome: Progressing     Problem: Pain  Goal: Verbalizes/displays adequate comfort level or baseline comfort level  Outcome: Progressing     Problem: Safety - Adult  Goal: Free from fall injury  Outcome: Progressing  Flowsheets (Taken 2/14/2025 1902)  Free From Fall Injury: Instruct family/caregiver on patient safety     Problem: ABCDS Injury Assessment  Goal: Absence of physical injury  Outcome: Progressing  Flowsheets (Taken 2/14/2025 1902)  Absence of Physical Injury: Implement safety measures based on patient assessment     Problem: Nutrition Deficit:  Goal: Optimize nutritional status  Outcome: Progressing     Problem: Discharge Planning  Goal: Discharge to home or other facility with appropriate resources  Outcome: Progressing  Flowsheets  Taken 2/14/2025 1902 by Tamara Emanuel RN  Discharge to home or other facility with appropriate resources: Identify barriers to discharge with patient and caregiver  Taken 2/14/2025 0728 by Opal Cisse RN  Discharge to home or

## 2025-02-15 NOTE — DISCHARGE SUMMARY
Discharge Summary    Name: Nasir Hatfield  [id number]  YOB: 1950 (Age: 74 y.o.)   Date of Admission: 1/26/2025  Date of Discharge: 2/15/2025  Attending Physician: Shaun Alex MD    Discharge Diagnosis:   Critical limb ischemia- L foot POA- s/p Left CFA=>PT bypass w/ipsilateral NRGSV by Dr Lorenzo   - s/p TMA Amputation completed  L foot cellulitis with Toe necrosis POA  -Acute on Chronic hypoxic & hypercapneic respiratory failure POA-   Hx of severe COPD with Emphysema POA- pt of Dr Ritter ([facility])  Afib RVR  - Active Smoking POA  Consultations:  IP CONSULT TO VASCULAR SURGERY  IP CONSULT TO PHARMACY  IP CONSULT TO PULMONOLOGY  IP CONSULT TO CARDIOLOGY  IP CONSULT TO CASE MANAGEMENT  IP CONSULT TO SOCIAL WORK  IP CONSULT TO PULMONOLOGY  IP CONSULT TO CASE MANAGEMENT      Brief Admission History/Reason for Admission Per Jamal Ambrose MD:   HISTORY OF PRESENT ILLNESS:     Nasir Hatfield is a 74 y.o.  male with PMHx significant for  has a past medical history of Chronic obstructive pulmonary disease (HCC), Hx of blood clots, and Hypertension. who presents with the above chief complaint.      We were asked to admit for work up and further evaluation.      Patient here with left foot pain and swelling. Reports that foot has been more swollen over the past several days and has been acutely pain over the past 1-2 days. Notes that foot is also different color than normal. Denies any fevers- has been checking his temp at home. No chills. No nausea or vomiting. No diarrhea. Endorses chronic SOB and cough. Follows with Vascular Surgery, Dr. Lorenzo. Notes plan as outpatient was for bypass.             Brief Hospital Course by Main Problems:   Critical limb ischemia- L foot POA- s/p Left CFA=>PT bypass w/ipsilateral NRGSV by Dr Lorenzo   - Here with worsening L leg swelling, pain, redness recently  - CTA runoff showing occluded left superficial femoral artery, distal vessels  difficult to evaluation, probably reconstitution at level of popliteal artery   IP Vascular Surgery consulted- S/p Left CFA=>PT bypass w/ipsilateral NRGSV 1/29 by Dr Lorenzo,  2/5/25 TMA Amputation completed  Cleared  by vascular surgery to be discharged  On Eliquis Plavix and aspirin, monitor for bleeding  L foot cellulitis with Toe necrosis POA  -WBC 23.5->19.9->16.8->14.4 ->15.4  -Color change to LLE however no madison erythema, leg is cold   - Blood cultures sent- neg  - crp elevated as expected 17.7  Status post IV antibiotics with Vanco and Zosyn, wound cultures negative  Acute on Chronic hypoxic & hypercapneic respiratory failure POA-   Increased oxygen requirement during hospital stay up to 10 L, now back to his baseline oxygen  on 3L/m NC home oxygen at baseline  Hx of severe COPD with Emphysema POA- pt of Dr Ritter ([facility])  Afib RVR  - Continue home inhalers  - Lung sounds coarse  - Continue PRN duonebs as needed for wheezing   CXR today AM with RR @ 10:30 AM noted- neg acute but severe Emphysema noted  ABG= 7.43/50/42/78% on 3L/m   Pulm consulted for further recommendations  Status post IV cardizem 5mg, status post Cardizem drip.  Discharged on Cardizem.  Home dose metoprolol   Continue Eliquis  Will consult cardiology for further recc  Status post IV steroid, now on prednisone taper  Patient stable to go to skilled nursing facility, insurance denied inpatient rehab  Active Smoking POA  - Nicotine patch  - Needs to quit, discussed with him  at length       Discharge Exam:  Patient seen and examined by me on discharge day.  Pertinent Findings:  Patient Vitals for the past 24 hrs:   BP Temp Temp src Pulse Resp SpO2   02/15/25 1120 -- 98.1 °F (36.7 °C) Oral -- -- --   02/15/25 0745 -- -- -- 83 18 98 %   02/15/25 0741 105/66 98.1 °F (36.7 °C) Oral 100 18 97 %   02/15/25 0230 114/78 98.2 °F (36.8 °C) Oral 74 18 94 %   02/14/25 2220 121/79 98.1 °F (36.7 °C) Oral 88 18 95 %   02/14/25 1902 127/77 98.3 °F (36.8 °C)  inhaler  Commonly known as: ANORO ELLIPTA            STOP taking these medications      metoprolol succinate 25 MG extended release tablet  Commonly known as: TOPROL XL               Where to Get Your Medications        [id number] - [place], [place] - [address] [facility] - P [phone] - F [phone] [address] [address]      Phone: [phone]   aspirin 81 MG EC tablet  atorvastatin 10 MG tablet  dilTIAZem 120 MG extended release capsule  pantoprazole 40 MG tablet       Information about where to get these medications is not yet available    Ask your nurse or doctor about these medications  oxyCODONE 5 MG immediate release tablet  predniSONE 10 MG tablet             DISPOSITION:    Home with Family:       Home with HH/PT/OT/RN:    SNF/LTC:    MATEUS:    OTHER:            Code status: Full code  Recommended diet: diabetic diet  Recommended activity: activity as tolerated  Wound care: See surgical/procedure care instructions    Patient has a wound VAC  Follow up with:   PCP : Annie Mcnair APRN - NP    [facility] [address] [address] [phone]              Total time in minutes spent coordinating this discharge (includes going over instructions, follow-up, prescriptions, and preparing report for sign off to her PCP) :  35 minutes

## 2025-02-15 NOTE — DISCHARGE INSTRUCTIONS
HOSPITALIST DISCHARGE INSTRUCTIONS    NAME: Nasir Hatfield   :  1950   MRN:  [id number]     Date/Time:  2/15/2025 12:40 PM    ADMIT DATE: 2025   DISCHARGE DATE: 2/15/2025     Attending Physician: Shaun Alex MD  Critical limb ischemia- L foot POA- s/p Left CFA=>PT bypass w/ipsilateral NRGSV by Dr Lorenzo   - s/p TMA Amputation completed  L foot cellulitis with Toe necrosis POA  -Acute on Chronic hypoxic & hypercapneic respiratory failure POA-   Hx of severe COPD with Emphysema POA- pt of Dr Ritter ([facility])  Afib RVR  - Active Smoking POA  - Medications: Per above medication reconciliation.    Pain Management: per above medications    Recommended diet: regular diet    Recommended activity: activity as tolerated    Wound care: See surgical/procedure care instructions wound vac     Indwelling devices:  None    Supplemental Oxygen: Chronic Oxygen,  3  LNC at baseline    Required Lab work: Per SNF routine    Glucose management:  None    Code status: full code

## 2025-02-15 NOTE — PROGRESS NOTES
0710 Bedside shift change report given to Haritha RN (oncoming nurse) by Tamara PETERSON (offgoing nurse). Report included the following information Nurse Handoff Report, MAR, Recent Results, and Cardiac Rhythm NSR .

## 2025-02-15 NOTE — CARE COORDINATION
Received call from Isaac with [facility] and the wound vac has just arrived. Teagan served Dr Alex and informed her and also informed nursing.    Audelia Woodard  RN BSN CRM        [phone]

## 2025-02-15 NOTE — PLAN OF CARE
Problem: Skin/Tissue Integrity  Goal: Skin integrity remains intact  Description: 1.  Monitor for areas of redness and/or skin breakdown  2.  Assess vascular access sites hourly  3.  Every 4-6 hours minimum:  Change oxygen saturation probe site  4.  Every 4-6 hours:  If on nasal continuous positive airway pressure, respiratory therapy assess nares and determine need for appliance change or resting period  Outcome: Adequate for Discharge     Problem: Respiratory - Adult  Goal: Achieves optimal ventilation and oxygenation  2/15/2025 1530 by Haritha Guardado RN  Outcome: Adequate for Discharge  2/15/2025 0759 by Carolyn Jean RT  Outcome: Progressing  2/15/2025 0658 by Leonid Newman RCP  Outcome: Progressing     Problem: Pain  Goal: Verbalizes/displays adequate comfort level or baseline comfort level  Outcome: Adequate for Discharge     Problem: Safety - Adult  Goal: Free from fall injury  Outcome: Adequate for Discharge     Problem: ABCDS Injury Assessment  Goal: Absence of physical injury  Outcome: Adequate for Discharge     Problem: Nutrition Deficit:  Goal: Optimize nutritional status  Outcome: Adequate for Discharge     Problem: Discharge Planning  Goal: Discharge to home or other facility with appropriate resources  Outcome: Adequate for Discharge

## 2025-02-15 NOTE — PROGRESS NOTES
Discharge Summary     Patient: Nasir Hatfield MRN: [id number]  SSN: [id number]    YOB: 1950  Age: 74 y.o.  Sex: male       Code Status: Full  Allergies: No Known Allergies    Admit Date: 1/26/2025    Discharge Date: 2/15/2025      Admission Diagnoses: Critical lower limb ischemia (HCC) [I70.229]  Acute lower limb ischemia [I99.8]    PMH:   Past Medical History:   Diagnosis Date    Chronic obstructive pulmonary disease (HCC)     Hx of blood clots 08/29/2024    Left lower leg    Hypertension        Social History:  Social history   Social History     Tobacco Use    Smoking status: Every Day     Current packs/day: 0.50     Types: Cigarettes    Smokeless tobacco: Not on file   Substance Use Topics    Alcohol use: Yes     Alcohol/week: 16.0 standard drinks of alcohol     Comment: liquor - pint per week (whiskey)     Drug History   Social History     Substance and Sexual Activity   Drug Use Not Currently    Types: Marijuana (Weed)     Familiy History   Family History   Problem Relation Age of Onset    Other Mother         developed aortic tear after hip fracture    Stroke Father        Consults: Vascular Surgery    Significant Diagnostic Studies: Left fem tib bipass and amputation of left toes    Discharge Condition: Good    Disposition: SNF    Discharge Medications:   Current Discharge Medication List        START taking these medications    Details   atorvastatin (LIPITOR) 10 MG tablet Take 1 tablet by mouth nightly  Qty: 30 tablet, Refills: 3      aspirin 81 MG EC tablet Take 1 tablet by mouth daily  Qty: 30 tablet, Refills: 3      dilTIAZem (CARDIZEM CD) 120 MG extended release capsule Take 1 capsule by mouth daily  Qty: 30 capsule, Refills: 3      predniSONE (DELTASONE) 10 MG tablet Take 2 tablets by mouth daily for 3 days, THEN 1 tablet daily for 3 days.  Qty: 9 tablet, Refills: 0      pantoprazole (PROTONIX) 40 MG tablet Take 1 tablet by mouth every morning (before breakfast)  Qty: 30 tablet,  Refills: 3      oxyCODONE (ROXICODONE) 5 MG immediate release tablet Take 1 tablet by mouth every 6 hours as needed for Pain for up to 3 days. Max Daily Amount: 20 mg  Qty: 12 tablet, Refills: 0    Comments: Reduce doses taken as pain becomes manageable  Associated Diagnoses: Critical lower limb ischemia (HCC)           CONTINUE these medications which have NOT CHANGED    Details   nicotine (NICODERM CQ) 21 MG/24HR Place 1 patch onto the skin daily  Qty: 30 patch, Refills: 3      apixaban (ELIQUIS) 5 MG TABS tablet Take 1 tablet by mouth 2 times daily  Qty: 60 tablet, Refills: 11      OXYGEN Inhale 3 L into the lungs daily      Multiple Vitamins-Minerals (MENS 50+ MULTI VITAMIN/MIN PO) Take by mouth daily      clopidogrel (PLAVIX) 75 MG tablet Take 1 tablet by mouth daily  Qty: 30 tablet, Refills: 0      albuterol sulfate HFA (PROVENTIL;VENTOLIN;PROAIR) 108 (90 Base) MCG/ACT inhaler Inhale into the lungs      citalopram (CELEXA) 10 MG tablet Take by mouth daily      umeclidinium-vilanterol (ANORO ELLIPTA) 62.5-25 MCG/ACT inhaler Inhale 1 puff into the lungs daily           STOP taking these medications       metoprolol succinate (TOPROL XL) 25 MG extended release tablet Comments:   Reason for Stopping:               Hard scripts included in packet: yes - Roxicodone    Isolation/Precautions: No  Isolation Type:     Activity: activity as tolerated  Diet: ADULT DIET; Regular; 3 carb choices (45 gm/meal)  ADULT ORAL NUTRITION SUPPLEMENT; Breakfast, Dinner; Standard High Calorie/High Protein Oral Supplement  ADULT ORAL NUTRITION SUPPLEMENT; Breakfast, Dinner; Wound Healing Oral Supplement  Wound Care: as directed and wound vac   Lines: no access  Hughes: N/A  Last BM (including prior to admit): 02/14/25    Last Vitals: VITALS:  /80   Pulse 95   Temp 98.2 °F (36.8 °C) (Oral)   Resp 15   Ht 1.778 m (5' 10\")   Wt 59.9 kg (132 lb 0.9 oz)   SpO2 95%   BMI 18.95 kg/m²     Oxygen Device: supplemental oxygen

## 2025-02-19 LAB
BACTERIA SPEC CULT: NORMAL
BACTERIA SPEC CULT: NORMAL
SERVICE CMNT-IMP: NORMAL
SERVICE CMNT-IMP: NORMAL

## 2025-02-28 NOTE — PROGRESS NOTES
Physician Progress Note      PATIENT:               ANGEL OAKES  [facility] #:                  [id number]  :                       1950  ADMIT DATE:       2025 3:58 PM  DISCH DATE:        2/15/2025 6:46 PM  RESPONDING  PROVIDER #:        Shaun Alex MD          QUERY TEXT:        Patient admitted with Critical limb ischemia. Noted to have  severe   malnutrition in nutritional progress note on 2025 . If possible, please   document in progress notes and discharge summary if you are evaluating and /or   treating any of the following:    The medical record reflects the following:  Risk Factors: COPD,AGE 74 Yr,Smoker,Cellulitis  Clinical Indicators: Nutritional progress note on 2025 states   Malnutrition Status:  Severe malnutrition (25 1435)  Context:  Chronic Illness  Findings of the 6 clinical characteristics of malnutrition:  Energy Intake:  Mild decrease in energy intake  Weight Loss:  No weight loss  Body Fat Loss:  Mild body fat loss Fat Overlying Ribs, Orbital  Muscle Mass Loss:  Severe muscle mass loss Clavicles (pectoralis & deltoids)  Fluid Accumulation:  Severe Generalized   Strength:  Not Performed  Anthropometric Measures:  Height: 177.8 cm (5' 10\")  Ideal Body Weight (IBW): 166 lbs (75 kg)  Current Body Weight: 70.3 kg (154 lb 15.7 oz),   IBW.  Current BMI (kg/m2): 22.2  Nutrition Diagnosis:  -         Severe malnutrition related to catabolic illness, impaired   respiratory function as evidenced by criteria as identified in malnutrition   assessment  Treatment: Modify Current Diet, Start Oral Nutrition Supplement,BMI   calculation,Anthropometric measurement,RD consult    Thank you  Heather Villeda,S,CDS  Options provided:  -- Protein calorie malnutrition severe  -- Other - I will add my own diagnosis  -- Disagree - Not applicable / Not valid  -- Disagree - Clinically unable to determine / Unknown  -- Refer to Clinical Documentation Reviewer    PROVIDER RESPONSE  TEXT:    This patient has severe protein calorie malnutrition.    Query created by: Heather Villeda on 2/18/2025 4:40 AM      Electronically signed by:  Shaun Alex MD 2/28/2025 1:24 PM

## 2025-03-06 RX ORDER — SENNA AND DOCUSATE SODIUM 50; 8.6 MG/1; MG/1
1 TABLET, FILM COATED ORAL DAILY
COMMUNITY

## 2025-03-06 RX ORDER — ACETAMINOPHEN 500 MG
500 TABLET ORAL EVERY 6 HOURS PRN
COMMUNITY

## 2025-03-06 RX ORDER — POLYETHYLENE GLYCOL 3350 17 G/17G
17 POWDER, FOR SOLUTION ORAL DAILY PRN
COMMUNITY

## 2025-03-06 RX ORDER — OXYCODONE HYDROCHLORIDE 5 MG/1
5 TABLET ORAL EVERY 4 HOURS PRN
COMMUNITY

## 2025-03-06 RX ORDER — FLUTICASONE FUROATE, UMECLIDINIUM BROMIDE AND VILANTEROL TRIFENATATE 100; 62.5; 25 UG/1; UG/1; UG/1
1 POWDER RESPIRATORY (INHALATION) DAILY
COMMUNITY

## 2025-03-06 NOTE — PROGRESS NOTES
[facility]  Preoperative Instructions        Surgery Date 3/6/2025    Time of Arrival to be called between 2pm - 5pm the day before your surgery.  Contact# Serge Cheung [phone]    On the day of your surgery, please report to Surgical Services Registration Desk and sign in at your designated time.  The Surgery Center is located to the right of the Emergency Room.     2. You must have someone with you to drive you home. You should not drive a car for 24 hours following surgery. Please make arrangements for a friend or family member to stay with you for the first 24 hours after your surgery.    3. Do not have anything to eat or drink (including water, gum, mints, coffee, juice) after midnight ??   3/5/2025 .?This may not apply to medications prescribed by your physician. ?(Please note below the special instructions with medications to take the morning of your procedure.)    4. We recommend you do not drink any alcoholic beverages for 24 hours before and after your surgery.    5. Contact your surgeon's office for instructions on the following medications: non-steroidal anti-inflammatory drugs (i.e. Advil, Aleve), vitamins, and supplements. (Some surgeon's will want you to stop these medications prior to surgery and others may allow you to take them)  **If you are currently taking Plavix, Coumadin, Aspirin and/or other blood-thinning agents, contact your surgeon for instructions.** Your surgeon will partner with the physician prescribing these medications to determine if it is safe to stop or if you need to continue taking.  Please do not stop taking these medications without instructions from your surgeon    6. Wear comfortable clothes.  Wear glasses instead of contacts.  Do not bring any money or jewelry. Please bring picture ID, insurance card, and any prearranged co-payment or hospital payment.  Do not wear make-up, particularly mascara the morning of your surgery.  Do not wear nail  polish, particularly if you are having foot /hand surgery.  Wear your hair loose or down, no ponytails, buns, tanner pins or clips.  All body piercings must be removed. Please do not shave for 48 hours prior to surgery. Shaving of the face is acceptable. Please see the attached Soap/Hibiclens bathing instructions.    7. You should understand that if you do not follow these instructions your surgery may be cancelled.  If your physical condition changes (I.e. fever, cold or flu) please contact your surgeon as soon as possible.    8. It is important that you be on time.  If a situation occurs where you may be late, please call [phone] (OR Holding Area).    9. If you have any questions and or problems, please call [phone] (Pre-admission Testing).    10. Your surgery time may be subject to change.  You will receive a phone call the evening prior if your time changes.    11.  If having outpatient surgery, you must have someone to drive you here, stay with you during the duration of your stay, and to drive you home at time of discharge.       SPECIAL INSTRUCTIONS: Follow all instructions Dr. Lorenzo has given you.   Continue taking Aspirin, Eliquis and Plavix as directed.     Bring your rescue inhalers with you to the hospital - it is OK to use the morning of surgery if needed.     TAKE ALL MEDICATIONS DAY OF SURGERY EXCEPT: vitamins or supplements, miralax.       I understand a pre-operative phone call will be made to verify my surgery time.  In the event that I am not available, I give permission for a message to be left on my answering service and/or with another person?  yes         ___________________      __________   _________    (Signature of Patient)             (Witness)                (Date and Time)

## 2025-03-06 NOTE — PROGRESS NOTES
healing and prevent you from healing completely.    If you lose your Hibiclens/chlorhexidine, please call the Surgery Center, or it is available for purchase at your pharmacy.               ___________________      ___________________      ________________  (Signature of Patient)          (Witness)                   (Date and Time)

## 2025-03-07 ENCOUNTER — ANESTHESIA (OUTPATIENT)
Facility: HOSPITAL | Age: 75
End: 2025-03-07
Payer: MEDICARE

## 2025-03-07 ENCOUNTER — HOSPITAL ENCOUNTER (OUTPATIENT)
Facility: HOSPITAL | Age: 75
Setting detail: OUTPATIENT SURGERY
Discharge: HOME | End: 2025-03-07
Attending: SURGERY | Admitting: SURGERY
Payer: MEDICARE

## 2025-03-07 ENCOUNTER — ANESTHESIA EVENT (OUTPATIENT)
Facility: HOSPITAL | Age: 75
End: 2025-03-07
Payer: MEDICARE

## 2025-03-07 VITALS
HEIGHT: 70 IN | HEART RATE: 83 BPM | DIASTOLIC BLOOD PRESSURE: 74 MMHG | OXYGEN SATURATION: 88 % | RESPIRATION RATE: 16 BRPM | WEIGHT: 129.85 LBS | TEMPERATURE: 98.1 F | BODY MASS INDEX: 18.59 KG/M2 | SYSTOLIC BLOOD PRESSURE: 97 MMHG

## 2025-03-07 DIAGNOSIS — I70.229 CRITICAL LOWER LIMB ISCHEMIA (HCC): Primary | ICD-10-CM

## 2025-03-07 LAB
HCT VFR BLD AUTO: 34.4 % (ref 36.6–50.3)
HGB BLD-MCNC: 10.3 G/DL (ref 12.1–17)

## 2025-03-07 PROCEDURE — 6360000002 HC RX W HCPCS: Performed by: SURGERY

## 2025-03-07 PROCEDURE — 85018 HEMOGLOBIN: CPT

## 2025-03-07 PROCEDURE — 7100000010 HC PHASE II RECOVERY - FIRST 15 MIN: Performed by: SURGERY

## 2025-03-07 PROCEDURE — 2580000003 HC RX 258: Performed by: NURSE ANESTHETIST, CERTIFIED REGISTERED

## 2025-03-07 PROCEDURE — 2709999900 HC NON-CHARGEABLE SUPPLY: Performed by: SURGERY

## 2025-03-07 PROCEDURE — 2500000003 HC RX 250 WO HCPCS: Performed by: SURGERY

## 2025-03-07 PROCEDURE — 6360000002 HC RX W HCPCS: Performed by: NURSE ANESTHETIST, CERTIFIED REGISTERED

## 2025-03-07 PROCEDURE — 36415 COLL VENOUS BLD VENIPUNCTURE: CPT

## 2025-03-07 PROCEDURE — 85014 HEMATOCRIT: CPT

## 2025-03-07 PROCEDURE — 3600000002 HC SURGERY LEVEL 2 BASE: Performed by: SURGERY

## 2025-03-07 PROCEDURE — 7100000000 HC PACU RECOVERY - FIRST 15 MIN: Performed by: SURGERY

## 2025-03-07 PROCEDURE — 3700000000 HC ANESTHESIA ATTENDED CARE: Performed by: SURGERY

## 2025-03-07 PROCEDURE — 3600000012 HC SURGERY LEVEL 2 ADDTL 15MIN: Performed by: SURGERY

## 2025-03-07 PROCEDURE — 2500000003 HC RX 250 WO HCPCS: Performed by: NURSE ANESTHETIST, CERTIFIED REGISTERED

## 2025-03-07 PROCEDURE — 6370000000 HC RX 637 (ALT 250 FOR IP)

## 2025-03-07 PROCEDURE — 2580000003 HC RX 258: Performed by: ANESTHESIOLOGY

## 2025-03-07 PROCEDURE — 6370000000 HC RX 637 (ALT 250 FOR IP): Performed by: SURGERY

## 2025-03-07 PROCEDURE — 7100000001 HC PACU RECOVERY - ADDTL 15 MIN: Performed by: SURGERY

## 2025-03-07 PROCEDURE — 3700000001 HC ADD 15 MINUTES (ANESTHESIA): Performed by: SURGERY

## 2025-03-07 RX ORDER — SODIUM CHLORIDE 0.9 % (FLUSH) 0.9 %
5-40 SYRINGE (ML) INJECTION EVERY 12 HOURS SCHEDULED
Status: DISCONTINUED | OUTPATIENT
Start: 2025-03-07 | End: 2025-03-07 | Stop reason: HOSPADM

## 2025-03-07 RX ORDER — GLUCAGON 1 MG/ML
1 KIT INJECTION PRN
Status: DISCONTINUED | OUTPATIENT
Start: 2025-03-07 | End: 2025-03-07 | Stop reason: HOSPADM

## 2025-03-07 RX ORDER — IPRATROPIUM BROMIDE AND ALBUTEROL SULFATE 2.5; .5 MG/3ML; MG/3ML
1 SOLUTION RESPIRATORY (INHALATION)
Status: DISCONTINUED | OUTPATIENT
Start: 2025-03-07 | End: 2025-03-07 | Stop reason: HOSPADM

## 2025-03-07 RX ORDER — IPRATROPIUM BROMIDE AND ALBUTEROL SULFATE 2.5; .5 MG/3ML; MG/3ML
1 SOLUTION RESPIRATORY (INHALATION) ONCE
Status: COMPLETED | OUTPATIENT
Start: 2025-03-07 | End: 2025-03-07

## 2025-03-07 RX ORDER — PROPOFOL 10 MG/ML
INJECTION, EMULSION INTRAVENOUS
Status: DISCONTINUED | OUTPATIENT
Start: 2025-03-07 | End: 2025-03-07 | Stop reason: SDUPTHER

## 2025-03-07 RX ORDER — ONDANSETRON 2 MG/ML
INJECTION INTRAMUSCULAR; INTRAVENOUS
Status: DISCONTINUED | OUTPATIENT
Start: 2025-03-07 | End: 2025-03-07 | Stop reason: SDUPTHER

## 2025-03-07 RX ORDER — FENTANYL CITRATE 50 UG/ML
25 INJECTION, SOLUTION INTRAMUSCULAR; INTRAVENOUS EVERY 5 MIN PRN
Status: DISCONTINUED | OUTPATIENT
Start: 2025-03-07 | End: 2025-03-07 | Stop reason: HOSPADM

## 2025-03-07 RX ORDER — IPRATROPIUM BROMIDE AND ALBUTEROL SULFATE 2.5; .5 MG/3ML; MG/3ML
SOLUTION RESPIRATORY (INHALATION)
Status: COMPLETED
Start: 2025-03-07 | End: 2025-03-07

## 2025-03-07 RX ORDER — ONDANSETRON 2 MG/ML
4 INJECTION INTRAMUSCULAR; INTRAVENOUS
Status: DISCONTINUED | OUTPATIENT
Start: 2025-03-07 | End: 2025-03-07 | Stop reason: HOSPADM

## 2025-03-07 RX ORDER — DEXTROSE MONOHYDRATE 100 MG/ML
INJECTION, SOLUTION INTRAVENOUS CONTINUOUS PRN
Status: DISCONTINUED | OUTPATIENT
Start: 2025-03-07 | End: 2025-03-07 | Stop reason: HOSPADM

## 2025-03-07 RX ORDER — PROCHLORPERAZINE EDISYLATE 5 MG/ML
5 INJECTION INTRAMUSCULAR; INTRAVENOUS
Status: DISCONTINUED | OUTPATIENT
Start: 2025-03-07 | End: 2025-03-07 | Stop reason: HOSPADM

## 2025-03-07 RX ORDER — SODIUM CHLORIDE 9 MG/ML
INJECTION, SOLUTION INTRAVENOUS PRN
Status: DISCONTINUED | OUTPATIENT
Start: 2025-03-07 | End: 2025-03-07 | Stop reason: HOSPADM

## 2025-03-07 RX ORDER — SODIUM CHLORIDE 0.9 % (FLUSH) 0.9 %
5-40 SYRINGE (ML) INJECTION PRN
Status: DISCONTINUED | OUTPATIENT
Start: 2025-03-07 | End: 2025-03-07 | Stop reason: HOSPADM

## 2025-03-07 RX ORDER — NALOXONE HYDROCHLORIDE 0.4 MG/ML
INJECTION, SOLUTION INTRAMUSCULAR; INTRAVENOUS; SUBCUTANEOUS PRN
Status: DISCONTINUED | OUTPATIENT
Start: 2025-03-07 | End: 2025-03-07 | Stop reason: HOSPADM

## 2025-03-07 RX ORDER — SODIUM CHLORIDE 9 MG/ML
INJECTION, SOLUTION INTRAVENOUS
Status: DISCONTINUED | OUTPATIENT
Start: 2025-03-07 | End: 2025-03-07 | Stop reason: SDUPTHER

## 2025-03-07 RX ORDER — EPHEDRINE SULFATE/0.9% NACL/PF 50 MG/5 ML
SYRINGE (ML) INTRAVENOUS
Status: DISCONTINUED | OUTPATIENT
Start: 2025-03-07 | End: 2025-03-07 | Stop reason: SDUPTHER

## 2025-03-07 RX ORDER — FENTANYL CITRATE 50 UG/ML
INJECTION, SOLUTION INTRAMUSCULAR; INTRAVENOUS
Status: DISCONTINUED | OUTPATIENT
Start: 2025-03-07 | End: 2025-03-07 | Stop reason: SDUPTHER

## 2025-03-07 RX ORDER — TRAMADOL HYDROCHLORIDE 50 MG/1
50 TABLET ORAL EVERY 6 HOURS PRN
Qty: 12 TABLET | Refills: 0 | Status: SHIPPED | OUTPATIENT
Start: 2025-03-07 | End: 2025-03-10

## 2025-03-07 RX ORDER — LIDOCAINE HYDROCHLORIDE 10 MG/ML
INJECTION, SOLUTION EPIDURAL; INFILTRATION; INTRACAUDAL; PERINEURAL PRN
Status: DISCONTINUED | OUTPATIENT
Start: 2025-03-07 | End: 2025-03-07 | Stop reason: ALTCHOICE

## 2025-03-07 RX ORDER — SODIUM CHLORIDE, SODIUM LACTATE, POTASSIUM CHLORIDE, CALCIUM CHLORIDE 600; 310; 30; 20 MG/100ML; MG/100ML; MG/100ML; MG/100ML
INJECTION, SOLUTION INTRAVENOUS ONCE
Status: COMPLETED | OUTPATIENT
Start: 2025-03-07 | End: 2025-03-07

## 2025-03-07 RX ORDER — HYDROMORPHONE HYDROCHLORIDE 1 MG/ML
0.5 INJECTION, SOLUTION INTRAMUSCULAR; INTRAVENOUS; SUBCUTANEOUS EVERY 5 MIN PRN
Status: DISCONTINUED | OUTPATIENT
Start: 2025-03-07 | End: 2025-03-07 | Stop reason: HOSPADM

## 2025-03-07 RX ADMIN — FENTANYL CITRATE 50 MCG: 50 INJECTION, SOLUTION INTRAMUSCULAR; INTRAVENOUS at 14:29

## 2025-03-07 RX ADMIN — IPRATROPIUM BROMIDE AND ALBUTEROL SULFATE 1 DOSE: .5; 3 SOLUTION RESPIRATORY (INHALATION) at 15:01

## 2025-03-07 RX ADMIN — Medication 10 MG: at 14:13

## 2025-03-07 RX ADMIN — ONDANSETRON HYDROCHLORIDE 4 MG: 2 INJECTION, SOLUTION INTRAMUSCULAR; INTRAVENOUS at 14:26

## 2025-03-07 RX ADMIN — FENTANYL CITRATE 50 MCG: 50 INJECTION, SOLUTION INTRAMUSCULAR; INTRAVENOUS at 14:01

## 2025-03-07 RX ADMIN — SODIUM CHLORIDE, SODIUM LACTATE, POTASSIUM CHLORIDE, AND CALCIUM CHLORIDE: .6; .31; .03; .02 INJECTION, SOLUTION INTRAVENOUS at 13:10

## 2025-03-07 RX ADMIN — WATER 2000 MG: 1 INJECTION INTRAMUSCULAR; INTRAVENOUS; SUBCUTANEOUS at 14:01

## 2025-03-07 RX ADMIN — Medication 10 MG: at 14:20

## 2025-03-07 RX ADMIN — Medication 3 AMPULE: at 13:11

## 2025-03-07 RX ADMIN — Medication 5 MG: at 14:16

## 2025-03-07 RX ADMIN — SODIUM CHLORIDE: 9 INJECTION, SOLUTION INTRAVENOUS at 13:50

## 2025-03-07 RX ADMIN — PROPOFOL 50 MCG/KG/MIN: 10 INJECTION, EMULSION INTRAVENOUS at 13:55

## 2025-03-07 RX ADMIN — IPRATROPIUM BROMIDE AND ALBUTEROL SULFATE 1 DOSE: 2.5; .5 SOLUTION RESPIRATORY (INHALATION) at 15:01

## 2025-03-07 RX ADMIN — HYDROMORPHONE HYDROCHLORIDE 0.5 MG: 1 INJECTION, SOLUTION INTRAMUSCULAR; INTRAVENOUS; SUBCUTANEOUS at 14:31

## 2025-03-07 ASSESSMENT — PAIN - FUNCTIONAL ASSESSMENT: PAIN_FUNCTIONAL_ASSESSMENT: 0-10

## 2025-03-07 ASSESSMENT — LIFESTYLE VARIABLES: SMOKING_STATUS: 1

## 2025-03-07 ASSESSMENT — COPD QUESTIONNAIRES: CAT_SEVERITY: SEVERE

## 2025-03-07 NOTE — DISCHARGE INSTRUCTIONS
procedure.    Report the following to your surgeon:  Excessive pain, swelling, redness or odor of or around the surgical area  Temperature over 100.5  Nausea and vomiting lasting longer than 4 hours or if unable to take medications  Any signs of decreased circulation or nerve impairment to extremity: change in color, persistent numbness, tingling, coldness or increase pain  Any questions or concerns         IF YOU REPORT TO AN EMERGENCY ROOM, DOCTOR'S OFFICE OR HOSPITAL WITHIN 24 HOURS AFTER YOUR PROCEDURE, BRING THIS SHEET AND YOUR AFTER VISIT SUMMARY WITH YOU AND GIVE IT TO THE PHYSICIAN OR NURSE ATTENDING YOU.    These are general instructions for a healthy lifestyle (if applicable):    No smoking/ No tobacco products/ Avoid exposure to secondhand smoke  Surgeon General's Warning:  Quitting smoking now greatly reduces serious risk to your health.    Obesity, smoking, and sedentary lifestyle greatly increases your risk for illness    A healthy diet, regular physical exercise & weight monitoring are important for maintaining a healthy lifestyle    You may be retaining fluid if you have a history of heart failure or if you experience any of the following symptoms:  Weight gain of 3 pounds or more overnight or 5 pounds in a week, increased swelling in our hands or feet or shortness of breath while lying flat in bed.  Please call your doctor as soon as you notice any of these symptoms; do not wait until your next office visit.    A common side effect of anesthesia following surgery is nausea and/or vomiting. In order to decrease symptoms, it is wise to avoid foods that are high in fat, greasy foods, milk products, and spicy foods for the first 24 hours.    Acceptable foods for the first 24 hours following surgery include but are not limited to:    soup  broth  toast   crackers   applesauce  bananas   mashed potatoes,  soft or scrambled eggs  oatmeal  jello    It is important to eat when taking your pain medication.  This will help to prevent nausea. If possible, please try to time your meals with your medications.    It is very important to stay hydrated following surgery. Sip fluids frequently while awake. Avoid acidic drinks such as citrus juices and soda for 24 hours. Carbonated beverages may cause bloating and gas. Acceptable fluids include:    water (flavor packets may add variety)  coffee or tea (in moderation)  Gatorade  Deshaun-Aid  apple juice  cranberry juice    You are encouraged to cough and deep breathe every hour when awake. This will help to prevent respiratory complications following anesthesia. You may want to hug a pillow when coughing and sneezing to add additional support to the surgical area and to decrease discomfort if you had abdominal or chest surgery.    If you are discharged home with support stockings, you may remove them after 24 hours. Support stockings are used to help prevent blood clots in the legs following surgery.    TO PREVENT AN INFECTION      WASH YOUR HANDS    To prevent infection, good handwashing is the most important thing you or your caregiver can do.      Wash your hands with soap and water or use the hand  we gave you before you touch any wounds.    SHOWER    Use the antibacterial soap we gave you when you take a shower.     Shower with this soap until your wounds are healed.      To reach all areas of your body, you may need someone to help you.     Don’t forget to clean your belly button with every shower.     USE CLEAN SHEETS    Use freshly cleaned sheets on your bed after surgery.     To keep the surgery site clean, do not allow pets to sleep with you while your wound is still healing.     STOP SMOKING    Stop smoking, or at least cut back on smoking    Smoking slows your healing.      CONTROL YOUR BLOOD SUGAR    High blood sugars slow wound healing.    If you are diabetic, control your blood sugar levels before and after your surgery.    Please take time to review all of

## 2025-03-07 NOTE — ANESTHESIA POSTPROCEDURE EVALUATION
Department of Anesthesiology  Postprocedure Note    Patient: Nasir Hatfield  MRN: [id number]  YOB: 1950  Date of evaluation: 3/7/2025    Procedure Summary       Date: 03/07/25 Room / Location: [facility] MAIN OR M9 / [facility] MAIN OR    Anesthesia Start: 1351 Anesthesia Stop: 1438    Procedure: LEFT FOOT DEBRIDEMENT (Left: Foot) Diagnosis:       Atherosclerosis of native artery of left lower extremity, with unspecified presence of clinical manifestation      (Atherosclerosis of native artery of left lower extremity, with unspecified presence of clinical manifestation [I70.202])    Providers: Harris Lorenzo MD Responsible Provider: Tony Avelar MD    Anesthesia Type: MAC ASA Status: 4            Anesthesia Type: MAC    Carmel Phase I: Carmel Score: 9    Carmel Phase II:      Anesthesia Post Evaluation    Patient location during evaluation: PACU  Patient participation: complete - patient participated  Level of consciousness: sleepy but conscious and responsive to verbal stimuli  Airway patency: patent  Nausea & Vomiting: no vomiting and no nausea  Cardiovascular status: blood pressure returned to baseline and hemodynamically stable  Respiratory status: acceptable  Hydration status: stable  Pain management: adequate    No notable events documented.

## 2025-03-07 NOTE — PERIOP NOTE
1226 - PT DENIES FEVER, COLD, N/V, DIARRHEA.....   PT DOES HAVE COPD WITH CHRONIC SOB ON EXERTION AND COUGH.  PRE-OP TCHING DONE - PT VERBALIZES UNDERSTANDING.  STRETCHER IN LOWEST POSITION, CB IN PLACE AND SR UP X2

## 2025-03-07 NOTE — ANESTHESIA PRE PROCEDURE
Department of Anesthesiology  Preprocedure Note       Name:  Nasir Hatfield   Age:  74 y.o.  :  1950                                          MRN:  [id number]         Date:  3/7/2025      Surgeon: Surgeon(s):  Harris Lorenzo MD    Procedure: Procedure(s):  LEFT FOOT DEBRIDEMENT    Medications prior to admission:   Prior to Admission medications    Medication Sig Start Date End Date Taking? Authorizing Provider   fluticasone-umeclidin-vilant (TRELEGY ELLIPTA) 100-62.5-25 MCG/ACT AEPB inhaler Inhale 1 puff into the lungs daily   Yes ProviderLiane MD   oxyCODONE (ROXICODONE) 5 MG immediate release tablet Take 1 tablet by mouth every 4 hours as needed for Pain.   Yes Liane Toney MD   melatonin 3 MG TABS tablet Take 1 tablet by mouth daily   Yes Liane Toney MD   acetaminophen (TYLENOL) 500 MG tablet Take 1 tablet by mouth every 6 hours as needed for Pain   Yes ProviderLiane MD   atorvastatin (LIPITOR) 10 MG tablet Take 1 tablet by mouth nightly 2/15/25  Yes Shaun Alex MD   aspirin 81 MG EC tablet Take 1 tablet by mouth daily 25  Yes Shaun Alex MD   dilTIAZem (CARDIZEM CD) 120 MG extended release capsule Take 1 capsule by mouth daily 25  Yes Shanu Alex MD   pantoprazole (PROTONIX) 40 MG tablet Take 1 tablet by mouth every morning (before breakfast) 25  Yes Shaun Alex MD   nicotine (NICODERM CQ) 21 MG/24HR Place 1 patch onto the skin daily 24  Yes Tonya Cohn APRN - NP   apixaban (ELIQUIS) 5 MG TABS tablet Take 1 tablet by mouth 2 times daily 24  Yes Tonya Cohn APRN - NP   OXYGEN Inhale 3 L into the lungs daily   Yes ProviderLiane MD   clopidogrel (PLAVIX) 75 MG tablet Take 1 tablet by mouth daily 24  Yes Staci Matos MD   albuterol sulfate HFA (PROVENTIL;VENTOLIN;PROAIR) 108 (90 Base) MCG/ACT inhaler Inhale into the lungs   Yes Automatic Reconciliation, Ar   citalopram (CELEXA) 10 MG tablet Take

## 2025-03-07 NOTE — PERIOP NOTE
1455 Called Dr. Avelar to notify of sat's mid 80's.  Denies any increased work or effort to breathe.  Per Patient home sat's are low 90's on 3L NC.  Order received for Duo Neb treatment.      1520 Continues to deny any increased SOB and/or work of breathing, yet sat's continue to decrease down to low 80's.  Working on Incentive Spirometer and achieving 1500.  Minimal increase noted with Sat's.  Strong productive cough.  Left lower lobe to upper lobe diminished greater than Right.  Coarse Rhonchi/crackles throughout.  Dr. Avelar called to notify of above mentioned.  Discussed placing on FT and continue to work on IS.       1600 RT at bedside to educate patient on use of acapella.  Switched to 4L NC and sat's mid 80's.  Decreasing quickly to lower 80's.  RT increased to 6L NC.      1610 Patient is refusing to stay in hospital.  \"I am going Home. No one is going to make me stay.My machine can go up to 6LNC.\"  Sat's while resting are mid 90's.  Decreasing down o 88-89% while talking.  Denies any increased work to breathe or increased SOB.      1615 Dr. Avelar ok to discharge with increased 02 requirements to 6L NC and sat's 88% or greater.      1620 Called Dr. Cheung to discuss discharge with higher 02 requirements.  OK to discharge home if father is ok with it.  Dr. Lorenzo paged and message left to notify of events up to current.    Dr. Lorenzo returned call and agreed with discharge. Patient was discharged with all belongings on 5L NC.  He switched over to his own home 02 with NC.  Allowed time for questions and answers.

## 2025-03-12 NOTE — OP NOTE
[facility]              [address] [id number]                            OPERATIVE REPORT      PATIENT NAME: ANGEL OAKES               : 1950  MED REC NO: [id number]                       ROOM: OR  ACCOUNT NO: [id number]                       ADMIT DATE: 2025  PROVIDER: Harris Lorenzo MD    DATE OF SERVICE:  2025    PREOPERATIVE DIAGNOSES:  Left foot wound.    POSTOPERATIVE DIAGNOSES:  Left foot wound.    PROCEDURES PERFORMED:  Left foot debridement with resection of left 5th metatarsal.    SURGEON:  Harris Lorenzo MD    ANESTHESIA:  MAC.    ESTIMATED BLOOD LOSS:  Minimal.    COMPLICATIONS:  None.    INDICATIONS:  The patient is a 74-year-old with prior left leg revascularization for ischemia and closed amputation of multiple toes.  His 5th toe amputation was left open and packed and now has exposed metatarsal head.    DESCRIPTION OF PROCEDURE:  After informed consent, the patient was supine on the operating table.  After adequate induction of IV sedation, the left foot was prepped and draped in sterile fashion.  1% local lidocaine was used to infiltrate the area and the exposed metatarsal head and the bulk of the remaining metatarsal were resected using bone cutters and rongeur.  Bone was taken back to well above the soft tissue edges until there was clean cortical bleeding bone encountered.  The wound was packed with saline soaked gauze.  Bulky dressing was applied.  The patient tolerated the procedure well.  No complications.        MD LIANG BROWNE/AQS  D:  2025 10:18:20  T:  2025 10:34:58  JOB #:  [id number]    CC:   Harris Lorenzo MD

## 2025-04-17 NOTE — PROGRESS NOTES
[facility]  Preoperative Instructions        Surgery Date 4/18/2025   Time of Arrival 0830am  Contact# [phone]     On the day of your surgery, please report to Surgical Services Registration Desk and sign in at your designated time.  The Surgery Center is located to the right of the Emergency Room.     2. You must have someone with you to drive you home. You should not drive a car for 24 hours following surgery. Please make arrangements for a friend or family member to stay with you for the first 24 hours after your surgery.    3. Do not have anything to eat or drink (including water, gum, mints, coffee, juice) after midnight ?? 4/17/2025 .?This may not apply to medications prescribed by your physician. ?(Please note below the special instructions with medications to take the morning of your procedure.)    4. We recommend you do not drink any alcoholic beverages for 24 hours before and after your surgery.    5. Contact your surgeon's office for instructions on the following medications: non-steroidal anti-inflammatory drugs (i.e. Advil, Aleve), vitamins, and supplements. (Some surgeon's will want you to stop these medications prior to surgery and others may allow you to take them)  **If you are currently taking Plavix, Coumadin, Aspirin and/or other blood-thinning agents, contact your surgeon for instructions.** Your surgeon will partner with the physician prescribing these medications to determine if it is safe to stop or if you need to continue taking.  Please do not stop taking these medications without instructions from your surgeon    6. Wear comfortable clothes.  Wear glasses instead of contacts.  Do not bring any money or jewelry. Please bring picture ID, insurance card, and any prearranged co-payment or hospital payment.  Do not wear make-up, particularly mascara the morning of your surgery.  Do not wear nail polish, particularly if you are having foot /hand surgery.  Wear your

## 2025-04-18 ENCOUNTER — ANESTHESIA (OUTPATIENT)
Facility: HOSPITAL | Age: 75
End: 2025-04-18
Payer: MEDICARE

## 2025-04-18 ENCOUNTER — APPOINTMENT (OUTPATIENT)
Facility: HOSPITAL | Age: 75
End: 2025-04-18
Attending: SURGERY
Payer: MEDICARE

## 2025-04-18 ENCOUNTER — HOSPITAL ENCOUNTER (OUTPATIENT)
Facility: HOSPITAL | Age: 75
Setting detail: OUTPATIENT SURGERY
Discharge: HOME | End: 2025-04-18
Attending: SURGERY | Admitting: SURGERY
Payer: MEDICARE

## 2025-04-18 ENCOUNTER — ANESTHESIA EVENT (OUTPATIENT)
Facility: HOSPITAL | Age: 75
End: 2025-04-18
Payer: MEDICARE

## 2025-04-18 VITALS
TEMPERATURE: 97.5 F | HEART RATE: 66 BPM | DIASTOLIC BLOOD PRESSURE: 62 MMHG | RESPIRATION RATE: 14 BRPM | WEIGHT: 130.95 LBS | BODY MASS INDEX: 18.75 KG/M2 | SYSTOLIC BLOOD PRESSURE: 105 MMHG | HEIGHT: 70 IN | OXYGEN SATURATION: 94 %

## 2025-04-18 LAB
ABO + RH BLD: NORMAL
BLOOD GROUP ANTIBODIES SERPL: NORMAL
HCT VFR BLD AUTO: 39.1 % (ref 36.6–50.3)
HGB BLD-MCNC: 11.6 G/DL (ref 12.1–17)
SPECIMEN EXP DATE BLD: NORMAL

## 2025-04-18 PROCEDURE — 3600000002 HC SURGERY LEVEL 2 BASE: Performed by: SURGERY

## 2025-04-18 PROCEDURE — 93005 ELECTROCARDIOGRAM TRACING: CPT

## 2025-04-18 PROCEDURE — 6360000002 HC RX W HCPCS: Performed by: NURSE ANESTHETIST, CERTIFIED REGISTERED

## 2025-04-18 PROCEDURE — C1769 GUIDE WIRE: HCPCS | Performed by: SURGERY

## 2025-04-18 PROCEDURE — 7100000000 HC PACU RECOVERY - FIRST 15 MIN: Performed by: SURGERY

## 2025-04-18 PROCEDURE — 2580000003 HC RX 258: Performed by: STUDENT IN AN ORGANIZED HEALTH CARE EDUCATION/TRAINING PROGRAM

## 2025-04-18 PROCEDURE — 85018 HEMOGLOBIN: CPT

## 2025-04-18 PROCEDURE — 6360000002 HC RX W HCPCS: Performed by: SURGERY

## 2025-04-18 PROCEDURE — 6370000000 HC RX 637 (ALT 250 FOR IP): Performed by: ANESTHESIOLOGY

## 2025-04-18 PROCEDURE — 7100000010 HC PHASE II RECOVERY - FIRST 15 MIN: Performed by: SURGERY

## 2025-04-18 PROCEDURE — 3600000012 HC SURGERY LEVEL 2 ADDTL 15MIN: Performed by: SURGERY

## 2025-04-18 PROCEDURE — 86901 BLOOD TYPING SEROLOGIC RH(D): CPT

## 2025-04-18 PROCEDURE — 3700000000 HC ANESTHESIA ATTENDED CARE: Performed by: SURGERY

## 2025-04-18 PROCEDURE — C1874 STENT, COATED/COV W/DEL SYS: HCPCS | Performed by: SURGERY

## 2025-04-18 PROCEDURE — 2580000003 HC RX 258: Performed by: NURSE ANESTHETIST, CERTIFIED REGISTERED

## 2025-04-18 PROCEDURE — 2500000003 HC RX 250 WO HCPCS: Performed by: SURGERY

## 2025-04-18 PROCEDURE — 6360000004 HC RX CONTRAST MEDICATION: Performed by: SURGERY

## 2025-04-18 PROCEDURE — C1894 INTRO/SHEATH, NON-LASER: HCPCS | Performed by: SURGERY

## 2025-04-18 PROCEDURE — 2709999900 HC NON-CHARGEABLE SUPPLY: Performed by: SURGERY

## 2025-04-18 PROCEDURE — 73552 X-RAY EXAM OF FEMUR 2/>: CPT

## 2025-04-18 PROCEDURE — 7100000001 HC PACU RECOVERY - ADDTL 15 MIN: Performed by: SURGERY

## 2025-04-18 PROCEDURE — 86850 RBC ANTIBODY SCREEN: CPT

## 2025-04-18 PROCEDURE — 2500000003 HC RX 250 WO HCPCS: Performed by: NURSE ANESTHETIST, CERTIFIED REGISTERED

## 2025-04-18 PROCEDURE — 76000 FLUOROSCOPY <1 HR PHYS/QHP: CPT

## 2025-04-18 PROCEDURE — 2720000010 HC SURG SUPPLY STERILE: Performed by: SURGERY

## 2025-04-18 PROCEDURE — 6370000000 HC RX 637 (ALT 250 FOR IP): Performed by: SURGERY

## 2025-04-18 PROCEDURE — C1757 CATH, THROMBECTOMY/EMBOLECT: HCPCS | Performed by: SURGERY

## 2025-04-18 PROCEDURE — C1725 CATH, TRANSLUMIN NON-LASER: HCPCS | Performed by: SURGERY

## 2025-04-18 PROCEDURE — 2580000003 HC RX 258: Performed by: ANESTHESIOLOGY

## 2025-04-18 PROCEDURE — 7100000011 HC PHASE II RECOVERY - ADDTL 15 MIN: Performed by: SURGERY

## 2025-04-18 PROCEDURE — 36415 COLL VENOUS BLD VENIPUNCTURE: CPT

## 2025-04-18 PROCEDURE — 86900 BLOOD TYPING SEROLOGIC ABO: CPT

## 2025-04-18 PROCEDURE — 3700000001 HC ADD 15 MINUTES (ANESTHESIA): Performed by: SURGERY

## 2025-04-18 PROCEDURE — 85014 HEMATOCRIT: CPT

## 2025-04-18 DEVICE — BX2 HEP REDUCED PROFILE BX2 7MMX39MM 6FR 135CM CATH
Type: IMPLANTABLE DEVICE | Site: EXTERNAL ILIAC | Status: FUNCTIONAL
Brand: GORE VIABAHN VBX BALLOON EXPANDABLE ENDO

## 2025-04-18 RX ORDER — VASOPRESSIN 20 [USP'U]/ML
INJECTION, SOLUTION INTRAVENOUS
Status: DISCONTINUED | OUTPATIENT
Start: 2025-04-18 | End: 2025-04-18 | Stop reason: SDUPTHER

## 2025-04-18 RX ORDER — PROCHLORPERAZINE EDISYLATE 5 MG/ML
5 INJECTION INTRAMUSCULAR; INTRAVENOUS
Status: DISCONTINUED | OUTPATIENT
Start: 2025-04-18 | End: 2025-04-18 | Stop reason: HOSPADM

## 2025-04-18 RX ORDER — SODIUM CHLORIDE 0.9 % (FLUSH) 0.9 %
5-40 SYRINGE (ML) INJECTION EVERY 12 HOURS SCHEDULED
Status: DISCONTINUED | OUTPATIENT
Start: 2025-04-18 | End: 2025-04-18 | Stop reason: HOSPADM

## 2025-04-18 RX ORDER — FENTANYL CITRATE 50 UG/ML
25 INJECTION, SOLUTION INTRAMUSCULAR; INTRAVENOUS EVERY 5 MIN PRN
Status: DISCONTINUED | OUTPATIENT
Start: 2025-04-18 | End: 2025-04-18 | Stop reason: HOSPADM

## 2025-04-18 RX ORDER — LIDOCAINE HYDROCHLORIDE 10 MG/ML
1 INJECTION, SOLUTION EPIDURAL; INFILTRATION; INTRACAUDAL; PERINEURAL
Status: DISCONTINUED | OUTPATIENT
Start: 2025-04-18 | End: 2025-04-18 | Stop reason: HOSPADM

## 2025-04-18 RX ORDER — ACETAMINOPHEN 500 MG
1000 TABLET ORAL ONCE
Status: COMPLETED | OUTPATIENT
Start: 2025-04-18 | End: 2025-04-18

## 2025-04-18 RX ORDER — EPHEDRINE SULFATE/0.9% NACL/PF 50 MG/5 ML
SYRINGE (ML) INTRAVENOUS
Status: DISCONTINUED | OUTPATIENT
Start: 2025-04-18 | End: 2025-04-18

## 2025-04-18 RX ORDER — NALOXONE HYDROCHLORIDE 0.4 MG/ML
INJECTION, SOLUTION INTRAMUSCULAR; INTRAVENOUS; SUBCUTANEOUS PRN
Status: DISCONTINUED | OUTPATIENT
Start: 2025-04-18 | End: 2025-04-18 | Stop reason: HOSPADM

## 2025-04-18 RX ORDER — DEXAMETHASONE SODIUM PHOSPHATE 4 MG/ML
INJECTION, SOLUTION INTRA-ARTICULAR; INTRALESIONAL; INTRAMUSCULAR; INTRAVENOUS; SOFT TISSUE
Status: DISCONTINUED | OUTPATIENT
Start: 2025-04-18 | End: 2025-04-18 | Stop reason: SDUPTHER

## 2025-04-18 RX ORDER — PROPOFOL 10 MG/ML
INJECTION, EMULSION INTRAVENOUS
Status: DISCONTINUED | OUTPATIENT
Start: 2025-04-18 | End: 2025-04-18 | Stop reason: SDUPTHER

## 2025-04-18 RX ORDER — SODIUM CHLORIDE, SODIUM LACTATE, POTASSIUM CHLORIDE, CALCIUM CHLORIDE 600; 310; 30; 20 MG/100ML; MG/100ML; MG/100ML; MG/100ML
INJECTION, SOLUTION INTRAVENOUS ONCE
Status: COMPLETED | OUTPATIENT
Start: 2025-04-18 | End: 2025-04-18

## 2025-04-18 RX ORDER — SODIUM CHLORIDE 0.9 % (FLUSH) 0.9 %
5-40 SYRINGE (ML) INJECTION PRN
Status: DISCONTINUED | OUTPATIENT
Start: 2025-04-18 | End: 2025-04-18 | Stop reason: HOSPADM

## 2025-04-18 RX ORDER — FENTANYL CITRATE 50 UG/ML
100 INJECTION, SOLUTION INTRAMUSCULAR; INTRAVENOUS
Status: DISCONTINUED | OUTPATIENT
Start: 2025-04-18 | End: 2025-04-18 | Stop reason: HOSPADM

## 2025-04-18 RX ORDER — ONDANSETRON 2 MG/ML
INJECTION INTRAMUSCULAR; INTRAVENOUS
Status: DISCONTINUED | OUTPATIENT
Start: 2025-04-18 | End: 2025-04-18 | Stop reason: SDUPTHER

## 2025-04-18 RX ORDER — LIDOCAINE HYDROCHLORIDE 20 MG/ML
INJECTION, SOLUTION EPIDURAL; INFILTRATION; INTRACAUDAL; PERINEURAL
Status: DISCONTINUED | OUTPATIENT
Start: 2025-04-18 | End: 2025-04-18 | Stop reason: SDUPTHER

## 2025-04-18 RX ORDER — SODIUM CHLORIDE, SODIUM LACTATE, POTASSIUM CHLORIDE, CALCIUM CHLORIDE 600; 310; 30; 20 MG/100ML; MG/100ML; MG/100ML; MG/100ML
INJECTION, SOLUTION INTRAVENOUS CONTINUOUS
Status: DISCONTINUED | OUTPATIENT
Start: 2025-04-18 | End: 2025-04-18 | Stop reason: HOSPADM

## 2025-04-18 RX ORDER — HEPARIN SODIUM 1000 [USP'U]/ML
INJECTION, SOLUTION INTRAVENOUS; SUBCUTANEOUS
Status: DISCONTINUED | OUTPATIENT
Start: 2025-04-18 | End: 2025-04-18 | Stop reason: SDUPTHER

## 2025-04-18 RX ORDER — EPHEDRINE SULFATE/0.9% NACL/PF 50 MG/5 ML
SYRINGE (ML) INTRAVENOUS
Status: DISCONTINUED | OUTPATIENT
Start: 2025-04-18 | End: 2025-04-18 | Stop reason: SDUPTHER

## 2025-04-18 RX ORDER — HYDRALAZINE HYDROCHLORIDE 20 MG/ML
10 INJECTION INTRAMUSCULAR; INTRAVENOUS ONCE
Status: DISCONTINUED | OUTPATIENT
Start: 2025-04-18 | End: 2025-04-18 | Stop reason: HOSPADM

## 2025-04-18 RX ORDER — OXYCODONE HYDROCHLORIDE 5 MG/1
5 TABLET ORAL
Status: COMPLETED | OUTPATIENT
Start: 2025-04-18 | End: 2025-04-18

## 2025-04-18 RX ORDER — SODIUM CHLORIDE 9 MG/ML
INJECTION, SOLUTION INTRAVENOUS PRN
Status: DISCONTINUED | OUTPATIENT
Start: 2025-04-18 | End: 2025-04-18 | Stop reason: HOSPADM

## 2025-04-18 RX ORDER — HYDROMORPHONE HYDROCHLORIDE 1 MG/ML
0.5 INJECTION, SOLUTION INTRAMUSCULAR; INTRAVENOUS; SUBCUTANEOUS EVERY 5 MIN PRN
Status: DISCONTINUED | OUTPATIENT
Start: 2025-04-18 | End: 2025-04-18 | Stop reason: HOSPADM

## 2025-04-18 RX ORDER — PHENYLEPHRINE HCL IN 0.9% NACL 0.4MG/10ML
SYRINGE (ML) INTRAVENOUS
Status: DISCONTINUED | OUTPATIENT
Start: 2025-04-18 | End: 2025-04-18 | Stop reason: SDUPTHER

## 2025-04-18 RX ORDER — IOPAMIDOL 755 MG/ML
INJECTION, SOLUTION INTRAVASCULAR PRN
Status: DISCONTINUED | OUTPATIENT
Start: 2025-04-18 | End: 2025-04-18 | Stop reason: ALTCHOICE

## 2025-04-18 RX ORDER — MIDAZOLAM HYDROCHLORIDE 2 MG/2ML
2 INJECTION, SOLUTION INTRAMUSCULAR; INTRAVENOUS PRN
Status: DISCONTINUED | OUTPATIENT
Start: 2025-04-18 | End: 2025-04-18 | Stop reason: HOSPADM

## 2025-04-18 RX ORDER — ONDANSETRON 2 MG/ML
4 INJECTION INTRAMUSCULAR; INTRAVENOUS
Status: DISCONTINUED | OUTPATIENT
Start: 2025-04-18 | End: 2025-04-18 | Stop reason: HOSPADM

## 2025-04-18 RX ORDER — MIDAZOLAM HYDROCHLORIDE 1 MG/ML
INJECTION, SOLUTION INTRAMUSCULAR; INTRAVENOUS
Status: DISCONTINUED | OUTPATIENT
Start: 2025-04-18 | End: 2025-04-18 | Stop reason: SDUPTHER

## 2025-04-18 RX ADMIN — MIDAZOLAM HYDROCHLORIDE 1 MG: 1 INJECTION, SOLUTION INTRAMUSCULAR; INTRAVENOUS at 10:17

## 2025-04-18 RX ADMIN — OXYCODONE 5 MG: 5 TABLET ORAL at 13:06

## 2025-04-18 RX ADMIN — VASOPRESSIN 2 UNITS: 20 INJECTION INTRAVENOUS at 11:02

## 2025-04-18 RX ADMIN — HYDROMORPHONE HYDROCHLORIDE 0.2 MG: 1 INJECTION, SOLUTION INTRAMUSCULAR; INTRAVENOUS; SUBCUTANEOUS at 10:27

## 2025-04-18 RX ADMIN — SODIUM CHLORIDE, SODIUM LACTATE, POTASSIUM CHLORIDE, AND CALCIUM CHLORIDE: .6; .31; .03; .02 INJECTION, SOLUTION INTRAVENOUS at 09:33

## 2025-04-18 RX ADMIN — PROPOFOL 50 MG: 10 INJECTION, EMULSION INTRAVENOUS at 10:27

## 2025-04-18 RX ADMIN — LIDOCAINE HYDROCHLORIDE 60 MG: 20 INJECTION, SOLUTION EPIDURAL; INFILTRATION; INTRACAUDAL; PERINEURAL at 10:27

## 2025-04-18 RX ADMIN — Medication 25 MG: at 10:31

## 2025-04-18 RX ADMIN — HEPARIN SODIUM 5000 UNITS: 1000 INJECTION INTRAVENOUS; SUBCUTANEOUS at 10:54

## 2025-04-18 RX ADMIN — ACETAMINOPHEN 1000 MG: 500 TABLET ORAL at 09:13

## 2025-04-18 RX ADMIN — ONDANSETRON 4 MG: 2 INJECTION, SOLUTION INTRAMUSCULAR; INTRAVENOUS at 11:33

## 2025-04-18 RX ADMIN — DEXAMETHASONE SODIUM PHOSPHATE 8 MG: 4 INJECTION, SOLUTION INTRAMUSCULAR; INTRAVENOUS at 10:44

## 2025-04-18 RX ADMIN — HEPARIN SODIUM 1000 UNITS: 1000 INJECTION INTRAVENOUS; SUBCUTANEOUS at 11:42

## 2025-04-18 RX ADMIN — PHENYLEPHRINE HYDROCHLORIDE 35 MCG/MIN: 10 INJECTION INTRAVENOUS at 10:43

## 2025-04-18 RX ADMIN — Medication 200 MCG: at 10:32

## 2025-04-18 RX ADMIN — Medication 7.5 MG: at 10:45

## 2025-04-18 RX ADMIN — MIDAZOLAM HYDROCHLORIDE 1 MG: 1 INJECTION, SOLUTION INTRAMUSCULAR; INTRAVENOUS at 10:23

## 2025-04-18 RX ADMIN — SODIUM CHLORIDE, POTASSIUM CHLORIDE, SODIUM LACTATE AND CALCIUM CHLORIDE: 600; 310; 30; 20 INJECTION, SOLUTION INTRAVENOUS at 10:17

## 2025-04-18 RX ADMIN — Medication 120 MCG: at 10:28

## 2025-04-18 RX ADMIN — Medication 7.5 MG: at 10:29

## 2025-04-18 RX ADMIN — PROPOFOL 50 MG: 10 INJECTION, EMULSION INTRAVENOUS at 10:28

## 2025-04-18 RX ADMIN — Medication 3 AMPULE: at 09:15

## 2025-04-18 RX ADMIN — WATER 2000 MG: 1 INJECTION INTRAMUSCULAR; INTRAVENOUS; SUBCUTANEOUS at 10:48

## 2025-04-18 RX ADMIN — Medication 25 MG: at 10:37

## 2025-04-18 ASSESSMENT — COPD QUESTIONNAIRES: CAT_SEVERITY: SEVERE

## 2025-04-18 ASSESSMENT — PAIN SCALES - GENERAL: PAINLEVEL_OUTOF10: 8

## 2025-04-18 ASSESSMENT — LIFESTYLE VARIABLES: SMOKING_STATUS: 1

## 2025-04-18 ASSESSMENT — PAIN - FUNCTIONAL ASSESSMENT: PAIN_FUNCTIONAL_ASSESSMENT: 0-10

## 2025-04-18 ASSESSMENT — PAIN DESCRIPTION - DESCRIPTORS: DESCRIPTORS: THROBBING;NUMBNESS

## 2025-04-18 NOTE — ANESTHESIA POSTPROCEDURE EVALUATION
Post-Anesthesia Evaluation and Assessment    Patient: Nasir Hatfield MRN: [id number]  SSN: [id number]    YOB: 1950  Age: 74 y.o.  Sex: male      I have evaluated the patient and they are stable and ready for discharge from the PACU.     Cardiovascular Function/Vital Signs  Visit Vitals  BP (!) 100/53   Pulse 67   Temp 97.5 °F (36.4 °C) (Oral)   Resp 12   Ht 1.778 m (5' 10\")   Wt 59.4 kg (130 lb 15.3 oz)   SpO2 94%   BMI 18.79 kg/m²       Patient is status post General anesthesia for Procedure(s):  LEFT LEG THROMBECTOMY AND ANGIOPLASTY OF BYPASS GRAFT, STENTING OF LEFT EXTERNAL ILIAC ARTERY.    Nausea/Vomiting: None    Postoperative hydration reviewed and adequate.    Pain:  Managed    Neurological Status:   At baseline    Mental Status, Level of Consciousness: Alert and  oriented to person, place, and time    Pulmonary Status:   Adequate oxygenation and airway patent    Complications related to anesthesia: None    Post-anesthesia assessment completed. No concerns    Signed By: Brock Chou MD     April 18, 2025

## 2025-04-18 NOTE — DISCHARGE INSTRUCTIONS
Patient Discharge Instructions    Nasir Hatfield / [id number] : 1950    Admitted 2025 Discharged: 2025     What to do at Home  Resume home wound care  Leave dressing for 72 hours.  Need office follow up in 2 weeks    Information obtained by :  I understand that if any problems occur once I am at home I am to contact my physician.    I understand and acknowledge receipt of the instructions indicated above.                                                                                                                                           R.N.'s Signature                                                                  Date/Time                                                                                                                                              Patient or Representative Signature                                                          Date/Time      Harris Lorenzo MD  DISCHARGE SUMMARY from Nurse    PATIENT INSTRUCTIONS:    After general anesthesia or intravenous sedation, for 24 hours or while taking prescription narcotics:    Have someone responsible help you with your care  Limit your activities  Do not drive and operate hazardous machinery  Do not make important personal, legal or business decisions  Do not drink alcoholic beverages  If you have not urinated within 8 hours after discharge, please contact your surgeon on call  Resume your medications unless otherwise instructed    From general anesthesia, intravenous sedation, or while taking prescription narcotics, you may experience:    Drowsiness, dizziness, sleepiness, or confusion  Difficulty remembering or delayed reaction times  Difficulty with your balance, especially while walking, move slowly and carefully, do not make sudden position changes  Difficulty focusing or blurred vision    You may not be aware of slight changes in your behavior and/or your reaction time because of the medication used during and after

## 2025-04-18 NOTE — ANESTHESIA PRE PROCEDURE
Department of Anesthesiology  Preprocedure Note       Name:  Nasir Hatfield   Age:  74 y.o.  :  1950                                          MRN:  [id number]         Date:  2025      Surgeon: Surgeon(s):  Harris Lorenzo MD    Procedure: Procedure(s):  LEFT LEG THROMBECTOMY OF BYPASS GRAFT    Medications prior to admission:   Prior to Admission medications    Medication Sig Start Date End Date Taking? Authorizing Provider   TRAMADOL HCL PO Take by mouth.   Yes Liane Toney MD   fluticasone-umeclidin-vilant (TRELEGY ELLIPTA) 100-62.5-25 MCG/ACT AEPB inhaler Inhale 1 puff into the lungs daily    Liane Toney MD   oxyCODONE (ROXICODONE) 5 MG immediate release tablet Take 1 tablet by mouth every 4 hours as needed for Pain.  Patient not taking: Reported on 2025    Liane Toney MD   melatonin 3 MG TABS tablet Take 1 tablet by mouth daily    Liane Toney MD   acetaminophen (TYLENOL) 500 MG tablet Take 1 tablet by mouth every 6 hours as needed for Pain    Liane Toney MD   polyethylene glycol (GLYCOLAX) 17 g packet Take 1 packet by mouth daily as needed for Constipation    ProviderLiane MD   sennosides-docusate sodium (SENOKOT-S) 8.6-50 MG tablet Take 1 tablet by mouth daily    Liane Toney MD   atorvastatin (LIPITOR) 10 MG tablet Take 1 tablet by mouth nightly 2/15/25   Shaun Alex MD   aspirin 81 MG EC tablet Take 1 tablet by mouth daily 25   Shaun Alex MD   dilTIAZem (CARDIZEM CD) 120 MG extended release capsule Take 1 capsule by mouth daily 25   Shaun Alex MD   pantoprazole (PROTONIX) 40 MG tablet Take 1 tablet by mouth every morning (before breakfast) 25   Shaun Alex MD   nicotine (NICODERM CQ) 21 MG/24HR Place 1 patch onto the skin daily 24   Tonya Cohn APRN - NP   apixaban (ELIQUIS) 5 MG TABS tablet Take 1 tablet by mouth 2 times daily 24   Tonya Cohn APRN - NP   OXYGEN

## 2025-04-18 NOTE — BRIEF OP NOTE
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 Thrombectomy LLE vein graft            2. Intra-op arteriogram w/fluoro            3. Balloon angioplasty left PT artery and vein graft (3.5mm)            4. Angioplasty/stent placement left external iliac artery (7mm VBX)    Surgeon(s):  Harris Lorenzo MD    Anesthesia: General    Estimated Blood Loss (mL): Minimal    Complications: None    Implants:  Implant Name Type Inv. Item Serial No.  Lot No. LRB No. Used Action   STENT ENDOPROSTHESIS L 79 MM MICHAEL 7 MM CATH L 135 CM - [id number]  STENT ENDOPROSTHESIS L 79 MM MICHAEL 7 MM CATH L 135 CM [id number]  [employer]  Left 1 Implanted   STENT ENDOPROSTHESIS L 39 MM MICHAEL 7 MM CATH L 135 CM - [id number]  STENT ENDOPROSTHESIS L 39 MM MICHAEL 7 MM CATH L 135 CM [id number]  [employer]  Left 1 Implanted         Findings: Distal PT anastomosis widely patent but vein graft proximal very small. EIA/inflow highly diseased over long segment.    Electronically signed by Harris Lorenzo MD on 4/18/2025 at 12:07 PM

## 2025-04-19 LAB
EKG ATRIAL RATE: 59 BPM
EKG DIAGNOSIS: NORMAL
EKG P AXIS: 54 DEGREES
EKG P-R INTERVAL: 146 MS
EKG Q-T INTERVAL: 456 MS
EKG QRS DURATION: 86 MS
EKG QTC CALCULATION (BAZETT): 451 MS
EKG R AXIS: -82 DEGREES
EKG T AXIS: 74 DEGREES
EKG VENTRICULAR RATE: 59 BPM

## 2025-04-28 ENCOUNTER — HOSPITAL ENCOUNTER (INPATIENT)
Facility: HOSPITAL | Age: 75
LOS: 10 days | Discharge: HOME HEALTH | DRG: 240 | End: 2025-05-08
Attending: EMERGENCY MEDICINE | Admitting: INTERNAL MEDICINE
Payer: MEDICARE

## 2025-04-28 DIAGNOSIS — Z98.890 PERIPHERAL ARTERIAL DISEASE WITH HISTORY OF REVASCULARIZATION: ICD-10-CM

## 2025-04-28 DIAGNOSIS — L08.9 INFECTED WOUND: Primary | ICD-10-CM

## 2025-04-28 DIAGNOSIS — I73.9 PERIPHERAL ARTERIAL DISEASE WITH HISTORY OF REVASCULARIZATION: ICD-10-CM

## 2025-04-28 DIAGNOSIS — T14.8XXA INFECTED WOUND: Primary | ICD-10-CM

## 2025-04-28 LAB
ALBUMIN SERPL-MCNC: 2.7 G/DL (ref 3.5–5)
ALBUMIN/GLOB SERPL: 0.6 (ref 1.1–2.2)
ALP SERPL-CCNC: 115 U/L (ref 45–117)
ALT SERPL-CCNC: 16 U/L (ref 12–78)
ANION GAP SERPL CALC-SCNC: 5 MMOL/L (ref 2–12)
AST SERPL-CCNC: 30 U/L (ref 15–37)
BASOPHILS # BLD: 0.06 K/UL (ref 0–0.1)
BASOPHILS NFR BLD: 0.6 % (ref 0–1)
BILIRUB SERPL-MCNC: 0.4 MG/DL (ref 0.2–1)
BUN SERPL-MCNC: 35 MG/DL (ref 6–20)
BUN/CREAT SERPL: 42 (ref 12–20)
CALCIUM SERPL-MCNC: 8.9 MG/DL (ref 8.5–10.1)
CHLORIDE SERPL-SCNC: 106 MMOL/L (ref 97–108)
CO2 SERPL-SCNC: 25 MMOL/L (ref 21–32)
CREAT SERPL-MCNC: 0.83 MG/DL (ref 0.7–1.3)
DIFFERENTIAL METHOD BLD: ABNORMAL
EOSINOPHIL # BLD: 0.15 K/UL (ref 0–0.4)
EOSINOPHIL NFR BLD: 1.6 % (ref 0–7)
ERYTHROCYTE [DISTWIDTH] IN BLOOD BY AUTOMATED COUNT: 21.2 % (ref 11.5–14.5)
GLOBULIN SER CALC-MCNC: 4.9 G/DL (ref 2–4)
GLUCOSE SERPL-MCNC: 98 MG/DL (ref 65–100)
HCT VFR BLD AUTO: 38.4 % (ref 36.6–50.3)
HGB BLD-MCNC: 11.4 G/DL (ref 12.1–17)
IMM GRANULOCYTES # BLD AUTO: 0.03 K/UL (ref 0–0.04)
IMM GRANULOCYTES NFR BLD AUTO: 0.3 % (ref 0–0.5)
LYMPHOCYTES # BLD: 1.06 K/UL (ref 0.8–3.5)
LYMPHOCYTES NFR BLD: 11.3 % (ref 12–49)
MCH RBC QN AUTO: 23.2 PG (ref 26–34)
MCHC RBC AUTO-ENTMCNC: 29.7 G/DL (ref 30–36.5)
MCV RBC AUTO: 78.2 FL (ref 80–99)
MONOCYTES # BLD: 1.05 K/UL (ref 0–1)
MONOCYTES NFR BLD: 11.2 % (ref 5–13)
NEUTS SEG # BLD: 7.05 K/UL (ref 1.8–8)
NEUTS SEG NFR BLD: 75 % (ref 32–75)
NRBC # BLD: 0 K/UL (ref 0–0.01)
NRBC BLD-RTO: 0 PER 100 WBC
PLATELET # BLD AUTO: 371 K/UL (ref 150–400)
PMV BLD AUTO: 9.1 FL (ref 8.9–12.9)
POTASSIUM SERPL-SCNC: 4 MMOL/L (ref 3.5–5.1)
PROT SERPL-MCNC: 7.6 G/DL (ref 6.4–8.2)
RBC # BLD AUTO: 4.91 M/UL (ref 4.1–5.7)
RBC MORPH BLD: ABNORMAL
RBC MORPH BLD: ABNORMAL
SODIUM SERPL-SCNC: 136 MMOL/L (ref 136–145)
WBC # BLD AUTO: 9.4 K/UL (ref 4.1–11.1)

## 2025-04-28 PROCEDURE — 1100000000 HC RM PRIVATE

## 2025-04-28 PROCEDURE — 6370000000 HC RX 637 (ALT 250 FOR IP)

## 2025-04-28 PROCEDURE — 6360000002 HC RX W HCPCS: Performed by: INTERNAL MEDICINE

## 2025-04-28 PROCEDURE — 6370000000 HC RX 637 (ALT 250 FOR IP): Performed by: EMERGENCY MEDICINE

## 2025-04-28 PROCEDURE — 2580000003 HC RX 258: Performed by: INTERNAL MEDICINE

## 2025-04-28 PROCEDURE — 94640 AIRWAY INHALATION TREATMENT: CPT

## 2025-04-28 PROCEDURE — 36415 COLL VENOUS BLD VENIPUNCTURE: CPT

## 2025-04-28 PROCEDURE — 6360000002 HC RX W HCPCS: Performed by: EMERGENCY MEDICINE

## 2025-04-28 PROCEDURE — 6370000000 HC RX 637 (ALT 250 FOR IP): Performed by: INTERNAL MEDICINE

## 2025-04-28 PROCEDURE — 99285 EMERGENCY DEPT VISIT HI MDM: CPT

## 2025-04-28 PROCEDURE — 80053 COMPREHEN METABOLIC PANEL: CPT

## 2025-04-28 PROCEDURE — 2580000003 HC RX 258: Performed by: EMERGENCY MEDICINE

## 2025-04-28 PROCEDURE — 85025 COMPLETE CBC W/AUTO DIFF WBC: CPT

## 2025-04-28 PROCEDURE — 2700000000 HC OXYGEN THERAPY PER DAY

## 2025-04-28 PROCEDURE — 2500000003 HC RX 250 WO HCPCS: Performed by: INTERNAL MEDICINE

## 2025-04-28 RX ORDER — ATORVASTATIN CALCIUM 10 MG/1
10 TABLET, FILM COATED ORAL NIGHTLY
Status: DISCONTINUED | OUTPATIENT
Start: 2025-04-28 | End: 2025-05-08 | Stop reason: HOSPADM

## 2025-04-28 RX ORDER — OXYCODONE HYDROCHLORIDE 5 MG/1
5 TABLET ORAL
Refills: 0 | Status: COMPLETED | OUTPATIENT
Start: 2025-04-28 | End: 2025-04-28

## 2025-04-28 RX ORDER — ENOXAPARIN SODIUM 100 MG/ML
40 INJECTION SUBCUTANEOUS DAILY
Status: DISCONTINUED | OUTPATIENT
Start: 2025-04-28 | End: 2025-05-08 | Stop reason: HOSPADM

## 2025-04-28 RX ORDER — OXYCODONE HYDROCHLORIDE 5 MG/1
5 TABLET ORAL EVERY 4 HOURS PRN
Refills: 0 | Status: COMPLETED | OUTPATIENT
Start: 2025-04-28 | End: 2025-04-29

## 2025-04-28 RX ORDER — ACETAMINOPHEN 325 MG/1
650 TABLET ORAL EVERY 6 HOURS PRN
Status: DISCONTINUED | OUTPATIENT
Start: 2025-04-28 | End: 2025-05-08 | Stop reason: HOSPADM

## 2025-04-28 RX ORDER — NICOTINE 21 MG/24HR
1 PATCH, TRANSDERMAL 24 HOURS TRANSDERMAL DAILY
Status: DISCONTINUED | OUTPATIENT
Start: 2025-04-28 | End: 2025-05-04

## 2025-04-28 RX ORDER — MAGNESIUM HYDROXIDE/ALUMINUM HYDROXICE/SIMETHICONE 120; 1200; 1200 MG/30ML; MG/30ML; MG/30ML
30 SUSPENSION ORAL EVERY 6 HOURS PRN
Status: DISCONTINUED | OUTPATIENT
Start: 2025-04-28 | End: 2025-05-08 | Stop reason: HOSPADM

## 2025-04-28 RX ORDER — SODIUM CHLORIDE 0.9 % (FLUSH) 0.9 %
5-40 SYRINGE (ML) INJECTION PRN
Status: DISCONTINUED | OUTPATIENT
Start: 2025-04-28 | End: 2025-05-02

## 2025-04-28 RX ORDER — MIRTAZAPINE 15 MG/1
15 TABLET, FILM COATED ORAL NIGHTLY
COMMUNITY

## 2025-04-28 RX ORDER — POLYETHYLENE GLYCOL 3350 17 G/17G
17 POWDER, FOR SOLUTION ORAL DAILY PRN
Status: DISCONTINUED | OUTPATIENT
Start: 2025-04-28 | End: 2025-05-08 | Stop reason: HOSPADM

## 2025-04-28 RX ORDER — BUDESONIDE 0.25 MG/2ML
0.25 INHALANT ORAL
Status: DISCONTINUED | OUTPATIENT
Start: 2025-04-28 | End: 2025-04-29

## 2025-04-28 RX ORDER — ALBUTEROL SULFATE 90 UG/1
1 INHALANT RESPIRATORY (INHALATION) EVERY 4 HOURS PRN
Status: DISCONTINUED | OUTPATIENT
Start: 2025-04-28 | End: 2025-05-08 | Stop reason: HOSPADM

## 2025-04-28 RX ORDER — SODIUM CHLORIDE 9 MG/ML
INJECTION, SOLUTION INTRAVENOUS PRN
Status: DISCONTINUED | OUTPATIENT
Start: 2025-04-28 | End: 2025-05-08 | Stop reason: HOSPADM

## 2025-04-28 RX ORDER — SODIUM CHLORIDE 0.9 % (FLUSH) 0.9 %
5-40 SYRINGE (ML) INJECTION EVERY 12 HOURS SCHEDULED
Status: DISCONTINUED | OUTPATIENT
Start: 2025-04-28 | End: 2025-05-02

## 2025-04-28 RX ORDER — ASPIRIN 81 MG/1
81 TABLET ORAL DAILY
Status: DISCONTINUED | OUTPATIENT
Start: 2025-04-28 | End: 2025-05-08 | Stop reason: HOSPADM

## 2025-04-28 RX ORDER — SENNA AND DOCUSATE SODIUM 50; 8.6 MG/1; MG/1
1 TABLET, FILM COATED ORAL DAILY
Status: DISCONTINUED | OUTPATIENT
Start: 2025-04-28 | End: 2025-05-08 | Stop reason: HOSPADM

## 2025-04-28 RX ORDER — METOPROLOL SUCCINATE 25 MG/1
75 TABLET, EXTENDED RELEASE ORAL DAILY
COMMUNITY

## 2025-04-28 RX ORDER — CITALOPRAM HYDROBROMIDE 20 MG/1
10 TABLET ORAL DAILY
Status: DISCONTINUED | OUTPATIENT
Start: 2025-04-28 | End: 2025-05-08 | Stop reason: HOSPADM

## 2025-04-28 RX ORDER — ACETAMINOPHEN 325 MG/1
650 TABLET ORAL EVERY 4 HOURS PRN
Status: DISCONTINUED | OUTPATIENT
Start: 2025-04-28 | End: 2025-04-29 | Stop reason: SDUPTHER

## 2025-04-28 RX ORDER — ARFORMOTEROL TARTRATE 15 UG/2ML
15 SOLUTION RESPIRATORY (INHALATION)
Status: DISCONTINUED | OUTPATIENT
Start: 2025-04-28 | End: 2025-04-29

## 2025-04-28 RX ORDER — ONDANSETRON 2 MG/ML
4 INJECTION INTRAMUSCULAR; INTRAVENOUS EVERY 6 HOURS PRN
Status: DISCONTINUED | OUTPATIENT
Start: 2025-04-28 | End: 2025-05-08 | Stop reason: HOSPADM

## 2025-04-28 RX ORDER — SODIUM CHLORIDE 9 MG/ML
INJECTION, SOLUTION INTRAVENOUS CONTINUOUS
Status: ACTIVE | OUTPATIENT
Start: 2025-04-28 | End: 2025-04-29

## 2025-04-28 RX ORDER — CLOPIDOGREL BISULFATE 75 MG/1
75 TABLET ORAL DAILY
Status: DISCONTINUED | OUTPATIENT
Start: 2025-04-28 | End: 2025-05-08 | Stop reason: HOSPADM

## 2025-04-28 RX ORDER — ACETAMINOPHEN 650 MG/1
650 SUPPOSITORY RECTAL EVERY 6 HOURS PRN
Status: DISCONTINUED | OUTPATIENT
Start: 2025-04-28 | End: 2025-05-08 | Stop reason: HOSPADM

## 2025-04-28 RX ORDER — LISINOPRIL 10 MG/1
10 TABLET ORAL DAILY
COMMUNITY

## 2025-04-28 RX ORDER — OXYCODONE AND ACETAMINOPHEN 5; 325 MG/1; MG/1
1 TABLET ORAL
Refills: 0 | Status: COMPLETED | OUTPATIENT
Start: 2025-04-28 | End: 2025-04-28

## 2025-04-28 RX ORDER — DILTIAZEM HYDROCHLORIDE 120 MG/1
120 CAPSULE, COATED, EXTENDED RELEASE ORAL DAILY
Status: DISCONTINUED | OUTPATIENT
Start: 2025-04-28 | End: 2025-05-08 | Stop reason: HOSPADM

## 2025-04-28 RX ORDER — PANTOPRAZOLE SODIUM 40 MG/1
40 TABLET, DELAYED RELEASE ORAL
Status: DISCONTINUED | OUTPATIENT
Start: 2025-04-29 | End: 2025-05-08 | Stop reason: HOSPADM

## 2025-04-28 RX ORDER — MELOXICAM 15 MG/1
7.5 TABLET ORAL DAILY
COMMUNITY

## 2025-04-28 RX ORDER — OXYCODONE AND ACETAMINOPHEN 7.5; 325 MG/1; MG/1
1 TABLET ORAL EVERY 4 HOURS PRN
COMMUNITY

## 2025-04-28 RX ORDER — ONDANSETRON 4 MG/1
4 TABLET, ORALLY DISINTEGRATING ORAL EVERY 8 HOURS PRN
Status: DISCONTINUED | OUTPATIENT
Start: 2025-04-28 | End: 2025-05-08 | Stop reason: HOSPADM

## 2025-04-28 RX ORDER — ONDANSETRON 2 MG/ML
4 INJECTION INTRAMUSCULAR; INTRAVENOUS EVERY 4 HOURS PRN
Status: DISCONTINUED | OUTPATIENT
Start: 2025-04-28 | End: 2025-04-28

## 2025-04-28 RX ORDER — DOCUSATE SODIUM 100 MG/1
100 CAPSULE, LIQUID FILLED ORAL 2 TIMES DAILY PRN
COMMUNITY

## 2025-04-28 RX ADMIN — PIPERACILLIN AND TAZOBACTAM 4500 MG: 4; .5 INJECTION, POWDER, LYOPHILIZED, FOR SOLUTION INTRAVENOUS at 15:17

## 2025-04-28 RX ADMIN — MELATONIN 3 MG: at 22:59

## 2025-04-28 RX ADMIN — PIPERACILLIN AND TAZOBACTAM 3375 MG: 3; .375 INJECTION, POWDER, LYOPHILIZED, FOR SOLUTION INTRAVENOUS at 23:11

## 2025-04-28 RX ADMIN — BUDESONIDE 250 MCG: 0.25 INHALANT RESPIRATORY (INHALATION) at 13:57

## 2025-04-28 RX ADMIN — ATORVASTATIN CALCIUM 10 MG: 10 TABLET, FILM COATED ORAL at 22:59

## 2025-04-28 RX ADMIN — ARFORMOTEROL TARTRATE 15 MCG: 15 SOLUTION RESPIRATORY (INHALATION) at 13:57

## 2025-04-28 RX ADMIN — IPRATROPIUM BROMIDE 0.5 MG: 0.5 SOLUTION RESPIRATORY (INHALATION) at 20:26

## 2025-04-28 RX ADMIN — SODIUM CHLORIDE 1500 MG: 0.9 INJECTION, SOLUTION INTRAVENOUS at 13:36

## 2025-04-28 RX ADMIN — CLOPIDOGREL BISULFATE 75 MG: 75 TABLET, FILM COATED ORAL at 15:06

## 2025-04-28 RX ADMIN — ENOXAPARIN SODIUM 40 MG: 100 INJECTION SUBCUTANEOUS at 15:07

## 2025-04-28 RX ADMIN — OXYCODONE AND ACETAMINOPHEN 1 TABLET: 325; 5 TABLET ORAL at 13:25

## 2025-04-28 RX ADMIN — SODIUM CHLORIDE: 0.9 INJECTION, SOLUTION INTRAVENOUS at 23:14

## 2025-04-28 RX ADMIN — SODIUM CHLORIDE, PRESERVATIVE FREE 10 ML: 5 INJECTION INTRAVENOUS at 23:15

## 2025-04-28 RX ADMIN — BUDESONIDE 250 MCG: 0.25 INHALANT RESPIRATORY (INHALATION) at 20:26

## 2025-04-28 RX ADMIN — ARFORMOTEROL TARTRATE 15 MCG: 15 SOLUTION RESPIRATORY (INHALATION) at 20:26

## 2025-04-28 RX ADMIN — IPRATROPIUM BROMIDE 0.5 MG: 0.5 SOLUTION RESPIRATORY (INHALATION) at 14:00

## 2025-04-28 RX ADMIN — SENNOSIDES AND DOCUSATE SODIUM 1 TABLET: 50; 8.6 TABLET ORAL at 15:07

## 2025-04-28 RX ADMIN — CITALOPRAM HYDROBROMIDE 10 MG: 20 TABLET ORAL at 15:06

## 2025-04-28 RX ADMIN — OXYCODONE 5 MG: 5 TABLET ORAL at 17:57

## 2025-04-28 RX ADMIN — OXYCODONE 5 MG: 5 TABLET ORAL at 20:17

## 2025-04-28 RX ADMIN — SODIUM CHLORIDE: 0.9 INJECTION, SOLUTION INTRAVENOUS at 15:15

## 2025-04-28 RX ADMIN — ASPIRIN 81 MG: 81 TABLET, COATED ORAL at 15:06

## 2025-04-28 ASSESSMENT — PAIN SCALES - GENERAL
PAINLEVEL_OUTOF10: 2
PAINLEVEL_OUTOF10: 0
PAINLEVEL_OUTOF10: 7
PAINLEVEL_OUTOF10: 8

## 2025-04-28 ASSESSMENT — PAIN DESCRIPTION - DESCRIPTORS
DESCRIPTORS: ACHING
DESCRIPTORS: ACHING

## 2025-04-28 ASSESSMENT — PAIN DESCRIPTION - ORIENTATION
ORIENTATION: LEFT
ORIENTATION: LEFT

## 2025-04-28 ASSESSMENT — LIFESTYLE VARIABLES
HOW OFTEN DO YOU HAVE A DRINK CONTAINING ALCOHOL: NEVER
HOW MANY STANDARD DRINKS CONTAINING ALCOHOL DO YOU HAVE ON A TYPICAL DAY: PATIENT DOES NOT DRINK

## 2025-04-28 ASSESSMENT — PAIN DESCRIPTION - LOCATION
LOCATION: FOOT
LOCATION: FOOT

## 2025-04-28 NOTE — ED NOTES
ED provider gave verbal order to this RN to obtain blood cultures prior to antibiotic administration at this time.

## 2025-04-28 NOTE — H&P
glycol (GLYCOLAX) 17 g packet Take 1 packet by mouth daily as needed for Constipation    ProviderLiane MD   sennosides-docusate sodium (SENOKOT-S) 8.6-50 MG tablet Take 1 tablet by mouth daily    Liane Toney MD   atorvastatin (LIPITOR) 10 MG tablet Take 1 tablet by mouth nightly 2/15/25   Shaun Alex MD   aspirin 81 MG EC tablet Take 1 tablet by mouth daily 25   Shaun Alex MD   dilTIAZem (CARDIZEM CD) 120 MG extended release capsule Take 1 capsule by mouth daily 25   Shaun Alex MD   pantoprazole (PROTONIX) 40 MG tablet Take 1 tablet by mouth every morning (before breakfast) 25   Shaun Alex MD   nicotine (NICODERM CQ) 21 MG/24HR Place 1 patch onto the skin daily 24   Tonya Cohn, APRN - NP   OXYGEN Inhale 3 L into the lungs daily    ProviderLiane MD   Multiple Vitamins-Minerals (MENS 50+ MULTI VITAMIN/MIN PO) Take by mouth daily    Liane Toney MD   clopidogrel (PLAVIX) 75 MG tablet Take 1 tablet by mouth daily 24   Staci Matos MD   albuterol sulfate HFA (PROVENTIL;VENTOLIN;PROAIR) 108 (90 Base) MCG/ACT inhaler Inhale into the lungs    Automatic Reconciliation, Ar   citalopram (CELEXA) 10 MG tablet Take by mouth daily    Automatic Reconciliation, Ar         Objective:   VITALS:    Patient Vitals for the past 24 hrs:   BP Temp Temp src Pulse Resp SpO2 Height Weight   25 1102 134/78 97.9 °F (36.6 °C) Oral 90 20 90 % 1.778 m (5' 10\") 59.4 kg (131 lb)       Temp (24hrs), Av.9 °F (36.6 °C), Min:97.9 °F (36.6 °C), Max:97.9 °F (36.6 °C)      O2 Flow Rate (L/min): 3 L/min O2 Device: Nasal cannula    Wt Readings from Last 12 Encounters:   25 59.4 kg (131 lb)   25 59.4 kg (130 lb 15.3 oz)   25 58.9 kg (129 lb 13.6 oz)   25 59.9 kg (132 lb 0.9 oz)   09/10/24 70 kg (154 lb 5.2 oz)   24 69.4 kg (153 lb)         PHYSICAL EXAM:  General:    Alert, chronically ill looking, cooperative, appears stated

## 2025-04-28 NOTE — ED PROVIDER NOTES
[facility] EMERGENCY DEPARTMENT  EMERGENCY DEPARTMENT ENCOUNTER       Pt Name: Nasir Hatfield  MRN: [id number]  Birthdate 1950  Date of evaluation: 4/28/2025  Provider: Melissa Chapa MD   PCP: Abdirahman Ortiz PA-C  Note Started: 12:08 PM EDT 4/28/25     CHIEF COMPLAINT       Chief Complaint   Patient presents with    Wound Infection     Patient sent by Vascular MD after having left foot wrapped in the office, patient reports that he had all 5 toes amputated 2/5. Patient reports that there is about 1-2 inch wound to the pinky side        HISTORY OF PRESENT ILLNESS: 1 or more elements      History From: patient, History limited by: none     Nasir Hatfield is a 74 y.o. male who presents with a cc of an infected wound. Sent by vascular for admission       Please See MDM for Additional Details of the HPI/PMH  Nursing Notes were all reviewed and agreed with or any disagreements were addressed in the HPI.     REVIEW OF SYSTEMS        Positives and Pertinent negatives as per HPI.    PAST HISTORY     Past Medical History:  Past Medical History:   Diagnosis Date    Cerebral artery occlusion with cerebral infarction (HCC)     >40yrs - no residual    Chronic obstructive pulmonary disease (HCC)     Hx of blood clots 08/29/2024    Left lower leg    Hypertension        Past Surgical History:  Past Surgical History:   Procedure Laterality Date    AMPUTATION Left 2024    Left Big Toe    ANGIOPLASTY Left 05/20/2024    LEFT FEMORAL ANGIOGRAM BALLOON  ANGIOPLASTY AND STENTING performed by Harris Lorenzo MD at \[facility]\"" MAIN OR    COLONOSCOPY      EYE SURGERY Left     cataract surgery    FEMORAL-TIBIAL BYPASS GRAFT N/A 1/29/2025    LEFT LOWER EXTREMITY TIBIAL BYPASS WITH VEIN performed by Harris Lorenzo MD at \[facility]\"" MAIN OR    FOOT DEBRIDEMENT Left 05/22/2024    BONE BIOPSY LEFT FOOT performed by Thony Chisholm DPM at \[facility]\"" MAIN OR    LEG SURGERY Left 3/7/2025    LEFT FOOT DEBRIDEMENT performed by Harris Lorenzo MD at

## 2025-04-28 NOTE — ED NOTES
Report received from NICHOLAS Otero. Reviewed reason for patient arrival, vitals, labs, medications, orders, procedures, results, pending orders/results and current plan for disposition. Questions were asked and answered prior to departure.

## 2025-04-29 ENCOUNTER — APPOINTMENT (OUTPATIENT)
Facility: HOSPITAL | Age: 75
DRG: 240 | End: 2025-04-29
Payer: MEDICARE

## 2025-04-29 LAB
ALBUMIN SERPL-MCNC: 2.4 G/DL (ref 3.5–5)
ALBUMIN/GLOB SERPL: 0.6 (ref 1.1–2.2)
ALP SERPL-CCNC: 85 U/L (ref 45–117)
ALT SERPL-CCNC: 12 U/L (ref 12–78)
ANION GAP SERPL CALC-SCNC: 6 MMOL/L (ref 2–12)
AST SERPL-CCNC: 16 U/L (ref 15–37)
BILIRUB SERPL-MCNC: 0.4 MG/DL (ref 0.2–1)
BUN SERPL-MCNC: 28 MG/DL (ref 6–20)
BUN/CREAT SERPL: 33 (ref 12–20)
CALCIUM SERPL-MCNC: 8 MG/DL (ref 8.5–10.1)
CHLORIDE SERPL-SCNC: 110 MMOL/L (ref 97–108)
CO2 SERPL-SCNC: 24 MMOL/L (ref 21–32)
CREAT SERPL-MCNC: 0.84 MG/DL (ref 0.7–1.3)
ERYTHROCYTE [DISTWIDTH] IN BLOOD BY AUTOMATED COUNT: 20.8 % (ref 11.5–14.5)
GLOBULIN SER CALC-MCNC: 3.9 G/DL (ref 2–4)
GLUCOSE SERPL-MCNC: 100 MG/DL (ref 65–100)
HCT VFR BLD AUTO: 31.6 % (ref 36.6–50.3)
HGB BLD-MCNC: 9.6 G/DL (ref 12.1–17)
IRON SATN MFR SERPL: 5 % (ref 20–50)
IRON SERPL-MCNC: 15 UG/DL (ref 35–150)
LACTATE SERPL-SCNC: 1.2 MMOL/L (ref 0.4–2)
MAGNESIUM SERPL-MCNC: 1.9 MG/DL (ref 1.6–2.4)
MCH RBC QN AUTO: 23.5 PG (ref 26–34)
MCHC RBC AUTO-ENTMCNC: 30.4 G/DL (ref 30–36.5)
MCV RBC AUTO: 77.3 FL (ref 80–99)
NRBC # BLD: 0 K/UL (ref 0–0.01)
NRBC BLD-RTO: 0 PER 100 WBC
PHOSPHATE SERPL-MCNC: 3.8 MG/DL (ref 2.6–4.7)
PLATELET # BLD AUTO: 318 K/UL (ref 150–400)
PMV BLD AUTO: 8.8 FL (ref 8.9–12.9)
POTASSIUM SERPL-SCNC: 3.7 MMOL/L (ref 3.5–5.1)
PROT SERPL-MCNC: 6.3 G/DL (ref 6.4–8.2)
RBC # BLD AUTO: 4.09 M/UL (ref 4.1–5.7)
SODIUM SERPL-SCNC: 140 MMOL/L (ref 136–145)
TIBC SERPL-MCNC: 312 UG/DL (ref 250–450)
VANCOMYCIN SERPL-MCNC: 9.3 UG/ML
WBC # BLD AUTO: 6.3 K/UL (ref 4.1–11.1)

## 2025-04-29 PROCEDURE — 97530 THERAPEUTIC ACTIVITIES: CPT | Performed by: OCCUPATIONAL THERAPIST

## 2025-04-29 PROCEDURE — 94761 N-INVAS EAR/PLS OXIMETRY MLT: CPT

## 2025-04-29 PROCEDURE — 83605 ASSAY OF LACTIC ACID: CPT

## 2025-04-29 PROCEDURE — 97161 PT EVAL LOW COMPLEX 20 MIN: CPT

## 2025-04-29 PROCEDURE — 80202 ASSAY OF VANCOMYCIN: CPT

## 2025-04-29 PROCEDURE — 6360000002 HC RX W HCPCS: Performed by: INTERNAL MEDICINE

## 2025-04-29 PROCEDURE — 97166 OT EVAL MOD COMPLEX 45 MIN: CPT | Performed by: OCCUPATIONAL THERAPIST

## 2025-04-29 PROCEDURE — 83540 ASSAY OF IRON: CPT

## 2025-04-29 PROCEDURE — 83550 IRON BINDING TEST: CPT

## 2025-04-29 PROCEDURE — 80053 COMPREHEN METABOLIC PANEL: CPT

## 2025-04-29 PROCEDURE — 93922 UPR/L XTREMITY ART 2 LEVELS: CPT

## 2025-04-29 PROCEDURE — 1100000000 HC RM PRIVATE

## 2025-04-29 PROCEDURE — 84100 ASSAY OF PHOSPHORUS: CPT

## 2025-04-29 PROCEDURE — 2500000003 HC RX 250 WO HCPCS: Performed by: INTERNAL MEDICINE

## 2025-04-29 PROCEDURE — 94640 AIRWAY INHALATION TREATMENT: CPT

## 2025-04-29 PROCEDURE — 85027 COMPLETE CBC AUTOMATED: CPT

## 2025-04-29 PROCEDURE — 36415 COLL VENOUS BLD VENIPUNCTURE: CPT

## 2025-04-29 PROCEDURE — 83735 ASSAY OF MAGNESIUM: CPT

## 2025-04-29 PROCEDURE — 6370000000 HC RX 637 (ALT 250 FOR IP): Performed by: INTERNAL MEDICINE

## 2025-04-29 PROCEDURE — 2700000000 HC OXYGEN THERAPY PER DAY

## 2025-04-29 PROCEDURE — 97530 THERAPEUTIC ACTIVITIES: CPT

## 2025-04-29 PROCEDURE — 6370000000 HC RX 637 (ALT 250 FOR IP)

## 2025-04-29 PROCEDURE — 2580000003 HC RX 258: Performed by: INTERNAL MEDICINE

## 2025-04-29 RX ORDER — IPRATROPIUM BROMIDE AND ALBUTEROL SULFATE 2.5; .5 MG/3ML; MG/3ML
1 SOLUTION RESPIRATORY (INHALATION) EVERY 4 HOURS PRN
Status: DISCONTINUED | OUTPATIENT
Start: 2025-04-29 | End: 2025-05-08 | Stop reason: HOSPADM

## 2025-04-29 RX ORDER — BUDESONIDE 0.25 MG/2ML
0.25 INHALANT ORAL
Status: DISCONTINUED | OUTPATIENT
Start: 2025-04-29 | End: 2025-05-08 | Stop reason: HOSPADM

## 2025-04-29 RX ORDER — MORPHINE SULFATE 2 MG/ML
1 INJECTION, SOLUTION INTRAMUSCULAR; INTRAVENOUS EVERY 4 HOURS PRN
Status: DISCONTINUED | OUTPATIENT
Start: 2025-04-29 | End: 2025-05-08 | Stop reason: HOSPADM

## 2025-04-29 RX ORDER — MORPHINE SULFATE 2 MG/ML
2 INJECTION, SOLUTION INTRAMUSCULAR; INTRAVENOUS EVERY 4 HOURS PRN
Status: DISCONTINUED | OUTPATIENT
Start: 2025-04-29 | End: 2025-05-08 | Stop reason: HOSPADM

## 2025-04-29 RX ORDER — ARFORMOTEROL TARTRATE 15 UG/2ML
15 SOLUTION RESPIRATORY (INHALATION)
Status: DISCONTINUED | OUTPATIENT
Start: 2025-04-29 | End: 2025-05-08 | Stop reason: HOSPADM

## 2025-04-29 RX ADMIN — PIPERACILLIN AND TAZOBACTAM 3375 MG: 3; .375 INJECTION, POWDER, LYOPHILIZED, FOR SOLUTION INTRAVENOUS at 06:46

## 2025-04-29 RX ADMIN — MORPHINE SULFATE 2 MG: 2 INJECTION, SOLUTION INTRAMUSCULAR; INTRAVENOUS at 21:33

## 2025-04-29 RX ADMIN — MELATONIN 3 MG: at 21:32

## 2025-04-29 RX ADMIN — ARFORMOTEROL TARTRATE 15 MCG: 15 SOLUTION RESPIRATORY (INHALATION) at 08:49

## 2025-04-29 RX ADMIN — OXYCODONE 5 MG: 5 TABLET ORAL at 00:24

## 2025-04-29 RX ADMIN — IPRATROPIUM BROMIDE 0.5 MG: 0.5 SOLUTION RESPIRATORY (INHALATION) at 13:54

## 2025-04-29 RX ADMIN — DILTIAZEM HYDROCHLORIDE 120 MG: 120 CAPSULE, EXTENDED RELEASE ORAL at 10:41

## 2025-04-29 RX ADMIN — IPRATROPIUM BROMIDE 0.5 MG: 0.5 SOLUTION RESPIRATORY (INHALATION) at 08:44

## 2025-04-29 RX ADMIN — SODIUM CHLORIDE, PRESERVATIVE FREE 10 ML: 5 INJECTION INTRAVENOUS at 21:36

## 2025-04-29 RX ADMIN — VANCOMYCIN HYDROCHLORIDE 750 MG: 750 INJECTION, POWDER, LYOPHILIZED, FOR SOLUTION INTRAVENOUS at 14:42

## 2025-04-29 RX ADMIN — BUDESONIDE 250 MCG: 0.25 INHALANT RESPIRATORY (INHALATION) at 08:49

## 2025-04-29 RX ADMIN — PIPERACILLIN AND TAZOBACTAM 3375 MG: 3; .375 INJECTION, POWDER, LYOPHILIZED, FOR SOLUTION INTRAVENOUS at 16:01

## 2025-04-29 RX ADMIN — PANTOPRAZOLE SODIUM 40 MG: 40 TABLET, DELAYED RELEASE ORAL at 06:46

## 2025-04-29 RX ADMIN — ASPIRIN 81 MG: 81 TABLET, COATED ORAL at 10:41

## 2025-04-29 RX ADMIN — IPRATROPIUM BROMIDE 0.5 MG: 0.5 SOLUTION RESPIRATORY (INHALATION) at 21:47

## 2025-04-29 RX ADMIN — MORPHINE SULFATE 2 MG: 2 INJECTION, SOLUTION INTRAMUSCULAR; INTRAVENOUS at 10:12

## 2025-04-29 RX ADMIN — SODIUM CHLORIDE, PRESERVATIVE FREE 10 ML: 5 INJECTION INTRAVENOUS at 10:42

## 2025-04-29 RX ADMIN — PIPERACILLIN AND TAZOBACTAM 3375 MG: 3; .375 INJECTION, POWDER, LYOPHILIZED, FOR SOLUTION INTRAVENOUS at 23:00

## 2025-04-29 RX ADMIN — VANCOMYCIN HYDROCHLORIDE 750 MG: 750 INJECTION, POWDER, LYOPHILIZED, FOR SOLUTION INTRAVENOUS at 01:57

## 2025-04-29 RX ADMIN — OXYCODONE 5 MG: 5 TABLET ORAL at 04:18

## 2025-04-29 RX ADMIN — ATORVASTATIN CALCIUM 10 MG: 10 TABLET, FILM COATED ORAL at 21:32

## 2025-04-29 RX ADMIN — MORPHINE SULFATE 2 MG: 2 INJECTION, SOLUTION INTRAMUSCULAR; INTRAVENOUS at 14:36

## 2025-04-29 RX ADMIN — BUDESONIDE 250 MCG: 0.25 INHALANT RESPIRATORY (INHALATION) at 21:52

## 2025-04-29 RX ADMIN — CLOPIDOGREL BISULFATE 75 MG: 75 TABLET, FILM COATED ORAL at 10:41

## 2025-04-29 RX ADMIN — ARFORMOTEROL TARTRATE 15 MCG: 15 SOLUTION RESPIRATORY (INHALATION) at 21:52

## 2025-04-29 RX ADMIN — CITALOPRAM HYDROBROMIDE 10 MG: 20 TABLET ORAL at 10:41

## 2025-04-29 RX ADMIN — SENNOSIDES AND DOCUSATE SODIUM 1 TABLET: 50; 8.6 TABLET ORAL at 10:41

## 2025-04-29 ASSESSMENT — PAIN DESCRIPTION - ORIENTATION
ORIENTATION: LEFT

## 2025-04-29 ASSESSMENT — PAIN SCALES - GENERAL
PAINLEVEL_OUTOF10: 2
PAINLEVEL_OUTOF10: 7
PAINLEVEL_OUTOF10: 2
PAINLEVEL_OUTOF10: 1
PAINLEVEL_OUTOF10: 7
PAINLEVEL_OUTOF10: 8
PAINLEVEL_OUTOF10: 7
PAINLEVEL_OUTOF10: 2
PAINLEVEL_OUTOF10: 8

## 2025-04-29 ASSESSMENT — PAIN DESCRIPTION - LOCATION
LOCATION: FOOT

## 2025-04-29 ASSESSMENT — PAIN DESCRIPTION - DESCRIPTORS
DESCRIPTORS: ACHING
DESCRIPTORS: THROBBING
DESCRIPTORS: ACHING
DESCRIPTORS: ACHING

## 2025-04-29 ASSESSMENT — PAIN - FUNCTIONAL ASSESSMENT: PAIN_FUNCTIONAL_ASSESSMENT: PREVENTS OR INTERFERES SOME ACTIVE ACTIVITIES AND ADLS

## 2025-04-29 NOTE — WOUND CARE
Wound care nurse consult for left foot wound.    73 y/o male admitted 4/28/25 for infected wound.  Past Medical History:   Diagnosis Date    Cerebral artery occlusion with cerebral infarction (HCC)     >40yrs - no residual    Chronic obstructive pulmonary disease (HCC)     Hx of blood clots 08/29/2024    Left lower leg    Hypertension      Past Surgical History:   Procedure Laterality Date    AMPUTATION Left 2024    Left Big Toe    ANGIOPLASTY Left 05/20/2024    LEFT FEMORAL ANGIOGRAM BALLOON  ANGIOPLASTY AND STENTING performed by Harris Lorenzo MD at [facility] MAIN OR    COLONOSCOPY      EYE SURGERY Left     cataract surgery    FEMORAL-TIBIAL BYPASS GRAFT N/A 1/29/2025    LEFT LOWER EXTREMITY TIBIAL BYPASS WITH VEIN performed by Harris Lorenzo MD at [facility] MAIN OR    FOOT DEBRIDEMENT Left 05/22/2024    BONE BIOPSY LEFT FOOT performed by Thony Chisholm DPM at [facility] MAIN OR    LEG SURGERY Left 3/7/2025    LEFT FOOT DEBRIDEMENT performed by Harris Lorenzo MD at [facility] MAIN OR    TOE AMPUTATION Left 2/5/2025    LEFT 2ND, 3RD, 4TH, AND 5TH TOE AMPUTATIONS ( MAC WITH BLOCK) performed by Harris Lorenzo MD at [facility] MAIN OR    VASCULAR SURGERY Right 9/7/2024    RE INJECT INFUSION CATHETER performed by Harris Lorenzo MD at [facility] MAIN OR    VASCULAR SURGERY Right 9/8/2024    REINJECT AND REMOVE INFUSION CATHETER performed by Harris Lorenzo MD at [facility] MAIN OR    VASCULAR SURGERY Left 9/6/2024    LEFT LOWER EXTREMITY THROMBECTOMY performed by Harris Lorenzo MD at [facility] MAIN OR    VASCULAR SURGERY Left 4/18/2025    LEFT LEG THROMBECTOMY AND ANGIOPLASTY OF BYPASS GRAFT, STENTING OF LEFT EXTERNAL ILIAC ARTERY performed by Harris Lorenzo MD at [facility] MAIN OR     Patient has been followed by Dr Lorenzo with vascular surgery for left leg foot and leg wounds from PAD  PMHX: PAD, COPD, Chronic respiratory failure, O2 dependence, tobacco use, HTN, Hyperlipidemia, A-fib, Stroke, depression and GERD.  OR 1/29/25 Left CFA to PT bypass

## 2025-04-29 NOTE — ED NOTES
Report given to NICHOLAS Naylor. Nurse was informed of reason for arrival, vitals, labs, medications, orders, procedures, results, anything left pending and current plan of action. Questions were asked and received prior to departure from the patient.

## 2025-04-30 ENCOUNTER — APPOINTMENT (OUTPATIENT)
Facility: HOSPITAL | Age: 75
DRG: 240 | End: 2025-04-30
Payer: MEDICARE

## 2025-04-30 ENCOUNTER — ANESTHESIA (OUTPATIENT)
Facility: HOSPITAL | Age: 75
End: 2025-04-30
Payer: MEDICARE

## 2025-04-30 ENCOUNTER — ANESTHESIA EVENT (OUTPATIENT)
Facility: HOSPITAL | Age: 75
End: 2025-04-30
Payer: MEDICARE

## 2025-04-30 LAB
ANION GAP SERPL CALC-SCNC: 3 MMOL/L (ref 2–12)
BUN SERPL-MCNC: 13 MG/DL (ref 6–20)
BUN/CREAT SERPL: 21 (ref 12–20)
CALCIUM SERPL-MCNC: 8.8 MG/DL (ref 8.5–10.1)
CHLORIDE SERPL-SCNC: 108 MMOL/L (ref 97–108)
CO2 SERPL-SCNC: 28 MMOL/L (ref 21–32)
CREAT SERPL-MCNC: 0.63 MG/DL (ref 0.7–1.3)
ERYTHROCYTE [DISTWIDTH] IN BLOOD BY AUTOMATED COUNT: 20.5 % (ref 11.5–14.5)
GLUCOSE SERPL-MCNC: 93 MG/DL (ref 65–100)
HCT VFR BLD AUTO: 32.1 % (ref 36.6–50.3)
HGB BLD-MCNC: 9.6 G/DL (ref 12.1–17)
MAGNESIUM SERPL-MCNC: 2.2 MG/DL (ref 1.6–2.4)
MCH RBC QN AUTO: 23.1 PG (ref 26–34)
MCHC RBC AUTO-ENTMCNC: 29.9 G/DL (ref 30–36.5)
MCV RBC AUTO: 77.3 FL (ref 80–99)
NRBC # BLD: 0 K/UL (ref 0–0.01)
NRBC BLD-RTO: 0 PER 100 WBC
PHOSPHATE SERPL-MCNC: 3.6 MG/DL (ref 2.6–4.7)
PLATELET # BLD AUTO: 334 K/UL (ref 150–400)
PMV BLD AUTO: 8.9 FL (ref 8.9–12.9)
POTASSIUM SERPL-SCNC: 3.9 MMOL/L (ref 3.5–5.1)
RBC # BLD AUTO: 4.15 M/UL (ref 4.1–5.7)
SODIUM SERPL-SCNC: 139 MMOL/L (ref 136–145)
WBC # BLD AUTO: 8.4 K/UL (ref 4.1–11.1)

## 2025-04-30 PROCEDURE — 0Y6N0ZD DETACHMENT AT LEFT FOOT, PARTIAL 4TH RAY, OPEN APPROACH: ICD-10-PCS | Performed by: SURGERY

## 2025-04-30 PROCEDURE — 6370000000 HC RX 637 (ALT 250 FOR IP)

## 2025-04-30 PROCEDURE — 36415 COLL VENOUS BLD VENIPUNCTURE: CPT

## 2025-04-30 PROCEDURE — 7100000000 HC PACU RECOVERY - FIRST 15 MIN: Performed by: SURGERY

## 2025-04-30 PROCEDURE — 88307 TISSUE EXAM BY PATHOLOGIST: CPT

## 2025-04-30 PROCEDURE — 83735 ASSAY OF MAGNESIUM: CPT

## 2025-04-30 PROCEDURE — 0Y6N0ZC DETACHMENT AT LEFT FOOT, PARTIAL 3RD RAY, OPEN APPROACH: ICD-10-PCS | Performed by: SURGERY

## 2025-04-30 PROCEDURE — 6360000002 HC RX W HCPCS: Performed by: ANESTHESIOLOGY

## 2025-04-30 PROCEDURE — 3700000000 HC ANESTHESIA ATTENDED CARE: Performed by: SURGERY

## 2025-04-30 PROCEDURE — 2580000003 HC RX 258: Performed by: INTERNAL MEDICINE

## 2025-04-30 PROCEDURE — 94640 AIRWAY INHALATION TREATMENT: CPT

## 2025-04-30 PROCEDURE — 6360000002 HC RX W HCPCS: Performed by: INTERNAL MEDICINE

## 2025-04-30 PROCEDURE — 1100000000 HC RM PRIVATE

## 2025-04-30 PROCEDURE — 2500000003 HC RX 250 WO HCPCS: Performed by: INTERNAL MEDICINE

## 2025-04-30 PROCEDURE — 80048 BASIC METABOLIC PNL TOTAL CA: CPT

## 2025-04-30 PROCEDURE — 0Y6N0ZF DETACHMENT AT LEFT FOOT, PARTIAL 5TH RAY, OPEN APPROACH: ICD-10-PCS | Performed by: SURGERY

## 2025-04-30 PROCEDURE — 6360000002 HC RX W HCPCS: Performed by: REGISTERED NURSE

## 2025-04-30 PROCEDURE — 6370000000 HC RX 637 (ALT 250 FOR IP): Performed by: INTERNAL MEDICINE

## 2025-04-30 PROCEDURE — 94761 N-INVAS EAR/PLS OXIMETRY MLT: CPT

## 2025-04-30 PROCEDURE — 3600000012 HC SURGERY LEVEL 2 ADDTL 15MIN: Performed by: SURGERY

## 2025-04-30 PROCEDURE — 3700000001 HC ADD 15 MINUTES (ANESTHESIA): Performed by: SURGERY

## 2025-04-30 PROCEDURE — 88311 DECALCIFY TISSUE: CPT

## 2025-04-30 PROCEDURE — 7100000001 HC PACU RECOVERY - ADDTL 15 MIN: Performed by: SURGERY

## 2025-04-30 PROCEDURE — 2709999900 HC NON-CHARGEABLE SUPPLY: Performed by: SURGERY

## 2025-04-30 PROCEDURE — 2500000003 HC RX 250 WO HCPCS: Performed by: ANESTHESIOLOGY

## 2025-04-30 PROCEDURE — 85027 COMPLETE CBC AUTOMATED: CPT

## 2025-04-30 PROCEDURE — 2580000003 HC RX 258: Performed by: REGISTERED NURSE

## 2025-04-30 PROCEDURE — 3600000002 HC SURGERY LEVEL 2 BASE: Performed by: SURGERY

## 2025-04-30 PROCEDURE — 84100 ASSAY OF PHOSPHORUS: CPT

## 2025-04-30 PROCEDURE — 0Y6N0Z9 DETACHMENT AT LEFT FOOT, PARTIAL 1ST RAY, OPEN APPROACH: ICD-10-PCS | Performed by: SURGERY

## 2025-04-30 PROCEDURE — 2700000000 HC OXYGEN THERAPY PER DAY

## 2025-04-30 PROCEDURE — 0Y6N0ZB DETACHMENT AT LEFT FOOT, PARTIAL 2ND RAY, OPEN APPROACH: ICD-10-PCS | Performed by: SURGERY

## 2025-04-30 PROCEDURE — 2500000003 HC RX 250 WO HCPCS: Performed by: REGISTERED NURSE

## 2025-04-30 RX ORDER — FENTANYL CITRATE 50 UG/ML
50 INJECTION, SOLUTION INTRAMUSCULAR; INTRAVENOUS EVERY 5 MIN PRN
Status: COMPLETED | OUTPATIENT
Start: 2025-04-30 | End: 2025-04-30

## 2025-04-30 RX ORDER — PROCHLORPERAZINE EDISYLATE 5 MG/ML
5 INJECTION INTRAMUSCULAR; INTRAVENOUS
Status: DISCONTINUED | OUTPATIENT
Start: 2025-04-30 | End: 2025-04-30 | Stop reason: HOSPADM

## 2025-04-30 RX ORDER — SUCCINYLCHOLINE CHLORIDE 20 MG/ML
INJECTION INTRAMUSCULAR; INTRAVENOUS
Status: DISCONTINUED | OUTPATIENT
Start: 2025-04-30 | End: 2025-04-30 | Stop reason: SDUPTHER

## 2025-04-30 RX ORDER — SODIUM CHLORIDE 9 MG/ML
INJECTION, SOLUTION INTRAVENOUS PRN
Status: DISCONTINUED | OUTPATIENT
Start: 2025-04-30 | End: 2025-04-30 | Stop reason: HOSPADM

## 2025-04-30 RX ORDER — SODIUM CHLORIDE, SODIUM LACTATE, POTASSIUM CHLORIDE, CALCIUM CHLORIDE 600; 310; 30; 20 MG/100ML; MG/100ML; MG/100ML; MG/100ML
INJECTION, SOLUTION INTRAVENOUS
Status: DISCONTINUED | OUTPATIENT
Start: 2025-04-30 | End: 2025-04-30 | Stop reason: SDUPTHER

## 2025-04-30 RX ORDER — OXYCODONE HYDROCHLORIDE 5 MG/1
5 TABLET ORAL EVERY 4 HOURS PRN
Refills: 0 | Status: DISCONTINUED | OUTPATIENT
Start: 2025-04-30 | End: 2025-05-08 | Stop reason: HOSPADM

## 2025-04-30 RX ORDER — SODIUM CHLORIDE 0.9 % (FLUSH) 0.9 %
5-40 SYRINGE (ML) INJECTION EVERY 12 HOURS SCHEDULED
Status: DISCONTINUED | OUTPATIENT
Start: 2025-04-30 | End: 2025-04-30 | Stop reason: HOSPADM

## 2025-04-30 RX ORDER — ONDANSETRON 2 MG/ML
INJECTION INTRAMUSCULAR; INTRAVENOUS
Status: DISCONTINUED | OUTPATIENT
Start: 2025-04-30 | End: 2025-04-30 | Stop reason: SDUPTHER

## 2025-04-30 RX ORDER — ROCURONIUM BROMIDE 10 MG/ML
INJECTION, SOLUTION INTRAVENOUS
Status: DISCONTINUED | OUTPATIENT
Start: 2025-04-30 | End: 2025-04-30 | Stop reason: SDUPTHER

## 2025-04-30 RX ORDER — FENTANYL CITRATE 50 UG/ML
INJECTION, SOLUTION INTRAMUSCULAR; INTRAVENOUS
Status: DISCONTINUED | OUTPATIENT
Start: 2025-04-30 | End: 2025-04-30 | Stop reason: SDUPTHER

## 2025-04-30 RX ORDER — SODIUM CHLORIDE 0.9 % (FLUSH) 0.9 %
5-40 SYRINGE (ML) INJECTION PRN
Status: DISCONTINUED | OUTPATIENT
Start: 2025-04-30 | End: 2025-04-30 | Stop reason: HOSPADM

## 2025-04-30 RX ORDER — NALOXONE HYDROCHLORIDE 0.4 MG/ML
INJECTION, SOLUTION INTRAMUSCULAR; INTRAVENOUS; SUBCUTANEOUS PRN
Status: DISCONTINUED | OUTPATIENT
Start: 2025-04-30 | End: 2025-04-30 | Stop reason: HOSPADM

## 2025-04-30 RX ORDER — PHENYLEPHRINE HCL IN 0.9% NACL 0.4MG/10ML
SYRINGE (ML) INTRAVENOUS
Status: DISCONTINUED | OUTPATIENT
Start: 2025-04-30 | End: 2025-04-30 | Stop reason: SDUPTHER

## 2025-04-30 RX ORDER — HYDROMORPHONE HYDROCHLORIDE 1 MG/ML
0.25 INJECTION, SOLUTION INTRAMUSCULAR; INTRAVENOUS; SUBCUTANEOUS EVERY 5 MIN PRN
Status: COMPLETED | OUTPATIENT
Start: 2025-04-30 | End: 2025-04-30

## 2025-04-30 RX ADMIN — PIPERACILLIN AND TAZOBACTAM 3375 MG: 3; .375 INJECTION, POWDER, LYOPHILIZED, FOR SOLUTION INTRAVENOUS at 07:02

## 2025-04-30 RX ADMIN — PROPOFOL 20 MG: 10 INJECTION, EMULSION INTRAVENOUS at 11:02

## 2025-04-30 RX ADMIN — MORPHINE SULFATE 2 MG: 2 INJECTION, SOLUTION INTRAMUSCULAR; INTRAVENOUS at 22:29

## 2025-04-30 RX ADMIN — Medication 160 MCG: at 11:13

## 2025-04-30 RX ADMIN — ATORVASTATIN CALCIUM 10 MG: 10 TABLET, FILM COATED ORAL at 21:02

## 2025-04-30 RX ADMIN — OXYCODONE 5 MG: 5 TABLET ORAL at 14:49

## 2025-04-30 RX ADMIN — PROPOFOL 30 MG: 10 INJECTION, EMULSION INTRAVENOUS at 11:01

## 2025-04-30 RX ADMIN — SODIUM CHLORIDE, POTASSIUM CHLORIDE, SODIUM LACTATE AND CALCIUM CHLORIDE: 600; 310; 30; 20 INJECTION, SOLUTION INTRAVENOUS at 10:39

## 2025-04-30 RX ADMIN — IPRATROPIUM BROMIDE 0.5 MG: 0.5 SOLUTION RESPIRATORY (INHALATION) at 21:58

## 2025-04-30 RX ADMIN — FENTANYL CITRATE 50 MCG: 50 INJECTION INTRAMUSCULAR; INTRAVENOUS at 11:45

## 2025-04-30 RX ADMIN — ONDANSETRON 4 MG: 2 INJECTION, SOLUTION INTRAMUSCULAR; INTRAVENOUS at 11:14

## 2025-04-30 RX ADMIN — BUDESONIDE 250 MCG: 0.25 INHALANT RESPIRATORY (INHALATION) at 22:03

## 2025-04-30 RX ADMIN — Medication 80 MCG: at 11:19

## 2025-04-30 RX ADMIN — SUGAMMADEX 200 MG: 100 INJECTION, SOLUTION INTRAVENOUS at 11:26

## 2025-04-30 RX ADMIN — FENTANYL CITRATE 50 MCG: 50 INJECTION INTRAMUSCULAR; INTRAVENOUS at 12:04

## 2025-04-30 RX ADMIN — HYDROMORPHONE HYDROCHLORIDE 0.25 MG: 1 INJECTION, SOLUTION INTRAMUSCULAR; INTRAVENOUS; SUBCUTANEOUS at 12:21

## 2025-04-30 RX ADMIN — BUDESONIDE 250 MCG: 0.25 INHALANT RESPIRATORY (INHALATION) at 09:03

## 2025-04-30 RX ADMIN — ROCURONIUM BROMIDE 30 MG: 10 INJECTION INTRAVENOUS at 11:01

## 2025-04-30 RX ADMIN — MORPHINE SULFATE 2 MG: 2 INJECTION, SOLUTION INTRAMUSCULAR; INTRAVENOUS at 18:49

## 2025-04-30 RX ADMIN — MORPHINE SULFATE 2 MG: 2 INJECTION, SOLUTION INTRAMUSCULAR; INTRAVENOUS at 06:00

## 2025-04-30 RX ADMIN — Medication 160 MCG: at 11:08

## 2025-04-30 RX ADMIN — IPRATROPIUM BROMIDE AND ALBUTEROL SULFATE 1 DOSE: .5; 3 SOLUTION RESPIRATORY (INHALATION) at 12:45

## 2025-04-30 RX ADMIN — ARFORMOTEROL TARTRATE 15 MCG: 15 SOLUTION RESPIRATORY (INHALATION) at 22:03

## 2025-04-30 RX ADMIN — MELATONIN 3 MG: at 21:02

## 2025-04-30 RX ADMIN — ARFORMOTEROL TARTRATE 15 MCG: 15 SOLUTION RESPIRATORY (INHALATION) at 09:03

## 2025-04-30 RX ADMIN — IPRATROPIUM BROMIDE 0.5 MG: 0.5 SOLUTION RESPIRATORY (INHALATION) at 08:58

## 2025-04-30 RX ADMIN — Medication 160 MCG: at 10:55

## 2025-04-30 RX ADMIN — Medication 120 MCG: at 10:53

## 2025-04-30 RX ADMIN — SODIUM CHLORIDE, PRESERVATIVE FREE 10 ML: 5 INJECTION INTRAVENOUS at 21:02

## 2025-04-30 RX ADMIN — PIPERACILLIN AND TAZOBACTAM 3375 MG: 3; .375 INJECTION, POWDER, LYOPHILIZED, FOR SOLUTION INTRAVENOUS at 22:29

## 2025-04-30 RX ADMIN — PIPERACILLIN AND TAZOBACTAM 3375 MG: 3; .375 INJECTION, POWDER, LYOPHILIZED, FOR SOLUTION INTRAVENOUS at 16:17

## 2025-04-30 RX ADMIN — FENTANYL CITRATE 50 MCG: 50 INJECTION, SOLUTION INTRAMUSCULAR; INTRAVENOUS at 10:59

## 2025-04-30 RX ADMIN — IPRATROPIUM BROMIDE 0.5 MG: 0.5 SOLUTION RESPIRATORY (INHALATION) at 13:50

## 2025-04-30 RX ADMIN — ACETAMINOPHEN 650 MG: 325 TABLET ORAL at 14:11

## 2025-04-30 RX ADMIN — PROPOFOL 100 MG: 10 INJECTION, EMULSION INTRAVENOUS at 10:49

## 2025-04-30 RX ADMIN — FENTANYL CITRATE 50 MCG: 50 INJECTION, SOLUTION INTRAMUSCULAR; INTRAVENOUS at 10:49

## 2025-04-30 RX ADMIN — SUCCINYLCHOLINE CHLORIDE 100 MG: 20 INJECTION, SOLUTION INTRAMUSCULAR; INTRAVENOUS at 10:50

## 2025-04-30 RX ADMIN — LIDOCAINE HYDROCHLORIDE 60 MG: 20 INJECTION, SOLUTION EPIDURAL; INFILTRATION; INTRACAUDAL; PERINEURAL at 10:49

## 2025-04-30 RX ADMIN — SODIUM CHLORIDE, PRESERVATIVE FREE 10 ML: 5 INJECTION INTRAVENOUS at 08:41

## 2025-04-30 RX ADMIN — HYDROMORPHONE HYDROCHLORIDE 0.25 MG: 1 INJECTION, SOLUTION INTRAMUSCULAR; INTRAVENOUS; SUBCUTANEOUS at 12:14

## 2025-04-30 RX ADMIN — ENOXAPARIN SODIUM 40 MG: 100 INJECTION SUBCUTANEOUS at 14:03

## 2025-04-30 RX ADMIN — VANCOMYCIN HYDROCHLORIDE 750 MG: 750 INJECTION, POWDER, LYOPHILIZED, FOR SOLUTION INTRAVENOUS at 01:41

## 2025-04-30 RX ADMIN — VANCOMYCIN HYDROCHLORIDE 750 MG: 750 INJECTION, POWDER, LYOPHILIZED, FOR SOLUTION INTRAVENOUS at 14:17

## 2025-04-30 ASSESSMENT — PAIN SCALES - GENERAL
PAINLEVEL_OUTOF10: 1
PAINLEVEL_OUTOF10: 5
PAINLEVEL_OUTOF10: 7
PAINLEVEL_OUTOF10: 6
PAINLEVEL_OUTOF10: 8
PAINLEVEL_OUTOF10: 8
PAINLEVEL_OUTOF10: 3
PAINLEVEL_OUTOF10: 8
PAINLEVEL_OUTOF10: 2
PAINLEVEL_OUTOF10: 8
PAINLEVEL_OUTOF10: 7
PAINLEVEL_OUTOF10: 6
PAINLEVEL_OUTOF10: 10
PAINLEVEL_OUTOF10: 5
PAINLEVEL_OUTOF10: 8

## 2025-04-30 ASSESSMENT — PAIN DESCRIPTION - ORIENTATION
ORIENTATION: LEFT
ORIENTATION: RIGHT
ORIENTATION: LEFT

## 2025-04-30 ASSESSMENT — PAIN DESCRIPTION - DESCRIPTORS
DESCRIPTORS: DULL
DESCRIPTORS: THROBBING;STABBING;SHARP
DESCRIPTORS: ACHING
DESCRIPTORS: SORE
DESCRIPTORS: ACHING

## 2025-04-30 ASSESSMENT — PAIN DESCRIPTION - LOCATION
LOCATION: FOOT

## 2025-04-30 ASSESSMENT — LIFESTYLE VARIABLES: SMOKING_STATUS: 1

## 2025-04-30 ASSESSMENT — COPD QUESTIONNAIRES: CAT_SEVERITY: SEVERE

## 2025-04-30 ASSESSMENT — PAIN DESCRIPTION - PAIN TYPE
TYPE: SURGICAL PAIN
TYPE: SURGICAL PAIN

## 2025-04-30 ASSESSMENT — PAIN - FUNCTIONAL ASSESSMENT
PAIN_FUNCTIONAL_ASSESSMENT: PREVENTS OR INTERFERES WITH ALL ACTIVE AND SOME PASSIVE ACTIVITIES
PAIN_FUNCTIONAL_ASSESSMENT: NONE - DENIES PAIN

## 2025-04-30 ASSESSMENT — PAIN SCALES - WONG BAKER: WONGBAKER_NUMERICALRESPONSE: HURTS EVEN MORE

## 2025-04-30 NOTE — ANESTHESIA PRE PROCEDURE
Department of Anesthesiology  Preprocedure Note       Name:  Nasir Hatfield   Age:  74 y.o.  :  1950                                          MRN:  [id number]         Date:  2025      Surgeon: Surgeon(s):  Harris Lorenzo MD    Procedure: Procedure(s):  LEFT TRANSMETATARSAL FOOT AMPUTATION    Medications prior to admission:   Prior to Admission medications    Medication Sig Start Date End Date Taking? Authorizing Provider   lisinopril (PRINIVIL;ZESTRIL) 10 MG tablet Take 1 tablet by mouth daily   Yes Liane Toney MD   meloxicam (MOBIC) 15 MG tablet Take 0.5 tablets by mouth daily   Yes Liane Toney MD   metoprolol succinate (TOPROL XL) 25 MG extended release tablet Take 3 tablets by mouth daily   Yes Liane Toney MD   oxyCODONE-acetaminophen (PERCOCET) 7.5-325 MG per tablet Take 1 tablet by mouth every 4 hours as needed for Pain. Max Daily Amount: 6 tablets   Yes Liane Toney MD   traMADol (ULTRAM) 50 MG tablet Take 1 tablet by mouth every 6 hours as needed (pain).   Yes Liane Toney MD   fluticasone-umeclidin-vilant (TRELEGY ELLIPTA) 100-62.5-25 MCG/ACT AEPB inhaler Inhale 1 puff into the lungs daily   Yes Liane Toney MD   atorvastatin (LIPITOR) 10 MG tablet Take 1 tablet by mouth nightly 2/15/25  Yes Shaun Alex MD   aspirin 81 MG EC tablet Take 1 tablet by mouth daily 25  Yes Shaun Alex MD   dilTIAZem (CARDIZEM CD) 120 MG extended release capsule Take 1 capsule by mouth daily 25  Yes Shaun Alex MD   nicotine (NICODERM CQ) 21 MG/24HR Place 1 patch onto the skin daily 24  Yes Tonya Cohn, APRN - NP   OXYGEN Inhale 3 L into the lungs daily   Yes Liane Toney MD   citalopram (CELEXA) 10 MG tablet Take 1 tablet by mouth daily   Yes Automatic Reconciliation, Ar   apixaban (ELIQUIS) 5 MG TABS tablet Take 1 tablet by mouth 2 times daily    Liane Toney MD   docusate sodium (COLACE) 100 MG capsule

## 2025-04-30 NOTE — FLOWSHEET NOTE
04/30/25 1156   Handoff   Communication Given Periop Handoff/Relief   Handoff phase Phase I receiving   Handoff Given To Jacques Saeed RN   Handoff Received From Giuliana Martel RN/ Arielle Turk CRNA   Handoff Communication Face to Face;At bedside   Time Handoff Given 8096

## 2025-04-30 NOTE — OP NOTE
[facility]              [address] [place]  [address]                            OPERATIVE REPORT      PATIENT NAME: ANGEL OAKES               : 1950  MED REC NO: [id number]                       ROOM:   ACCOUNT NO: [id number]                       ADMIT DATE: 2025  PROVIDER: Harris Lorenzo MD    DATE OF SERVICE:  2025    PREOPERATIVE DIAGNOSES:  Left foot wounds.    POSTOPERATIVE DIAGNOSES:  Left foot wounds.    PROCEDURES PERFORMED:  Left transmetatarsal amputation.    SURGEON:  Harris Lorenzo MD    ANESTHESIA:  General.    ESTIMATED BLOOD LOSS:  Minimal.    COMPLICATIONS:  None.    INDICATIONS:  The patient is a 74-year-old gentleman, who has had multiple previous toe amputations on the left and a left leg bypass.  His fifth toe amputation was open and the wound has been very slow to heal.  He has also developed a new wound beneath his left second metatarsal head.  He has been advised formal closed left TMA while his bypass remains open and he has accepted.    DESCRIPTION OF PROCEDURE:  After obtaining informed consent, the patient was placed supine on the operating table.  After adequate induction of general anesthesia, signing the patient's confirmation, and administration of prophylactic antibiotics, the left leg and foot were prepped and draped in usual sterile fashion.  An incision was made anteriorly across the proximal metatarsals.  Long posterior base flap was fashioned.  The metatarsals were divided with the oscillating saw and posterior soft tissue divided with the Bovie.  There was excellent bleeding at the cut tissue level and there was pulsatile bleeding at the digital vessel level.  Bleeding was controlled with Bovie electrocautery and chromic ties.  All exposed flexor tendons were resected.  The wound was irrigated, evacuated, and deemed hemostatic, and the posterior flap brought forward and closed at the fascial level

## 2025-04-30 NOTE — CARE COORDINATION
Care Management Initial Assessment       RUR: 21%  Readmission? No  1st IM letter given? Yes   1st  letter given: No     Chart reviewed. CM met with pt and family at the bedside to introduce self and role. Verified contact information and demographics. Pt resides alone in a tri-level home with no steps to enter. Pt stays on bottom level most of the time. Pt is typically independent with ADLs and ambulatory with a RW as needed. Pt has a wheelchair, shower chair, and home O2 provided by [employer] (3L NC). Hx of SNF stay at [facility]. Pt current with [facility] for [facility] services. Pt reports he applied for Medicaid at the end of January 2025 but is not sure if he was ever approved. CM to check status of Medicaid via Medicaid portal. ACP on file. Family to transport home at time of d/c. Will continue to follow.       04/30/25 1535   Service Assessment   Patient Orientation Alert and Oriented   Cognition Alert   History Provided By Patient   Primary Caregiver Self   Accompanied By/Relationship daughter (Skye)   Support Systems Children;Friends/Neighbors   Patient's Healthcare Decision Maker is: Named in Scanned ACP Document   PCP Verified by CM Yes  (Abdirahman Ortiz)   Last Visit to PCP Within last 3 months   Prior Functional Level Independent in ADLs/IADLs   Current Functional Level Assistance with the following:;Cooking;Housework;Shopping;Mobility;Toileting;Bathing;Dressing   Can patient return to prior living arrangement Yes   Ability to make needs known: Good   Family able to assist with home care needs: Other (comment)  (limited- daughter lives one hour away)   Would you like for me to discuss the discharge plan with any other family members/significant others, and if so, who? Yes  (Skye)   Financial Resources Medicare   Social/Functional History   Lives With Alone   Type of Home House   Home Layout Multi-level;Able to Live on Main level with bedroom/bathroom   Home Access Level entry

## 2025-04-30 NOTE — ANESTHESIA POSTPROCEDURE EVALUATION
Department of Anesthesiology  Postprocedure Note    Patient: Nasir Hatfield  MRN: [id number]  YOB: 1950  Date of evaluation: 4/30/2025    Procedure Summary       Date: 04/30/25 [facility] / [facility]    Anesthesia Start: 1043 Anesthesia Stop: 1142    Procedure: LEFT TRANSMETATARSAL FOOT AMPUTATION (Left: Foot) Diagnosis:       Wound infection      (Wound infection [T14.8XXA, L08.9])    Providers: Harris Lorenzo MD Responsible Provider: Tony Avelar MD    Anesthesia Type: General ASA Status: 4            Anesthesia Type: General    Carmel Phase I: Carmel Score: 9    Carmel Phase II:      Anesthesia Post Evaluation    Patient location during evaluation: PACU  Patient participation: complete - patient participated  Level of consciousness: sleepy but conscious and responsive to verbal stimuli  Airway patency: patent  Nausea & Vomiting: no vomiting and no nausea  Cardiovascular status: blood pressure returned to baseline and hemodynamically stable  Respiratory status: acceptable  Hydration status: stable  Pain management: adequate    No notable events documented.

## 2025-04-30 NOTE — ANESTHESIA POST-OP
TRANSFER - OUT REPORT:    Verbal report given to Erika RN / Denice RN(name) on Nasir Hatfield  being transferred to [facility](unit) for routine post-op       Report consisted of patient’s Situation, Background, Assessment and   Recommendations(SBAR).     Information from the following report(s) Surgery Report, Intake/Output, and MAR was reviewed with the receiving nurse.    Opportunity for questions and clarification was provided.      Patient transported with:   O2 @ 4 liters  Tech

## 2025-04-30 NOTE — PERIOP NOTE
Irrisept Wound Debridement and Cleansing System    Ref: [id number]   GTIN: [id number]   LOT: [id number]   Expiration Date: 11/30/2027

## 2025-04-30 NOTE — BRIEF OP NOTE
Brief Postoperative Note      Patient: Nasir Hatfield  YOB: 1950  MRN: [id number]    Date of Procedure: 4/30/2025    Pre-Op Diagnosis: Left foot wounds    Post-Op Diagnosis: Same       Procedure(s):  LEFT TRANSMETATARSAL FOOT AMPUTATION (TMA)    Surgeon(s):  Harris Lorenzo MD    Assistant:  First Assistant: Bethanie Ryan RN    Anesthesia: General    Estimated Blood Loss (mL): Minimal    Complications: None    Specimens:   ID Type Source Tests Collected by Time Destination   1 : Left foot transmetatarsal amputation Tissue Foot SURGICAL PATHOLOGY Harris Lorenzo MD 4/30/2025 1100        Findings:  Infection Present At Time Of Surgery (PATOS) (choose all levels that have infection present):  No infection present  Other Findings: Excellent vascularity    Electronically signed by Harris Lorenzo MD on 4/30/2025 at 11:47 AM

## 2025-04-30 NOTE — SIGNIFICANT EVENT
[facility]  RAPID RESPONSE TEAM   RN NOTE       Responded to an overhead rapid response called to room # 3223/01  @ 1333 for patient Nasir Hatfield , MRN# [id number]      S- Reason for rapid response: desaturation  Per primary RN:   Background: patient s/p left toe amputation, baseline on 3L O2 at home.      O/A- Focused Assessment:   Cardiac- sinus, HR 80s, denies chest pain. Afebrile.   Respiratory- on NRB sats 96s.   Neuro- awake, following command.   GI/:was NPO for procedure.   Gtts/Lines/drain- PIV x2  Pertinent Labs/vital signs/chart reviewed-  Yes.    Sepsis Screen: Negative.  - no wbc, no fever, bp and hr stable     Interventions:      Dr. Villalpando at bedside, orders received for the following Interventions:   - Breathing treatment  - Wean O2 as tolerated to keep O2 greater than 90  - resume diet and meds   - continue tele monitoring  - pain control   -   P- Outcome/Response:   Patient stable, awake, pleasant, bp stable, O2 weaned by RT to 6L, sats around 90.     Patient Disposition:   Patient transferred to higher level of care following RRT?: No.  RRT End Time: 1422  Patient/family education provided as applicable.       Primary RN aware to call for help with any new or concerning problems.   Care Collaborated with primary RN, CRN, RT, and responding MD.   See MD notes for details and plan of care.     Code Status: Full Code   Attending MD: Jack Villalpando MD Ali, BSN, RN, CCRN, TCRN  Rapid Response Team- RN  Ext [phone]

## 2025-04-30 NOTE — PERIOP NOTE
0847:  TRANSFER - IN REPORT:    Verbal report received from Denice PETERSON on Nasir Hatfield  being received from 3223 for ordered procedure      Report consisted of patient’s Situation, Background, Assessment and   Recommendations(SBAR).     Information from the following report(s) Intake/Output, MAR, and Recent Results was reviewed with the receiving nurse.    Opportunity for questions and clarification was provided.      Assessment completed upon patient’s arrival to unit and care assume

## 2025-05-01 LAB
ANION GAP SERPL CALC-SCNC: 4 MMOL/L (ref 2–12)
BASOPHILS # BLD: 0.05 K/UL (ref 0–0.1)
BASOPHILS NFR BLD: 0.6 % (ref 0–1)
BUN SERPL-MCNC: 14 MG/DL (ref 6–20)
BUN/CREAT SERPL: 21 (ref 12–20)
CALCIUM SERPL-MCNC: 8.8 MG/DL (ref 8.5–10.1)
CHLORIDE SERPL-SCNC: 107 MMOL/L (ref 97–108)
CO2 SERPL-SCNC: 28 MMOL/L (ref 21–32)
CREAT SERPL-MCNC: 0.67 MG/DL (ref 0.7–1.3)
DIFFERENTIAL METHOD BLD: ABNORMAL
EOSINOPHIL # BLD: 0.46 K/UL (ref 0–0.4)
EOSINOPHIL NFR BLD: 5.1 % (ref 0–7)
ERYTHROCYTE [DISTWIDTH] IN BLOOD BY AUTOMATED COUNT: 20.1 % (ref 11.5–14.5)
GLUCOSE SERPL-MCNC: 92 MG/DL (ref 65–100)
HCT VFR BLD AUTO: 30.2 % (ref 36.6–50.3)
HGB BLD-MCNC: 9 G/DL (ref 12.1–17)
IMM GRANULOCYTES # BLD AUTO: 0.03 K/UL (ref 0–0.04)
IMM GRANULOCYTES NFR BLD AUTO: 0.3 % (ref 0–0.5)
LYMPHOCYTES # BLD: 0.76 K/UL (ref 0.8–3.5)
LYMPHOCYTES NFR BLD: 8.4 % (ref 12–49)
MCH RBC QN AUTO: 23.4 PG (ref 26–34)
MCHC RBC AUTO-ENTMCNC: 29.8 G/DL (ref 30–36.5)
MCV RBC AUTO: 78.4 FL (ref 80–99)
MONOCYTES # BLD: 0.81 K/UL (ref 0–1)
MONOCYTES NFR BLD: 9 % (ref 5–13)
NEUTS SEG # BLD: 6.89 K/UL (ref 1.8–8)
NEUTS SEG NFR BLD: 76.6 % (ref 32–75)
NRBC # BLD: 0 K/UL (ref 0–0.01)
NRBC BLD-RTO: 0 PER 100 WBC
PLATELET # BLD AUTO: 287 K/UL (ref 150–400)
PMV BLD AUTO: 9.3 FL (ref 8.9–12.9)
POTASSIUM SERPL-SCNC: 4.2 MMOL/L (ref 3.5–5.1)
RBC # BLD AUTO: 3.85 M/UL (ref 4.1–5.7)
RBC MORPH BLD: ABNORMAL
RBC MORPH BLD: ABNORMAL
SODIUM SERPL-SCNC: 139 MMOL/L (ref 136–145)
WBC # BLD AUTO: 9 K/UL (ref 4.1–11.1)

## 2025-05-01 PROCEDURE — 6370000000 HC RX 637 (ALT 250 FOR IP): Performed by: INTERNAL MEDICINE

## 2025-05-01 PROCEDURE — 97535 SELF CARE MNGMENT TRAINING: CPT

## 2025-05-01 PROCEDURE — 2500000003 HC RX 250 WO HCPCS: Performed by: INTERNAL MEDICINE

## 2025-05-01 PROCEDURE — 94761 N-INVAS EAR/PLS OXIMETRY MLT: CPT

## 2025-05-01 PROCEDURE — 1100000000 HC RM PRIVATE

## 2025-05-01 PROCEDURE — 2580000003 HC RX 258: Performed by: INTERNAL MEDICINE

## 2025-05-01 PROCEDURE — 6360000002 HC RX W HCPCS: Performed by: INTERNAL MEDICINE

## 2025-05-01 PROCEDURE — 80048 BASIC METABOLIC PNL TOTAL CA: CPT

## 2025-05-01 PROCEDURE — 2700000000 HC OXYGEN THERAPY PER DAY

## 2025-05-01 PROCEDURE — 36415 COLL VENOUS BLD VENIPUNCTURE: CPT

## 2025-05-01 PROCEDURE — 2500000003 HC RX 250 WO HCPCS: Performed by: ANESTHESIOLOGY

## 2025-05-01 PROCEDURE — 97530 THERAPEUTIC ACTIVITIES: CPT

## 2025-05-01 PROCEDURE — 94640 AIRWAY INHALATION TREATMENT: CPT

## 2025-05-01 PROCEDURE — 85025 COMPLETE CBC W/AUTO DIFF WBC: CPT

## 2025-05-01 PROCEDURE — 97530 THERAPEUTIC ACTIVITIES: CPT | Performed by: PHYSICAL THERAPIST

## 2025-05-01 RX ORDER — SODIUM CHLORIDE 9 MG/ML
INJECTION, SOLUTION INTRAVENOUS PRN
Status: DISCONTINUED | OUTPATIENT
Start: 2025-05-01 | End: 2025-05-08 | Stop reason: HOSPADM

## 2025-05-01 RX ORDER — SODIUM CHLORIDE 0.9 % (FLUSH) 0.9 %
5-40 SYRINGE (ML) INJECTION EVERY 12 HOURS SCHEDULED
Status: DISCONTINUED | OUTPATIENT
Start: 2025-05-01 | End: 2025-05-08 | Stop reason: HOSPADM

## 2025-05-01 RX ORDER — SODIUM CHLORIDE 0.9 % (FLUSH) 0.9 %
5-40 SYRINGE (ML) INJECTION PRN
Status: DISCONTINUED | OUTPATIENT
Start: 2025-05-01 | End: 2025-05-08 | Stop reason: HOSPADM

## 2025-05-01 RX ADMIN — BUDESONIDE 250 MCG: 0.25 INHALANT RESPIRATORY (INHALATION) at 21:29

## 2025-05-01 RX ADMIN — CLOPIDOGREL BISULFATE 75 MG: 75 TABLET, FILM COATED ORAL at 09:48

## 2025-05-01 RX ADMIN — SODIUM CHLORIDE, PRESERVATIVE FREE 10 ML: 5 INJECTION INTRAVENOUS at 22:21

## 2025-05-01 RX ADMIN — ASPIRIN 81 MG: 81 TABLET, COATED ORAL at 09:48

## 2025-05-01 RX ADMIN — ARFORMOTEROL TARTRATE 15 MCG: 15 SOLUTION RESPIRATORY (INHALATION) at 07:56

## 2025-05-01 RX ADMIN — IPRATROPIUM BROMIDE 0.5 MG: 0.5 SOLUTION RESPIRATORY (INHALATION) at 08:07

## 2025-05-01 RX ADMIN — SODIUM CHLORIDE, PRESERVATIVE FREE 10 ML: 5 INJECTION INTRAVENOUS at 13:21

## 2025-05-01 RX ADMIN — OXYCODONE 5 MG: 5 TABLET ORAL at 17:32

## 2025-05-01 RX ADMIN — DILTIAZEM HYDROCHLORIDE 120 MG: 120 CAPSULE, EXTENDED RELEASE ORAL at 09:48

## 2025-05-01 RX ADMIN — PIPERACILLIN AND TAZOBACTAM 3375 MG: 3; .375 INJECTION, POWDER, LYOPHILIZED, FOR SOLUTION INTRAVENOUS at 17:20

## 2025-05-01 RX ADMIN — OXYCODONE 5 MG: 5 TABLET ORAL at 13:17

## 2025-05-01 RX ADMIN — MELATONIN 3 MG: at 22:21

## 2025-05-01 RX ADMIN — MORPHINE SULFATE 2 MG: 2 INJECTION, SOLUTION INTRAMUSCULAR; INTRAVENOUS at 10:42

## 2025-05-01 RX ADMIN — SODIUM CHLORIDE, PRESERVATIVE FREE 10 ML: 5 INJECTION INTRAVENOUS at 22:00

## 2025-05-01 RX ADMIN — PIPERACILLIN AND TAZOBACTAM 3375 MG: 3; .375 INJECTION, POWDER, LYOPHILIZED, FOR SOLUTION INTRAVENOUS at 06:33

## 2025-05-01 RX ADMIN — ATORVASTATIN CALCIUM 10 MG: 10 TABLET, FILM COATED ORAL at 22:19

## 2025-05-01 RX ADMIN — MORPHINE SULFATE 2 MG: 2 INJECTION, SOLUTION INTRAMUSCULAR; INTRAVENOUS at 03:37

## 2025-05-01 RX ADMIN — IPRATROPIUM BROMIDE 0.5 MG: 0.5 SOLUTION RESPIRATORY (INHALATION) at 21:29

## 2025-05-01 RX ADMIN — SENNOSIDES AND DOCUSATE SODIUM 1 TABLET: 50; 8.6 TABLET ORAL at 09:48

## 2025-05-01 RX ADMIN — ENOXAPARIN SODIUM 40 MG: 100 INJECTION SUBCUTANEOUS at 09:48

## 2025-05-01 RX ADMIN — VANCOMYCIN HYDROCHLORIDE 750 MG: 750 INJECTION, POWDER, LYOPHILIZED, FOR SOLUTION INTRAVENOUS at 01:38

## 2025-05-01 RX ADMIN — OXYCODONE 5 MG: 5 TABLET ORAL at 22:19

## 2025-05-01 RX ADMIN — PANTOPRAZOLE SODIUM 40 MG: 40 TABLET, DELAYED RELEASE ORAL at 05:46

## 2025-05-01 RX ADMIN — CITALOPRAM HYDROBROMIDE 10 MG: 20 TABLET ORAL at 09:49

## 2025-05-01 RX ADMIN — MORPHINE SULFATE 2 MG: 2 INJECTION, SOLUTION INTRAMUSCULAR; INTRAVENOUS at 06:49

## 2025-05-01 RX ADMIN — ARFORMOTEROL TARTRATE 15 MCG: 15 SOLUTION RESPIRATORY (INHALATION) at 21:30

## 2025-05-01 RX ADMIN — SODIUM CHLORIDE, PRESERVATIVE FREE 10 ML: 5 INJECTION INTRAVENOUS at 09:50

## 2025-05-01 RX ADMIN — MORPHINE SULFATE 2 MG: 2 INJECTION, SOLUTION INTRAMUSCULAR; INTRAVENOUS at 19:27

## 2025-05-01 RX ADMIN — BUDESONIDE 250 MCG: 0.25 INHALANT RESPIRATORY (INHALATION) at 07:56

## 2025-05-01 RX ADMIN — MORPHINE SULFATE 2 MG: 2 INJECTION, SOLUTION INTRAMUSCULAR; INTRAVENOUS at 15:29

## 2025-05-01 RX ADMIN — VANCOMYCIN HYDROCHLORIDE 750 MG: 750 INJECTION, POWDER, LYOPHILIZED, FOR SOLUTION INTRAVENOUS at 17:14

## 2025-05-01 ASSESSMENT — PAIN DESCRIPTION - LOCATION
LOCATION: FOOT

## 2025-05-01 ASSESSMENT — PAIN DESCRIPTION - PAIN TYPE
TYPE: CHRONIC PAIN;SURGICAL PAIN

## 2025-05-01 ASSESSMENT — PAIN DESCRIPTION - ORIENTATION
ORIENTATION: LEFT

## 2025-05-01 ASSESSMENT — PAIN SCALES - GENERAL
PAINLEVEL_OUTOF10: 7
PAINLEVEL_OUTOF10: 8
PAINLEVEL_OUTOF10: 10
PAINLEVEL_OUTOF10: 4
PAINLEVEL_OUTOF10: 7
PAINLEVEL_OUTOF10: 3
PAINLEVEL_OUTOF10: 8
PAINLEVEL_OUTOF10: 9
PAINLEVEL_OUTOF10: 7
PAINLEVEL_OUTOF10: 6
PAINLEVEL_OUTOF10: 10
PAINLEVEL_OUTOF10: 6

## 2025-05-01 ASSESSMENT — PAIN DESCRIPTION - DESCRIPTORS
DESCRIPTORS: THROBBING
DESCRIPTORS: ACHING
DESCRIPTORS: THROBBING
DESCRIPTORS: ACHING;THROBBING
DESCRIPTORS: THROBBING
DESCRIPTORS: ACHING
DESCRIPTORS: THROBBING

## 2025-05-01 ASSESSMENT — PAIN - FUNCTIONAL ASSESSMENT
PAIN_FUNCTIONAL_ASSESSMENT: PREVENTS OR INTERFERES SOME ACTIVE ACTIVITIES AND ADLS

## 2025-05-02 LAB
ECHO BSA: 1.71 M2
VANCOMYCIN SERPL-MCNC: 9.4 UG/ML
VAS LEFT ABI: 1.07
VAS LEFT DORSALIS PEDIS BP: 108 MMHG
VAS LEFT PTA BP: 130 MMHG
VAS RIGHT ABI: 0.98
VAS RIGHT ARM BP: 121 MMHG
VAS RIGHT DORSALIS PEDIS BP: 118 MMHG
VAS RIGHT PTA BP: 110 MMHG
VAS RIGHT TBI: 0.54
VAS RIGHT TOE PRESSURE: 65 MMHG

## 2025-05-02 PROCEDURE — 94760 N-INVAS EAR/PLS OXIMETRY 1: CPT

## 2025-05-02 PROCEDURE — 2700000000 HC OXYGEN THERAPY PER DAY

## 2025-05-02 PROCEDURE — 36415 COLL VENOUS BLD VENIPUNCTURE: CPT

## 2025-05-02 PROCEDURE — 94640 AIRWAY INHALATION TREATMENT: CPT

## 2025-05-02 PROCEDURE — 80202 ASSAY OF VANCOMYCIN: CPT

## 2025-05-02 PROCEDURE — 1100000000 HC RM PRIVATE

## 2025-05-02 PROCEDURE — 94761 N-INVAS EAR/PLS OXIMETRY MLT: CPT

## 2025-05-02 PROCEDURE — 6360000002 HC RX W HCPCS: Performed by: INTERNAL MEDICINE

## 2025-05-02 PROCEDURE — 97530 THERAPEUTIC ACTIVITIES: CPT

## 2025-05-02 PROCEDURE — 2500000003 HC RX 250 WO HCPCS: Performed by: ANESTHESIOLOGY

## 2025-05-02 PROCEDURE — 97535 SELF CARE MNGMENT TRAINING: CPT

## 2025-05-02 PROCEDURE — 2580000003 HC RX 258: Performed by: INTERNAL MEDICINE

## 2025-05-02 PROCEDURE — 6370000000 HC RX 637 (ALT 250 FOR IP): Performed by: INTERNAL MEDICINE

## 2025-05-02 RX ADMIN — PIPERACILLIN AND TAZOBACTAM 3375 MG: 3; .375 INJECTION, POWDER, LYOPHILIZED, FOR SOLUTION INTRAVENOUS at 23:30

## 2025-05-02 RX ADMIN — VANCOMYCIN HYDROCHLORIDE 750 MG: 750 INJECTION, POWDER, LYOPHILIZED, FOR SOLUTION INTRAVENOUS at 04:33

## 2025-05-02 RX ADMIN — PIPERACILLIN AND TAZOBACTAM 3375 MG: 3; .375 INJECTION, POWDER, LYOPHILIZED, FOR SOLUTION INTRAVENOUS at 07:03

## 2025-05-02 RX ADMIN — MORPHINE SULFATE 2 MG: 2 INJECTION, SOLUTION INTRAMUSCULAR; INTRAVENOUS at 00:05

## 2025-05-02 RX ADMIN — BUDESONIDE 250 MCG: 0.25 INHALANT RESPIRATORY (INHALATION) at 19:52

## 2025-05-02 RX ADMIN — ARFORMOTEROL TARTRATE 15 MCG: 15 SOLUTION RESPIRATORY (INHALATION) at 07:55

## 2025-05-02 RX ADMIN — DILTIAZEM HYDROCHLORIDE 120 MG: 120 CAPSULE, EXTENDED RELEASE ORAL at 10:42

## 2025-05-02 RX ADMIN — PIPERACILLIN AND TAZOBACTAM 3375 MG: 3; .375 INJECTION, POWDER, LYOPHILIZED, FOR SOLUTION INTRAVENOUS at 17:54

## 2025-05-02 RX ADMIN — ATORVASTATIN CALCIUM 10 MG: 10 TABLET, FILM COATED ORAL at 20:25

## 2025-05-02 RX ADMIN — OXYCODONE 5 MG: 5 TABLET ORAL at 06:59

## 2025-05-02 RX ADMIN — SODIUM CHLORIDE, PRESERVATIVE FREE 10 ML: 5 INJECTION INTRAVENOUS at 18:19

## 2025-05-02 RX ADMIN — IPRATROPIUM BROMIDE 0.5 MG: 0.5 SOLUTION RESPIRATORY (INHALATION) at 07:50

## 2025-05-02 RX ADMIN — SENNOSIDES AND DOCUSATE SODIUM 1 TABLET: 50; 8.6 TABLET ORAL at 10:41

## 2025-05-02 RX ADMIN — MORPHINE SULFATE 1 MG: 2 INJECTION, SOLUTION INTRAMUSCULAR; INTRAVENOUS at 18:02

## 2025-05-02 RX ADMIN — ASPIRIN 81 MG: 81 TABLET, COATED ORAL at 10:42

## 2025-05-02 RX ADMIN — ENOXAPARIN SODIUM 40 MG: 100 INJECTION SUBCUTANEOUS at 10:46

## 2025-05-02 RX ADMIN — IPRATROPIUM BROMIDE 0.5 MG: 0.5 SOLUTION RESPIRATORY (INHALATION) at 19:52

## 2025-05-02 RX ADMIN — CLOPIDOGREL BISULFATE 75 MG: 75 TABLET, FILM COATED ORAL at 10:42

## 2025-05-02 RX ADMIN — MELATONIN 3 MG: at 20:25

## 2025-05-02 RX ADMIN — OXYCODONE 5 MG: 5 TABLET ORAL at 01:40

## 2025-05-02 RX ADMIN — CITALOPRAM HYDROBROMIDE 10 MG: 20 TABLET ORAL at 10:42

## 2025-05-02 RX ADMIN — BUDESONIDE 250 MCG: 0.25 INHALANT RESPIRATORY (INHALATION) at 07:55

## 2025-05-02 RX ADMIN — ARFORMOTEROL TARTRATE 15 MCG: 15 SOLUTION RESPIRATORY (INHALATION) at 19:52

## 2025-05-02 RX ADMIN — OXYCODONE 5 MG: 5 TABLET ORAL at 13:50

## 2025-05-02 RX ADMIN — SODIUM CHLORIDE, PRESERVATIVE FREE 10 ML: 5 INJECTION INTRAVENOUS at 10:12

## 2025-05-02 RX ADMIN — PIPERACILLIN AND TAZOBACTAM 3375 MG: 3; .375 INJECTION, POWDER, LYOPHILIZED, FOR SOLUTION INTRAVENOUS at 00:01

## 2025-05-02 RX ADMIN — IPRATROPIUM BROMIDE 0.5 MG: 0.5 SOLUTION RESPIRATORY (INHALATION) at 13:37

## 2025-05-02 RX ADMIN — MORPHINE SULFATE 2 MG: 2 INJECTION, SOLUTION INTRAMUSCULAR; INTRAVENOUS at 03:34

## 2025-05-02 RX ADMIN — VANCOMYCIN HYDROCHLORIDE 1000 MG: 1 INJECTION, POWDER, LYOPHILIZED, FOR SOLUTION INTRAVENOUS at 18:55

## 2025-05-02 RX ADMIN — PANTOPRAZOLE SODIUM 40 MG: 40 TABLET, DELAYED RELEASE ORAL at 06:59

## 2025-05-02 ASSESSMENT — PAIN SCALES - GENERAL
PAINLEVEL_OUTOF10: 4
PAINLEVEL_OUTOF10: 8
PAINLEVEL_OUTOF10: 9
PAINLEVEL_OUTOF10: 7
PAINLEVEL_OUTOF10: 3
PAINLEVEL_OUTOF10: 8
PAINLEVEL_OUTOF10: 5
PAINLEVEL_OUTOF10: 8
PAINLEVEL_OUTOF10: 6
PAINLEVEL_OUTOF10: 10

## 2025-05-02 ASSESSMENT — PAIN DESCRIPTION - DESCRIPTORS
DESCRIPTORS: ACHING;THROBBING
DESCRIPTORS: ACHING;THROBBING
DESCRIPTORS: THROBBING;OTHER (COMMENT)
DESCRIPTORS: JABBING
DESCRIPTORS: ACHING;THROBBING
DESCRIPTORS: ACHING;THROBBING
DESCRIPTORS: OTHER (COMMENT)

## 2025-05-02 ASSESSMENT — PAIN DESCRIPTION - ORIENTATION
ORIENTATION: LEFT

## 2025-05-02 ASSESSMENT — PAIN DESCRIPTION - LOCATION
LOCATION: FOOT
LOCATION: FOOT
LOCATION: LEG
LOCATION: FOOT
LOCATION: LEG

## 2025-05-02 NOTE — CARE COORDINATION
Transition of Care Plan:    RUR: 22%  Prior Level of Functioning: independent  Disposition: home with [facility]  >declining rehab  FELA: 5/5  If SNF or IPR: Date FOC offered: 5/1, declined  Follow up appointments: PCP, specialists as indicated   DME needed: owns DME required for discharge  Transportation at discharge: family  IM/IMM Medicare/ letter given: to be given  Is patient a [place] and connected with [facility]? no   If yes, was [place] transfer form completed and [facility] notified?   Caregiver Contact: daughter  Discharge Caregiver contacted prior to discharge? yes  Care Conference needed? no  Barriers to discharge:  vascular clearance    Chart reviewed. CM continuing to follow for discharge planning. Pt awaiting vascular clearance, not yet medically cleared at this time. Current discharge plan remains home with resumption of [facility]- pt has declined rehab. Will continue to monitor d/c plan.    HERBERT Castillo   Care Manager, [facility]  [phone]

## 2025-05-02 NOTE — CARE COORDINATION
1610 - CM assisting with Med Tele patients.  Chart opened in error.      Kelsey Lawler, MSN, RN  [facility]

## 2025-05-03 LAB
ANION GAP SERPL CALC-SCNC: 4 MMOL/L (ref 2–12)
BUN SERPL-MCNC: 18 MG/DL (ref 6–20)
BUN/CREAT SERPL: 25 (ref 12–20)
CALCIUM SERPL-MCNC: 8.6 MG/DL (ref 8.5–10.1)
CHLORIDE SERPL-SCNC: 105 MMOL/L (ref 97–108)
CO2 SERPL-SCNC: 27 MMOL/L (ref 21–32)
CREAT SERPL-MCNC: 0.72 MG/DL (ref 0.7–1.3)
GLUCOSE SERPL-MCNC: 89 MG/DL (ref 65–100)
POTASSIUM SERPL-SCNC: 4 MMOL/L (ref 3.5–5.1)
SODIUM SERPL-SCNC: 136 MMOL/L (ref 136–145)

## 2025-05-03 PROCEDURE — 6360000002 HC RX W HCPCS: Performed by: INTERNAL MEDICINE

## 2025-05-03 PROCEDURE — 80048 BASIC METABOLIC PNL TOTAL CA: CPT

## 2025-05-03 PROCEDURE — 2700000000 HC OXYGEN THERAPY PER DAY

## 2025-05-03 PROCEDURE — 2580000003 HC RX 258: Performed by: INTERNAL MEDICINE

## 2025-05-03 PROCEDURE — 2500000003 HC RX 250 WO HCPCS: Performed by: ANESTHESIOLOGY

## 2025-05-03 PROCEDURE — 6370000000 HC RX 637 (ALT 250 FOR IP): Performed by: INTERNAL MEDICINE

## 2025-05-03 PROCEDURE — 36415 COLL VENOUS BLD VENIPUNCTURE: CPT

## 2025-05-03 PROCEDURE — 94760 N-INVAS EAR/PLS OXIMETRY 1: CPT

## 2025-05-03 PROCEDURE — 1100000000 HC RM PRIVATE

## 2025-05-03 PROCEDURE — 94640 AIRWAY INHALATION TREATMENT: CPT

## 2025-05-03 PROCEDURE — 94761 N-INVAS EAR/PLS OXIMETRY MLT: CPT

## 2025-05-03 RX ORDER — GABAPENTIN 100 MG/1
100 CAPSULE ORAL 3 TIMES DAILY
Status: DISCONTINUED | OUTPATIENT
Start: 2025-05-03 | End: 2025-05-08 | Stop reason: HOSPADM

## 2025-05-03 RX ADMIN — VANCOMYCIN HYDROCHLORIDE 1000 MG: 1 INJECTION, POWDER, LYOPHILIZED, FOR SOLUTION INTRAVENOUS at 17:41

## 2025-05-03 RX ADMIN — BUDESONIDE 250 MCG: 0.25 INHALANT RESPIRATORY (INHALATION) at 20:30

## 2025-05-03 RX ADMIN — OXYCODONE 5 MG: 5 TABLET ORAL at 08:41

## 2025-05-03 RX ADMIN — SODIUM CHLORIDE, PRESERVATIVE FREE 10 ML: 5 INJECTION INTRAVENOUS at 08:49

## 2025-05-03 RX ADMIN — IPRATROPIUM BROMIDE 0.5 MG: 0.5 SOLUTION RESPIRATORY (INHALATION) at 14:07

## 2025-05-03 RX ADMIN — PANTOPRAZOLE SODIUM 40 MG: 40 TABLET, DELAYED RELEASE ORAL at 05:52

## 2025-05-03 RX ADMIN — PIPERACILLIN AND TAZOBACTAM 3375 MG: 3; .375 INJECTION, POWDER, LYOPHILIZED, FOR SOLUTION INTRAVENOUS at 22:39

## 2025-05-03 RX ADMIN — OXYCODONE 5 MG: 5 TABLET ORAL at 01:40

## 2025-05-03 RX ADMIN — CLOPIDOGREL BISULFATE 75 MG: 75 TABLET, FILM COATED ORAL at 08:41

## 2025-05-03 RX ADMIN — GABAPENTIN 100 MG: 100 CAPSULE ORAL at 21:12

## 2025-05-03 RX ADMIN — ARFORMOTEROL TARTRATE 15 MCG: 15 SOLUTION RESPIRATORY (INHALATION) at 20:30

## 2025-05-03 RX ADMIN — VANCOMYCIN HYDROCHLORIDE 1000 MG: 1 INJECTION, POWDER, LYOPHILIZED, FOR SOLUTION INTRAVENOUS at 04:15

## 2025-05-03 RX ADMIN — ASPIRIN 81 MG: 81 TABLET, COATED ORAL at 08:43

## 2025-05-03 RX ADMIN — ENOXAPARIN SODIUM 40 MG: 100 INJECTION SUBCUTANEOUS at 08:41

## 2025-05-03 RX ADMIN — IPRATROPIUM BROMIDE 0.5 MG: 0.5 SOLUTION RESPIRATORY (INHALATION) at 08:32

## 2025-05-03 RX ADMIN — SENNOSIDES AND DOCUSATE SODIUM 1 TABLET: 50; 8.6 TABLET ORAL at 08:41

## 2025-05-03 RX ADMIN — COLLAGENASE SANTYL: 250 OINTMENT TOPICAL at 08:50

## 2025-05-03 RX ADMIN — DILTIAZEM HYDROCHLORIDE 120 MG: 120 CAPSULE, EXTENDED RELEASE ORAL at 08:41

## 2025-05-03 RX ADMIN — PIPERACILLIN AND TAZOBACTAM 3375 MG: 3; .375 INJECTION, POWDER, LYOPHILIZED, FOR SOLUTION INTRAVENOUS at 14:29

## 2025-05-03 RX ADMIN — MORPHINE SULFATE 2 MG: 2 INJECTION, SOLUTION INTRAMUSCULAR; INTRAVENOUS at 11:08

## 2025-05-03 RX ADMIN — IPRATROPIUM BROMIDE 0.5 MG: 0.5 SOLUTION RESPIRATORY (INHALATION) at 20:25

## 2025-05-03 RX ADMIN — ATORVASTATIN CALCIUM 10 MG: 10 TABLET, FILM COATED ORAL at 21:12

## 2025-05-03 RX ADMIN — MELATONIN 3 MG: at 21:12

## 2025-05-03 RX ADMIN — CITALOPRAM HYDROBROMIDE 10 MG: 20 TABLET ORAL at 08:43

## 2025-05-03 RX ADMIN — MORPHINE SULFATE 2 MG: 2 INJECTION, SOLUTION INTRAMUSCULAR; INTRAVENOUS at 05:56

## 2025-05-03 RX ADMIN — SODIUM CHLORIDE, PRESERVATIVE FREE 10 ML: 5 INJECTION INTRAVENOUS at 21:12

## 2025-05-03 RX ADMIN — MORPHINE SULFATE 2 MG: 2 INJECTION, SOLUTION INTRAMUSCULAR; INTRAVENOUS at 17:38

## 2025-05-03 RX ADMIN — ARFORMOTEROL TARTRATE 15 MCG: 15 SOLUTION RESPIRATORY (INHALATION) at 08:37

## 2025-05-03 RX ADMIN — PIPERACILLIN AND TAZOBACTAM 3375 MG: 3; .375 INJECTION, POWDER, LYOPHILIZED, FOR SOLUTION INTRAVENOUS at 05:53

## 2025-05-03 RX ADMIN — BUDESONIDE 250 MCG: 0.25 INHALANT RESPIRATORY (INHALATION) at 08:37

## 2025-05-03 RX ADMIN — GABAPENTIN 100 MG: 100 CAPSULE ORAL at 10:30

## 2025-05-03 RX ADMIN — GABAPENTIN 100 MG: 100 CAPSULE ORAL at 14:26

## 2025-05-03 ASSESSMENT — PAIN SCALES - GENERAL
PAINLEVEL_OUTOF10: 7
PAINLEVEL_OUTOF10: 0
PAINLEVEL_OUTOF10: 6
PAINLEVEL_OUTOF10: 6
PAINLEVEL_OUTOF10: 0
PAINLEVEL_OUTOF10: 2
PAINLEVEL_OUTOF10: 6
PAINLEVEL_OUTOF10: 9
PAINLEVEL_OUTOF10: 7
PAINLEVEL_OUTOF10: 6
PAINLEVEL_OUTOF10: 3

## 2025-05-03 ASSESSMENT — PAIN DESCRIPTION - LOCATION
LOCATION: FOOT

## 2025-05-03 ASSESSMENT — PAIN DESCRIPTION - DESCRIPTORS
DESCRIPTORS: ACHING;BURNING;SHOOTING;SHARP
DESCRIPTORS: ACHING
DESCRIPTORS: SHARP;BURNING;SHOOTING

## 2025-05-03 ASSESSMENT — PAIN DESCRIPTION - ORIENTATION
ORIENTATION: LEFT

## 2025-05-04 LAB
ANION GAP SERPL CALC-SCNC: 5 MMOL/L (ref 2–12)
BUN SERPL-MCNC: 17 MG/DL (ref 6–20)
BUN/CREAT SERPL: 27 (ref 12–20)
CALCIUM SERPL-MCNC: 8.8 MG/DL (ref 8.5–10.1)
CHLORIDE SERPL-SCNC: 103 MMOL/L (ref 97–108)
CO2 SERPL-SCNC: 29 MMOL/L (ref 21–32)
CREAT SERPL-MCNC: 0.62 MG/DL (ref 0.7–1.3)
GLUCOSE SERPL-MCNC: 90 MG/DL (ref 65–100)
POTASSIUM SERPL-SCNC: 3.8 MMOL/L (ref 3.5–5.1)
SODIUM SERPL-SCNC: 137 MMOL/L (ref 136–145)

## 2025-05-04 PROCEDURE — 6360000002 HC RX W HCPCS: Performed by: INTERNAL MEDICINE

## 2025-05-04 PROCEDURE — 6370000000 HC RX 637 (ALT 250 FOR IP): Performed by: INTERNAL MEDICINE

## 2025-05-04 PROCEDURE — 94761 N-INVAS EAR/PLS OXIMETRY MLT: CPT

## 2025-05-04 PROCEDURE — 6370000000 HC RX 637 (ALT 250 FOR IP): Performed by: NURSE PRACTITIONER

## 2025-05-04 PROCEDURE — 2580000003 HC RX 258: Performed by: INTERNAL MEDICINE

## 2025-05-04 PROCEDURE — 1100000000 HC RM PRIVATE

## 2025-05-04 PROCEDURE — 36415 COLL VENOUS BLD VENIPUNCTURE: CPT

## 2025-05-04 PROCEDURE — 2500000003 HC RX 250 WO HCPCS: Performed by: ANESTHESIOLOGY

## 2025-05-04 PROCEDURE — 2700000000 HC OXYGEN THERAPY PER DAY

## 2025-05-04 PROCEDURE — 94640 AIRWAY INHALATION TREATMENT: CPT

## 2025-05-04 PROCEDURE — 80048 BASIC METABOLIC PNL TOTAL CA: CPT

## 2025-05-04 RX ORDER — NICOTINE 21 MG/24HR
1 PATCH, TRANSDERMAL 24 HOURS TRANSDERMAL DAILY
Status: DISCONTINUED | OUTPATIENT
Start: 2025-05-04 | End: 2025-05-08 | Stop reason: HOSPADM

## 2025-05-04 RX ADMIN — OXYCODONE 5 MG: 5 TABLET ORAL at 21:50

## 2025-05-04 RX ADMIN — ARFORMOTEROL TARTRATE 15 MCG: 15 SOLUTION RESPIRATORY (INHALATION) at 08:18

## 2025-05-04 RX ADMIN — IPRATROPIUM BROMIDE 0.5 MG: 0.5 SOLUTION RESPIRATORY (INHALATION) at 08:13

## 2025-05-04 RX ADMIN — PIPERACILLIN AND TAZOBACTAM 3375 MG: 3; .375 INJECTION, POWDER, LYOPHILIZED, FOR SOLUTION INTRAVENOUS at 06:59

## 2025-05-04 RX ADMIN — ATORVASTATIN CALCIUM 10 MG: 10 TABLET, FILM COATED ORAL at 19:49

## 2025-05-04 RX ADMIN — OXYCODONE 5 MG: 5 TABLET ORAL at 04:44

## 2025-05-04 RX ADMIN — DILTIAZEM HYDROCHLORIDE 120 MG: 120 CAPSULE, EXTENDED RELEASE ORAL at 10:24

## 2025-05-04 RX ADMIN — VANCOMYCIN HYDROCHLORIDE 1000 MG: 1 INJECTION, POWDER, LYOPHILIZED, FOR SOLUTION INTRAVENOUS at 18:00

## 2025-05-04 RX ADMIN — SODIUM CHLORIDE, PRESERVATIVE FREE 10 ML: 5 INJECTION INTRAVENOUS at 10:25

## 2025-05-04 RX ADMIN — MORPHINE SULFATE 2 MG: 2 INJECTION, SOLUTION INTRAMUSCULAR; INTRAVENOUS at 19:49

## 2025-05-04 RX ADMIN — MELATONIN 3 MG: at 19:49

## 2025-05-04 RX ADMIN — BUDESONIDE 250 MCG: 0.25 INHALANT RESPIRATORY (INHALATION) at 08:18

## 2025-05-04 RX ADMIN — GABAPENTIN 100 MG: 100 CAPSULE ORAL at 10:24

## 2025-05-04 RX ADMIN — GABAPENTIN 100 MG: 100 CAPSULE ORAL at 14:41

## 2025-05-04 RX ADMIN — VANCOMYCIN HYDROCHLORIDE 1000 MG: 1 INJECTION, POWDER, LYOPHILIZED, FOR SOLUTION INTRAVENOUS at 04:47

## 2025-05-04 RX ADMIN — PIPERACILLIN AND TAZOBACTAM 3375 MG: 3; .375 INJECTION, POWDER, LYOPHILIZED, FOR SOLUTION INTRAVENOUS at 14:58

## 2025-05-04 RX ADMIN — SODIUM CHLORIDE, PRESERVATIVE FREE 10 ML: 5 INJECTION INTRAVENOUS at 19:52

## 2025-05-04 RX ADMIN — CITALOPRAM HYDROBROMIDE 10 MG: 20 TABLET ORAL at 10:22

## 2025-05-04 RX ADMIN — MORPHINE SULFATE 2 MG: 2 INJECTION, SOLUTION INTRAMUSCULAR; INTRAVENOUS at 00:56

## 2025-05-04 RX ADMIN — MORPHINE SULFATE 2 MG: 2 INJECTION, SOLUTION INTRAMUSCULAR; INTRAVENOUS at 10:19

## 2025-05-04 RX ADMIN — COLLAGENASE SANTYL: 250 OINTMENT TOPICAL at 10:27

## 2025-05-04 RX ADMIN — ENOXAPARIN SODIUM 40 MG: 100 INJECTION SUBCUTANEOUS at 10:25

## 2025-05-04 RX ADMIN — BUDESONIDE 250 MCG: 0.25 INHALANT RESPIRATORY (INHALATION) at 22:06

## 2025-05-04 RX ADMIN — PANTOPRAZOLE SODIUM 40 MG: 40 TABLET, DELAYED RELEASE ORAL at 06:55

## 2025-05-04 RX ADMIN — ARFORMOTEROL TARTRATE 15 MCG: 15 SOLUTION RESPIRATORY (INHALATION) at 22:06

## 2025-05-04 RX ADMIN — IPRATROPIUM BROMIDE 0.5 MG: 0.5 SOLUTION RESPIRATORY (INHALATION) at 15:01

## 2025-05-04 RX ADMIN — ACETAMINOPHEN 650 MG: 325 TABLET ORAL at 06:55

## 2025-05-04 RX ADMIN — MORPHINE SULFATE 2 MG: 2 INJECTION, SOLUTION INTRAMUSCULAR; INTRAVENOUS at 14:48

## 2025-05-04 RX ADMIN — GABAPENTIN 100 MG: 100 CAPSULE ORAL at 19:49

## 2025-05-04 RX ADMIN — SENNOSIDES AND DOCUSATE SODIUM 1 TABLET: 50; 8.6 TABLET ORAL at 10:24

## 2025-05-04 RX ADMIN — PIPERACILLIN AND TAZOBACTAM 3375 MG: 3; .375 INJECTION, POWDER, LYOPHILIZED, FOR SOLUTION INTRAVENOUS at 23:20

## 2025-05-04 RX ADMIN — CLOPIDOGREL BISULFATE 75 MG: 75 TABLET, FILM COATED ORAL at 10:24

## 2025-05-04 RX ADMIN — ASPIRIN 81 MG: 81 TABLET, COATED ORAL at 10:24

## 2025-05-04 RX ADMIN — IPRATROPIUM BROMIDE 0.5 MG: 0.5 SOLUTION RESPIRATORY (INHALATION) at 22:01

## 2025-05-04 ASSESSMENT — PAIN SCALES - GENERAL
PAINLEVEL_OUTOF10: 3
PAINLEVEL_OUTOF10: 3
PAINLEVEL_OUTOF10: 7
PAINLEVEL_OUTOF10: 5
PAINLEVEL_OUTOF10: 7
PAINLEVEL_OUTOF10: 6
PAINLEVEL_OUTOF10: 7

## 2025-05-04 ASSESSMENT — PAIN DESCRIPTION - ORIENTATION
ORIENTATION: LEFT

## 2025-05-04 ASSESSMENT — PAIN SCALES - WONG BAKER
WONGBAKER_NUMERICALRESPONSE: NO HURT

## 2025-05-04 ASSESSMENT — PAIN DESCRIPTION - LOCATION
LOCATION: FOOT
LOCATION: HEAD

## 2025-05-04 ASSESSMENT — PAIN DESCRIPTION - DESCRIPTORS
DESCRIPTORS: ACHING
DESCRIPTORS: ACHING

## 2025-05-05 ENCOUNTER — APPOINTMENT (OUTPATIENT)
Facility: HOSPITAL | Age: 75
DRG: 240 | End: 2025-05-05
Payer: MEDICARE

## 2025-05-05 LAB
ANION GAP SERPL CALC-SCNC: 5 MMOL/L (ref 2–12)
BASOPHILS # BLD: 0.07 K/UL (ref 0–0.1)
BASOPHILS NFR BLD: 0.9 % (ref 0–1)
BUN SERPL-MCNC: 18 MG/DL (ref 6–20)
BUN/CREAT SERPL: 24 (ref 12–20)
CALCIUM SERPL-MCNC: 8.4 MG/DL (ref 8.5–10.1)
CHLORIDE SERPL-SCNC: 102 MMOL/L (ref 97–108)
CO2 SERPL-SCNC: 27 MMOL/L (ref 21–32)
CREAT SERPL-MCNC: 0.76 MG/DL (ref 0.7–1.3)
DIFFERENTIAL METHOD BLD: ABNORMAL
EOSINOPHIL # BLD: 0.4 K/UL (ref 0–0.4)
EOSINOPHIL NFR BLD: 5.2 % (ref 0–7)
ERYTHROCYTE [DISTWIDTH] IN BLOOD BY AUTOMATED COUNT: 19.6 % (ref 11.5–14.5)
GLUCOSE SERPL-MCNC: 120 MG/DL (ref 65–100)
HCT VFR BLD AUTO: 30.8 % (ref 36.6–50.3)
HGB BLD-MCNC: 9.1 G/DL (ref 12.1–17)
IMM GRANULOCYTES # BLD AUTO: 0.02 K/UL (ref 0–0.04)
IMM GRANULOCYTES NFR BLD AUTO: 0.3 % (ref 0–0.5)
LYMPHOCYTES # BLD: 1.18 K/UL (ref 0.8–3.5)
LYMPHOCYTES NFR BLD: 15.4 % (ref 12–49)
MCH RBC QN AUTO: 23.3 PG (ref 26–34)
MCHC RBC AUTO-ENTMCNC: 29.5 G/DL (ref 30–36.5)
MCV RBC AUTO: 78.8 FL (ref 80–99)
MONOCYTES # BLD: 0.96 K/UL (ref 0–1)
MONOCYTES NFR BLD: 12.5 % (ref 5–13)
NEUTS SEG # BLD: 5.03 K/UL (ref 1.8–8)
NEUTS SEG NFR BLD: 65.7 % (ref 32–75)
NRBC # BLD: 0 K/UL (ref 0–0.01)
NRBC BLD-RTO: 0 PER 100 WBC
NT PRO BNP: 609 PG/ML
PLATELET # BLD AUTO: 359 K/UL (ref 150–400)
PMV BLD AUTO: 9.1 FL (ref 8.9–12.9)
POTASSIUM SERPL-SCNC: 4 MMOL/L (ref 3.5–5.1)
RBC # BLD AUTO: 3.91 M/UL (ref 4.1–5.7)
SODIUM SERPL-SCNC: 134 MMOL/L (ref 136–145)
WBC # BLD AUTO: 7.7 K/UL (ref 4.1–11.1)

## 2025-05-05 PROCEDURE — 2580000003 HC RX 258: Performed by: INTERNAL MEDICINE

## 2025-05-05 PROCEDURE — 94640 AIRWAY INHALATION TREATMENT: CPT

## 2025-05-05 PROCEDURE — 2500000003 HC RX 250 WO HCPCS: Performed by: ANESTHESIOLOGY

## 2025-05-05 PROCEDURE — 6370000000 HC RX 637 (ALT 250 FOR IP): Performed by: INTERNAL MEDICINE

## 2025-05-05 PROCEDURE — 36415 COLL VENOUS BLD VENIPUNCTURE: CPT

## 2025-05-05 PROCEDURE — 80048 BASIC METABOLIC PNL TOTAL CA: CPT

## 2025-05-05 PROCEDURE — 85025 COMPLETE CBC W/AUTO DIFF WBC: CPT

## 2025-05-05 PROCEDURE — 83880 ASSAY OF NATRIURETIC PEPTIDE: CPT

## 2025-05-05 PROCEDURE — 6360000002 HC RX W HCPCS: Performed by: INTERNAL MEDICINE

## 2025-05-05 PROCEDURE — 2500000003 HC RX 250 WO HCPCS: Performed by: INTERNAL MEDICINE

## 2025-05-05 PROCEDURE — 71045 X-RAY EXAM CHEST 1 VIEW: CPT

## 2025-05-05 PROCEDURE — 6370000000 HC RX 637 (ALT 250 FOR IP): Performed by: PHYSICIAN ASSISTANT

## 2025-05-05 PROCEDURE — 1100000000 HC RM PRIVATE

## 2025-05-05 PROCEDURE — 94761 N-INVAS EAR/PLS OXIMETRY MLT: CPT

## 2025-05-05 PROCEDURE — 2700000000 HC OXYGEN THERAPY PER DAY

## 2025-05-05 PROCEDURE — 6370000000 HC RX 637 (ALT 250 FOR IP): Performed by: NURSE PRACTITIONER

## 2025-05-05 RX ORDER — AZITHROMYCIN 250 MG/1
500 TABLET, FILM COATED ORAL DAILY
Status: COMPLETED | OUTPATIENT
Start: 2025-05-05 | End: 2025-05-07

## 2025-05-05 RX ORDER — GUAIFENESIN 600 MG/1
1200 TABLET, EXTENDED RELEASE ORAL 2 TIMES DAILY
Status: DISCONTINUED | OUTPATIENT
Start: 2025-05-05 | End: 2025-05-08 | Stop reason: HOSPADM

## 2025-05-05 RX ORDER — IPRATROPIUM BROMIDE AND ALBUTEROL SULFATE 2.5; .5 MG/3ML; MG/3ML
1 SOLUTION RESPIRATORY (INHALATION)
Status: DISCONTINUED | OUTPATIENT
Start: 2025-05-05 | End: 2025-05-08 | Stop reason: HOSPADM

## 2025-05-05 RX ADMIN — SODIUM CHLORIDE, PRESERVATIVE FREE 10 ML: 5 INJECTION INTRAVENOUS at 10:58

## 2025-05-05 RX ADMIN — GUAIFENESIN 1200 MG: 600 TABLET, MULTILAYER, EXTENDED RELEASE ORAL at 20:59

## 2025-05-05 RX ADMIN — GABAPENTIN 100 MG: 100 CAPSULE ORAL at 15:12

## 2025-05-05 RX ADMIN — PIPERACILLIN AND TAZOBACTAM 3375 MG: 3; .375 INJECTION, POWDER, LYOPHILIZED, FOR SOLUTION INTRAVENOUS at 07:05

## 2025-05-05 RX ADMIN — AZITHROMYCIN 500 MG: 250 TABLET, FILM COATED ORAL at 15:51

## 2025-05-05 RX ADMIN — IPRATROPIUM BROMIDE 0.5 MG: 0.5 SOLUTION RESPIRATORY (INHALATION) at 13:50

## 2025-05-05 RX ADMIN — GABAPENTIN 100 MG: 100 CAPSULE ORAL at 20:59

## 2025-05-05 RX ADMIN — SENNOSIDES AND DOCUSATE SODIUM 1 TABLET: 50; 8.6 TABLET ORAL at 10:44

## 2025-05-05 RX ADMIN — DILTIAZEM HYDROCHLORIDE 120 MG: 120 CAPSULE, EXTENDED RELEASE ORAL at 10:43

## 2025-05-05 RX ADMIN — MORPHINE SULFATE 2 MG: 2 INJECTION, SOLUTION INTRAMUSCULAR; INTRAVENOUS at 11:09

## 2025-05-05 RX ADMIN — ARFORMOTEROL TARTRATE 15 MCG: 15 SOLUTION RESPIRATORY (INHALATION) at 20:22

## 2025-05-05 RX ADMIN — ENOXAPARIN SODIUM 40 MG: 100 INJECTION SUBCUTANEOUS at 10:43

## 2025-05-05 RX ADMIN — GABAPENTIN 100 MG: 100 CAPSULE ORAL at 10:45

## 2025-05-05 RX ADMIN — CITALOPRAM HYDROBROMIDE 10 MG: 20 TABLET ORAL at 10:44

## 2025-05-05 RX ADMIN — IPRATROPIUM BROMIDE 0.5 MG: 0.5 SOLUTION RESPIRATORY (INHALATION) at 08:30

## 2025-05-05 RX ADMIN — SODIUM CHLORIDE, PRESERVATIVE FREE 10 ML: 5 INJECTION INTRAVENOUS at 21:00

## 2025-05-05 RX ADMIN — ARFORMOTEROL TARTRATE 15 MCG: 15 SOLUTION RESPIRATORY (INHALATION) at 08:35

## 2025-05-05 RX ADMIN — IPRATROPIUM BROMIDE AND ALBUTEROL SULFATE 1 DOSE: .5; 2.5 SOLUTION RESPIRATORY (INHALATION) at 15:22

## 2025-05-05 RX ADMIN — METHYLPREDNISOLONE SODIUM SUCCINATE 40 MG: 40 INJECTION, POWDER, LYOPHILIZED, FOR SOLUTION INTRAMUSCULAR; INTRAVENOUS at 11:30

## 2025-05-05 RX ADMIN — MELATONIN 3 MG: at 20:59

## 2025-05-05 RX ADMIN — CLOPIDOGREL BISULFATE 75 MG: 75 TABLET, FILM COATED ORAL at 10:45

## 2025-05-05 RX ADMIN — PANTOPRAZOLE SODIUM 40 MG: 40 TABLET, DELAYED RELEASE ORAL at 07:05

## 2025-05-05 RX ADMIN — BUDESONIDE 250 MCG: 0.25 INHALANT RESPIRATORY (INHALATION) at 20:22

## 2025-05-05 RX ADMIN — ATORVASTATIN CALCIUM 10 MG: 10 TABLET, FILM COATED ORAL at 20:59

## 2025-05-05 RX ADMIN — METHYLPREDNISOLONE SODIUM SUCCINATE 40 MG: 40 INJECTION, POWDER, LYOPHILIZED, FOR SOLUTION INTRAMUSCULAR; INTRAVENOUS at 20:59

## 2025-05-05 RX ADMIN — BUDESONIDE 250 MCG: 0.25 INHALANT RESPIRATORY (INHALATION) at 08:35

## 2025-05-05 RX ADMIN — IPRATROPIUM BROMIDE AND ALBUTEROL SULFATE 1 DOSE: .5; 2.5 SOLUTION RESPIRATORY (INHALATION) at 20:17

## 2025-05-05 RX ADMIN — ASPIRIN 81 MG: 81 TABLET, COATED ORAL at 10:43

## 2025-05-05 RX ADMIN — MORPHINE SULFATE 2 MG: 2 INJECTION, SOLUTION INTRAMUSCULAR; INTRAVENOUS at 03:32

## 2025-05-05 ASSESSMENT — PAIN SCALES - WONG BAKER: WONGBAKER_NUMERICALRESPONSE: NO HURT

## 2025-05-05 ASSESSMENT — PAIN DESCRIPTION - ORIENTATION: ORIENTATION: LEFT

## 2025-05-05 ASSESSMENT — PAIN SCALES - GENERAL
PAINLEVEL_OUTOF10: 7
PAINLEVEL_OUTOF10: 7
PAINLEVEL_OUTOF10: 6

## 2025-05-05 ASSESSMENT — PAIN DESCRIPTION - LOCATION
LOCATION: FOOT
LOCATION: FOOT

## 2025-05-05 NOTE — CARE COORDINATION
Transition of Care Plan:     RUR: 21%  Prior Level of Functioning: independent  Disposition: home with [facility] [place]  >declining rehab  FELA: 5/5  If SNF or IPR: Date FOC offered: 5/1, declined  Follow up appointments: PCP, specialists as indicated   DME needed: owns DME required for discharge  Transportation at discharge: family  IM/IMM Medicare/ letter given: to be given  Is patient a  and connected with VA? no              If yes, was  transfer form completed and VA notified?   Caregiver Contact: daughter  Discharge Caregiver contacted prior to discharge? yes  Care Conference needed? no  Barriers to discharge:  vascular clearance    Chart reviewed. CM continuing to follow for discharge planning. Pt awaiting vascular clearance. Discharge plan remains home with resumption of [facility] services with [facility]. Will continue to be available should further disposition needs arise.    HERBERT Castillo   Care Manager, [facility]  [phone]

## 2025-05-05 NOTE — OP NOTE
[facility]              [address] [place]  [address]                            OPERATIVE REPORT      PATIENT NAME: ANGEL OAKES               : 1950  MED REC NO: [id number]                       ROOM: OR  ACCOUNT NO: [id number]                       ADMIT DATE: 2025  PROVIDER: Harris Lorenzo MD    DATE OF SERVICE:  2025    PREOPERATIVE DIAGNOSES:  Thrombosed left leg vein bypass.    POSTOPERATIVE DIAGNOSES:  Thrombosed left leg vein bypass.    PROCEDURES PERFORMED:       1. Mechanical catheter thrombectomy, left leg vein bypass.     2. Intraoperative completion arteriogram using fluoroscopy.     3. Balloon angioplasty of distal anastomosis and posterior tibial artery (3 mm).     4. Balloon angioplasty and stent placement, left external iliac artery (7 x 79 and 7 x 39 VBX).    SURGEON:  Harris Lorenzo MD    ANESTHESIA:  General.    ESTIMATED BLOOD LOSS:  Minimal.    COMPLICATIONS:  None.    INDICATIONS:  The patient is a 74-year-old gentleman, who had a prior left leg vein bypass from the common femoral to posterior tibial level on the left.  He presented with worsening foot wound and was found to have an occluded bypass graft.    DESCRIPTION OF PROCEDURE:  After obtaining informed consent, the patient was placed supine on the operating table.  After adequate induction of general anesthesia, signing patient's confirmation, and administration of prophylactic antibiotics, the left leg was prepped and draped in a sterile fashion.  A limited incision was made over the medial aspect of the leg and the vein grafts identified, dissected free proximally and distally, and opened transversely.  The patient was systemically heparinized.  A #3 Marisol catheter was passed antegrade and passed easily into the posterior tibial artery to the level of the ankle and foot.  A small amount of debris was removed.  It was brought back with very modest

## 2025-05-05 NOTE — CONSULTS
Vascular Surgery Consult Note  4/29/2025    Subjective:     Nasir Hatfield is a 74 y.o. male with a pmhx significant for COPD w/ CRF, CVA, AFIB, HTN, HLD, GERD, and alcohol use.  He is a current smoker.  He was taking a statin, Eliquis, clopidogrel, and aspirin prior to presenting.  He is currently WC bound.     He is a known patient of the practice for the management of PAD.   He is s/p left SFA/MAK PTA and stent placement (5/2024), left leg thrombectomy with suction embolectomy device (penumbra), left SFA/MAK BAP/stent placement (Viabahn), left peroneal artery BAP, and lytic therapy of the left leg followed by reposition of the infusion catheter x2 (9/2024), left CFA to PT non-reversed vein bypass (1/26/25), ray amputation of the 2nd-4th toes and open transmetatarsal amputation of the 5th toe (1/26/25),  left foot debridement with resection of left 5th metatarsal (3/7/25), and thrombectomy LLE vein graft, BAP left PT artery and vein graft, BAP/stent placement left external iliac artery (4/18/25).   He continues to have an open wound of the lateral foot.  He presented to the clinic in 4/28/25 with deterioration of the wound.  He had a strong signal in the foot.  He was referred to the hospital for IV antibxs and debridement.       Past medical history  Peripheral arterial disease   Chronic obstructive pulmonary disease   Chronic respiratory failure   Oxygen dependence   Tobacco use   Hypertension  Hyperlipidemia   Atrial fibrillation   Stroke   Depression     Gastroesophageal reflux disease      Past procedural history  Removal of bilateral cataracts  Left great toe amputation  Left foot debridement (5/22/24)  Left SFA/MAK PTA and stent placement (5/2024),  Left leg thrombectomy with suction embolectomy device (penumbra), left SFA/MAK BAP/stent placement (Viabahn), left peroneal artery BAP, and lytic therapy of the left leg followed by reposition of the infusion catheter x2 (9/2024)  Left CFA to PT non-reversed 
(ROXICODONE) immediate release tablet 5 mg  5 mg Oral Q4H PRN    budesonide (PULMICORT) nebulizer suspension 250 mcg  0.25 mg Nebulization BID RT    And    arformoterol tartrate (BROVANA) nebulizer solution 15 mcg  15 mcg Nebulization BID RT    And    ipratropium (ATROVENT) 0.02 % nebulizer solution 0.5 mg  0.5 mg Nebulization TID RT    morphine (PF) injection 1 mg  1 mg IntraVENous Q4H PRN    Or    morphine (PF) injection 2 mg  2 mg IntraVENous Q4H PRN    collagenase ointment   Topical Daily    ipratropium 0.5 mg-albuterol 2.5 mg (DUONEB) nebulizer solution 1 Dose  1 Dose Inhalation Q4H PRN    albuterol sulfate HFA (PROVENTIL;VENTOLIN;PROAIR) 108 (90 Base) MCG/ACT inhaler 1 puff  1 puff Inhalation Q4H PRN    aspirin EC tablet 81 mg  81 mg Oral Daily    atorvastatin (LIPITOR) tablet 10 mg  10 mg Oral Nightly    citalopram (CELEXA) tablet 10 mg  10 mg Oral Daily    clopidogrel (PLAVIX) tablet 75 mg  75 mg Oral Daily    dilTIAZem (CARDIZEM CD) extended release capsule 120 mg  120 mg Oral Daily    pantoprazole (PROTONIX) tablet 40 mg  40 mg Oral QAM AC    sennosides-docusate sodium (SENOKOT-S) 8.6-50 MG tablet 1 tablet  1 tablet Oral Daily    0.9 % sodium chloride infusion   IntraVENous PRN    enoxaparin (LOVENOX) injection 40 mg  40 mg SubCUTAneous Daily    ondansetron (ZOFRAN-ODT) disintegrating tablet 4 mg  4 mg Oral Q8H PRN    Or    ondansetron (ZOFRAN) injection 4 mg  4 mg IntraVENous Q6H PRN    polyethylene glycol (GLYCOLAX) packet 17 g  17 g Oral Daily PRN    acetaminophen (TYLENOL) tablet 650 mg  650 mg Oral Q6H PRN    Or    acetaminophen (TYLENOL) suppository 650 mg  650 mg Rectal Q6H PRN    melatonin tablet 3 mg  3 mg Oral Nightly    aluminum & magnesium hydroxide-simethicone (MAALOX PLUS) 200-200-20 MG/5ML suspension 30 mL  30 mL Oral Q6H PRN       Labs:  ABG Invalid input(s): \"PHI\", \"PCO2I\", \"PO2I\", \"HCO3I\", \"SO2I\", \"FIO2I\"     CBC Recent Labs     05/05/25  0504   WBC 7.7   HGB 9.1*   HCT 30.8*

## 2025-05-06 LAB
ANION GAP SERPL CALC-SCNC: 7 MMOL/L (ref 2–12)
BASOPHILS # BLD: 0.01 K/UL (ref 0–0.1)
BASOPHILS NFR BLD: 0.1 % (ref 0–1)
BUN SERPL-MCNC: 21 MG/DL (ref 6–20)
BUN/CREAT SERPL: 26 (ref 12–20)
CALCIUM SERPL-MCNC: 9.1 MG/DL (ref 8.5–10.1)
CHLORIDE SERPL-SCNC: 104 MMOL/L (ref 97–108)
CO2 SERPL-SCNC: 27 MMOL/L (ref 21–32)
CREAT SERPL-MCNC: 0.82 MG/DL (ref 0.7–1.3)
DIFFERENTIAL METHOD BLD: ABNORMAL
EOSINOPHIL # BLD: 0 K/UL (ref 0–0.4)
EOSINOPHIL NFR BLD: 0 % (ref 0–7)
ERYTHROCYTE [DISTWIDTH] IN BLOOD BY AUTOMATED COUNT: 19.6 % (ref 11.5–14.5)
GLUCOSE SERPL-MCNC: 160 MG/DL (ref 65–100)
HCT VFR BLD AUTO: 32.6 % (ref 36.6–50.3)
HGB BLD-MCNC: 9.7 G/DL (ref 12.1–17)
IMM GRANULOCYTES # BLD AUTO: 0.03 K/UL (ref 0–0.04)
IMM GRANULOCYTES NFR BLD AUTO: 0.4 % (ref 0–0.5)
LYMPHOCYTES # BLD: 0.53 K/UL (ref 0.8–3.5)
LYMPHOCYTES NFR BLD: 7.2 % (ref 12–49)
MCH RBC QN AUTO: 23.2 PG (ref 26–34)
MCHC RBC AUTO-ENTMCNC: 29.8 G/DL (ref 30–36.5)
MCV RBC AUTO: 78 FL (ref 80–99)
MONOCYTES # BLD: 0.32 K/UL (ref 0–1)
MONOCYTES NFR BLD: 4.4 % (ref 5–13)
NEUTS SEG # BLD: 6.41 K/UL (ref 1.8–8)
NEUTS SEG NFR BLD: 87.9 % (ref 32–75)
NRBC # BLD: 0 K/UL (ref 0–0.01)
NRBC BLD-RTO: 0 PER 100 WBC
PLATELET # BLD AUTO: 373 K/UL (ref 150–400)
PMV BLD AUTO: 8.9 FL (ref 8.9–12.9)
POTASSIUM SERPL-SCNC: 4 MMOL/L (ref 3.5–5.1)
RBC # BLD AUTO: 4.18 M/UL (ref 4.1–5.7)
RBC MORPH BLD: ABNORMAL
SODIUM SERPL-SCNC: 138 MMOL/L (ref 136–145)
WBC # BLD AUTO: 7.3 K/UL (ref 4.1–11.1)

## 2025-05-06 PROCEDURE — 97530 THERAPEUTIC ACTIVITIES: CPT

## 2025-05-06 PROCEDURE — 6370000000 HC RX 637 (ALT 250 FOR IP): Performed by: NURSE PRACTITIONER

## 2025-05-06 PROCEDURE — 97116 GAIT TRAINING THERAPY: CPT

## 2025-05-06 PROCEDURE — 94669 MECHANICAL CHEST WALL OSCILL: CPT

## 2025-05-06 PROCEDURE — 6370000000 HC RX 637 (ALT 250 FOR IP): Performed by: SURGERY

## 2025-05-06 PROCEDURE — 94640 AIRWAY INHALATION TREATMENT: CPT

## 2025-05-06 PROCEDURE — 2500000003 HC RX 250 WO HCPCS: Performed by: ANESTHESIOLOGY

## 2025-05-06 PROCEDURE — 6370000000 HC RX 637 (ALT 250 FOR IP): Performed by: PHYSICIAN ASSISTANT

## 2025-05-06 PROCEDURE — 6370000000 HC RX 637 (ALT 250 FOR IP): Performed by: INTERNAL MEDICINE

## 2025-05-06 PROCEDURE — 1100000000 HC RM PRIVATE

## 2025-05-06 PROCEDURE — 2700000000 HC OXYGEN THERAPY PER DAY

## 2025-05-06 PROCEDURE — 36415 COLL VENOUS BLD VENIPUNCTURE: CPT

## 2025-05-06 PROCEDURE — 80048 BASIC METABOLIC PNL TOTAL CA: CPT

## 2025-05-06 PROCEDURE — 6360000002 HC RX W HCPCS: Performed by: INTERNAL MEDICINE

## 2025-05-06 PROCEDURE — 2500000003 HC RX 250 WO HCPCS: Performed by: INTERNAL MEDICINE

## 2025-05-06 PROCEDURE — 6360000002 HC RX W HCPCS: Performed by: SURGERY

## 2025-05-06 PROCEDURE — 94761 N-INVAS EAR/PLS OXIMETRY MLT: CPT

## 2025-05-06 PROCEDURE — 85025 COMPLETE CBC W/AUTO DIFF WBC: CPT

## 2025-05-06 PROCEDURE — 97535 SELF CARE MNGMENT TRAINING: CPT

## 2025-05-06 RX ADMIN — ASPIRIN 81 MG: 81 TABLET, COATED ORAL at 09:10

## 2025-05-06 RX ADMIN — GABAPENTIN 100 MG: 100 CAPSULE ORAL at 09:09

## 2025-05-06 RX ADMIN — IPRATROPIUM BROMIDE AND ALBUTEROL SULFATE 1 DOSE: .5; 2.5 SOLUTION RESPIRATORY (INHALATION) at 15:50

## 2025-05-06 RX ADMIN — CLOPIDOGREL BISULFATE 75 MG: 75 TABLET, FILM COATED ORAL at 09:09

## 2025-05-06 RX ADMIN — ARFORMOTEROL TARTRATE 15 MCG: 15 SOLUTION RESPIRATORY (INHALATION) at 07:55

## 2025-05-06 RX ADMIN — AZITHROMYCIN 500 MG: 250 TABLET, FILM COATED ORAL at 09:09

## 2025-05-06 RX ADMIN — MELATONIN 3 MG: at 20:52

## 2025-05-06 RX ADMIN — ENOXAPARIN SODIUM 40 MG: 100 INJECTION SUBCUTANEOUS at 09:11

## 2025-05-06 RX ADMIN — PANTOPRAZOLE SODIUM 40 MG: 40 TABLET, DELAYED RELEASE ORAL at 05:49

## 2025-05-06 RX ADMIN — CITALOPRAM HYDROBROMIDE 10 MG: 20 TABLET ORAL at 09:10

## 2025-05-06 RX ADMIN — BUDESONIDE 250 MCG: 0.25 INHALANT RESPIRATORY (INHALATION) at 07:55

## 2025-05-06 RX ADMIN — GABAPENTIN 100 MG: 100 CAPSULE ORAL at 20:52

## 2025-05-06 RX ADMIN — ACETAMINOPHEN 650 MG: 325 TABLET ORAL at 09:10

## 2025-05-06 RX ADMIN — SENNOSIDES AND DOCUSATE SODIUM 1 TABLET: 50; 8.6 TABLET ORAL at 09:09

## 2025-05-06 RX ADMIN — IPRATROPIUM BROMIDE AND ALBUTEROL SULFATE 1 DOSE: .5; 2.5 SOLUTION RESPIRATORY (INHALATION) at 12:19

## 2025-05-06 RX ADMIN — GUAIFENESIN 1200 MG: 600 TABLET, MULTILAYER, EXTENDED RELEASE ORAL at 20:52

## 2025-05-06 RX ADMIN — BUDESONIDE 250 MCG: 0.25 INHALANT RESPIRATORY (INHALATION) at 19:56

## 2025-05-06 RX ADMIN — ARFORMOTEROL TARTRATE 15 MCG: 15 SOLUTION RESPIRATORY (INHALATION) at 19:56

## 2025-05-06 RX ADMIN — SODIUM CHLORIDE, PRESERVATIVE FREE 10 ML: 5 INJECTION INTRAVENOUS at 20:53

## 2025-05-06 RX ADMIN — GUAIFENESIN 1200 MG: 600 TABLET, MULTILAYER, EXTENDED RELEASE ORAL at 09:09

## 2025-05-06 RX ADMIN — METHYLPREDNISOLONE SODIUM SUCCINATE 40 MG: 40 INJECTION, POWDER, LYOPHILIZED, FOR SOLUTION INTRAMUSCULAR; INTRAVENOUS at 20:52

## 2025-05-06 RX ADMIN — IPRATROPIUM BROMIDE AND ALBUTEROL SULFATE 1 DOSE: .5; 2.5 SOLUTION RESPIRATORY (INHALATION) at 07:50

## 2025-05-06 RX ADMIN — METHYLPREDNISOLONE SODIUM SUCCINATE 40 MG: 40 INJECTION, POWDER, LYOPHILIZED, FOR SOLUTION INTRAMUSCULAR; INTRAVENOUS at 09:18

## 2025-05-06 RX ADMIN — DILTIAZEM HYDROCHLORIDE 120 MG: 120 CAPSULE, EXTENDED RELEASE ORAL at 09:09

## 2025-05-06 RX ADMIN — OXYCODONE 5 MG: 5 TABLET ORAL at 20:52

## 2025-05-06 RX ADMIN — ATORVASTATIN CALCIUM 10 MG: 10 TABLET, FILM COATED ORAL at 20:52

## 2025-05-06 RX ADMIN — SODIUM CHLORIDE, PRESERVATIVE FREE 10 ML: 5 INJECTION INTRAVENOUS at 09:33

## 2025-05-06 RX ADMIN — GABAPENTIN 100 MG: 100 CAPSULE ORAL at 15:29

## 2025-05-06 RX ADMIN — IPRATROPIUM BROMIDE AND ALBUTEROL SULFATE 1 DOSE: .5; 2.5 SOLUTION RESPIRATORY (INHALATION) at 19:57

## 2025-05-06 ASSESSMENT — PAIN DESCRIPTION - DESCRIPTORS
DESCRIPTORS: ACHING
DESCRIPTORS: ACHING

## 2025-05-06 ASSESSMENT — PAIN - FUNCTIONAL ASSESSMENT: PAIN_FUNCTIONAL_ASSESSMENT: ACTIVITIES ARE NOT PREVENTED

## 2025-05-06 ASSESSMENT — PAIN DESCRIPTION - ORIENTATION
ORIENTATION: LEFT

## 2025-05-06 ASSESSMENT — PAIN SCALES - GENERAL
PAINLEVEL_OUTOF10: 6
PAINLEVEL_OUTOF10: 7
PAINLEVEL_OUTOF10: 6

## 2025-05-06 ASSESSMENT — PAIN DESCRIPTION - LOCATION
LOCATION: FOOT

## 2025-05-06 ASSESSMENT — PAIN SCALES - WONG BAKER: WONGBAKER_NUMERICALRESPONSE: NO HURT

## 2025-05-07 PROCEDURE — 6370000000 HC RX 637 (ALT 250 FOR IP): Performed by: NURSE PRACTITIONER

## 2025-05-07 PROCEDURE — 6370000000 HC RX 637 (ALT 250 FOR IP): Performed by: PHYSICIAN ASSISTANT

## 2025-05-07 PROCEDURE — 6370000000 HC RX 637 (ALT 250 FOR IP): Performed by: INTERNAL MEDICINE

## 2025-05-07 PROCEDURE — 6360000002 HC RX W HCPCS: Performed by: INTERNAL MEDICINE

## 2025-05-07 PROCEDURE — 2500000003 HC RX 250 WO HCPCS: Performed by: INTERNAL MEDICINE

## 2025-05-07 PROCEDURE — 94669 MECHANICAL CHEST WALL OSCILL: CPT

## 2025-05-07 PROCEDURE — 2700000000 HC OXYGEN THERAPY PER DAY

## 2025-05-07 PROCEDURE — 6360000002 HC RX W HCPCS: Performed by: SURGERY

## 2025-05-07 PROCEDURE — 6370000000 HC RX 637 (ALT 250 FOR IP): Performed by: SURGERY

## 2025-05-07 PROCEDURE — 2500000003 HC RX 250 WO HCPCS: Performed by: ANESTHESIOLOGY

## 2025-05-07 PROCEDURE — 94640 AIRWAY INHALATION TREATMENT: CPT

## 2025-05-07 PROCEDURE — 1100000000 HC RM PRIVATE

## 2025-05-07 PROCEDURE — 94761 N-INVAS EAR/PLS OXIMETRY MLT: CPT

## 2025-05-07 RX ORDER — PREDNISONE 20 MG/1
TABLET ORAL
Qty: 24 TABLET | Refills: 0 | Status: SHIPPED | OUTPATIENT
Start: 2025-05-07 | End: 2025-05-22

## 2025-05-07 RX ORDER — PREDNISONE 20 MG/1
TABLET ORAL
Qty: 15 TABLET | Refills: 0 | OUTPATIENT
Start: 2025-05-07 | End: 2025-05-13

## 2025-05-07 RX ORDER — OXYCODONE HYDROCHLORIDE 5 MG/1
5 TABLET ORAL EVERY 4 HOURS PRN
Qty: 12 TABLET | Refills: 0 | Status: SHIPPED | OUTPATIENT
Start: 2025-05-07 | End: 2025-05-10

## 2025-05-07 RX ADMIN — GUAIFENESIN 1200 MG: 600 TABLET, MULTILAYER, EXTENDED RELEASE ORAL at 10:43

## 2025-05-07 RX ADMIN — IPRATROPIUM BROMIDE AND ALBUTEROL SULFATE 1 DOSE: .5; 2.5 SOLUTION RESPIRATORY (INHALATION) at 12:54

## 2025-05-07 RX ADMIN — ATORVASTATIN CALCIUM 10 MG: 10 TABLET, FILM COATED ORAL at 20:06

## 2025-05-07 RX ADMIN — IPRATROPIUM BROMIDE AND ALBUTEROL SULFATE 1 DOSE: .5; 2.5 SOLUTION RESPIRATORY (INHALATION) at 19:49

## 2025-05-07 RX ADMIN — SODIUM CHLORIDE, PRESERVATIVE FREE 10 ML: 5 INJECTION INTRAVENOUS at 20:07

## 2025-05-07 RX ADMIN — CLOPIDOGREL BISULFATE 75 MG: 75 TABLET, FILM COATED ORAL at 10:42

## 2025-05-07 RX ADMIN — BUDESONIDE 250 MCG: 0.25 INHALANT RESPIRATORY (INHALATION) at 08:47

## 2025-05-07 RX ADMIN — METHYLPREDNISOLONE SODIUM SUCCINATE 40 MG: 40 INJECTION, POWDER, LYOPHILIZED, FOR SOLUTION INTRAMUSCULAR; INTRAVENOUS at 20:06

## 2025-05-07 RX ADMIN — BUDESONIDE 250 MCG: 0.25 INHALANT RESPIRATORY (INHALATION) at 19:49

## 2025-05-07 RX ADMIN — IPRATROPIUM BROMIDE AND ALBUTEROL SULFATE 1 DOSE: .5; 2.5 SOLUTION RESPIRATORY (INHALATION) at 08:42

## 2025-05-07 RX ADMIN — MELATONIN 3 MG: at 20:06

## 2025-05-07 RX ADMIN — DILTIAZEM HYDROCHLORIDE 120 MG: 120 CAPSULE, EXTENDED RELEASE ORAL at 10:42

## 2025-05-07 RX ADMIN — ASPIRIN 81 MG: 81 TABLET, COATED ORAL at 10:42

## 2025-05-07 RX ADMIN — AZITHROMYCIN 500 MG: 250 TABLET, FILM COATED ORAL at 10:42

## 2025-05-07 RX ADMIN — OXYCODONE 5 MG: 5 TABLET ORAL at 17:57

## 2025-05-07 RX ADMIN — GABAPENTIN 100 MG: 100 CAPSULE ORAL at 10:42

## 2025-05-07 RX ADMIN — METHYLPREDNISOLONE SODIUM SUCCINATE 40 MG: 40 INJECTION, POWDER, LYOPHILIZED, FOR SOLUTION INTRAMUSCULAR; INTRAVENOUS at 10:47

## 2025-05-07 RX ADMIN — IPRATROPIUM BROMIDE AND ALBUTEROL SULFATE 1 DOSE: .5; 2.5 SOLUTION RESPIRATORY (INHALATION) at 16:33

## 2025-05-07 RX ADMIN — ARFORMOTEROL TARTRATE 15 MCG: 15 SOLUTION RESPIRATORY (INHALATION) at 08:47

## 2025-05-07 RX ADMIN — ARFORMOTEROL TARTRATE 15 MCG: 15 SOLUTION RESPIRATORY (INHALATION) at 19:49

## 2025-05-07 RX ADMIN — GABAPENTIN 100 MG: 100 CAPSULE ORAL at 20:06

## 2025-05-07 RX ADMIN — PANTOPRAZOLE SODIUM 40 MG: 40 TABLET, DELAYED RELEASE ORAL at 05:25

## 2025-05-07 RX ADMIN — GABAPENTIN 100 MG: 100 CAPSULE ORAL at 15:44

## 2025-05-07 RX ADMIN — GUAIFENESIN 1200 MG: 600 TABLET, MULTILAYER, EXTENDED RELEASE ORAL at 20:06

## 2025-05-07 RX ADMIN — OXYCODONE 5 MG: 5 TABLET ORAL at 05:25

## 2025-05-07 RX ADMIN — SENNOSIDES AND DOCUSATE SODIUM 1 TABLET: 50; 8.6 TABLET ORAL at 10:43

## 2025-05-07 RX ADMIN — CITALOPRAM HYDROBROMIDE 10 MG: 20 TABLET ORAL at 10:43

## 2025-05-07 RX ADMIN — ENOXAPARIN SODIUM 40 MG: 100 INJECTION SUBCUTANEOUS at 10:41

## 2025-05-07 RX ADMIN — SODIUM CHLORIDE, PRESERVATIVE FREE 10 ML: 5 INJECTION INTRAVENOUS at 10:43

## 2025-05-07 RX ADMIN — COLLAGENASE SANTYL: 250 OINTMENT TOPICAL at 12:30

## 2025-05-07 ASSESSMENT — PAIN SCALES - GENERAL
PAINLEVEL_OUTOF10: 0
PAINLEVEL_OUTOF10: 0
PAINLEVEL_OUTOF10: 6
PAINLEVEL_OUTOF10: 6

## 2025-05-07 ASSESSMENT — PAIN DESCRIPTION - ORIENTATION
ORIENTATION: LEFT
ORIENTATION: LEFT

## 2025-05-07 ASSESSMENT — PAIN DESCRIPTION - DESCRIPTORS
DESCRIPTORS: THROBBING
DESCRIPTORS: ACHING;THROBBING;POUNDING

## 2025-05-07 ASSESSMENT — PAIN DESCRIPTION - LOCATION
LOCATION: FOOT
LOCATION: FOOT

## 2025-05-07 NOTE — DISCHARGE SUMMARY
Discharge Summary    Name: Nasir Hatfield  [id number]  YOB: 1950 (Age: 74 y.o.)   Date of Admission: 4/28/2025  Date of Discharge: 5/7/2025  Attending Physician: Carmencita Hein MD    Discharge Diagnosis:     Consultations:  IP CONSULT TO HOSPITALIST  IP CONSULT TO PHARMACY  IP CONSULT TO VASCULAR SURGERY  IP CONSULT TO PULMONOLOGY  IP CONSULT TO CASE MANAGEMENT      Brief Admission History/Reason for Admission Per Renae Anderson MD:     Non-weight bearing left foot with walker, f/w with vascular in 2 weeks  Resume eliquis     Brief Hospital Course by Main Problems:   Infected wound left foot  Peripheral vascular disease s/p stent and bypass surgery  S/P left transmetatarsal amputation 4/30/25  Failed outpatient oral antibiotic  -Vascular following.   -stay course of  IV vancomycin and Zosyn ends 5/4  -PT OT eval.  NWB on left.  Patient declines rehab   -gabapentin prn      Microcytic anemia  Iron profile consistent with iron deficiency anemia.   -start iron supplements at discharge.   -follow-up with PCP to review or set up GI workup     HTN  HLD  Stroke without any residual deficits  Continue with aspirin, Lipitor, Plavix.     COPD  Chronic respiratory failure on 3 L of oxygen  Continue with home nebs.  This is worsened  Gait portable chest x-ray  Consult his pulmonologist  Continue DuoNebs  Acapella  Incentive spirometry     Active smoker  Previously counseled regarding smoking cessation -Total time spent 3 minutes.  - Nicotine patch 21--> 14     GERD  Continue with Protonix    Discussed with bela Underwood on discharge  Pulm cleared the patient and will add 5 days of steroid at home as      Discharge Exam:  Patient seen and examined by me on discharge day.  Pertinent Findings:  Patient Vitals for the past 24 hrs:   BP Temp Temp src Pulse Resp SpO2   05/07/25 1633 -- -- -- -- -- 90 %   05/07/25 1539 139/72 97.7 °F (36.5 °C) Oral 82 16 --   05/07/25

## 2025-05-07 NOTE — CARE COORDINATION
Cleared for D/C from CM standpoint  Transition of Care Plan:     RUR: 21%  Prior Level of Functioning: independent  Disposition: home with [facility] [place]  >declining rehab  FELA: 5/7  If SNF or IPR: Date FOC offered: 5/1, declined  Follow up appointments: PCP, specialists as indicated   DME needed: owns DME required for discharge  Transportation at discharge: family, ETA 5PM  IM/IMM Medicare/ letter given: given  Is patient a  and connected with VA? no              If yes, was [place] transfer form completed and VA notified?   Caregiver Contact: daughter  Discharge Caregiver contacted prior to discharge? yes  Care Conference needed? no  Barriers to discharge: none    Chart reviewed. CM aware of discharge order. Met with pt at the bedside to confirm d/c plan. 2nd IM given and reviewed. Daughter to transport home today, ETA 5PM and will bring portable O2 tank for transport. [facility] referral sent to [facility]- able to accept and information added to AVS for review. No further CM needs identified at this time.     05/07/25 1511   Services At/After Discharge   Transition of Care Consult (CM Consult) Discharge Planning   Services At/After Discharge [facility]    Resource Information Provided? No   Mode of Transport at Discharge Other (see comment)  (family)   Hospital Transport Time of Discharge 1700   Confirm Follow Up Transport Family   Condition of Participation: Discharge Planning   The Plan for Transition of Care is related to the following treatment goals: return home   The Patient and/or Patient Representative was provided with a Choice of Provider? Patient   The Patient and/Or Patient Representative agree with the Discharge Plan? Yes   Freedom of Choice list was provided with basic dialogue that supports the patient's individualized plan of care/goals, treatment preferences, and shares the quality data associated with the providers?  Yes       HERBERT Castillo   Care

## 2025-05-08 VITALS
RESPIRATION RATE: 18 BRPM | HEART RATE: 89 BPM | OXYGEN SATURATION: 92 % | WEIGHT: 146.39 LBS | DIASTOLIC BLOOD PRESSURE: 72 MMHG | BODY MASS INDEX: 20.96 KG/M2 | SYSTOLIC BLOOD PRESSURE: 132 MMHG | HEIGHT: 70 IN | TEMPERATURE: 97.9 F

## 2025-05-08 PROCEDURE — 2700000000 HC OXYGEN THERAPY PER DAY

## 2025-05-08 PROCEDURE — 6360000002 HC RX W HCPCS: Performed by: INTERNAL MEDICINE

## 2025-05-08 PROCEDURE — 6370000000 HC RX 637 (ALT 250 FOR IP): Performed by: PHYSICIAN ASSISTANT

## 2025-05-08 PROCEDURE — 2500000003 HC RX 250 WO HCPCS: Performed by: ANESTHESIOLOGY

## 2025-05-08 PROCEDURE — 94761 N-INVAS EAR/PLS OXIMETRY MLT: CPT

## 2025-05-08 PROCEDURE — 6370000000 HC RX 637 (ALT 250 FOR IP): Performed by: SURGERY

## 2025-05-08 PROCEDURE — 6370000000 HC RX 637 (ALT 250 FOR IP): Performed by: INTERNAL MEDICINE

## 2025-05-08 PROCEDURE — 6360000002 HC RX W HCPCS: Performed by: SURGERY

## 2025-05-08 PROCEDURE — 2500000003 HC RX 250 WO HCPCS: Performed by: INTERNAL MEDICINE

## 2025-05-08 PROCEDURE — 94669 MECHANICAL CHEST WALL OSCILL: CPT

## 2025-05-08 PROCEDURE — 6370000000 HC RX 637 (ALT 250 FOR IP): Performed by: NURSE PRACTITIONER

## 2025-05-08 PROCEDURE — 94640 AIRWAY INHALATION TREATMENT: CPT

## 2025-05-08 RX ADMIN — SENNOSIDES AND DOCUSATE SODIUM 1 TABLET: 50; 8.6 TABLET ORAL at 10:24

## 2025-05-08 RX ADMIN — IPRATROPIUM BROMIDE AND ALBUTEROL SULFATE 1 DOSE: .5; 2.5 SOLUTION RESPIRATORY (INHALATION) at 07:08

## 2025-05-08 RX ADMIN — ASPIRIN 81 MG: 81 TABLET, COATED ORAL at 10:24

## 2025-05-08 RX ADMIN — BUDESONIDE 250 MCG: 0.25 INHALANT RESPIRATORY (INHALATION) at 07:17

## 2025-05-08 RX ADMIN — GABAPENTIN 100 MG: 100 CAPSULE ORAL at 10:25

## 2025-05-08 RX ADMIN — CITALOPRAM HYDROBROMIDE 10 MG: 20 TABLET ORAL at 10:24

## 2025-05-08 RX ADMIN — GUAIFENESIN 1200 MG: 600 TABLET, MULTILAYER, EXTENDED RELEASE ORAL at 10:25

## 2025-05-08 RX ADMIN — PANTOPRAZOLE SODIUM 40 MG: 40 TABLET, DELAYED RELEASE ORAL at 06:48

## 2025-05-08 RX ADMIN — GABAPENTIN 100 MG: 100 CAPSULE ORAL at 13:40

## 2025-05-08 RX ADMIN — ENOXAPARIN SODIUM 40 MG: 100 INJECTION SUBCUTANEOUS at 10:25

## 2025-05-08 RX ADMIN — IPRATROPIUM BROMIDE AND ALBUTEROL SULFATE 1 DOSE: .5; 2.5 SOLUTION RESPIRATORY (INHALATION) at 10:45

## 2025-05-08 RX ADMIN — IPRATROPIUM BROMIDE AND ALBUTEROL SULFATE 1 DOSE: .5; 2.5 SOLUTION RESPIRATORY (INHALATION) at 15:01

## 2025-05-08 RX ADMIN — SODIUM CHLORIDE, PRESERVATIVE FREE 10 ML: 5 INJECTION INTRAVENOUS at 10:25

## 2025-05-08 RX ADMIN — COLLAGENASE SANTYL: 250 OINTMENT TOPICAL at 16:29

## 2025-05-08 RX ADMIN — DILTIAZEM HYDROCHLORIDE 120 MG: 120 CAPSULE, EXTENDED RELEASE ORAL at 10:25

## 2025-05-08 RX ADMIN — ARFORMOTEROL TARTRATE 15 MCG: 15 SOLUTION RESPIRATORY (INHALATION) at 07:17

## 2025-05-08 RX ADMIN — METHYLPREDNISOLONE SODIUM SUCCINATE 40 MG: 40 INJECTION, POWDER, LYOPHILIZED, FOR SOLUTION INTRAMUSCULAR; INTRAVENOUS at 10:25

## 2025-05-08 RX ADMIN — CLOPIDOGREL BISULFATE 75 MG: 75 TABLET, FILM COATED ORAL at 10:24

## 2025-05-08 NOTE — CARE COORDINATION
CM informed that pt was not discharged yesterday evening due to transportation concerns. Spoke with pt who stated that his daughter was not able to pick him up as planned. Noted after CM hours (5PM) yesterday and no other transportation arrangements had been made. Per pt his daughter will arrive today at 5PM to pick him up for discharge. She will bring a portable O2 tank for travel. [facility] to follow up once home. Avoidable day captured. Cleared for D/C from CM standpoint. If additional transportation barriers arise after 4:30PM, please contact nursing supervisor for [facility] approval.    Julisa Martel MSW   Care Manager, [facility]  [phone]

## 2025-05-08 NOTE — DISCHARGE SUMMARY
Discharge Summary    Name: Nasir Hatfield  [id number]  YOB: 1950 (Age: 74 y.o.)   Date of Admission: 4/28/2025  Date of Discharge: 5/8/2025  Attending Physician: Carmencita Hein MD    Discharge Diagnosis:     Consultations:  IP CONSULT TO HOSPITALIST  IP CONSULT TO PHARMACY  IP CONSULT TO VASCULAR SURGERY  IP CONSULT TO PULMONOLOGY  IP CONSULT TO CASE MANAGEMENT      Brief Admission History/Reason for Admission Per Renae Anderson MD:     Non-weight bearing left foot with walker, f/w with vascular in 2 weeks  Resume eliquis     Brief Hospital Course by Main Problems:   Infected wound left foot  Peripheral vascular disease s/p stent and bypass surgery  S/P left transmetatarsal amputation 4/30/25  Failed outpatient oral antibiotic  -Vascular following.   -stay course of  IV vancomycin and Zosyn ends 5/4  -PT OT eval.  NWB on left.  Patient declines rehab   -gabapentin prn      Microcytic anemia  Iron profile consistent with iron deficiency anemia.   -start iron supplements at discharge.   -follow-up with PCP to review or set up GI workup     HTN  HLD  Stroke without any residual deficits  Continue with aspirin, Lipitor, Plavix.     COPD  Chronic respiratory failure on 3 L of oxygen  Continue with home nebs.  This is worsened  Gait portable chest x-ray  Consult his pulmonologist  Continue DuoNebs  Acapella  Incentive spirometry     Active smoker  Previously counseled regarding smoking cessation -Total time spent 3 minutes.  - Nicotine patch 21--> 14     GERD  Continue with Protonix    Discussed with bela Underwood on discharge  Pulm cleared the patient and will add 5 days of steroid at home as      Discharge Exam:  Patient seen and examined by me on discharge day.  Pertinent Findings:  Patient Vitals for the past 24 hrs:   BP Temp Temp src Pulse Resp SpO2   05/08/25 1204 -- -- Oral -- -- --   05/08/25 1045 -- -- -- 92 18 92 %   05/08/25 1023 132/72 97.9 °F

## 2025-05-08 NOTE — PROGRESS NOTES
Pulmonary, Critical Care, and Sleep Medicine~Progress Note    Name: Nasir Hatfield MRN: [id number]   : 1950 Hospital: [facility]   Date: 2025 1:43 PM Admission: 2025     Impression Plan   Acute on chronic hypoxemic and chronic hypercapnic respiratory failure- baseline 3-5L NC  COPD in acute exacerbation   L foot infection s/p TMA   PAD   Microcytic anemia   Afib on eliquis   H/o CVA  HTN  HLD  GERD   Tobacco abuse-ongoing 1 PPD x 50 yrs Supp o2 prn goal sats >88%- weaned to 3.5L NC  Arformoterol/budesonide nebs in place of home Trelegy  DuoNebs scheduled   Mucinex  Nicotine patch  Aggressive pulmonary toilet  (ie encourage IS, OOB  IVCS  azithro   Recently completed vanco and zosyn for foot infection   Eliquis held for procedure, resume when okay by vascular        Daily Progression:    5/6 Pt seen this afternoon lying in bed. States his breathing is okay. Prefers trelegy to inpt nebs as does not feel they were as well. Denies dyspnea and cough. On 4L NC, weaned to 3L NC    Consult Note    74 yom with pmhx significant for COPD (FEV1 1.75L, 53% in 2016), chronic hypoxemic respiratory failure, HTN, HLD, CVA, PAD, depression who is admitted with infected L foot wound. Underwent L TMA . Has had acute on chronic hypoxemia post operatively.    Pt denies significant dyspnea. Has occasional cough. Satting 83% on 4L NC. Increased to 6L NC with improvement in SpO2. Discussed with bedside nurse.     He follows with Dr. Ritter, last visit 2024. He was stable at that time. Using 5L O2 w/ exertion and at night. Continued on Trelegy inhaler.     Social hx: smokes 1/2 PPD   Family hx: mother + COPD     I have reviewed the labs and previous day’s notes.    Pertinent items are noted in HPI.  Past Medical History:   Diagnosis Date    Cerebral artery occlusion with cerebral infarction (HCC)     >40yrs - no residual    Chronic obstructive pulmonary disease 
      Hospitalist Progress Note    NAME:   Nasir Hatfield   : 1950   MRN: [id number]     Date/Time: 2025 1:09 PM  Patient PCP: Abdirahman Ortiz PA-C    Estimated discharge date:  Barriers: vascular surgery clearance    Assessment / Plan:    Infected wound left foot  Peripheral vascular disease s/p stent and bypass surgery  S/P left transmetatarsal amputation 25  Failed outpatient oral antibiotic  -Vascular following.   -stay course of  IV vancomycin and Zosyn ends   -PT OT eval.  NWB on left.  Patient declines rehab   -gabapentin prn     Microcytic anemia  Iron profile consistent with iron deficiency anemia.   -start iron supplements at discharge.   -follow-up with PCP to review or set up GI workup     HTN  HLD  Stroke without any residual deficits  Continue with aspirin, Lipitor, Plavix.     COPD  Chronic respiratory failure on 3 L of oxygen  Continue with home nebs.     Active smoker  Previously counseled regarding smoking cessation -Total time spent 3 minutes.  - Nicotine patch 21--> 14     GERD  Continue with Protonix.        Medical Decision Making:   I personally reviewed labs:vibha INTERIANO personally reviewed imaging:y  LAISHA personally reviewed EKG:-  Toxic drug monitoring: vancomycin   Discussed case with: patient         Code Status: Full code  DVT Prophylaxis: Lovenox  GI prophylaxis: Protonix  Baseline: lives independently      Subjective:     Chief Complaint / Reason for Physician Visit  No acute events noted overnight, on exam offers no acute concerns. Pt understands plan of care, pending vascular cl, he plans home discharge.       Objective:     VITALS:   Last 24hrs VS reviewed since prior progress note. Most recent are:  Patient Vitals for the past 24 hrs:   BP Temp Temp src Pulse Resp SpO2 Weight   25 0915 120/72 97.2 °F (36.2 °C) Oral 68 18 90 % --   25 0600 -- -- -- -- -- -- 59.3 kg (130 lb 11.7 oz)   25 0514 -- -- -- -- 16 -- --   25 0126 -- -- -- -- 17 -- -- 
      Hospitalist Progress Note    NAME:   Nasir Hatfield   : 1950   MRN: [id number]     Date/Time: 2025 10:52 AM  Patient PCP: Abdirahman Ortiz PA-C    Estimated discharge date:   Barriers: iv antibiotics, vascular surgery clearance      Assessment / Plan:  Infected wound on left foot  Peripheral vascular disease status post stent and bypass surgery  Failed outpatient oral antibiotic  Continue with IV vancomycin and Zosyn  Vascular surgery consult appreciated.  Recommending TMA -scheduled for today  PT OT eval after procedure, likely home with home health    Microcytic anemia  : H&H of 9.6/31.6.    Iron profile consistent with iron deficiency anemia.  Will start iron supplements at discharge.  Will need follow-up with PCP to review or set up GI workup     HTN  HLD  Stroke without any residual deficits  Continue with aspirin, Lipitor, Plavix.     COPD  Chronic respiratory failure on 3 L of oxygen  Continue with home nebs.     Active smoker  Patient counseled about smoking cessation.  Nicotine patch offered.  Total time spent 3 minutes.     GERD  Continue with Protonix.        Medical Decision Making:   I personally reviewed labs: CBC, BMP, LFT, Mg, phos  I personally reviewed imaging: None  I personally reviewed EKG:  Toxic drug monitoring: H&H while patient is on Lovenox, creatinine while patient is on IV vancomycin  Discussed case with: Patient, RN.        Code Status: Full code  DVT Prophylaxis: Lovenox  GI prophylaxis: Protonix  Baseline: Independent from home, lives alone.  Uses cane/walker sometimes.     Subjective:     Chief Complaint / Reason for Physician Visit  \" Follow-up for infected wound on left foot, peripheral vascular disease, HTN, HLD, stroke, COPD, chronic respiratory failure on 2 L of oxygen, active smoker, GERD.\".  Discussed with RN events overnight.       Objective:     VITALS:   Last 24hrs VS reviewed since prior progress note. Most recent are:  Patient Vitals for the past 24 
      Hospitalist Progress Note    NAME:   Nasir Hatfield   : 1950   MRN: [id number]     Date/Time: 2025 1:58 PM  Patient PCP: Abdirahman Ortiz PA-C    Estimated discharge date:  Barriers: vascular surgery clearance    Assessment / Plan:    Infected wound left foot  Peripheral vascular disease s/p stent and bypass surgery  S/P left transmetatarsal amputation 25  Failed outpatient oral antibiotic  -Vascular following.   -stay course of  IV vancomycin and Zosyn ends   -PT OT eval.  NWB on left.  Patient declines rehab   -gabapentin prn     Microcytic anemia  Iron profile consistent with iron deficiency anemia.   -start iron supplements at discharge.   -follow-up with PCP to review or set up GI workup     HTN  HLD  Stroke without any residual deficits  Continue with aspirin, Lipitor, Plavix.     COPD  Chronic respiratory failure on 3 L of oxygen  Continue with home nebs.  This is worsened  Gait portable chest x-ray  Consult his pulmonologist  Continue DuoNebs  Acapella  Incentive spirometry     Active smoker  Previously counseled regarding smoking cessation -Total time spent 3 minutes.  - Nicotine patch 21--> 14     GERD  Continue with Protonix.        Medical Decision Making:   I personally reviewed labs:y  I personally reviewed imaging:y  I personally reviewed EKG:-  Toxic drug monitoring: vancomycin   Discussed case with: patient         Code Status: Full code  DVT Prophylaxis: Lovenox  GI prophylaxis: Protonix  Baseline: lives independently      Subjective:     Chief Complaint / Reason for Physician Visit  Patient has increasing O2 requirements that is concerning.  Continue Solu-Medrol and bronchodilators  Add Acapella incentive spirometry  Consulted with pulmonary group.  He says that he is comfortable and does not look like he is in acute distress    Objective:     VITALS:   Last 24hrs VS reviewed since prior progress note. Most recent are:  Patient Vitals for the past 24 hrs:   BP Temp 
      Hospitalist Progress Note    NAME:   Nasir Hatfield   : 1950   MRN: [id number]     Date/Time: 2025 1:42 PM  Patient PCP: Abdirahman Ortiz PA-C    Estimated discharge date:  Barriers: vascular surgery clearance    Assessment / Plan:    Infected wound left foot  Peripheral vascular disease s/p stent and bypass surgery  S/P left transmetatarsal amputation 25  Failed outpatient oral antibiotic  -Vascular following.   -stay course of  IV vancomycin and Zosyn ends   -PT OT eval.  NWB on left.  Patient declines rehab   -gabapentin prn     Microcytic anemia  Iron profile consistent with iron deficiency anemia.   -start iron supplements at discharge.   -follow-up with PCP to review or set up GI workup     HTN  HLD  Stroke without any residual deficits  Continue with aspirin, Lipitor, Plavix.     COPD  Chronic respiratory failure on 3 L of oxygen  Continue with home nebs.  This is worsened  Gait portable chest x-ray  Consult his pulmonologist  Continue DuoNebs  Acapella  Incentive spirometry     Active smoker  Previously counseled regarding smoking cessation -Total time spent 3 minutes.  - Nicotine patch 21--> 14     GERD  Continue with Protonix.        Medical Decision Making:   I personally reviewed labs:y  I personally reviewed imaging:y  I personally reviewed EKG:-  Toxic drug monitoring: vancomycin   Discussed case with: patient         Code Status: Full code  DVT Prophylaxis: Lovenox  GI prophylaxis: Protonix  Baseline: lives independently      Subjective:     Chief Complaint / Reason for Physician Visit  Patient has increasing O2 requirements that is concerning.  Continue Solu-Medrol and bronchodilators  Add Acapella incentive spirometry  Consulted with pulmonary group.  He says that he is comfortable and does not look like he is in acute distress    Objective:     VITALS:   Last 24hrs VS reviewed since prior progress note. Most recent are:  Patient Vitals for the past 24 hrs:   BP Temp 
      Hospitalist Progress Note    NAME:   Nasir Hatfield   : 1950   MRN: [id number]     Date/Time: 2025 2:16 PM  Patient PCP: Abdirahman Ortiz PA-C    Estimated discharge date:  Barriers: vascular surgery clearance    Assessment / Plan:    Infected wound left foot  Peripheral vascular disease s/p stent and bypass surgery  S/P left transmetatarsal amputation 25  Failed outpatient oral antibiotic  -Vascular following.   -stay course of  IV vancomycin and Zosyn ends   -PT OT eval.  NWB on left.  Patient declines rehab   -gabapentin prn     Microcytic anemia  Iron profile consistent with iron deficiency anemia.   -start iron supplements at discharge.   -follow-up with PCP to review or set up GI workup     HTN  HLD  Stroke without any residual deficits  Continue with aspirin, Lipitor, Plavix.     COPD  Chronic respiratory failure on 3 L of oxygen  Continue with home nebs.     Active smoker  Previously counseled regarding smoking cessation -Total time spent 3 minutes.  - Nicotine patch 21--> 14     GERD  Continue with Protonix.        Medical Decision Making:   I personally reviewed labs:y  LAISHA personally reviewed imaging:y  I personally reviewed EKG:-  Toxic drug monitoring: vancomycin   Discussed case with: patient         Code Status: Full code  DVT Prophylaxis: Lovenox  GI prophylaxis: Protonix  Baseline: lives independently      Subjective:     Chief Complaint / Reason for Physician Visit  No acute events noted overnight, on exam offers no acute concerns. Pt understands plan of care, pending vascular cl, he plans home discharge.       Objective:     VITALS:   Last 24hrs VS reviewed since prior progress note. Most recent are:  Patient Vitals for the past 24 hrs:   BP Temp Temp src Pulse Resp SpO2 Weight   25 1045 132/68 98.5 °F (36.9 °C) Axillary (!) 107 20 -- --   25 0900 117/68 98.1 °F (36.7 °C) Oral 79 16 (!) 86 % --   25 0843 -- -- -- 86 -- 90 % --   25 0427 -- -- -- 
      Hospitalist Progress Note    NAME:   Nasir Hatfield   : 1950   MRN: [id number]     Date/Time: 2025 11:27 AM  Patient PCP: Abdirahman Ortiz PA-C    Estimated discharge date:   Barriers: iv antibiotics, vascular surgery clearance      Assessment / Plan:  Infected wound on left foot  Peripheral vascular disease status post stent and bypass surgery  Failed outpatient oral antibiotic  Continue with IV vancomycin and Zosyn  S/P left transmetatarsal amputation 25  PT OT eval after procedure, likely home with home health    Microcytic anemia  : H&H of 9.6/31.6.    Iron profile consistent with iron deficiency anemia.  Will start iron supplements at discharge.  Will need follow-up with PCP to review or set up GI workup     HTN  HLD  Stroke without any residual deficits  Continue with aspirin, Lipitor, Plavix.     COPD  Chronic respiratory failure on 3 L of oxygen  Continue with home nebs.     Active smoker  Patient counseled about smoking cessation.  Nicotine patch offered.  Total time spent 3 minutes.     GERD  Continue with Protonix.        Medical Decision Making:   I personally reviewed labs: CBC, BMP, LFT, Mg, phos  I personally reviewed imaging: None  I personally reviewed EKG:  Toxic drug monitoring: H&H while patient is on Lovenox, creatinine while patient is on IV vancomycin  Discussed case with: Patient, RN.        Code Status: Full code  DVT Prophylaxis: Lovenox  GI prophylaxis: Protonix  Baseline: Independent from home, lives alone.  Uses cane/walker sometimes.     Subjective:     Chief Complaint / Reason for Physician Visit  \" Follow-up for infected wound on left foot, peripheral vascular disease, HTN, HLD, stroke, COPD, chronic respiratory failure on 2 L of oxygen, active smoker, GERD.\".  Discussed with RN events overnight.     BP and sats stable this AM      Objective:     VITALS:   Last 24hrs VS reviewed since prior progress note. Most recent are:  Patient Vitals for the past 24 
      Hospitalist Progress Note    NAME:   Nasir Hatfield   : 1950   MRN: [id number]     Date/Time: 2025 11:46 AM  Patient PCP: Abdirahman Ortiz PA-C    Estimated discharge date:  Barriers: iv antibiotics, vascular surgery clearance    Assessment / Plan:  Infected wound on left foot  Peripheral vascular disease status post stent and bypass surgery  Failed outpatient oral antibiotic  Continue with IV vancomycin and Zosyn  S/P left transmetatarsal amputation 25  Vascular following.   PT OT eval.  NWB on left.  Patient declines rehab     Microcytic anemia  : H&H of 9.6/31.6.    Iron profile consistent with iron deficiency anemia.  Will start iron supplements at discharge.  Will need follow-up with PCP to review or set up GI workup     HTN  HLD  Stroke without any residual deficits  Continue with aspirin, Lipitor, Plavix.     COPD  Chronic respiratory failure on 3 L of oxygen  Continue with home nebs.     Active smoker  Patient counseled about smoking cessation.  Nicotine patch offered.  Total time spent 3 minutes.     GERD  Continue with Protonix.        Medical Decision Making:   I personally reviewed labs: CBC, BMP, LFT, Mg, phos  I personally reviewed imaging: None  I personally reviewed EKG:  Toxic drug monitoring: H&H while patient is on Lovenox, creatinine while patient is on IV vancomycin  Discussed case with: Patient, RN.        Code Status: Full code  DVT Prophylaxis: Lovenox  GI prophylaxis: Protonix  Baseline: Independent from home, lives alone.  Uses cane/walker sometimes.     Subjective:     Chief Complaint / Reason for Physician Visit  \" Follow-up for infected wound on left foot, peripheral vascular disease, HTN, HLD, stroke, COPD, chronic respiratory failure on 2 L of oxygen, active smoker, GERD.\".  Discussed with RN events overnight.     No new complaints       Objective:     VITALS:   Last 24hrs VS reviewed since prior progress note. Most recent are:  Patient Vitals for the past 24 
1047 - PULMONARY hospital follow-up transitional care appointment has been scheduled with Dr. Chetan Ritter on 5/13/25 0800. Pending patient discharge.     [facility] hospital follow-up transitional care appointment has been scheduled with SAFIA Payton on 5/9/25 1330. Pending patient discharge.   
1324: Received patient to unit from PACU. Patient showing no signs of respiratory distress.   1328: Patient vitals taken see flowsheets.   1328: Patient on 3L saturations at 74%.  1328: Titrated to 5L, saturations at 78%.  1329: Titrated to 6L NC saturations 83%.   1331: Charge nurse notified.  1332: Rapid Response called.   1332: 15L NRB saturations 93%.   1344: Patient on 6L NC.  1344: Awaiting orders from MD. Dr. Villalpando at bedside.   
ADULT PROTOCOL: JET AEROSOL ASSESSMENT    Patient  Nasir Hatfield     74 y.o.   male     5/8/2025  7:29 AM    Breath Sounds Pre Procedure: Breath Sounds Pre-Tx CONG: Diminished                                  Breath Sounds Pre-Tx LLL: Diminished        Breath Sounds Pre-Tx RUL: Diminished        Breath Sounds Pre-Tx RML: Diminished        Breath Sounds Pre-Tx RLL: Diminished  Breath Sounds Post Procedure: Breath Sounds Post-Tx CONG: Diminished          Breath Sounds Post-Tx LLL: Diminished          Breath Sounds Post-Tx RUL: Diminished          Breath Sounds Post-Tx RML: Diminished          Breath Sounds Post-Tx RLL: Diminished                                       Heart Rate: Pre procedure Pre-Tx Pulse: 97           Post procedure Post-Tx Pulse: 99    Resp Rate: Pre procedure Pre-Tx Resps: 18           Post procedure Post-Tx Resps: 18      Oxygen: O2 Therapy: Oxygen humidified   nasal cannula     Changed: No    SpO2:  SpO2: 92 %   with Oxygen                Nebulizer Therapy: Current medications Medications: Budesonide/Formoterol      Changed: No        Problem List:   Patient Active Problem List   Diagnosis    Acute respiratory failure (HCC)    Nonhealing surgical wound, sequela    Bacterial infection due to Staphylococcus aureus    Thrombus    Critical lower limb ischemia (HCC)    Infected wound    Wound infection       Respiratory Therapist: Carolyn Finn, RT    
Bypass graft patent w/good graft pulse  Open 5th tow wound looks worse and new wound beneath 2nd MT remnant  I believe he wound be best served with formal TMA while graft remains patent  He agrees.   
Comprehensive Nutrition Assessment    Type and Reason for Visit:  Initial, LOS    Nutrition Recommendations/Plan:   Continue current diet  Add ensure plus high protein daily and Armando BID     Malnutrition Assessment:  Malnutrition Status:  Insufficient data (05/06/25 1517)    Context:  Acute Illness     Findings of the 6 clinical characteristics of malnutrition:  Energy Intake:  Mild decrease in energy intake  Weight Loss:  No weight loss     Body Fat Loss:  Unable to assess     Muscle Mass Loss:  Unable to assess    Fluid Accumulation:  No fluid accumulation     Strength:  Not Performed    Nutrition Assessment:    Patient medically noted for PAD s/p left TMA. PMH HTN, HLD, stroke, COPD, chronic respiratory failure, and GERD. Chart reviewed for length of stay. He was unavailable at time of attempted visit with staff at bedside. Receiving a regular diet. Varying PO per flowsheets but mostly eating >50% of meals. No weight loss noted on chart review. He received supplements on a prior admission; will add Ensure plus high protein and Armando. Encourage intake of meals.     Patient Vitals for the past 120 hrs:   PO Meals Eaten (%)   05/05/25 1904 76 - 100%   05/05/25 1600 76 - 100%   05/05/25 1110 51 - 75%   05/05/25 0900 1 - 25%   05/04/25 1755 26 - 50%   05/04/25 1418 76 - 100%   05/04/25 1033 76 - 100%   05/03/25 1816 26 - 50%   05/03/25 1351 51 - 75%   05/03/25 0953 76 - 100%     Wt Readings from Last 5 Encounters:   05/06/25 66.4 kg (146 lb 6.2 oz)   04/18/25 59.4 kg (130 lb 15.3 oz)   03/07/25 58.9 kg (129 lb 13.6 oz)   02/11/25 59.9 kg (132 lb 0.9 oz)   09/10/24 70 kg (154 lb 5.2 oz)     Nutrition Related Findings:    Labs reviewed   BM 5/6   Atorvastatin, Celexa, Plavix, Solu-medrol, Protonix, Senokot   Wound Type: Surgical Incision       Current Nutrition Intake & Therapies:          ADULT DIET; Regular    Anthropometric Measures:  Height: 177.8 cm (5' 10\")  Ideal Body Weight (IBW): 166 lbs (75 kg)     
Dressing changed  TMA looks great  I'll see in 2 weeks in office  
Dressing changed at bedside  L TMA looks good so far  Would con't IV antibiotics for now  Will check back Monday  Up ad frederick NWB on left  
End of Shift Note    Bedside shift change report given to  Tamara PETERSON(oncoming nurse) by Rey Gonzalez RN (offgoing nurse).  Report included the following information SBAR, Intake/Output, MAR, Cardiac Rhythm Sinus Tach/NSR, and Alarm Parameters     Shift worked:  7P-7A     Shift summary and any significant changes:     Patient slept fairly.  On 3L oxygen via NC saturating at high 80s-90s. Patient denies any SOB/ respiratory distress.  PRN meds for pain given with report of relief.  Caring rounds done, call bell within reach.  Plan of care ongoing.     Concerns for physician to address:       Zone phone for oncoming shift:          Activity:  Level of Assistance: Standby assist, set-up cues, supervision of patient - no hands on  Number times ambulated in hallways past shift: 0  Number of times OOB to chair past shift: 0    Cardiac:   Cardiac Monitoring: Yes      Cardiac Rhythm: Sinus rhythm, Sinus tachy    Access:  Current line(s): PIV     Genitourinary:   Urinary Status: Voiding    Respiratory:   O2 Device: Nasal cannula  Chronic home O2 use?: YES  Incentive spirometer at bedside: YES    GI:  Last BM (including prior to admit): 05/06/25  Current diet:  ADULT DIET; Regular  ADULT ORAL NUTRITION SUPPLEMENT; Lunch; Standard High Calorie/High Protein Oral Supplement  ADULT ORAL NUTRITION SUPPLEMENT; Breakfast, Dinner; Wound Healing Oral Supplement  Passing flatus: YES    Pain Management:   Patient states pain is manageable on current regimen: YES    Skin:  Alfred Scale Score: 19  Interventions: Wound Offloading (Prevention Methods): Bed, pressure reduction mattress, Pillows, Repositioning    Patient Safety:  Fall Risk: Nursing Judgement-Fall Risk High(Add Comments): Yes  Fall Risk Interventions  Nursing Judgement-Fall Risk High(Add Comments): Yes  Toilet Every 2 Hours-In Advance of Need: Yes  Hourly Visual Checks: Awake, In bed  Fall Visual Posted: Armband, Fall sign posted, Socks  Room Door Open: Yes  Alarm On: 
End of Shift Note    Bedside shift change report given to Arti PETERSON (oncoming nurse) by Breana Ortiz RN (offgoing nurse).  Report included the following information SBAR, MAR, Cardiac Rhythm NSR, and Quality Measures    Shift worked:  7a-3p     Shift summary and any significant changes:     Pt had no changes during shift. Pt awaiting for daughter to . Discharge order has been placed. Pt wound care has been completed.      Concerns for physician to address:       Zone phone for oncoming shift:          Activity:  Level of Assistance: Standby assist, set-up cues, supervision of patient - no hands on  Number times ambulated in hallways past shift: 0  Number of times OOB to chair past shift: 0    Cardiac:   Cardiac Monitoring: Yes      Cardiac Rhythm: Sinus rhythm    Access:  Current line(s): PIV     Genitourinary:   Urinary Status: Voiding    Respiratory:   O2 Device: Nasal cannula  Chronic home O2 use?: NO  Incentive spirometer at bedside: NO    GI:  Last BM (including prior to admit): 05/07/25  Current diet:  ADULT DIET; Regular  ADULT ORAL NUTRITION SUPPLEMENT; Lunch; Standard High Calorie/High Protein Oral Supplement  ADULT ORAL NUTRITION SUPPLEMENT; Breakfast, Dinner; Wound Healing Oral Supplement  Passing flatus: YES    Pain Management:   Patient states pain is manageable on current regimen: YES    Skin:  Alfred Scale Score: 19  Interventions: Wound Offloading (Prevention Methods): Pillows, Repositioning    Patient Safety:  Fall Risk: Nursing Judgement-Fall Risk High(Add Comments): Yes  Fall Risk Interventions  Nursing Judgement-Fall Risk High(Add Comments): Yes  Toilet Every 2 Hours-In Advance of Need: Yes  Hourly Visual Checks: Agitated, In bed  Fall Visual Posted: Socks, Armband  Room Door Open: Yes  Alarm On: Bed  Patient Moved Closer to Nursing Station: No    Active Consults:   IP CONSULT TO HOSPITALIST  IP CONSULT TO PHARMACY  IP CONSULT TO VASCULAR SURGERY  IP CONSULT TO PULMONOLOGY  IP CONSULT 
End of Shift Note    Bedside shift change report given to Jeane MARTINEZ (oncoming nurse) by Dayday Monterroso RN (offgoing nurse).  Report included the following information SBAR, Kardex, Intake/Output, Recent Results, Cardiac Rhythm sinus rhythm, and Quality Measures    Shift worked:  1550-1225     Shift summary and any significant changes:     Seen patient on bed, not in respiratoty distress Due medications given. Hourly rounding done. Patient needs attended, Pain medications given PRN. NPO instructed, patient verbalized understanding.Plan of care ongoing.  concern as of this time      Concerns for physician to address:  none     Zone phone for oncoming shift:          Activity:  Level of Assistance: Minimal assist, patient does 75% or more  Number times ambulated in hallways past shift: 0  Number of times OOB to chair past shift: 0    Cardiac:   Cardiac Monitoring: Yes           Access:  Current line(s): PIV     Genitourinary:        Respiratory:   O2 Device: Nasal cannula  Chronic home O2 use?: YES  Incentive spirometer at bedside: YES    GI:     Current diet:  Diet NPO  Passing flatus: YES    Pain Management:   Patient states pain is manageable on current regimen: YES    Skin:  Alfred Scale Score: 20  Interventions: Wound Offloading (Prevention Methods): Repositioning, Pillows, Turning    Patient Safety:  Fall Risk: Nursing Judgement-Fall Risk High(Add Comments): Yes  Fall Risk Interventions  Nursing Judgement-Fall Risk High(Add Comments): Yes  Toilet Every 2 Hours-In Advance of Need: Yes  Hourly Visual Checks: In bed, Quiet  Fall Visual Posted: Fall sign posted, Socks  Room Door Open: Deferred to promote rest  Alarm On: Bed  Patient Moved Closer to Nursing Station: No    Active Consults:   IP CONSULT TO HOSPITALIST  IP CONSULT TO PHARMACY  IP CONSULT TO VASCULAR SURGERY    Length of Stay:  Expected LOS: 3  Actual LOS: 2    Dayday Monterroso, RN                            
End of Shift Note    Bedside shift change report given to Jeane MARTINEZ (oncoming nurse) by Dayday Monterroso RN (offgoing nurse).  Report included the following information SBAR, Kardex, MAR, Recent Results, and Quality Measures    Shift worked:  1765-0388     Shift summary and any significant changes:     Admission from ER. Routine admission done,Dual skin assessment done with Nanette PETERSON. Due medications given. Hourly rounding done. Patient needs attended, Pain medications given PRN. Plan of care ongoing. NO concern as of this time     Concerns for physician to address:  none     Zone phone for oncoming shift:   [phone]       Activity:  Level of Assistance: Minimal assist, patient does 75% or more  Number times ambulated in hallways past shift: 0  Number of times OOB to chair past shift: 1    Cardiac:   Cardiac Monitoring: No           Access:  Current line(s): PIV     Genitourinary:        Respiratory:   O2 Device: Nasal cannula  Chronic home O2 use?: YES  Incentive spirometer at bedside: NO    GI:     Current diet:  ADULT DIET; Regular; Low Fat/Low Chol/High Fiber/2 gm Na  Passing flatus: YES    Pain Management:   Patient states pain is manageable on current regimen: YES    Skin:  Alfred Scale Score: 18  Interventions:      Patient Safety:  Fall Risk: Nursing Judgement-Fall Risk High(Add Comments): Yes  Fall Risk Interventions  Nursing Judgement-Fall Risk High(Add Comments): Yes  Toilet Every 2 Hours-In Advance of Need: Yes  Hourly Visual Checks: In bed, Quiet  Fall Visual Posted: Fall sign posted, Socks  Room Door Open: Deferred to promote rest  Alarm On: Bed  Patient Moved Closer to Nursing Station: No    Active Consults:   IP CONSULT TO HOSPITALIST  IP CONSULT TO PHARMACY    Length of Stay:  Expected LOS: 2  Actual LOS: 1    Dayday Monterroso, RN                            
End of Shift Note    Bedside shift change report given to MICHELLE Eubanks (oncoming nurse) by Asia Valencia RN (offgoing nurse).  Report included the following information SBAR, Intake/Output, MAR, and Alarm Parameters     Shift worked:  5812-4398     Shift summary and any significant changes:     Pt calm and cooperative.   Given oxycodone x1 and morphine x2 for pain as per pt request.  Wound care Refused by patient.   IV abx therapy continued.  Plan of care on going.     Concerns for physician to address:  None     Zone phone for oncoming shift:       Activity:  Level of Assistance: Minimal assist, patient does 75% or more  Number times ambulated in hallways past shift: 0  Number of times OOB to chair past shift: 0    Cardiac:   Cardiac Monitoring: No      Cardiac Rhythm: Sinus rhythm    Access:  Current line(s): PIV     Genitourinary:   Urinary Status: Voiding (Urinal)    Respiratory:   O2 Device: Nasal cannula  Chronic home O2 use?: YES  Incentive spirometer at bedside: YES    GI:  Last BM (including prior to admit): 05/03/25  Current diet:  ADULT DIET; Regular  Passing flatus: YES    Pain Management:   Patient states pain is manageable on current regimen: YES    Skin:  Alfred Scale Score: 19  Interventions: Wound Offloading (Prevention Methods): Turning, Pillows, Repositioning    Patient Safety:  Fall Risk: Nursing Judgement-Fall Risk High(Add Comments): Yes  Fall Risk Interventions  Nursing Judgement-Fall Risk High(Add Comments): Yes  Toilet Every 2 Hours-In Advance of Need: Yes  Hourly Visual Checks: Awake, In bed  Fall Visual Posted: Socks  Room Door Open: Deferred to promote rest  Alarm On: Bed  Patient Moved Closer to Nursing Station: No    Active Consults:   IP CONSULT TO HOSPITALIST  IP CONSULT TO PHARMACY  IP CONSULT TO VASCULAR SURGERY    Length of Stay:  Expected LOS: 7  Actual LOS: 5    Asia Valencia, RN                           
End of Shift Note    Bedside shift change report given to NICHOLAS Cisneros (oncoming nurse) by Jonah Moon RN (offgoing nurse).  Report included the following information SBAR, Kardex, Intake/Output, MAR, Recent Results, and Quality Measures    Shift worked:  7P-7A     Shift summary and any significant changes:     Took over care of the pt at 2300 hrs.Scheduled medication given.Pt in severe pain and PRN Morphine was given X 2. Plan of care ongoing.     Concerns for physician to address:  None      Zone phone for oncoming shift:          Activity:  Level of Assistance: Minimal assist, patient does 75% or more  Number times ambulated in hallways past shift: 0  Number of times OOB to chair past shift: 0    Cardiac:   Cardiac Monitoring: Yes      Cardiac Rhythm: Sinus rhythm    Access:  Current line(s): PIV     Genitourinary:   Urinary Status: Voiding    Respiratory:   O2 Device: Nasal cannula  Chronic home O2 use?: YES  Incentive spirometer at bedside: NO    GI:  Last BM (including prior to admit): 04/29/25  Current diet:  ADULT DIET; Regular  Passing flatus: YES    Pain Management:   Patient states pain is manageable on current regimen: NO    Skin:  Alfred Scale Score: 19  Interventions: Wound Offloading (Prevention Methods): Pillows, Repositioning    Patient Safety:  Fall Risk: Nursing Judgement-Fall Risk High(Add Comments): Yes  Fall Risk Interventions  Nursing Judgement-Fall Risk High(Add Comments): Yes  Toilet Every 2 Hours-In Advance of Need: Yes  Hourly Visual Checks: In bed, Eyes closed  Fall Visual Posted: Fall sign posted  Room Door Open: Deferred to promote rest  Alarm On: Bed  Patient Moved Closer to Nursing Station: No    Active Consults:   IP CONSULT TO HOSPITALIST  IP CONSULT TO PHARMACY  IP CONSULT TO VASCULAR SURGERY    Length of Stay:  Expected LOS: 4  Actual LOS: 3    Jonah Moon RN                            
End of Shift Note    Bedside shift change report given to NICHOLAS James  (oncoming nurse) by Denice Alexander LPN (offgoing nurse).  Report included the following information SBAR and MAR    Shift worked:  7786-7466     Shift summary and any significant changes:     Rapid Response called at 1332, see not. Patient resting on 6L NC. Patient went for amputation during my shift. Patient diet upgraded from NPO for procedure. Patient complaints of pain, Tylenol given x1 and Oxycodone given x1. Meds given per MAR. Plan of care ongoing.      Concerns for physician to address:  None     Zone phone for oncoming shift:   [phone]       Denice Alexander LPN                           
End of Shift Note    Bedside shift change report given to NICHOLAS Le (oncoming nurse) by Riya Scott LPN (offgoing nurse).  Report included the following information SBAR, Intake/Output, MAR, Cardiac Rhythm sinus tachy/NSR, and Alarm Parameters     Shift worked:  8771-3683     Shift summary and any significant changes:     Pt calm and cooperative. Given Tylenol x1 for pain. Pt on 3L NC, down from 5.  Plan of care ongoing.     Concerns for physician to address:  None     Zone phone for oncoming shift:   [phone]       Activity:  Level of Assistance: Standby assist, set-up cues, supervision of patient - no hands on  Number times ambulated in hallways past shift: 0  Number of times OOB to chair past shift: 0    Cardiac:   Cardiac Monitoring: Yes      Cardiac Rhythm: Sinus tachy    Access:  Current line(s): PIV     Genitourinary:   Urinary Status: Voiding    Respiratory:   O2 Device: Nasal cannula  Chronic home O2 use?: YES  Incentive spirometer at bedside: YES    GI:  Last BM (including prior to admit): 05/06/25  Current diet:  ADULT DIET; Regular  ADULT ORAL NUTRITION SUPPLEMENT; Lunch; Standard High Calorie/High Protein Oral Supplement  ADULT ORAL NUTRITION SUPPLEMENT; Breakfast, Dinner; Wound Healing Oral Supplement  Passing flatus: YES    Pain Management:   Patient states pain is manageable on current regimen: YES    Skin:  Alfred Scale Score: 19  Interventions: Wound Offloading (Prevention Methods): Pillows, Repositioning, Turning    Patient Safety:  Fall Risk: Nursing Judgement-Fall Risk High(Add Comments): Yes  Fall Risk Interventions  Nursing Judgement-Fall Risk High(Add Comments): Yes  Toilet Every 2 Hours-In Advance of Need: Yes  Hourly Visual Checks: Awake, In bed  Fall Visual Posted: Armband, Fall sign posted, Socks  Room Door Open: Yes  Alarm On: Bed  Patient Moved Closer to Nursing Station: No    Active Consults:   IP CONSULT TO HOSPITALIST  IP CONSULT TO PHARMACY  IP CONSULT TO VASCULAR SURGERY  IP 
End of Shift Note    Bedside shift change report given to NICHOLAS Mendez (oncoming nurse) by Riya Scott LPN (offgoing nurse).  Report included the following information SBAR, Intake/Output, MAR, and Alarm Parameters     Shift worked:  9956-3040     Shift summary and any significant changes:     Pt calm and cooperative. Given oxycodone x1 and morphine x1. Wound care completed by podiatry. IV abx therapy continued. New IV access.  Plan for discharge on Monday - pending vascular clearance.     Concerns for physician to address:  None     Zone phone for oncoming shift:   [phone]       Activity:  Level of Assistance: Minimal assist, patient does 75% or more  Number times ambulated in hallways past shift: 0  Number of times OOB to chair past shift: 0    Cardiac:   Cardiac Monitoring: No      Cardiac Rhythm: Sinus rhythm    Access:  Current line(s): PIV     Genitourinary:   Urinary Status: Voiding    Respiratory:   O2 Device: Nasal cannula  Chronic home O2 use?: YES  Incentive spirometer at bedside: YES    GI:  Last BM (including prior to admit): 05/01/25  Current diet:  ADULT DIET; Regular  Passing flatus: YES    Pain Management:   Patient states pain is manageable on current regimen: YES    Skin:  Alfred Scale Score: 19  Interventions: Wound Offloading (Prevention Methods): Bed, pressure reduction mattress, Pillows, Turning    Patient Safety:  Fall Risk: Nursing Judgement-Fall Risk High(Add Comments): Yes  Fall Risk Interventions  Nursing Judgement-Fall Risk High(Add Comments): Yes  Toilet Every 2 Hours-In Advance of Need: No (Comment)  Hourly Visual Checks: Awake  Fall Visual Posted: Socks  Room Door Open: No (Comment)  Alarm On: Bed  Patient Moved Closer to Nursing Station: No    Active Consults:   IP CONSULT TO HOSPITALIST  IP CONSULT TO PHARMACY  IP CONSULT TO VASCULAR SURGERY    Length of Stay:  Expected LOS: 7  Actual LOS: 4    Riya Scott LPN                           
End of Shift Note    Bedside shift change report given to NICHOLAS Naylor (oncoming nurse) by Denice Alexander LPN (offgoing nurse).  Report included the following information SBAR and MAR    Shift worked:  8218-8334     Shift summary and any significant changes:     Patient NPO at midnight. Patient complaints of foot pain, morphine given x2 for pain. Wound care completed. Meds given per MAR. Plan of care ongoing.      Concerns for physician to address:  None     Zone phone for oncoming shift:   [phone]       Denice Alexander LPN                           
End of Shift Note    Bedside shift change report given to NICHOLAS Snowden (oncoming nurse) by Maral Hooks LPN (offgoing nurse).  Report included the following information SBAR, Kardex, Intake/Output, MAR, Accordion, Recent Results, and Med Rec Status    Shift worked:  6806-5499     Shift summary and any significant changes:    Labs collected. Patient remained on 3.5 L NC overnight. No complaints of SOB overnight. PRN morphine, and oxycodone, given overnight for pain. Wound care completed on left foot. No complaints at the time of shift change. Plan of care ongoing.   Concerns for physician to address:  N/a     Zone phone for oncoming shift:   [phone]       Activity:  Level of Assistance: Standby assist, set-up cues, supervision of patient - no hands on  Number times ambulated in hallways past shift: 0  Number of times OOB to chair past shift: 0    Cardiac:   Cardiac Monitoring: No      Cardiac Rhythm: Sinus rhythm    Access:  Current line(s): PIV     Genitourinary:   Urinary Status: Voiding (urinal)    Respiratory:   O2 Device: Nasal cannula  Chronic home O2 use?: YES  Incentive spirometer at bedside: YES    GI:  Last BM (including prior to admit): 05/03/25  Current diet:  ADULT DIET; Regular  Passing flatus: YES    Pain Management:   Patient states pain is manageable on current regimen: YES    Skin:  Alfred Scale Score: 17  Interventions: Wound Offloading (Prevention Methods): Pillows, Repositioning, Elevate heels    Patient Safety:  Fall Risk: Nursing Judgement-Fall Risk High(Add Comments): Yes  Fall Risk Interventions  Nursing Judgement-Fall Risk High(Add Comments): Yes  Toilet Every 2 Hours-In Advance of Need: Yes  Hourly Visual Checks: Awake, In bed  Fall Visual Posted: Socks  Room Door Open: Yes  Alarm On: Bed  Patient Moved Closer to Nursing Station: No    Active Consults:   IP CONSULT TO HOSPITALIST  IP CONSULT TO PHARMACY  IP CONSULT TO VASCULAR SURGERY    Length of Stay:  Expected LOS: 7  Actual LOS: 6    Maral 
End of Shift Note    Bedside shift change report given to NICHOLAS Snowden (oncoming nurse) by Maral Hooks LPN (offgoing nurse).  Report included the following information SBAR, Kardex, Intake/Output, MAR, Accordion, Recent Results, and Med Rec Status    Shift worked:  7809-2557     Shift summary and any significant changes:    Labs collected. Patient remained on 4 L NC overnight with SpO2 90% or greater (hx of COPD; active smoker). No complaints of SOB overnight. PRN morphine,oxycodone, and tylenol given overnight for pain. Patient refused Left foot dressing change overnight, prefers dressing change during the day. No complaints at the time of shift change. Plan of care ongoing.   Concerns for physician to address:  N/a     Zone phone for oncoming shift:   [phone]       Activity:  Level of Assistance: Standby assist, set-up cues, supervision of patient - no hands on  Number times ambulated in hallways past shift: 0  Number of times OOB to chair past shift: 0    Cardiac:   Cardiac Monitoring: No      Cardiac Rhythm: Sinus rhythm    Access:  Current line(s): PIV     Genitourinary:   Urinary Status: Voiding (URINAL)    Respiratory:   O2 Device: Nasal cannula  Chronic home O2 use?: YES  Incentive spirometer at bedside: YES    GI:  Last BM (including prior to admit): 05/03/25  Current diet:  ADULT DIET; Regular  Passing flatus: YES    Pain Management:   Patient states pain is manageable on current regimen: YES    Skin:  Alfred Scale Score: 17  Interventions: Wound Offloading (Prevention Methods): Pillows, Repositioning, Elevate heels    Patient Safety:  Fall Risk: Nursing Judgement-Fall Risk High(Add Comments): Yes  Fall Risk Interventions  Nursing Judgement-Fall Risk High(Add Comments): Yes  Toilet Every 2 Hours-In Advance of Need: Yes  Hourly Visual Checks: Awake, In bed  Fall Visual Posted: Socks  Room Door Open: Yes  Alarm On: Bed  Patient Moved Closer to Nursing Station: No    Active Consults:   IP CONSULT TO 
End of Shift Note    Bedside shift change report given to NICHOLAS Toussaint (oncoming nurse) by Kelsey Cardenas RN (offgoing nurse).  Report included the following information SBAR, Intake/Output, MAR, and Quality Measures    Shift worked:  0188-6348     Shift summary and any significant changes:     Patient was calm overnight, caring rounds completed, medications given according to MAR. Patient on 4L NC and was tolerated by patients with no complaints. Plan for discharge today.     Concerns for physician to address:  None.     Zone phone for oncoming shift:          Activity:  Level of Assistance: Standby assist, set-up cues, supervision of patient - no hands on  Number times ambulated in hallways past shift: 0  Number of times OOB to chair past shift: 0    Cardiac:   Cardiac Monitoring: Yes      Cardiac Rhythm: Sinus rhythm    Access:  Current line(s): PIV    Genitourinary:   Urinary Status: Voiding (urinal)    Respiratory:   O2 Device: Nasal cannula  Chronic home O2 use?: YES  Incentive spirometer at bedside: YES    GI:  Last BM (including prior to admit): 05/07/25  Current diet:  ADULT DIET; Regular  ADULT ORAL NUTRITION SUPPLEMENT; Lunch; Standard High Calorie/High Protein Oral Supplement  ADULT ORAL NUTRITION SUPPLEMENT; Breakfast, Dinner; Wound Healing Oral Supplement  Passing flatus: YES    Pain Management:   Patient states pain is manageable on current regimen: YES    Skin:  Alfred Scale Score: 19  Interventions: Wound Offloading (Prevention Methods): Pillows, Repositioning, Turning, Bed, pressure reduction mattress    Patient Safety:  Fall Risk: Nursing Judgement-Fall Risk High(Add Comments): Yes  Fall Risk Interventions  Nursing Judgement-Fall Risk High(Add Comments): Yes  Toilet Every 2 Hours-In Advance of Need: Yes  Hourly Visual Checks: Awake, In bed  Fall Visual Posted: Armband, Fall sign posted, Socks  Room Door Open: Yes  Alarm On: Bed  Patient Moved Closer to Nursing Station: No    Active Consults:   IP 
End of Shift Note    Bedside shift change report given to Rey PETERSON (oncoming nurse) by Tamara Becerra RN (offgoing nurse).  Report included the following information SBAR, Kardex, and MAR    Shift worked:  7a-7p     Shift summary and any significant changes:    Patient tolerated care well. Medications were given per the MAR. Caring rounds have been completed. Patient is a x1 to the bathroom with the walker. Wound care has been completed. PRN oxycodone given x1       Activity:  Level of Assistance: Standby assist, set-up cues, supervision of patient - no hands on  Number times ambulated in hallways past shift: 0  Number of times OOB to chair past shift: 0    Cardiac:   Cardiac Monitoring: Yes      Cardiac Rhythm: Sinus rhythm    Access:  Current line(s): PIV     Genitourinary:   Urinary Status: Voiding    Respiratory:   O2 Device: Nasal cannula  Chronic home O2 use?: YES  Incentive spirometer at bedside: YES    GI:  Last BM (including prior to admit): 05/07/25  Current diet:  ADULT DIET; Regular  ADULT ORAL NUTRITION SUPPLEMENT; Lunch; Standard High Calorie/High Protein Oral Supplement  ADULT ORAL NUTRITION SUPPLEMENT; Breakfast, Dinner; Wound Healing Oral Supplement  Passing flatus: YES    Pain Management:   Patient states pain is manageable on current regimen: YES    Skin:  Alfred Scale Score: 19  Interventions: Wound Offloading (Prevention Methods): Bed, pressure reduction mattress    Patient Safety:  Fall Risk: Nursing Judgement-Fall Risk High(Add Comments): Yes  Fall Risk Interventions  Nursing Judgement-Fall Risk High(Add Comments): Yes  Toilet Every 2 Hours-In Advance of Need: Yes  Hourly Visual Checks: Awake, In bed  Fall Visual Posted: Armband, Fall sign posted, Socks  Room Door Open: Yes  Alarm On: Bed  Patient Moved Closer to Nursing Station: No    Active Consults:   IP CONSULT TO HOSPITALIST  IP CONSULT TO PHARMACY  IP CONSULT TO VASCULAR SURGERY  IP CONSULT TO PULMONOLOGY  IP CONSULT TO CASE 
End of Shift Note    Bedside shift change report given to silver (oncoming nurse) by Jameson Brooks RN (offgoing nurse).  Report included the following information SBAR, Kardex, MAR, and Recent Results    Shift worked:  3p-7p     Shift summary and any significant changes:     none     Concerns for physician to address:  pain     Zone phone for oncoming shift:          Activity:  Level of Assistance: Minimal assist, patient does 75% or more  Number times ambulated in hallways past shift: 0  Number of times OOB to chair past shift: 0    Cardiac:   Cardiac Monitoring: No      Cardiac Rhythm: Sinus rhythm    Access:  Current line(s): PIV     Genitourinary:   Urinary Status: Voiding    Respiratory:   O2 Device: Nasal cannula  Chronic home O2 use?: YES  Incentive spirometer at bedside: YES    GI:  Last BM (including prior to admit): 04/29/25  Current diet:  ADULT DIET; Regular  Passing flatus: YES    Pain Management:   Patient states pain is manageable on current regimen: NO    Skin:  Alfred Scale Score: 19  Interventions: Wound Offloading (Prevention Methods): Pillows, Repositioning    Patient Safety:  Fall Risk: Nursing Judgement-Fall Risk High(Add Comments): Yes  Fall Risk Interventions  Nursing Judgement-Fall Risk High(Add Comments): Yes  Toilet Every 2 Hours-In Advance of Need: No (Comment)  Hourly Visual Checks: Awake  Fall Visual Posted: Socks  Room Door Open: Yes  Alarm On: Bed  Patient Moved Closer to Nursing Station: No    Active Consults:   IP CONSULT TO HOSPITALIST  IP CONSULT TO PHARMACY  IP CONSULT TO VASCULAR SURGERY    Length of Stay:  Expected LOS: 4  Actual LOS: 3    Jameson Brooks RN                            
Established patient of PAR. Will see in consultation. Full note to follow.   
From my standpoint can go home any time  Dry dressing left foot  Non-weight bearing left foot with walker   I need to see in 2 weeks in office  Thanks  
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 Will defer and follow up for pt re-eval when medically appropriate.     Jorge A Perez, OTFANNY, OTR/L  
Occupational Therapy     Duplicate orders acknowledged, pt is already on caseload x3/week and recommending HHOT/PT as pt has been adamant about declining moderate intensity therapy services at time of dc. Will continue to follow pt. Furthermore, via conversation w/ PT, pt politely declining therapy services on this date, will defer. And continue to follow pt.     Jorge A Perez, OTD, OTR/L  
Pharmacy Antimicrobial Kinetic Dosing    Indication for Antimicrobials: SSTI  PMH:  S/p LEFT LEG THROMBECTOMY OF BYPASS GRAFT     Current Regimen of Each Antimicrobial:  Vancomycin consult - started  day 1 of 7  Zosyn 3.375gm IV q8h - started  day 1 of 7    Previous Antimicrobial Therapy:  Keflex x 10days    Goal Level: Vancomycin -600    Date Dose & Interval Measured (mcg/mL) Predicted AUC 24-48 Predicted AUC 24,ss    14:00                       Significant Cultures:   -    Labs:  Recent Labs     Units 25  1133   CREATININE MG/DL 0.83   BUN MG/DL 35*   WBC K/uL 9.4     Temp (24hrs), Av.9 °F (36.6 °C), Min:97.9 °F (36.6 °C), Max:97.9 °F (36.6 °C)      Conditions for Dosing Consideration: None    Creatinine Clearance (mL/min): Estimated Creatinine Clearance: 67 mL/min (based on SCr of 0.83 mg/dL).       Impression/Plan:   Vancomycin 1500mg loading dose then 750mg IV q12h for aUCss 4888  Zosyn 4.5gm IV x1 then 3.375gm IV q8h  Ordered Vanc level  12:00  Antimicrobial stop date 7 days     Pharmacy will follow daily and adjust medications as appropriate for renal function and/or serum levels.    Thank you,  MARIA LUZ LORENZANA [facility]    
Pharmacy Antimicrobial Kinetic Dosing    Indication for Antimicrobials: SSTI  PMH:  S/p LEFT LEG THROMBECTOMY OF BYPASS GRAFT     Current Regimen of Each Antimicrobial:  Vancomycin consult - started  day 2 of 7  Zosyn 3.375gm IV q8h - started  day 2 of 7    Previous Antimicrobial Therapy:  Keflex x 10days    Goal Level: Vancomycin -600    Date Dose & Interval Measured (mcg/mL) Predicted AUC 24-48 Predicted AUC 24,ss    14:00 750 mg IV Q12H 9.3 451 479                   Significant Cultures:   NA    Labs:  Recent Labs     Units 25  0422 25  1133   CREATININE MG/DL 0.84 0.83   BUN MG/DL 28* 35*   WBC K/uL 6.3 9.4     Temp (24hrs), Av.3 °F (36.8 °C), Min:97.9 °F (36.6 °C), Max:98.6 °F (37 °C)    Conditions for Dosing Consideration: None    Creatinine Clearance (mL/min): Estimated Creatinine Clearance: 66 mL/min (based on SCr of 0.84 mg/dL).     Impression/Plan:   Vancomycin trough resulted, AUC therapeutic  Continue vanco 750 mg IV Q12H  Zosyn 4.5gm IV x1 then 3.375gm IV q8h  Antimicrobial stop date 7 days     Pharmacy will follow daily and adjust medications as appropriate for renal function and/or serum levels.    Thank you,  Darryl Ash, [facility]  
Pharmacy Antimicrobial Kinetic Dosing    Indication for Antimicrobials: SSTI  PMH:  S/p LEFT LEG THROMBECTOMY OF BYPASS GRAFT     Current Regimen of Each Antimicrobial:  Vancomycin consult - started  day 5 of   Zosyn 3.375gm IV q8h - started  day 5 of 7    Previous Antimicrobial Therapy:  Keflex x 10days    Goal Level: Vancomycin -600    Date Dose & Interval Measured (mcg/mL) Predicted AUC 24-48 Predicted AUC 24,ss    750 mg IV Q12H 9.3 451 479    750 mg IV Q12H 9.4 380 382            Significant Cultures:   NA    Labs:  Recent Labs     Units 25  0517 25  0536   CREATININE MG/DL 0.67* 0.63*   BUN MG/DL 14 13   WBC K/uL 9.0 8.4     Temp (24hrs), Av.6 °F (37 °C), Min:98.1 °F (36.7 °C), Max:99.3 °F (37.4 °C)    Conditions for Dosing Consideration: None    Creatinine Clearance (mL/min): Estimated Creatinine Clearance: 83 mL/min (A) (based on SCr of 0.67 mg/dL (L)).    Impression/Plan:   Vancomycin trough resulted, increase to 1000 mg IV Q12H (16.8 mg/kg)   (24-48 hrs), 506 (steady state)  Zosyn 4.5gm IV x1 then 3.375gm IV q8h  Antimicrobial stop date 7 days     Pharmacy will follow daily and adjust medications as appropriate for renal function and/or serum levels.    Thank you,  Darryl Ash, [facility]  
Pharmacy Antimicrobial Kinetic Dosing    Indication for Antimicrobials: SSTI  PMH:  S/p LEFT LEG THROMBECTOMY OF BYPASS GRAFT     Current Regimen of Each Antimicrobial:  Vancomycin consult - started  day 6 of   Zosyn 3.375gm IV q8h - started  day 6 of 7    Previous Antimicrobial Therapy:  Keflex x 10days    Goal Level: Vancomycin -600    Date Dose & Interval Measured (mcg/mL) Predicted AUC 24-48 Predicted AUC 24,ss    750 mg IV Q12H 9.3 451 479    750 mg IV Q12H 9.4 380 382            Significant Cultures:   NA    Labs:  Recent Labs     Units 25  0359 25  0517   CREATININE MG/DL 0.72 0.67*   BUN MG/DL 18 14   WBC K/uL  --  9.0     Temp (24hrs), Av.4 °F (36.9 °C), Min:98.2 °F (36.8 °C), Max:98.6 °F (37 °C)    Conditions for Dosing Consideration: None    Creatinine Clearance (mL/min): Estimated Creatinine Clearance: 77 mL/min (based on SCr of 0.72 mg/dL).    Impression/Plan:   Continue with vancomycin 1000mg q12h   (24-48 hrs), 506 (steady state)  Zosyn 4.5gm IV x1 then 3.375gm IV q8h  Antimicrobial stop date 7 days     Pharmacy will follow daily and adjust medications as appropriate for renal function and/or serum levels.    Thank you,  Arben Villegas [facility]    
Pharmacy Antimicrobial Kinetic Dosing    Indication for Antimicrobials: SSTI  PMH:  S/p LEFT LEG THROMBECTOMY OF BYPASS GRAFT     Current Regimen of Each Antimicrobial:  Vancomycin consult - started  day 7 of   Zosyn 3.375gm IV q8h - started  day 7 of 7    Previous Antimicrobial Therapy:  Keflex x 10days    Goal Level: Vancomycin -600    Date Dose & Interval Measured (mcg/mL) Predicted AUC 24-48 Predicted AUC 24,ss    750 mg IV Q12H 9.3 451 479    750 mg IV Q12H 9.4 380 382            Significant Cultures:   NA    Labs:  Recent Labs     Units 25  0520 25  0359   CREATININE MG/DL 0.62* 0.72   BUN MG/DL 17 18     Temp (24hrs), Av.1 °F (36.7 °C), Min:97.2 °F (36.2 °C), Max:99 °F (37.2 °C)    Conditions for Dosing Consideration: None    Creatinine Clearance (mL/min): Estimated Creatinine Clearance: 89 mL/min (A) (based on SCr of 0.62 mg/dL (L)).    Impression/Plan:   Continue with vancomycin 1000mg q12h to be completed today   (24-48 hrs), 506 (steady state)  Zosyn 4.5gm IV x1 then 3.375gm IV q8h  Antimicrobial stop date 7 days (ending )  Pharmacy will sign off, please reconsult if vancomycin therapy is needed to be continued.     Pharmacy will follow daily and adjust medications as appropriate for renal function and/or serum levels.    Thank you,  Arben Villegas [facility]      
Pharmacy Medication History Review    Medication history obtained by Zuleima Jefferson while patient was in room ER09/09 and was completed based on information available during current patient encounter. Medication history was completed after home meds were reconciled by provider.    Pharmacist Admission Medication Reconciliation comments:   Plavix last refill 12/8/24 #90 days  Diltiazem last refill /3/25 #30 days  Thus ? Compliance with Plavix and Dilt  No refills in last year for protonix and pt stated he was not taking         PTA medication list was corrected to the following:   Prior to Admission Medications   Prescriptions Last Dose Informant   Multiple Vitamins-Minerals (MENS 50+ MULTI VITAMIN/MIN PO) Unknown Self   Sig: Take 1 tablet by mouth daily   OXYGEN 4/28/2025    Sig: Inhale 3 L into the lungs daily   acetaminophen (TYLENOL) 500 MG tablet Unknown    Sig: Take 1 tablet by mouth every 6 hours as needed for Pain   albuterol sulfate HFA (PROVENTIL;VENTOLIN;PROAIR) 108 (90 Base) MCG/ACT inhaler Unknown Self   Sig: Inhale 1 puff into the lungs every 6 hours as needed for Wheezing or Shortness of Breath   apixaban (ELIQUIS) 5 MG TABS tablet Unknown    Sig: Take 1 tablet by mouth 2 times daily   aspirin 81 MG EC tablet 4/28/2025 Morning Self   Sig: Take 1 tablet by mouth daily   atorvastatin (LIPITOR) 10 MG tablet 4/27/2025 Bedtime Self   Sig: Take 1 tablet by mouth nightly   citalopram (CELEXA) 10 MG tablet 4/28/2025 Morning Self   Sig: Take 1 tablet by mouth daily   clopidogrel (PLAVIX) 75 MG tablet Unknown    Sig: Take 1 tablet by mouth daily   dilTIAZem (CARDIZEM CD) 120 MG extended release capsule 4/28/2025 Morning Self   Sig: Take 1 capsule by mouth daily   docusate sodium (COLACE) 100 MG capsule Unknown Spouse/Significant Other   Sig: Take 1 capsule by mouth 2 times daily as needed for Constipation   fluticasone-umeclidin-vilant (TRELEGY ELLIPTA) 100-62.5-25 MCG/ACT AEPB inhaler 4/28/2025 Morning Self 
Physical Therapy    Chart reviewed. Attempted to see pt for PT treatment session, pt currently NAHOMY for L transmetatarsal foot amputation. Will defer and follow up for pt re-eval when medically appropriate.     Lisa Monson, PT, DPT  
Physical Therapy    Duplicate order acknowledged. Patient on caseload already and recommendations are in place. Attempted PT treatment session, however patient politely declined to participate. He states he was just up to the chair for breakfast not long ago and now plans to rest. Discussed barriers to d/c home from PT standpoint, and patient denies concerns with ability to manage his care and mobility. He has supplemental O2 set up and other DME needs met. Has only a 6 inch threshold to enter first level of home where he will reside while NWB. Reinforced importance of maintaining NWB, to which patient verbalized understanding. Will continue to follow in acute setting.    Thank you,  Lior Chávez, PT, DPT  
Spiritual Health History and Assessment/Progress Note  [facility]    Initial Encounter,  ,  ,      Name: Nasir Hatfield MRN: [id number]    Age: 74 y.o.     Sex: male   Language: English   Moravian: Other   Infected wound     Date: 4/29/2025            Total Time Calculated: 11 min              Spiritual Assessment began in [facility] 3 MED TELE        Referral/Consult From: Rounding   Encounter Overview/Reason: Initial Encounter  Service Provided For: Patient    Aurea, Belief, Meaning:   Patient unable to assess at this time  Family/Friends No family/friends present      Importance and Influence:  Patient unable to assess at this time  Family/Friends No family/friends present    Community:  Patient Other: unable to assess  Family/Friends No family/friends present    Assessment and Plan of Care:     Patient Interventions include: Other: patient declined visit   Family/Friends Interventions include: No family/friends present    Patient Plan of Care: Spiritual Care available upon further referral  Family/Friends Plan of Care: Spiritual Care available upon further referral    Electronically signed by Chaplain MARYBETH HOANG on 4/29/2025 at 3:08 PM    
are:  Patient Vitals for the past 24 hrs:   BP Temp Temp src Pulse Resp SpO2   05/03/25 0911 -- -- -- -- 18 --   05/03/25 0838 131/60 98.2 °F (36.8 °C) Oral 79 18 93 %   05/03/25 0832 -- -- -- -- -- 92 %   05/03/25 0626 -- -- -- -- 17 --   05/03/25 0210 -- -- -- -- 18 --   05/02/25 2017 132/66 98.6 °F (37 °C) Oral 70 19 99 %   05/02/25 2002 -- -- -- 78 20 95 %   05/02/25 1952 -- -- -- 76 18 95 %   05/02/25 1832 -- -- -- -- 18 --   05/02/25 1337 -- -- -- 94 20 93 %         Intake/Output Summary (Last 24 hours) at 5/3/2025 1029  Last data filed at 5/2/2025 1841  Gross per 24 hour   Intake 8 ml   Output 225 ml   Net -217 ml        I had a face to face encounter and independently examined this patient on 5/3/2025, as outlined below:  PHYSICAL EXAM:  General: Alert, cooperative  EENT:  EOMI. Anicteric sclerae.  Resp:  CTA bilaterally, no wheezing or rales.  No accessory muscle use  CV:  Regular  rhythm,  No edema  GI:  Soft, Non distended, Non tender.  +Bowel sounds  Neurologic:  Alert and oriented X 3, normal speech,   Psych:   Good insight. Not anxious nor agitated  Extremities: Left foot is bandaged    Reviewed most current lab test results and cultures  YES  Reviewed most current radiology test results   YES  Review and summation of old records today    NO  Reviewed patient's current orders and MAR    YES  PMH/SH reviewed - no change compared to H&P    Procedures: see electronic medical records for all procedures/Xrays and details which were not copied into this note but were reviewed prior to creation of Plan.      LABS:  I reviewed today's most current labs and imaging studies.  Pertinent labs include:  Recent Labs     05/01/25  0517   WBC 9.0   HGB 9.0*   HCT 30.2*        Recent Labs     05/01/25  0517 05/03/25  0359    136   K 4.2 4.0    105   CO2 28 27   GLUCOSE 92 89   BUN 14 18   CREATININE 0.67* 0.72   CALCIUM 8.8 8.6       Signed: Jack Villalpando MD          
YES  Review and summation of old records today    NO  Reviewed patient's current orders and MAR    YES  PMH/SH reviewed - no change compared to H&P    Procedures: see electronic medical records for all procedures/Xrays and details which were not copied into this note but were reviewed prior to creation of Plan.      LABS:  I reviewed today's most current labs and imaging studies.  Pertinent labs include:  Recent Labs     04/28/25  1133 04/29/25  0422   WBC 9.4 6.3   HGB 11.4* 9.6*   HCT 38.4 31.6*    318     Recent Labs     04/28/25  1133 04/29/25  0422    140   K 4.0 3.7    110*   CO2 25 24   GLUCOSE 98 100   BUN 35* 28*   CREATININE 0.83 0.84   CALCIUM 8.9 8.0*   MG  --  1.9   PHOS  --  3.8   BILITOT 0.4 0.4   AST 30 16   ALT 16 12       Signed: Renae Anderson MD          
   methylPREDNISolone sodium succ (SOLU-MEDROL) 40 mg in sterile water 1 mL injection  40 mg IntraVENous BID    ipratropium 0.5 mg-albuterol 2.5 mg (DUONEB) nebulizer solution 1 Dose  1 Dose Inhalation 4x Daily RT    guaiFENesin (MUCINEX) extended release tablet 1,200 mg  1,200 mg Oral BID    azithromycin (ZITHROMAX) tablet 500 mg  500 mg Oral Daily    nicotine (NICODERM CQ) 14 MG/24HR 1 patch  1 patch TransDERmal Daily    gabapentin (NEURONTIN) capsule 100 mg  100 mg Oral TID    sodium chloride flush 0.9 % injection 5-40 mL  5-40 mL IntraVENous 2 times per day    sodium chloride flush 0.9 % injection 5-40 mL  5-40 mL IntraVENous PRN    0.9 % sodium chloride infusion   IntraVENous PRN    oxyCODONE (ROXICODONE) immediate release tablet 5 mg  5 mg Oral Q4H PRN    budesonide (PULMICORT) nebulizer suspension 250 mcg  0.25 mg Nebulization BID RT    And    arformoterol tartrate (BROVANA) nebulizer solution 15 mcg  15 mcg Nebulization BID RT    morphine (PF) injection 1 mg  1 mg IntraVENous Q4H PRN    Or    morphine (PF) injection 2 mg  2 mg IntraVENous Q4H PRN    collagenase ointment   Topical Daily    ipratropium 0.5 mg-albuterol 2.5 mg (DUONEB) nebulizer solution 1 Dose  1 Dose Inhalation Q4H PRN    albuterol sulfate HFA (PROVENTIL;VENTOLIN;PROAIR) 108 (90 Base) MCG/ACT inhaler 1 puff  1 puff Inhalation Q4H PRN    aspirin EC tablet 81 mg  81 mg Oral Daily    atorvastatin (LIPITOR) tablet 10 mg  10 mg Oral Nightly    citalopram (CELEXA) tablet 10 mg  10 mg Oral Daily    clopidogrel (PLAVIX) tablet 75 mg  75 mg Oral Daily    dilTIAZem (CARDIZEM CD) extended release capsule 120 mg  120 mg Oral Daily    pantoprazole (PROTONIX) tablet 40 mg  40 mg Oral QAM AC    sennosides-docusate sodium (SENOKOT-S) 8.6-50 MG tablet 1 tablet  1 tablet Oral Daily    0.9 % sodium chloride infusion   IntraVENous PRN    enoxaparin (LOVENOX) injection 40 mg  40 mg SubCUTAneous Daily    ondansetron (ZOFRAN-ODT) disintegrating tablet 4 mg  4 mg

## 2025-05-08 NOTE — PLAN OF CARE
Problem: Occupational Therapy - Adult  Goal: By Discharge: Performs self-care activities at highest level of function for planned discharge setting.  See evaluation for individualized goals.  Description: FUNCTIONAL STATUS PRIOR TO ADMISSION:  mobilizing holding onto items in his home or using RW,  has been performing limited WB on left foot due to wound, performed ADLs on his own and driving, performed IADLs, resides on bottom level of tri level and mainly stays on the bottom level with no steps to enter   ,  ,  ,  ,  ,  ,  ,  ,  ,  ,       HOME SUPPORT: Patient lived alone and neighbors/friends assist.    Occupational Therapy Goals:  Initiated 4/29/2025  1.  Patient will perform grooming with Modified [place] within 7 day(s).  2.  Patient will perform lower body dressing with Modified [place] within 7 day(s).  3.  Patient will perform toileting with Modified [place] within 7 day(s).  4.  Patient will perform toilet transfers with Modified [place]  within 7 day(s).  5.  Patient will perform bathing with Modified [place] within 7 day(s).      Outcome: Progressing    OCCUPATIONAL THERAPY TREATMENT  Patient: Nasir Hatfield (74 y.o. male)  Date: 5/2/2025  Primary Diagnosis: Infected wound [T14.8XXA, L08.9]  Wound infection [T14.8XXA, L08.9]  Procedure(s) (LRB):  LEFT TRANSMETATARSAL FOOT AMPUTATION (Left) 2 Days Post-Op   Precautions: Weight Bearing   Left Lower Extremity Weight Bearing: Non Weight Bearing            Chart, occupational therapy assessment, plan of care, and goals were reviewed.    ASSESSMENT  Patient continues to benefit from skilled OT services and is slowly progressing towards goals. Pt received supine in bed, agreeable to therapy services. Pt completed bed mobility w/ mod I to supervision and while seated at EOB required up to max A for LE dressing (donning post op shoe for wound/bandage protection). Pt on 5L NC at beginning of session, placed on 6L NC for increased 
  Problem: Occupational Therapy - Adult  Goal: By Discharge: Performs self-care activities at highest level of function for planned discharge setting.  See evaluation for individualized goals.  Description: FUNCTIONAL STATUS PRIOR TO ADMISSION:  mobilizing holding onto items in his home or using RW,  has been performing limited WB on left foot due to wound, performed ADLs on his own and driving, performed IADLs, resides on bottom level of tri level and mainly stays on the bottom level with no steps to enter   ,  ,  ,  ,  ,  ,  ,  ,  ,  ,       HOME SUPPORT: Patient lived alone and neighbors/friends assist.    Occupational Therapy Goals:  Initiated 4/29/2025  1.  Patient will perform grooming with Modified [place] within 7 day(s).  2.  Patient will perform lower body dressing with Modified [place] within 7 day(s).  3.  Patient will perform toileting with Modified [place] within 7 day(s).  4.  Patient will perform toilet transfers with Modified [place]  within 7 day(s).  5.  Patient will perform bathing with Modified [place] within 7 day(s).      Outcome: Progressing    OCCUPATIONAL THERAPY TREATMENT  Patient: Nasir Hatfield (74 y.o. male)  Date: 5/1/2025  Primary Diagnosis: Infected wound [T14.8XXA, L08.9]  Wound infection [T14.8XXA, L08.9]  Procedure(s) (LRB):  LEFT TRANSMETATARSAL FOOT AMPUTATION (Left) 1 Day Post-Op   Precautions: Weight Bearing   Left Lower Extremity Weight Bearing: Non Weight Bearing            Chart, occupational therapy assessment, plan of care, and goals were reviewed.    ASSESSMENT  Patient continues to benefit from skilled OT services and is progressing towards goals. Pt received supine in bed, agreeable to therapy services. Educated pt on NWB on LLE and placed post op shoe on LLE to protect wrapping and preserve cleanliness of bandage. Pt on 4L NC at beginning of session and pt satting around 82-84%, ultimately placed pt on 6L NC and w/ increased time (~3-4 
  Problem: Occupational Therapy - Adult  Goal: By Discharge: Performs self-care activities at highest level of function for planned discharge setting.  See evaluation for individualized goals.  Description: FUNCTIONAL STATUS PRIOR TO ADMISSION:  mobilizing holding onto items in his home or using RW,  has been performing limited WB on left foot due to wound, performed ADLs on his own and driving, performed IADLs, resides on bottom level of tri level and mainly stays on the bottom level with no steps to enter   ,  ,  ,  ,  ,  ,  ,  ,  ,  ,       HOME SUPPORT: Patient lived alone and neighbors/friends assist.    Occupational Therapy Goals:  Initiated 4/29/2025. Weekly re-assessment performed on 5/6/25, all goals remain appropriate   1.  Patient will perform grooming with Modified [place] within 7 day(s).  2.  Patient will perform lower body dressing with Modified [place] within 7 day(s).  3.  Patient will perform toileting with Modified [place] within 7 day(s).  4.  Patient will perform toilet transfers with Modified [place]  within 7 day(s).  5.  Patient will perform bathing with Modified [place] within 7 day(s).      Outcome: Progressing    OCCUPATIONAL THERAPY TREATMENT  Patient: Nasir Hatfield (74 y.o. male)  Date: 5/6/2025  Primary Diagnosis: Infected wound [T14.8XXA, L08.9]  Wound infection [T14.8XXA, L08.9]  Procedure(s) (LRB):  LEFT TRANSMETATARSAL FOOT AMPUTATION (Left) 6 Days Post-Op   Precautions: Weight Bearing   Left Lower Extremity Weight Bearing: Non Weight Bearing            Chart, occupational therapy assessment, plan of care, and goals were reviewed.    ASSESSMENT  Patient continues to benefit from skilled OT services and is slowly progressing towards goals. Pt received supine in bed, on 4L NC, agreeable to therapy services as pt needed to use the bathroom. With activity pt is currently requiring increased O2 needs from baseline (3-4L NC at home, today required up to 6L NC 
  Problem: Pain  Goal: Verbalizes/displays adequate comfort level or baseline comfort level  Outcome: Progressing  Flowsheets (Taken 5/1/2025 1042 by Priscilla Mohan RN)  Verbalizes/displays adequate comfort level or baseline comfort level:   Encourage patient to monitor pain and request assistance   Assess pain using appropriate pain scale   Administer analgesics based on type and severity of pain and evaluate response   Implement non-pharmacological measures as appropriate and evaluate response   Consider cultural and social influences on pain and pain management   Notify Licensed Independent Practitioner if interventions unsuccessful or patient reports new pain     Problem: Occupational Therapy - Adult  Goal: By Discharge: Performs self-care activities at highest level of function for planned discharge setting.  See evaluation for individualized goals.  Description: FUNCTIONAL STATUS PRIOR TO ADMISSION:  mobilizing holding onto items in his home or using RW,  has been performing limited WB on left foot due to wound, performed ADLs on his own and driving, performed IADLs, resides on bottom level of tri level and mainly stays on the bottom level with no steps to enter   ,  ,  ,  ,  ,  ,  ,  ,  ,  ,       HOME SUPPORT: Patient lived alone and neighbors/friends assist.    Occupational Therapy Goals:  Initiated 4/29/2025  1.  Patient will perform grooming with Modified [place] within 7 day(s).  2.  Patient will perform lower body dressing with Modified [place] within 7 day(s).  3.  Patient will perform toileting with Modified [place] within 7 day(s).  4.  Patient will perform toilet transfers with Modified [place]  within 7 day(s).  5.  Patient will perform bathing with Modified [place] within 7 day(s).      5/2/2025 1418 by Jorge A Perez, ALEKSANDR  Outcome: Progressing     
  Problem: Physical Therapy - Adult  Goal: By Discharge: Performs mobility at highest level of function for planned discharge setting.  See evaluation for individualized goals.  Description: FUNCTIONAL STATUS PRIOR TO ADMISSION: Patient was modified independent using a rolling walker for functional mobility. Patient has been limiting left foot WB with surgeries, pain and wound. Has been staying on 1 level of home with no steps to enter.     HOME SUPPORT PRIOR TO ADMISSION: The patient lived alone with friends to provide some assistance.    Physical Therapy Goals  Initiated 4/29/2025  1.  Patient will move from supine to sit and sit to supine, scoot up and down, and roll side to side in bed with modified independence within 7 day(s).    2.  Patient will perform sit to stand with modified independence within 7 day(s).  3.  Patient will transfer from bed to chair and chair to bed with modified independence using the least restrictive device within 7 day(s).  4.  Patient will ambulate with modified independence for 50 feet with the least restrictive device within 7 day(s).     Outcome: Progressing     PHYSICAL THERAPY TREATMENT: WEEKLY REASSESSMENT    Patient: Nasir Hatfield (74 y.o. male)  Date: 5/6/2025  Primary Diagnosis: Infected wound [T14.8XXA, L08.9]  Wound infection [T14.8XXA, L08.9]  Procedure(s) (LRB):  LEFT TRANSMETATARSAL FOOT AMPUTATION (Left) 6 Days Post-Op   Precautions: Restrictions/Precautions  Restrictions/Precautions: Weight Bearing Lower Extremity Weight Bearing Restrictions  Left Lower Extremity Weight Bearing: Non Weight Bearing        ASSESSMENT :  Patient continues to benefit from skilled PT services and is progressing towards goals. Patient received laying in bed and agreeable to therapy and needing to go to the bathroom. Patient directed in donning R shoe prior to stance. Pt on 4 L O2 but increased to 6L prior to activity. Pt ambulated to bathroom with RW with pt demo good adherence to L LE 
  Problem: Physical Therapy - Adult  Goal: By Discharge: Performs mobility at highest level of function for planned discharge setting.  See evaluation for individualized goals.  Description: FUNCTIONAL STATUS PRIOR TO ADMISSION: Patient was modified independent using a rolling walker for functional mobility. Patient has been limiting left foot WB with surgeries, pain and wound. Has been staying on 1 level of home with no steps to enter.     HOME SUPPORT PRIOR TO ADMISSION: The patient lived alone with friends to provide some assistance.    Physical Therapy Goals  Initiated 4/29/2025  1.  Patient will move from supine to sit and sit to supine, scoot up and down, and roll side to side in bed with modified independence within 7 day(s).    2.  Patient will perform sit to stand with modified independence within 7 day(s).  3.  Patient will transfer from bed to chair and chair to bed with modified independence using the least restrictive device within 7 day(s).  4.  Patient will ambulate with modified independence for 50 feet with the least restrictive device within 7 day(s).     Outcome: Progressing   PHYSICAL THERAPY TREATMENT    Patient: Nasir Hatfield (74 y.o. male)  Date: 5/1/2025  Diagnosis: Infected wound [T14.8XXA, L08.9]  Wound infection [T14.8XXA, L08.9] Infected wound  Procedure(s) (LRB):  LEFT TRANSMETATARSAL FOOT AMPUTATION (Left) 1 Day Post-Op  Precautions: Restrictions/Precautions  Restrictions/Precautions: Weight Bearing Lower Extremity Weight Bearing Restrictions  Left Lower Extremity Weight Bearing: Non Weight Bearing          ASSESSMENT:  Patient continues to benefit from skilled PT services and is slowly progressing towards goals. Patient is now s/p L TMA post op day 1 and is to maintain NWB status on L. Patient agreeable to therapy with encouragement. He presents with generalized weakness and mildly impaired functional mobility, decreased endurance and poor activity tolerance.   O2 sats 82% on  4LPM at 
  Problem: Physical Therapy - Adult  Goal: By Discharge: Performs mobility at highest level of function for planned discharge setting.  See evaluation for individualized goals.  Description: FUNCTIONAL STATUS PRIOR TO ADMISSION: Patient was modified independent using a rolling walker for functional mobility. Patient has been limiting left foot WB with surgeries, pain and wound. Has been staying on 1 level of home with no steps to enter.     HOME SUPPORT PRIOR TO ADMISSION: The patient lived alone with friends to provide some assistance.    Physical Therapy Goals  Initiated 4/29/2025  1.  Patient will move from supine to sit and sit to supine, scoot up and down, and roll side to side in bed with modified independence within 7 day(s).    2.  Patient will perform sit to stand with modified independence within 7 day(s).  3.  Patient will transfer from bed to chair and chair to bed with modified independence using the least restrictive device within 7 day(s).  4.  Patient will ambulate with modified independence for 50 feet with the least restrictive device within 7 day(s).     Outcome: Progressing  PHYSICAL THERAPY EVALUATION    Patient: Nasir Hatfield (74 y.o. male)  Date: 4/29/2025  Primary Diagnosis: Infected wound [T14.8XXA, L08.9]  Wound infection [T14.8XXA, L08.9]  Procedure(s) (LRB):  LEFT TRANSMETATARSAL FOOT AMPUTATION (Left)     Precautions: Restrictions/Precautions  Restrictions/Precautions: Bed Alarm, Fall Risk            ASSESSMENT :   DEFICITS/IMPAIRMENTS:   The patient is limited by decreased functional mobility, independence in ADLs, high-level IADLs, ROM, strength, activity tolerance, endurance, balance, increased pain levels     Based on the impairments listed above patient presents below baseline for functional mobility. Patient with increased left foot pain from multiple prior surgeries and wound. He maintained NWB LLE for pain management. Tolerated transferring to chair. COOL noted and patients SpO2 
  Problem: Respiratory - Adult  Goal: Achieves optimal ventilation and oxygenation  4/30/2025 0007 by Dayday Monterroso RN  Outcome: Progressing  4/29/2025 2153 by Ormond, Brittany Michelle, RCP  Outcome: Progressing     Problem: Chronic Conditions and Co-morbidities  Goal: Patient's chronic conditions and co-morbidity symptoms are monitored and maintained or improved  Outcome: Progressing     Problem: Discharge Planning  Goal: Discharge to home or other facility with appropriate resources  Outcome: Progressing     Problem: Pain  Goal: Verbalizes/displays adequate comfort level or baseline comfort level  4/30/2025 0007 by Dayday Monterroso RN  Outcome: Progressing  4/29/2025 1720 by Denice Alexander LPN  Outcome: Progressing     Problem: Skin/Tissue Integrity  Goal: Skin integrity remains intact  Description: 1.  Monitor for areas of redness and/or skin breakdown2.  Assess vascular access sites hourly3.  Every 4-6 hours minimum:  Change oxygen saturation probe site4.  Every 4-6 hours:  If on nasal continuous positive airway pressure, respiratory therapy assess nares and determine need for appliance change or resting period  Outcome: Progressing     Problem: Safety - Adult  Goal: Free from fall injury  Outcome: Progressing     Problem: Occupational Therapy - Adult  Goal: By Discharge: Performs self-care activities at highest level of function for planned discharge setting.  See evaluation for individualized goals.  Description: FUNCTIONAL STATUS PRIOR TO ADMISSION:  mobilizing holding onto items in his home or using RW,  has been performing limited WB on left foot due to wound, performed ADLs on his own and driving, performed IADLs, resides on bottom level of tri level and mainly stays on the bottom level with no steps to enter   ,  ,  ,  ,  ,  ,  ,  ,  ,  ,       HOME SUPPORT: Patient lived alone and neighbors/friends assist.    Occupational Therapy Goals:  Initiated 4/29/2025  1.  Patient will perform grooming with 
  Problem: Respiratory - Adult  Goal: Achieves optimal ventilation and oxygenation  5/3/2025 1255 by Asia Valencia, RN  Outcome: Progressing  5/3/2025 0839 by Kelsey Hernandez RT  Outcome: Progressing  5/2/2025 2357 by Kenyatta Cristobal RT  Outcome: Progressing     Problem: Chronic Conditions and Co-morbidities  Goal: Patient's chronic conditions and co-morbidity symptoms are monitored and maintained or improved  Outcome: Progressing     Problem: Discharge Planning  Goal: Discharge to home or other facility with appropriate resources  Outcome: Progressing     Problem: Pain  Goal: Verbalizes/displays adequate comfort level or baseline comfort level  5/3/2025 1255 by Asia Valencia, RN  Outcome: Progressing  5/2/2025 2256 by Andrea Rice RN  Outcome: Progressing  Flowsheets (Taken 5/1/2025 1042 by Priscilla Mohan, RN)  Verbalizes/displays adequate comfort level or baseline comfort level:   Encourage patient to monitor pain and request assistance   Assess pain using appropriate pain scale   Administer analgesics based on type and severity of pain and evaluate response   Implement non-pharmacological measures as appropriate and evaluate response   Consider cultural and social influences on pain and pain management   Notify Licensed Independent Practitioner if interventions unsuccessful or patient reports new pain     Problem: Skin/Tissue Integrity  Goal: Skin integrity remains intact  Description: 1.  Monitor for areas of redness and/or skin breakdown2.  Assess vascular access sites hourly3.  Every 4-6 hours minimum:  Change oxygen saturation probe site4.  Every 4-6 hours:  If on nasal continuous positive airway pressure, respiratory therapy assess nares and determine need for appliance change or resting period  Outcome: Progressing     Problem: Safety - Adult  Goal: Free from fall injury  Outcome: Progressing     Problem: ABCDS Injury Assessment  Goal: Absence of physical injury  Outcome: Progressing     
  Problem: Respiratory - Adult  Goal: Achieves optimal ventilation and oxygenation  5/6/2025 2308 by Gely Delong, MIESHA  Outcome: Progressing     
  Problem: Respiratory - Adult  Goal: Achieves optimal ventilation and oxygenation  5/6/2025 2324 by Rey Gonzalez RN  Outcome: Progressing  5/6/2025 2308 by Gely Delong RCP  Outcome: Progressing  5/6/2025 1858 by Riya Scott LPN  Outcome: Progressing     Problem: Chronic Conditions and Co-morbidities  Goal: Patient's chronic conditions and co-morbidity symptoms are monitored and maintained or improved  5/6/2025 2324 by Rey Gonzalez RN  Outcome: Progressing  5/6/2025 1858 by Riya Scott LPN  Outcome: Progressing     Problem: Discharge Planning  Goal: Discharge to home or other facility with appropriate resources  5/6/2025 2324 by Rey Gonzalez RN  Outcome: Progressing  5/6/2025 1858 by Riya Scott LPN  Outcome: Progressing     Problem: Pain  Goal: Verbalizes/displays adequate comfort level or baseline comfort level  5/6/2025 2324 by Rey Gonzalez RN  Outcome: Progressing  5/6/2025 1858 by Riya Scott LPN  Outcome: Progressing     Problem: Skin/Tissue Integrity  Goal: Skin integrity remains intact  Description: 1.  Monitor for areas of redness and/or skin breakdown2.  Assess vascular access sites hourly3.  Every 4-6 hours minimum:  Change oxygen saturation probe site4.  Every 4-6 hours:  If on nasal continuous positive airway pressure, respiratory therapy assess nares and determine need for appliance change or resting period  5/6/2025 2324 by Rey Gonzalez RN  Outcome: Progressing  Flowsheets (Taken 5/6/2025 1954)  Skin Integrity Remains Intact: Monitor for areas of redness and/or skin breakdown  5/6/2025 1858 by Riya Scott LPN  Outcome: Progressing     Problem: Safety - Adult  Goal: Free from fall injury  5/6/2025 2324 by Rey Gonzalez RN  Outcome: Progressing  5/6/2025 1858 by Riya Scott LPN  Outcome: Progressing     Problem: Occupational Therapy - Adult  Goal: By Discharge: Performs self-care activities at highest level of function for planned 
  Problem: Respiratory - Adult  Goal: Achieves optimal ventilation and oxygenation  5/7/2025 2339 by Kelsey Cardenas RN  Outcome: Progressing  5/7/2025 1357 by Tamara Becerra RN  Outcome: Progressing     Problem: Chronic Conditions and Co-morbidities  Goal: Patient's chronic conditions and co-morbidity symptoms are monitored and maintained or improved  5/7/2025 2339 by Kelsey Cardenas RN  Outcome: Progressing  5/7/2025 1357 by Tamara Becerra RN  Outcome: Progressing     Problem: Discharge Planning  Goal: Discharge to home or other facility with appropriate resources  5/7/2025 2339 by Kelsey Cardenas RN  Outcome: Progressing  5/7/2025 1357 by Tamara Becerra RN  Outcome: Progressing     Problem: Pain  Goal: Verbalizes/displays adequate comfort level or baseline comfort level  5/7/2025 2339 by Kelsey Cardenas RN  Outcome: Progressing  5/7/2025 1357 by Tamara Becerra RN  Outcome: Progressing     Problem: Skin/Tissue Integrity  Goal: Skin integrity remains intact  Description: 1.  Monitor for areas of redness and/or skin breakdown2.  Assess vascular access sites hourly3.  Every 4-6 hours minimum:  Change oxygen saturation probe site4.  Every 4-6 hours:  If on nasal continuous positive airway pressure, respiratory therapy assess nares and determine need for appliance change or resting period  5/7/2025 2339 by Kelsey Cardenas RN  Outcome: Progressing  Flowsheets (Taken 5/7/2025 1358 by Tamara Becerra RN)  Skin Integrity Remains Intact: Monitor for areas of redness and/or skin breakdown  5/7/2025 1357 by Tamara Becerra RN  Outcome: Progressing     Problem: Safety - Adult  Goal: Free from fall injury  5/7/2025 2339 by Kelsey Cardenas RN  Outcome: Progressing  5/7/2025 1357 by Tamara Becerra RN  Outcome: Progressing     Problem: ABCDS Injury Assessment  Goal: Absence of physical injury  5/7/2025 2339 by Kelsey Cardenas RN  Outcome: Progressing  5/7/2025 1357 by Tamara Becerra RN  Outcome: 
  Problem: Respiratory - Adult  Goal: Achieves optimal ventilation and oxygenation  5/8/2025 1203 by Breana Ortiz RN  Outcome: Progressing  5/8/2025 0728 by Carolyn Jean RT  Outcome: Progressing  5/7/2025 2339 by Kelsey Cardenas RN  Outcome: Progressing     Problem: Chronic Conditions and Co-morbidities  Goal: Patient's chronic conditions and co-morbidity symptoms are monitored and maintained or improved  5/8/2025 1203 by Breana Ortiz RN  Outcome: Progressing  5/7/2025 2339 by Kelsey Cardenas RN  Outcome: Progressing     Problem: Discharge Planning  Goal: Discharge to home or other facility with appropriate resources  5/8/2025 1203 by Breana Ortiz RN  Outcome: Progressing  5/7/2025 2339 by Kelsey Cardenas RN  Outcome: Progressing     Problem: Pain  Goal: Verbalizes/displays adequate comfort level or baseline comfort level  5/8/2025 1203 by Breana Ortiz RN  Outcome: Progressing  5/7/2025 2339 by Kelsey Cardenas RN  Outcome: Progressing     Problem: Skin/Tissue Integrity  Goal: Skin integrity remains intact  Description: 1.  Monitor for areas of redness and/or skin breakdown2.  Assess vascular access sites hourly3.  Every 4-6 hours minimum:  Change oxygen saturation probe site4.  Every 4-6 hours:  If on nasal continuous positive airway pressure, respiratory therapy assess nares and determine need for appliance change or resting period  5/8/2025 1203 by Breana Ortiz RN  Outcome: Progressing  5/7/2025 2339 by Kelsey Cardenas RN  Outcome: Progressing  Flowsheets (Taken 5/7/2025 1358 by Tamara Becerra RN)  Skin Integrity Remains Intact: Monitor for areas of redness and/or skin breakdown     Problem: Safety - Adult  Goal: Free from fall injury  5/8/2025 1203 by Breana Ortiz RN  Outcome: Progressing  5/7/2025 2339 by Kelsey Cardenas RN  Outcome: Progressing     Problem: ABCDS Injury Assessment  Goal: Absence of physical injury  5/8/2025 1203 by Breana Ortiz RN  Outcome: 
  Problem: Respiratory - Adult  Goal: Achieves optimal ventilation and oxygenation  Outcome: Progressing     Problem: Chronic Conditions and Co-morbidities  Goal: Patient's chronic conditions and co-morbidity symptoms are monitored and maintained or improved  Outcome: Progressing     Problem: Discharge Planning  Goal: Discharge to home or other facility with appropriate resources  Outcome: Progressing     Problem: Pain  Goal: Verbalizes/displays adequate comfort level or baseline comfort level  Outcome: Progressing     Problem: Skin/Tissue Integrity  Goal: Skin integrity remains intact  Description: 1.  Monitor for areas of redness and/or skin breakdown2.  Assess vascular access sites hourly3.  Every 4-6 hours minimum:  Change oxygen saturation probe site4.  Every 4-6 hours:  If on nasal continuous positive airway pressure, respiratory therapy assess nares and determine need for appliance change or resting period  Outcome: Progressing     Problem: Safety - Adult  Goal: Free from fall injury  Outcome: Progressing     
Needs help for balance, handling clothes or toilet paper  Chair/Bed Trannsfers: Minimum assistance or supervision required  Ambulation: Cannot perform activity  Stairs: Cannot perform activity  Total Barthel Index Score: 55            The Barthel ADL Index: Guidelines  1. The index should be used as a record of what a patient does, not as a record of what a patient could do.  2. The main aim is to establish degree of independence from any help, physical or verbal, however minor and for whatever reason.  3. The need for supervision renders the patient not independent.  4. A patient's performance should be established using the best available evidence. Asking the patient, friends/relatives and nurses are the usual sources, but direct observation and common sense are also important. However direct testing is not needed.  5. Usually the patient's performance over the preceding 24-48 hours is important, but occasionally longer periods will be relevant.  6. Middle categories imply that the patient supplies over 50 per cent of the effort.  7. Use of aids to be independent is allowed.    Score Interpretation (from [employer] 1997)    Independent   60-79 Minimally independent   40-59 Partially dependent   20-39 Very dependent   <20 Totally dependent     Ishaan Skelton., BarthelVERNON. (1965). Functional evaluation: the Barthel Index. [place] [facility] Med J (142.  Zachary Chan, [email or web address].[email or web address].F, MERRICK Jane., Nahid, J.A., Naga, A.TWILA. (1999). Measuring the change indisability after inpatient rehabilitation; comparison of the responsiveness of the Barthel Index and Functional Burgin Measure. Journal of Neurology, Neurosurgery, and Psychiatry, 66(4), 480-484.  Zachary David, N.J.A, RAUL Beckett, & Mine M.A. (2004.) Assessment of post-stroke quality of life in cost-effectiveness studies: The usefulness of the Barthel Index and the EuroQoL-5D. Quality of Life Research, 13, 427-43     Activity Tolerance:   Fair ,

## 2025-08-22 ENCOUNTER — ANESTHESIA (OUTPATIENT)
Facility: HOSPITAL | Age: 75
End: 2025-08-22
Payer: MEDICARE

## 2025-08-22 ENCOUNTER — HOSPITAL ENCOUNTER (INPATIENT)
Facility: HOSPITAL | Age: 75
LOS: 6 days | Discharge: NURSING, REHAB OR LONG-TERM CARE | DRG: 240 | End: 2025-08-28
Attending: SURGERY | Admitting: SURGERY
Payer: MEDICARE

## 2025-08-22 ENCOUNTER — ANESTHESIA EVENT (OUTPATIENT)
Facility: HOSPITAL | Age: 75
End: 2025-08-22
Payer: MEDICARE

## 2025-08-22 DIAGNOSIS — T14.8XXA WOUND INFECTION: Primary | ICD-10-CM

## 2025-08-22 DIAGNOSIS — L08.9 WOUND INFECTION: Primary | ICD-10-CM

## 2025-08-22 PROBLEM — I99.8 LOWER LIMB ISCHEMIA: Status: ACTIVE | Noted: 2025-08-22

## 2025-08-22 PROCEDURE — 1100000000 HC RM PRIVATE

## 2025-08-22 PROCEDURE — 2580000003 HC RX 258

## 2025-08-22 PROCEDURE — 88307 TISSUE EXAM BY PATHOLOGIST: CPT

## 2025-08-22 PROCEDURE — 2500000003 HC RX 250 WO HCPCS: Performed by: SURGERY

## 2025-08-22 PROCEDURE — 6360000002 HC RX W HCPCS: Performed by: SURGERY

## 2025-08-22 PROCEDURE — 6360000002 HC RX W HCPCS

## 2025-08-22 PROCEDURE — 6360000002 HC RX W HCPCS: Performed by: ANESTHESIOLOGY

## 2025-08-22 PROCEDURE — 3700000001 HC ADD 15 MINUTES (ANESTHESIA): Performed by: SURGERY

## 2025-08-22 PROCEDURE — 7100000000 HC PACU RECOVERY - FIRST 15 MIN: Performed by: SURGERY

## 2025-08-22 PROCEDURE — 2709999900 HC NON-CHARGEABLE SUPPLY: Performed by: SURGERY

## 2025-08-22 PROCEDURE — 7100000001 HC PACU RECOVERY - ADDTL 15 MIN: Performed by: SURGERY

## 2025-08-22 PROCEDURE — 3600000013 HC SURGERY LEVEL 3 ADDTL 15MIN: Performed by: SURGERY

## 2025-08-22 PROCEDURE — 3700000000 HC ANESTHESIA ATTENDED CARE: Performed by: SURGERY

## 2025-08-22 PROCEDURE — 3600000003 HC SURGERY LEVEL 3 BASE: Performed by: SURGERY

## 2025-08-22 PROCEDURE — 6370000000 HC RX 637 (ALT 250 FOR IP): Performed by: SURGERY

## 2025-08-22 PROCEDURE — 94640 AIRWAY INHALATION TREATMENT: CPT

## 2025-08-22 PROCEDURE — 0Y6J0Z1 DETACHMENT AT LEFT LOWER LEG, HIGH, OPEN APPROACH: ICD-10-PCS | Performed by: SURGERY

## 2025-08-22 RX ORDER — SODIUM CHLORIDE 9 MG/ML
INJECTION, SOLUTION INTRAVENOUS PRN
Status: DISCONTINUED | OUTPATIENT
Start: 2025-08-22 | End: 2025-08-22 | Stop reason: HOSPADM

## 2025-08-22 RX ORDER — MELOXICAM 7.5 MG/1
7.5 TABLET ORAL DAILY
Status: DISCONTINUED | OUTPATIENT
Start: 2025-08-22 | End: 2025-08-28 | Stop reason: HOSPADM

## 2025-08-22 RX ORDER — ONDANSETRON 2 MG/ML
4 INJECTION INTRAMUSCULAR; INTRAVENOUS EVERY 6 HOURS PRN
Status: DISCONTINUED | OUTPATIENT
Start: 2025-08-22 | End: 2025-08-28 | Stop reason: HOSPADM

## 2025-08-22 RX ORDER — IBUPROFEN 600 MG/1
600 TABLET, FILM COATED ORAL EVERY 6 HOURS PRN
Status: ON HOLD | COMMUNITY
End: 2025-08-28 | Stop reason: HOSPADM

## 2025-08-22 RX ORDER — SODIUM CHLORIDE 0.9 % (FLUSH) 0.9 %
5-40 SYRINGE (ML) INJECTION EVERY 12 HOURS SCHEDULED
Status: DISCONTINUED | OUTPATIENT
Start: 2025-08-22 | End: 2025-08-22 | Stop reason: HOSPADM

## 2025-08-22 RX ORDER — BUDESONIDE 0.25 MG/2ML
0.25 INHALANT ORAL
Status: DISCONTINUED | OUTPATIENT
Start: 2025-08-22 | End: 2025-08-22

## 2025-08-22 RX ORDER — FENTANYL CITRATE 50 UG/ML
25 INJECTION, SOLUTION INTRAMUSCULAR; INTRAVENOUS EVERY 5 MIN PRN
Status: COMPLETED | OUTPATIENT
Start: 2025-08-22 | End: 2025-08-22

## 2025-08-22 RX ORDER — ONDANSETRON 2 MG/ML
INJECTION INTRAMUSCULAR; INTRAVENOUS
Status: DISCONTINUED | OUTPATIENT
Start: 2025-08-22 | End: 2025-08-22 | Stop reason: SDUPTHER

## 2025-08-22 RX ORDER — BUDESONIDE 0.25 MG/2ML
0.25 INHALANT ORAL
Status: DISCONTINUED | OUTPATIENT
Start: 2025-08-22 | End: 2025-08-28 | Stop reason: HOSPADM

## 2025-08-22 RX ORDER — HYDRALAZINE HYDROCHLORIDE 20 MG/ML
10 INJECTION INTRAMUSCULAR; INTRAVENOUS
Status: DISCONTINUED | OUTPATIENT
Start: 2025-08-22 | End: 2025-08-22 | Stop reason: HOSPADM

## 2025-08-22 RX ORDER — ARFORMOTEROL TARTRATE 15 UG/2ML
15 SOLUTION RESPIRATORY (INHALATION)
Status: DISCONTINUED | OUTPATIENT
Start: 2025-08-22 | End: 2025-08-22

## 2025-08-22 RX ORDER — SODIUM CHLORIDE 0.9 % (FLUSH) 0.9 %
5-40 SYRINGE (ML) INJECTION PRN
Status: DISCONTINUED | OUTPATIENT
Start: 2025-08-22 | End: 2025-08-22 | Stop reason: HOSPADM

## 2025-08-22 RX ORDER — SODIUM CHLORIDE, SODIUM LACTATE, POTASSIUM CHLORIDE, CALCIUM CHLORIDE 600; 310; 30; 20 MG/100ML; MG/100ML; MG/100ML; MG/100ML
INJECTION, SOLUTION INTRAVENOUS CONTINUOUS
Status: DISCONTINUED | OUTPATIENT
Start: 2025-08-22 | End: 2025-08-22 | Stop reason: HOSPADM

## 2025-08-22 RX ORDER — ARFORMOTEROL TARTRATE 15 UG/2ML
15 SOLUTION RESPIRATORY (INHALATION)
Status: DISCONTINUED | OUTPATIENT
Start: 2025-08-22 | End: 2025-08-28 | Stop reason: HOSPADM

## 2025-08-22 RX ORDER — ACETAMINOPHEN 325 MG/1
650 TABLET ORAL
Status: DISCONTINUED | OUTPATIENT
Start: 2025-08-22 | End: 2025-08-22 | Stop reason: HOSPADM

## 2025-08-22 RX ORDER — ACETAMINOPHEN 500 MG
1000 TABLET ORAL ONCE
Status: DISCONTINUED | OUTPATIENT
Start: 2025-08-22 | End: 2025-08-22 | Stop reason: HOSPADM

## 2025-08-22 RX ORDER — HYDROMORPHONE HYDROCHLORIDE 1 MG/ML
0.25 INJECTION, SOLUTION INTRAMUSCULAR; INTRAVENOUS; SUBCUTANEOUS EVERY 5 MIN PRN
Status: COMPLETED | OUTPATIENT
Start: 2025-08-22 | End: 2025-08-22

## 2025-08-22 RX ORDER — MORPHINE SULFATE 2 MG/ML
2 INJECTION, SOLUTION INTRAMUSCULAR; INTRAVENOUS EVERY 4 HOURS PRN
Status: DISCONTINUED | OUTPATIENT
Start: 2025-08-22 | End: 2025-08-25

## 2025-08-22 RX ORDER — KETOROLAC TROMETHAMINE 30 MG/ML
15 INJECTION, SOLUTION INTRAMUSCULAR; INTRAVENOUS EVERY 6 HOURS PRN
Status: DISPENSED | OUTPATIENT
Start: 2025-08-22 | End: 2025-08-24

## 2025-08-22 RX ORDER — ASPIRIN 81 MG/1
81 TABLET ORAL DAILY
Status: DISCONTINUED | OUTPATIENT
Start: 2025-08-22 | End: 2025-08-28 | Stop reason: HOSPADM

## 2025-08-22 RX ORDER — ONDANSETRON 2 MG/ML
4 INJECTION INTRAMUSCULAR; INTRAVENOUS ONCE
Status: DISCONTINUED | OUTPATIENT
Start: 2025-08-22 | End: 2025-08-22 | Stop reason: HOSPADM

## 2025-08-22 RX ORDER — LIDOCAINE HYDROCHLORIDE 20 MG/ML
INJECTION, SOLUTION EPIDURAL; INFILTRATION; INTRACAUDAL; PERINEURAL
Status: DISCONTINUED | OUTPATIENT
Start: 2025-08-22 | End: 2025-08-22 | Stop reason: SDUPTHER

## 2025-08-22 RX ORDER — FENTANYL CITRATE 50 UG/ML
INJECTION, SOLUTION INTRAMUSCULAR; INTRAVENOUS
Status: DISCONTINUED | OUTPATIENT
Start: 2025-08-22 | End: 2025-08-22 | Stop reason: SDUPTHER

## 2025-08-22 RX ORDER — MIDAZOLAM HYDROCHLORIDE 2 MG/2ML
2 INJECTION, SOLUTION INTRAMUSCULAR; INTRAVENOUS
Status: DISCONTINUED | OUTPATIENT
Start: 2025-08-22 | End: 2025-08-22 | Stop reason: HOSPADM

## 2025-08-22 RX ORDER — NICOTINE 21 MG/24HR
1 PATCH, TRANSDERMAL 24 HOURS TRANSDERMAL DAILY
Status: DISCONTINUED | OUTPATIENT
Start: 2025-08-22 | End: 2025-08-22 | Stop reason: SDUPTHER

## 2025-08-22 RX ORDER — PROPOFOL 10 MG/ML
INJECTION, EMULSION INTRAVENOUS
Status: DISCONTINUED | OUTPATIENT
Start: 2025-08-22 | End: 2025-08-22 | Stop reason: SDUPTHER

## 2025-08-22 RX ORDER — OXYCODONE AND ACETAMINOPHEN 7.5; 325 MG/1; MG/1
1 TABLET ORAL EVERY 4 HOURS PRN
Status: DISCONTINUED | OUTPATIENT
Start: 2025-08-22 | End: 2025-08-28 | Stop reason: HOSPADM

## 2025-08-22 RX ORDER — SODIUM CHLORIDE, SODIUM LACTATE, POTASSIUM CHLORIDE, CALCIUM CHLORIDE 600; 310; 30; 20 MG/100ML; MG/100ML; MG/100ML; MG/100ML
INJECTION, SOLUTION INTRAVENOUS
Status: DISCONTINUED | OUTPATIENT
Start: 2025-08-22 | End: 2025-08-22 | Stop reason: SDUPTHER

## 2025-08-22 RX ORDER — OXYCODONE HYDROCHLORIDE 5 MG/1
5 TABLET ORAL
Status: DISCONTINUED | OUTPATIENT
Start: 2025-08-22 | End: 2025-08-22 | Stop reason: HOSPADM

## 2025-08-22 RX ORDER — DILTIAZEM HYDROCHLORIDE 120 MG/1
120 CAPSULE, COATED, EXTENDED RELEASE ORAL DAILY
Status: DISCONTINUED | OUTPATIENT
Start: 2025-08-23 | End: 2025-08-28 | Stop reason: HOSPADM

## 2025-08-22 RX ORDER — FENTANYL CITRATE 50 UG/ML
100 INJECTION, SOLUTION INTRAMUSCULAR; INTRAVENOUS
Status: DISCONTINUED | OUTPATIENT
Start: 2025-08-22 | End: 2025-08-22 | Stop reason: HOSPADM

## 2025-08-22 RX ORDER — SODIUM CHLORIDE, SODIUM LACTATE, POTASSIUM CHLORIDE, CALCIUM CHLORIDE 600; 310; 30; 20 MG/100ML; MG/100ML; MG/100ML; MG/100ML
INJECTION, SOLUTION INTRAVENOUS ONCE
Status: DISCONTINUED | OUTPATIENT
Start: 2025-08-22 | End: 2025-08-22 | Stop reason: HOSPADM

## 2025-08-22 RX ORDER — DEXAMETHASONE SODIUM PHOSPHATE 4 MG/ML
4 INJECTION, SOLUTION INTRA-ARTICULAR; INTRALESIONAL; INTRAMUSCULAR; INTRAVENOUS; SOFT TISSUE
Status: DISCONTINUED | OUTPATIENT
Start: 2025-08-22 | End: 2025-08-22 | Stop reason: HOSPADM

## 2025-08-22 RX ORDER — DOCUSATE SODIUM 100 MG/1
100 CAPSULE, LIQUID FILLED ORAL 2 TIMES DAILY PRN
Status: DISCONTINUED | OUTPATIENT
Start: 2025-08-22 | End: 2025-08-28 | Stop reason: HOSPADM

## 2025-08-22 RX ORDER — NICOTINE 21 MG/24HR
1 PATCH, TRANSDERMAL 24 HOURS TRANSDERMAL
Status: DISCONTINUED | OUTPATIENT
Start: 2025-08-22 | End: 2025-08-28 | Stop reason: HOSPADM

## 2025-08-22 RX ORDER — SUCCINYLCHOLINE CHLORIDE 20 MG/ML
INJECTION INTRAMUSCULAR; INTRAVENOUS
Status: DISCONTINUED | OUTPATIENT
Start: 2025-08-22 | End: 2025-08-22 | Stop reason: SDUPTHER

## 2025-08-22 RX ORDER — ATORVASTATIN CALCIUM 10 MG/1
10 TABLET, FILM COATED ORAL NIGHTLY
Status: DISCONTINUED | OUTPATIENT
Start: 2025-08-22 | End: 2025-08-28 | Stop reason: HOSPADM

## 2025-08-22 RX ORDER — LISINOPRIL 5 MG/1
10 TABLET ORAL DAILY
Status: DISCONTINUED | OUTPATIENT
Start: 2025-08-23 | End: 2025-08-28 | Stop reason: HOSPADM

## 2025-08-22 RX ORDER — PROCHLORPERAZINE EDISYLATE 5 MG/ML
5 INJECTION INTRAMUSCULAR; INTRAVENOUS
Status: DISCONTINUED | OUTPATIENT
Start: 2025-08-22 | End: 2025-08-22 | Stop reason: HOSPADM

## 2025-08-22 RX ADMIN — BUDESONIDE 250 MCG: 0.25 INHALANT RESPIRATORY (INHALATION) at 21:36

## 2025-08-22 RX ADMIN — LIDOCAINE HYDROCHLORIDE 80 MG: 20 INJECTION, SOLUTION EPIDURAL; INFILTRATION; INTRACAUDAL; PERINEURAL at 12:32

## 2025-08-22 RX ADMIN — PROPOFOL 120 MG: 10 INJECTION, EMULSION INTRAVENOUS at 12:32

## 2025-08-22 RX ADMIN — FENTANYL CITRATE 25 MCG: 50 INJECTION, SOLUTION INTRAMUSCULAR; INTRAVENOUS at 15:00

## 2025-08-22 RX ADMIN — FENTANYL CITRATE 25 MCG: 50 INJECTION, SOLUTION INTRAMUSCULAR; INTRAVENOUS at 14:26

## 2025-08-22 RX ADMIN — WATER 2000 MG: 1 INJECTION INTRAMUSCULAR; INTRAVENOUS; SUBCUTANEOUS at 12:36

## 2025-08-22 RX ADMIN — FENTANYL CITRATE 50 MCG: 50 INJECTION, SOLUTION INTRAMUSCULAR; INTRAVENOUS at 12:25

## 2025-08-22 RX ADMIN — KETOROLAC TROMETHAMINE 15 MG: 30 INJECTION, SOLUTION INTRAMUSCULAR at 16:00

## 2025-08-22 RX ADMIN — PHENYLEPHRINE HYDROCHLORIDE 40 MCG/MIN: 10 INJECTION INTRAVENOUS at 12:38

## 2025-08-22 RX ADMIN — ARFORMOTEROL TARTRATE 15 MCG: 15 SOLUTION RESPIRATORY (INHALATION) at 21:36

## 2025-08-22 RX ADMIN — MORPHINE SULFATE 2 MG: 2 INJECTION, SOLUTION INTRAMUSCULAR; INTRAVENOUS at 16:04

## 2025-08-22 RX ADMIN — HYDROMORPHONE HYDROCHLORIDE 0.25 MG: 1 INJECTION, SOLUTION INTRAMUSCULAR; INTRAVENOUS; SUBCUTANEOUS at 12:55

## 2025-08-22 RX ADMIN — SODIUM CHLORIDE, POTASSIUM CHLORIDE, SODIUM LACTATE AND CALCIUM CHLORIDE: 600; 310; 30; 20 INJECTION, SOLUTION INTRAVENOUS at 12:25

## 2025-08-22 RX ADMIN — IPRATROPIUM BROMIDE 0.5 MG: 0.5 SOLUTION RESPIRATORY (INHALATION) at 21:31

## 2025-08-22 RX ADMIN — HYDROMORPHONE HYDROCHLORIDE 0.25 MG: 1 INJECTION, SOLUTION INTRAMUSCULAR; INTRAVENOUS; SUBCUTANEOUS at 13:40

## 2025-08-22 RX ADMIN — FENTANYL CITRATE 50 MCG: 50 INJECTION, SOLUTION INTRAMUSCULAR; INTRAVENOUS at 12:43

## 2025-08-22 RX ADMIN — FENTANYL CITRATE 25 MCG: 50 INJECTION, SOLUTION INTRAMUSCULAR; INTRAVENOUS at 14:37

## 2025-08-22 RX ADMIN — MORPHINE SULFATE 2 MG: 2 INJECTION, SOLUTION INTRAMUSCULAR; INTRAVENOUS at 21:02

## 2025-08-22 RX ADMIN — SUCCINYLCHOLINE CHLORIDE 120 MG: 20 INJECTION, SOLUTION INTRAMUSCULAR; INTRAVENOUS at 12:32

## 2025-08-22 RX ADMIN — HYDROMORPHONE HYDROCHLORIDE 0.25 MG: 1 INJECTION, SOLUTION INTRAMUSCULAR; INTRAVENOUS; SUBCUTANEOUS at 13:55

## 2025-08-22 RX ADMIN — ONDANSETRON HYDROCHLORIDE 4 MG: 2 INJECTION, SOLUTION INTRAMUSCULAR; INTRAVENOUS at 12:40

## 2025-08-22 RX ADMIN — HYDROMORPHONE HYDROCHLORIDE 0.25 MG: 1 INJECTION, SOLUTION INTRAMUSCULAR; INTRAVENOUS; SUBCUTANEOUS at 13:50

## 2025-08-22 RX ADMIN — HYDROMORPHONE HYDROCHLORIDE 0.25 MG: 1 INJECTION, SOLUTION INTRAMUSCULAR; INTRAVENOUS; SUBCUTANEOUS at 13:45

## 2025-08-22 RX ADMIN — FENTANYL CITRATE 25 MCG: 50 INJECTION, SOLUTION INTRAMUSCULAR; INTRAVENOUS at 14:43

## 2025-08-22 RX ADMIN — ATORVASTATIN CALCIUM 10 MG: 10 TABLET, FILM COATED ORAL at 21:02

## 2025-08-22 RX ADMIN — WATER 2000 MG: 1 INJECTION INTRAMUSCULAR; INTRAVENOUS; SUBCUTANEOUS at 21:01

## 2025-08-22 ASSESSMENT — PAIN - FUNCTIONAL ASSESSMENT
PAIN_FUNCTIONAL_ASSESSMENT: PREVENTS OR INTERFERES WITH ALL ACTIVE AND SOME PASSIVE ACTIVITIES
PAIN_FUNCTIONAL_ASSESSMENT: 0-10
PAIN_FUNCTIONAL_ASSESSMENT: ACTIVITIES ARE NOT PREVENTED
PAIN_FUNCTIONAL_ASSESSMENT: PREVENTS OR INTERFERES WITH MANY ACTIVE NOT PASSIVE ACTIVITIES
PAIN_FUNCTIONAL_ASSESSMENT: 0-10
PAIN_FUNCTIONAL_ASSESSMENT: PREVENTS OR INTERFERES WITH ALL ACTIVE AND SOME PASSIVE ACTIVITIES
PAIN_FUNCTIONAL_ASSESSMENT: 0-10

## 2025-08-22 ASSESSMENT — PAIN DESCRIPTION - DESCRIPTORS
DESCRIPTORS: THROBBING
DESCRIPTORS: DULL;ACHING
DESCRIPTORS: ACHING
DESCRIPTORS: THROBBING
DESCRIPTORS: ACHING;CRAMPING
DESCRIPTORS: THROBBING
DESCRIPTORS: THROBBING
DESCRIPTORS: ACHING;HEAVINESS;POUNDING
DESCRIPTORS: THROBBING

## 2025-08-22 ASSESSMENT — PAIN SCALES - GENERAL
PAINLEVEL_OUTOF10: 4
PAINLEVEL_OUTOF10: 7
PAINLEVEL_OUTOF10: 8
PAINLEVEL_OUTOF10: 10
PAINLEVEL_OUTOF10: 6
PAINLEVEL_OUTOF10: 10
PAINLEVEL_OUTOF10: 4
PAINLEVEL_OUTOF10: 8
PAINLEVEL_OUTOF10: 9
PAINLEVEL_OUTOF10: 4
PAINLEVEL_OUTOF10: 6
PAINLEVEL_OUTOF10: 0
PAINLEVEL_OUTOF10: 3
PAINLEVEL_OUTOF10: 7
PAINLEVEL_OUTOF10: 8

## 2025-08-22 ASSESSMENT — PAIN DESCRIPTION - ORIENTATION
ORIENTATION: LEFT
ORIENTATION: LEFT;LOWER
ORIENTATION: LEFT

## 2025-08-22 ASSESSMENT — PAIN DESCRIPTION - FREQUENCY
FREQUENCY: INTERMITTENT
FREQUENCY: INTERMITTENT

## 2025-08-22 ASSESSMENT — PAIN DESCRIPTION - LOCATION
LOCATION: LEG

## 2025-08-22 ASSESSMENT — LIFESTYLE VARIABLES: SMOKING_STATUS: 1

## 2025-08-22 ASSESSMENT — COPD QUESTIONNAIRES: CAT_SEVERITY: SEVERE

## 2025-08-22 ASSESSMENT — PAIN DESCRIPTION - PAIN TYPE
TYPE: SURGICAL PAIN
TYPE: SURGICAL PAIN

## 2025-08-23 PROCEDURE — 6370000000 HC RX 637 (ALT 250 FOR IP): Performed by: SURGERY

## 2025-08-23 PROCEDURE — 51798 US URINE CAPACITY MEASURE: CPT

## 2025-08-23 PROCEDURE — 6360000002 HC RX W HCPCS: Performed by: SURGERY

## 2025-08-23 PROCEDURE — 2700000000 HC OXYGEN THERAPY PER DAY

## 2025-08-23 PROCEDURE — 51701 INSERT BLADDER CATHETER: CPT

## 2025-08-23 PROCEDURE — 2500000003 HC RX 250 WO HCPCS: Performed by: SURGERY

## 2025-08-23 PROCEDURE — 94761 N-INVAS EAR/PLS OXIMETRY MLT: CPT

## 2025-08-23 PROCEDURE — 1100000000 HC RM PRIVATE

## 2025-08-23 PROCEDURE — 94640 AIRWAY INHALATION TREATMENT: CPT

## 2025-08-23 RX ORDER — TAMSULOSIN HYDROCHLORIDE 0.4 MG/1
0.4 CAPSULE ORAL DAILY
Status: DISCONTINUED | OUTPATIENT
Start: 2025-08-23 | End: 2025-08-28 | Stop reason: HOSPADM

## 2025-08-23 RX ADMIN — IPRATROPIUM BROMIDE 0.5 MG: 0.5 SOLUTION RESPIRATORY (INHALATION) at 13:55

## 2025-08-23 RX ADMIN — OXYCODONE AND ACETAMINOPHEN 1 TABLET: 7.5; 325 TABLET ORAL at 00:40

## 2025-08-23 RX ADMIN — ASPIRIN 81 MG: 81 TABLET, COATED ORAL at 08:19

## 2025-08-23 RX ADMIN — OXYCODONE AND ACETAMINOPHEN 1 TABLET: 7.5; 325 TABLET ORAL at 19:20

## 2025-08-23 RX ADMIN — BUDESONIDE 250 MCG: 0.25 INHALANT RESPIRATORY (INHALATION) at 20:08

## 2025-08-23 RX ADMIN — BUDESONIDE 250 MCG: 0.25 INHALANT RESPIRATORY (INHALATION) at 08:38

## 2025-08-23 RX ADMIN — MELOXICAM 7.5 MG: 7.5 TABLET ORAL at 08:19

## 2025-08-23 RX ADMIN — LISINOPRIL 10 MG: 5 TABLET ORAL at 08:20

## 2025-08-23 RX ADMIN — OXYCODONE AND ACETAMINOPHEN 1 TABLET: 7.5; 325 TABLET ORAL at 15:18

## 2025-08-23 RX ADMIN — ARFORMOTEROL TARTRATE 15 MCG: 15 SOLUTION RESPIRATORY (INHALATION) at 20:08

## 2025-08-23 RX ADMIN — MORPHINE SULFATE 2 MG: 2 INJECTION, SOLUTION INTRAMUSCULAR; INTRAVENOUS at 08:21

## 2025-08-23 RX ADMIN — METOPROLOL SUCCINATE 75 MG: 50 TABLET, EXTENDED RELEASE ORAL at 08:20

## 2025-08-23 RX ADMIN — ARFORMOTEROL TARTRATE 15 MCG: 15 SOLUTION RESPIRATORY (INHALATION) at 08:38

## 2025-08-23 RX ADMIN — WATER 2000 MG: 1 INJECTION INTRAMUSCULAR; INTRAVENOUS; SUBCUTANEOUS at 03:53

## 2025-08-23 RX ADMIN — IPRATROPIUM BROMIDE 0.5 MG: 0.5 SOLUTION RESPIRATORY (INHALATION) at 08:38

## 2025-08-23 RX ADMIN — TAMSULOSIN HYDROCHLORIDE 0.4 MG: 0.4 CAPSULE ORAL at 11:48

## 2025-08-23 RX ADMIN — ATORVASTATIN CALCIUM 10 MG: 10 TABLET, FILM COATED ORAL at 21:23

## 2025-08-23 RX ADMIN — DILTIAZEM HYDROCHLORIDE 120 MG: 120 CAPSULE, EXTENDED RELEASE ORAL at 08:19

## 2025-08-23 RX ADMIN — IPRATROPIUM BROMIDE 0.5 MG: 0.5 SOLUTION RESPIRATORY (INHALATION) at 20:08

## 2025-08-23 RX ADMIN — MORPHINE SULFATE 2 MG: 2 INJECTION, SOLUTION INTRAMUSCULAR; INTRAVENOUS at 03:54

## 2025-08-23 ASSESSMENT — PAIN DESCRIPTION - DESCRIPTORS
DESCRIPTORS: THROBBING
DESCRIPTORS: ACHING;SHARP
DESCRIPTORS: THROBBING
DESCRIPTORS: ACHING;CRAMPING
DESCRIPTORS: THROBBING

## 2025-08-23 ASSESSMENT — PAIN SCALES - GENERAL
PAINLEVEL_OUTOF10: 7
PAINLEVEL_OUTOF10: 6
PAINLEVEL_OUTOF10: 3
PAINLEVEL_OUTOF10: 5
PAINLEVEL_OUTOF10: 4
PAINLEVEL_OUTOF10: 8
PAINLEVEL_OUTOF10: 6
PAINLEVEL_OUTOF10: 4

## 2025-08-23 ASSESSMENT — PAIN DESCRIPTION - ORIENTATION
ORIENTATION: LEFT;LOWER
ORIENTATION: LEFT;LOWER
ORIENTATION: LEFT

## 2025-08-23 ASSESSMENT — PAIN - FUNCTIONAL ASSESSMENT
PAIN_FUNCTIONAL_ASSESSMENT: PREVENTS OR INTERFERES SOME ACTIVE ACTIVITIES AND ADLS
PAIN_FUNCTIONAL_ASSESSMENT: ACTIVITIES ARE NOT PREVENTED
PAIN_FUNCTIONAL_ASSESSMENT: 0-10
PAIN_FUNCTIONAL_ASSESSMENT: PREVENTS OR INTERFERES SOME ACTIVE ACTIVITIES AND ADLS
PAIN_FUNCTIONAL_ASSESSMENT: ACTIVITIES ARE NOT PREVENTED
PAIN_FUNCTIONAL_ASSESSMENT: PREVENTS OR INTERFERES SOME ACTIVE ACTIVITIES AND ADLS

## 2025-08-23 ASSESSMENT — PAIN DESCRIPTION - LOCATION
LOCATION: LEG

## 2025-08-24 PROCEDURE — 6360000002 HC RX W HCPCS: Performed by: SURGERY

## 2025-08-24 PROCEDURE — 6370000000 HC RX 637 (ALT 250 FOR IP): Performed by: SURGERY

## 2025-08-24 PROCEDURE — 2700000000 HC OXYGEN THERAPY PER DAY

## 2025-08-24 PROCEDURE — 94640 AIRWAY INHALATION TREATMENT: CPT

## 2025-08-24 PROCEDURE — 1100000000 HC RM PRIVATE

## 2025-08-24 PROCEDURE — 94761 N-INVAS EAR/PLS OXIMETRY MLT: CPT

## 2025-08-24 RX ADMIN — MORPHINE SULFATE 2 MG: 2 INJECTION, SOLUTION INTRAMUSCULAR; INTRAVENOUS at 05:14

## 2025-08-24 RX ADMIN — OXYCODONE AND ACETAMINOPHEN 1 TABLET: 7.5; 325 TABLET ORAL at 12:37

## 2025-08-24 RX ADMIN — ARFORMOTEROL TARTRATE 15 MCG: 15 SOLUTION RESPIRATORY (INHALATION) at 20:02

## 2025-08-24 RX ADMIN — OXYCODONE AND ACETAMINOPHEN 1 TABLET: 7.5; 325 TABLET ORAL at 01:04

## 2025-08-24 RX ADMIN — ARFORMOTEROL TARTRATE 15 MCG: 15 SOLUTION RESPIRATORY (INHALATION) at 09:45

## 2025-08-24 RX ADMIN — MORPHINE SULFATE 2 MG: 2 INJECTION, SOLUTION INTRAMUSCULAR; INTRAVENOUS at 09:39

## 2025-08-24 RX ADMIN — TAMSULOSIN HYDROCHLORIDE 0.4 MG: 0.4 CAPSULE ORAL at 09:39

## 2025-08-24 RX ADMIN — ATORVASTATIN CALCIUM 10 MG: 10 TABLET, FILM COATED ORAL at 20:02

## 2025-08-24 RX ADMIN — MORPHINE SULFATE 2 MG: 2 INJECTION, SOLUTION INTRAMUSCULAR; INTRAVENOUS at 17:15

## 2025-08-24 RX ADMIN — DILTIAZEM HYDROCHLORIDE 120 MG: 120 CAPSULE, EXTENDED RELEASE ORAL at 09:39

## 2025-08-24 RX ADMIN — IPRATROPIUM BROMIDE 0.5 MG: 0.5 SOLUTION RESPIRATORY (INHALATION) at 15:25

## 2025-08-24 RX ADMIN — METOPROLOL SUCCINATE 75 MG: 50 TABLET, EXTENDED RELEASE ORAL at 09:39

## 2025-08-24 RX ADMIN — IPRATROPIUM BROMIDE 0.5 MG: 0.5 SOLUTION RESPIRATORY (INHALATION) at 20:17

## 2025-08-24 RX ADMIN — MELOXICAM 7.5 MG: 7.5 TABLET ORAL at 09:47

## 2025-08-24 RX ADMIN — IPRATROPIUM BROMIDE 0.5 MG: 0.5 SOLUTION RESPIRATORY (INHALATION) at 09:40

## 2025-08-24 RX ADMIN — OXYCODONE AND ACETAMINOPHEN 1 TABLET: 7.5; 325 TABLET ORAL at 20:01

## 2025-08-24 RX ADMIN — ASPIRIN 81 MG: 81 TABLET, COATED ORAL at 09:39

## 2025-08-24 RX ADMIN — LISINOPRIL 10 MG: 5 TABLET ORAL at 09:39

## 2025-08-24 RX ADMIN — BUDESONIDE 250 MCG: 0.25 INHALANT RESPIRATORY (INHALATION) at 20:17

## 2025-08-24 RX ADMIN — BUDESONIDE 250 MCG: 0.25 INHALANT RESPIRATORY (INHALATION) at 09:45

## 2025-08-24 ASSESSMENT — PAIN - FUNCTIONAL ASSESSMENT
PAIN_FUNCTIONAL_ASSESSMENT: ACTIVITIES ARE NOT PREVENTED
PAIN_FUNCTIONAL_ASSESSMENT: 0-10

## 2025-08-24 ASSESSMENT — PAIN DESCRIPTION - DESCRIPTORS
DESCRIPTORS: ACHING;THROBBING
DESCRIPTORS: ACHING
DESCRIPTORS: ACHING

## 2025-08-24 ASSESSMENT — PAIN DESCRIPTION - ONSET: ONSET: GRADUAL

## 2025-08-24 ASSESSMENT — PAIN DESCRIPTION - ORIENTATION
ORIENTATION: LEFT

## 2025-08-24 ASSESSMENT — PAIN DESCRIPTION - LOCATION
LOCATION: LEG

## 2025-08-24 ASSESSMENT — PAIN SCALES - GENERAL
PAINLEVEL_OUTOF10: 3
PAINLEVEL_OUTOF10: 3
PAINLEVEL_OUTOF10: 0
PAINLEVEL_OUTOF10: 7
PAINLEVEL_OUTOF10: 0
PAINLEVEL_OUTOF10: 7
PAINLEVEL_OUTOF10: 4
PAINLEVEL_OUTOF10: 0

## 2025-08-24 ASSESSMENT — PAIN DESCRIPTION - PAIN TYPE: TYPE: SURGICAL PAIN

## 2025-08-24 ASSESSMENT — PAIN DESCRIPTION - FREQUENCY: FREQUENCY: INTERMITTENT

## 2025-08-25 PROCEDURE — 94761 N-INVAS EAR/PLS OXIMETRY MLT: CPT

## 2025-08-25 PROCEDURE — 6370000000 HC RX 637 (ALT 250 FOR IP): Performed by: SURGERY

## 2025-08-25 PROCEDURE — 6370000000 HC RX 637 (ALT 250 FOR IP): Performed by: NURSE PRACTITIONER

## 2025-08-25 PROCEDURE — 1100000000 HC RM PRIVATE

## 2025-08-25 PROCEDURE — 94640 AIRWAY INHALATION TREATMENT: CPT

## 2025-08-25 PROCEDURE — 2700000000 HC OXYGEN THERAPY PER DAY

## 2025-08-25 PROCEDURE — 6360000002 HC RX W HCPCS: Performed by: SURGERY

## 2025-08-25 RX ORDER — CITALOPRAM HYDROBROMIDE 20 MG/1
10 TABLET ORAL DAILY
Status: DISCONTINUED | OUTPATIENT
Start: 2025-08-25 | End: 2025-08-28 | Stop reason: HOSPADM

## 2025-08-25 RX ORDER — ACETAMINOPHEN 500 MG
1000 TABLET ORAL EVERY 8 HOURS SCHEDULED
Status: DISCONTINUED | OUTPATIENT
Start: 2025-08-25 | End: 2025-08-28 | Stop reason: HOSPADM

## 2025-08-25 RX ORDER — PANTOPRAZOLE SODIUM 40 MG/1
40 TABLET, DELAYED RELEASE ORAL
Status: DISCONTINUED | OUTPATIENT
Start: 2025-08-26 | End: 2025-08-28 | Stop reason: HOSPADM

## 2025-08-25 RX ADMIN — BUDESONIDE 250 MCG: 0.25 INHALANT RESPIRATORY (INHALATION) at 21:30

## 2025-08-25 RX ADMIN — ACETAMINOPHEN 1000 MG: 500 TABLET ORAL at 22:25

## 2025-08-25 RX ADMIN — MELOXICAM 7.5 MG: 7.5 TABLET ORAL at 09:19

## 2025-08-25 RX ADMIN — TAMSULOSIN HYDROCHLORIDE 0.4 MG: 0.4 CAPSULE ORAL at 09:20

## 2025-08-25 RX ADMIN — LISINOPRIL 10 MG: 5 TABLET ORAL at 09:20

## 2025-08-25 RX ADMIN — OXYCODONE AND ACETAMINOPHEN 1 TABLET: 7.5; 325 TABLET ORAL at 09:20

## 2025-08-25 RX ADMIN — IPRATROPIUM BROMIDE 0.5 MG: 0.5 SOLUTION RESPIRATORY (INHALATION) at 21:25

## 2025-08-25 RX ADMIN — IPRATROPIUM BROMIDE 0.5 MG: 0.5 SOLUTION RESPIRATORY (INHALATION) at 13:21

## 2025-08-25 RX ADMIN — ARFORMOTEROL TARTRATE 15 MCG: 15 SOLUTION RESPIRATORY (INHALATION) at 08:20

## 2025-08-25 RX ADMIN — ARFORMOTEROL TARTRATE 15 MCG: 15 SOLUTION RESPIRATORY (INHALATION) at 21:30

## 2025-08-25 RX ADMIN — IPRATROPIUM BROMIDE 0.5 MG: 0.5 SOLUTION RESPIRATORY (INHALATION) at 08:15

## 2025-08-25 RX ADMIN — OXYCODONE AND ACETAMINOPHEN 1 TABLET: 7.5; 325 TABLET ORAL at 13:29

## 2025-08-25 RX ADMIN — CITALOPRAM HYDROBROMIDE 10 MG: 20 TABLET ORAL at 13:29

## 2025-08-25 RX ADMIN — ATORVASTATIN CALCIUM 10 MG: 10 TABLET, FILM COATED ORAL at 22:25

## 2025-08-25 RX ADMIN — METOPROLOL SUCCINATE 75 MG: 50 TABLET, EXTENDED RELEASE ORAL at 09:20

## 2025-08-25 RX ADMIN — OXYCODONE AND ACETAMINOPHEN 1 TABLET: 7.5; 325 TABLET ORAL at 22:23

## 2025-08-25 RX ADMIN — ACETAMINOPHEN 1000 MG: 500 TABLET ORAL at 13:29

## 2025-08-25 RX ADMIN — ASPIRIN 81 MG: 81 TABLET, COATED ORAL at 09:19

## 2025-08-25 RX ADMIN — BUDESONIDE 250 MCG: 0.25 INHALANT RESPIRATORY (INHALATION) at 08:20

## 2025-08-25 RX ADMIN — DILTIAZEM HYDROCHLORIDE 120 MG: 120 CAPSULE, EXTENDED RELEASE ORAL at 09:19

## 2025-08-25 RX ADMIN — OXYCODONE AND ACETAMINOPHEN 1 TABLET: 7.5; 325 TABLET ORAL at 04:35

## 2025-08-25 ASSESSMENT — PAIN DESCRIPTION - LOCATION
LOCATION: LEG

## 2025-08-25 ASSESSMENT — PAIN SCALES - GENERAL
PAINLEVEL_OUTOF10: 4
PAINLEVEL_OUTOF10: 4
PAINLEVEL_OUTOF10: 0
PAINLEVEL_OUTOF10: 2
PAINLEVEL_OUTOF10: 3
PAINLEVEL_OUTOF10: 0
PAINLEVEL_OUTOF10: 4

## 2025-08-25 ASSESSMENT — PAIN DESCRIPTION - DESCRIPTORS
DESCRIPTORS: ACHING
DESCRIPTORS: THROBBING
DESCRIPTORS: ACHING;STABBING
DESCRIPTORS: THROBBING

## 2025-08-25 ASSESSMENT — PAIN - FUNCTIONAL ASSESSMENT
PAIN_FUNCTIONAL_ASSESSMENT: ACTIVITIES ARE NOT PREVENTED
PAIN_FUNCTIONAL_ASSESSMENT: 0-10
PAIN_FUNCTIONAL_ASSESSMENT: ACTIVITIES ARE NOT PREVENTED

## 2025-08-25 ASSESSMENT — PAIN DESCRIPTION - PAIN TYPE: TYPE: SURGICAL PAIN

## 2025-08-25 ASSESSMENT — PAIN DESCRIPTION - ONSET: ONSET: GRADUAL

## 2025-08-25 ASSESSMENT — PAIN DESCRIPTION - ORIENTATION
ORIENTATION: LEFT

## 2025-08-25 ASSESSMENT — PAIN DESCRIPTION - FREQUENCY: FREQUENCY: INTERMITTENT

## 2025-08-26 PROCEDURE — 2700000000 HC OXYGEN THERAPY PER DAY

## 2025-08-26 PROCEDURE — 94761 N-INVAS EAR/PLS OXIMETRY MLT: CPT

## 2025-08-26 PROCEDURE — 6370000000 HC RX 637 (ALT 250 FOR IP): Performed by: SURGERY

## 2025-08-26 PROCEDURE — 6370000000 HC RX 637 (ALT 250 FOR IP): Performed by: NURSE PRACTITIONER

## 2025-08-26 PROCEDURE — 97535 SELF CARE MNGMENT TRAINING: CPT

## 2025-08-26 PROCEDURE — 97161 PT EVAL LOW COMPLEX 20 MIN: CPT | Performed by: PHYSICAL THERAPIST

## 2025-08-26 PROCEDURE — 97530 THERAPEUTIC ACTIVITIES: CPT | Performed by: PHYSICAL THERAPIST

## 2025-08-26 PROCEDURE — 6360000002 HC RX W HCPCS: Performed by: SURGERY

## 2025-08-26 PROCEDURE — 97165 OT EVAL LOW COMPLEX 30 MIN: CPT

## 2025-08-26 PROCEDURE — 1100000000 HC RM PRIVATE

## 2025-08-26 PROCEDURE — 94640 AIRWAY INHALATION TREATMENT: CPT

## 2025-08-26 RX ORDER — CLOPIDOGREL BISULFATE 75 MG/1
75 TABLET ORAL DAILY
Status: DISCONTINUED | OUTPATIENT
Start: 2025-08-27 | End: 2025-08-28 | Stop reason: HOSPADM

## 2025-08-26 RX ADMIN — ARFORMOTEROL TARTRATE 15 MCG: 15 SOLUTION RESPIRATORY (INHALATION) at 20:40

## 2025-08-26 RX ADMIN — METOPROLOL SUCCINATE 75 MG: 50 TABLET, EXTENDED RELEASE ORAL at 09:05

## 2025-08-26 RX ADMIN — PANTOPRAZOLE SODIUM 40 MG: 40 TABLET, DELAYED RELEASE ORAL at 06:33

## 2025-08-26 RX ADMIN — IPRATROPIUM BROMIDE 0.5 MG: 0.5 SOLUTION RESPIRATORY (INHALATION) at 14:39

## 2025-08-26 RX ADMIN — TAMSULOSIN HYDROCHLORIDE 0.4 MG: 0.4 CAPSULE ORAL at 09:05

## 2025-08-26 RX ADMIN — DOCUSATE SODIUM 100 MG: 100 CAPSULE, LIQUID FILLED ORAL at 09:17

## 2025-08-26 RX ADMIN — OXYCODONE AND ACETAMINOPHEN 1 TABLET: 7.5; 325 TABLET ORAL at 04:57

## 2025-08-26 RX ADMIN — ACETAMINOPHEN 1000 MG: 500 TABLET ORAL at 21:39

## 2025-08-26 RX ADMIN — BUDESONIDE 250 MCG: 0.25 INHALANT RESPIRATORY (INHALATION) at 20:40

## 2025-08-26 RX ADMIN — ATORVASTATIN CALCIUM 10 MG: 10 TABLET, FILM COATED ORAL at 21:40

## 2025-08-26 RX ADMIN — ARFORMOTEROL TARTRATE 15 MCG: 15 SOLUTION RESPIRATORY (INHALATION) at 08:58

## 2025-08-26 RX ADMIN — ACETAMINOPHEN 1000 MG: 500 TABLET ORAL at 17:02

## 2025-08-26 RX ADMIN — ASPIRIN 81 MG: 81 TABLET, COATED ORAL at 09:05

## 2025-08-26 RX ADMIN — CITALOPRAM HYDROBROMIDE 10 MG: 20 TABLET ORAL at 09:05

## 2025-08-26 RX ADMIN — MELOXICAM 7.5 MG: 7.5 TABLET ORAL at 09:05

## 2025-08-26 RX ADMIN — DILTIAZEM HYDROCHLORIDE 120 MG: 120 CAPSULE, EXTENDED RELEASE ORAL at 09:05

## 2025-08-26 RX ADMIN — LISINOPRIL 10 MG: 5 TABLET ORAL at 09:05

## 2025-08-26 RX ADMIN — IPRATROPIUM BROMIDE 0.5 MG: 0.5 SOLUTION RESPIRATORY (INHALATION) at 08:53

## 2025-08-26 RX ADMIN — IPRATROPIUM BROMIDE 0.5 MG: 0.5 SOLUTION RESPIRATORY (INHALATION) at 20:35

## 2025-08-26 RX ADMIN — ACETAMINOPHEN 1000 MG: 500 TABLET ORAL at 06:32

## 2025-08-26 RX ADMIN — BUDESONIDE 250 MCG: 0.25 INHALANT RESPIRATORY (INHALATION) at 08:58

## 2025-08-26 ASSESSMENT — PAIN - FUNCTIONAL ASSESSMENT
PAIN_FUNCTIONAL_ASSESSMENT: 0-10
PAIN_FUNCTIONAL_ASSESSMENT: ACTIVITIES ARE NOT PREVENTED
PAIN_FUNCTIONAL_ASSESSMENT: 0-10
PAIN_FUNCTIONAL_ASSESSMENT: 0-10
PAIN_FUNCTIONAL_ASSESSMENT: ACTIVITIES ARE NOT PREVENTED
PAIN_FUNCTIONAL_ASSESSMENT: ACTIVITIES ARE NOT PREVENTED

## 2025-08-26 ASSESSMENT — PAIN DESCRIPTION - DESCRIPTORS
DESCRIPTORS: ACHING
DESCRIPTORS: ACHING
DESCRIPTORS: ACHING;DULL

## 2025-08-26 ASSESSMENT — PAIN DESCRIPTION - ORIENTATION
ORIENTATION: LEFT

## 2025-08-26 ASSESSMENT — PAIN SCALES - GENERAL
PAINLEVEL_OUTOF10: 3
PAINLEVEL_OUTOF10: 0
PAINLEVEL_OUTOF10: 3

## 2025-08-26 ASSESSMENT — PAIN DESCRIPTION - LOCATION
LOCATION: LEG

## 2025-08-27 PROCEDURE — 2700000000 HC OXYGEN THERAPY PER DAY

## 2025-08-27 PROCEDURE — 6370000000 HC RX 637 (ALT 250 FOR IP): Performed by: SURGERY

## 2025-08-27 PROCEDURE — 94640 AIRWAY INHALATION TREATMENT: CPT

## 2025-08-27 PROCEDURE — 97535 SELF CARE MNGMENT TRAINING: CPT

## 2025-08-27 PROCEDURE — 97116 GAIT TRAINING THERAPY: CPT

## 2025-08-27 PROCEDURE — 1100000000 HC RM PRIVATE

## 2025-08-27 PROCEDURE — 6360000002 HC RX W HCPCS: Performed by: SURGERY

## 2025-08-27 PROCEDURE — 94761 N-INVAS EAR/PLS OXIMETRY MLT: CPT

## 2025-08-27 PROCEDURE — 6370000000 HC RX 637 (ALT 250 FOR IP): Performed by: NURSE PRACTITIONER

## 2025-08-27 PROCEDURE — 97530 THERAPEUTIC ACTIVITIES: CPT

## 2025-08-27 RX ADMIN — OXYCODONE AND ACETAMINOPHEN 1 TABLET: 7.5; 325 TABLET ORAL at 21:17

## 2025-08-27 RX ADMIN — IPRATROPIUM BROMIDE 0.5 MG: 0.5 SOLUTION RESPIRATORY (INHALATION) at 08:31

## 2025-08-27 RX ADMIN — ACETAMINOPHEN 1000 MG: 500 TABLET ORAL at 14:10

## 2025-08-27 RX ADMIN — LISINOPRIL 10 MG: 5 TABLET ORAL at 09:40

## 2025-08-27 RX ADMIN — APIXABAN 5 MG: 5 TABLET, FILM COATED ORAL at 09:40

## 2025-08-27 RX ADMIN — METOPROLOL SUCCINATE 75 MG: 50 TABLET, EXTENDED RELEASE ORAL at 09:39

## 2025-08-27 RX ADMIN — APIXABAN 5 MG: 5 TABLET, FILM COATED ORAL at 21:18

## 2025-08-27 RX ADMIN — TAMSULOSIN HYDROCHLORIDE 0.4 MG: 0.4 CAPSULE ORAL at 09:40

## 2025-08-27 RX ADMIN — CLOPIDOGREL BISULFATE 75 MG: 75 TABLET, FILM COATED ORAL at 09:40

## 2025-08-27 RX ADMIN — CITALOPRAM HYDROBROMIDE 10 MG: 20 TABLET ORAL at 09:40

## 2025-08-27 RX ADMIN — BUDESONIDE 250 MCG: 0.25 INHALANT RESPIRATORY (INHALATION) at 20:57

## 2025-08-27 RX ADMIN — IPRATROPIUM BROMIDE 0.5 MG: 0.5 SOLUTION RESPIRATORY (INHALATION) at 20:52

## 2025-08-27 RX ADMIN — ARFORMOTEROL TARTRATE 15 MCG: 15 SOLUTION RESPIRATORY (INHALATION) at 08:36

## 2025-08-27 RX ADMIN — MELOXICAM 7.5 MG: 7.5 TABLET ORAL at 09:40

## 2025-08-27 RX ADMIN — IPRATROPIUM BROMIDE 0.5 MG: 0.5 SOLUTION RESPIRATORY (INHALATION) at 16:00

## 2025-08-27 RX ADMIN — PANTOPRAZOLE SODIUM 40 MG: 40 TABLET, DELAYED RELEASE ORAL at 06:15

## 2025-08-27 RX ADMIN — ATORVASTATIN CALCIUM 10 MG: 10 TABLET, FILM COATED ORAL at 21:18

## 2025-08-27 RX ADMIN — ARFORMOTEROL TARTRATE 15 MCG: 15 SOLUTION RESPIRATORY (INHALATION) at 20:57

## 2025-08-27 RX ADMIN — ACETAMINOPHEN 1000 MG: 500 TABLET ORAL at 06:14

## 2025-08-27 RX ADMIN — BUDESONIDE 250 MCG: 0.25 INHALANT RESPIRATORY (INHALATION) at 08:36

## 2025-08-27 RX ADMIN — ASPIRIN 81 MG: 81 TABLET, COATED ORAL at 09:39

## 2025-08-27 RX ADMIN — DILTIAZEM HYDROCHLORIDE 120 MG: 120 CAPSULE, EXTENDED RELEASE ORAL at 09:40

## 2025-08-27 ASSESSMENT — PAIN - FUNCTIONAL ASSESSMENT
PAIN_FUNCTIONAL_ASSESSMENT: 0-10
PAIN_FUNCTIONAL_ASSESSMENT: ACTIVITIES ARE NOT PREVENTED
PAIN_FUNCTIONAL_ASSESSMENT: 0-10

## 2025-08-27 ASSESSMENT — PAIN DESCRIPTION - LOCATION
LOCATION: LEG

## 2025-08-27 ASSESSMENT — PAIN DESCRIPTION - PAIN TYPE
TYPE: CHRONIC PAIN
TYPE: CHRONIC PAIN

## 2025-08-27 ASSESSMENT — PAIN SCALES - GENERAL
PAINLEVEL_OUTOF10: 5
PAINLEVEL_OUTOF10: 4
PAINLEVEL_OUTOF10: 3
PAINLEVEL_OUTOF10: 3
PAINLEVEL_OUTOF10: 2

## 2025-08-27 ASSESSMENT — PAIN DESCRIPTION - FREQUENCY
FREQUENCY: CONTINUOUS
FREQUENCY: CONTINUOUS

## 2025-08-27 ASSESSMENT — PAIN DESCRIPTION - ORIENTATION
ORIENTATION: LEFT

## 2025-08-27 ASSESSMENT — PAIN DESCRIPTION - ONSET: ONSET: ON-GOING

## 2025-08-27 ASSESSMENT — PAIN DESCRIPTION - DESCRIPTORS
DESCRIPTORS: ACHING
DESCRIPTORS: SORE
DESCRIPTORS: ACHING

## 2025-08-28 VITALS
DIASTOLIC BLOOD PRESSURE: 66 MMHG | WEIGHT: 136.91 LBS | OXYGEN SATURATION: 92 % | HEIGHT: 70 IN | BODY MASS INDEX: 19.6 KG/M2 | RESPIRATION RATE: 16 BRPM | HEART RATE: 72 BPM | SYSTOLIC BLOOD PRESSURE: 103 MMHG | TEMPERATURE: 98.1 F

## 2025-08-28 PROCEDURE — 6370000000 HC RX 637 (ALT 250 FOR IP): Performed by: SURGERY

## 2025-08-28 PROCEDURE — 2700000000 HC OXYGEN THERAPY PER DAY

## 2025-08-28 PROCEDURE — 6360000002 HC RX W HCPCS: Performed by: SURGERY

## 2025-08-28 PROCEDURE — 6370000000 HC RX 637 (ALT 250 FOR IP): Performed by: NURSE PRACTITIONER

## 2025-08-28 PROCEDURE — 94761 N-INVAS EAR/PLS OXIMETRY MLT: CPT

## 2025-08-28 PROCEDURE — 94640 AIRWAY INHALATION TREATMENT: CPT

## 2025-08-28 RX ORDER — OXYCODONE AND ACETAMINOPHEN 7.5; 325 MG/1; MG/1
1 TABLET ORAL EVERY 6 HOURS PRN
Qty: 20 TABLET | Refills: 0 | Status: SHIPPED | OUTPATIENT
Start: 2025-08-28 | End: 2025-09-02

## 2025-08-28 RX ORDER — METOPROLOL SUCCINATE 25 MG/1
25 TABLET, EXTENDED RELEASE ORAL DAILY
Qty: 30 TABLET | Refills: 3 | Status: SHIPPED | OUTPATIENT
Start: 2025-08-28

## 2025-08-28 RX ORDER — OXYCODONE AND ACETAMINOPHEN 7.5; 325 MG/1; MG/1
1 TABLET ORAL EVERY 6 HOURS PRN
Qty: 20 TABLET | Refills: 0 | Status: SHIPPED | OUTPATIENT
Start: 2025-08-28 | End: 2025-08-28

## 2025-08-28 RX ADMIN — CITALOPRAM HYDROBROMIDE 10 MG: 20 TABLET ORAL at 09:56

## 2025-08-28 RX ADMIN — TAMSULOSIN HYDROCHLORIDE 0.4 MG: 0.4 CAPSULE ORAL at 09:56

## 2025-08-28 RX ADMIN — ARFORMOTEROL TARTRATE 15 MCG: 15 SOLUTION RESPIRATORY (INHALATION) at 08:07

## 2025-08-28 RX ADMIN — ASPIRIN 81 MG: 81 TABLET, COATED ORAL at 09:57

## 2025-08-28 RX ADMIN — APIXABAN 5 MG: 5 TABLET, FILM COATED ORAL at 09:55

## 2025-08-28 RX ADMIN — MELOXICAM 7.5 MG: 7.5 TABLET ORAL at 09:56

## 2025-08-28 RX ADMIN — CLOPIDOGREL BISULFATE 75 MG: 75 TABLET, FILM COATED ORAL at 09:57

## 2025-08-28 RX ADMIN — IPRATROPIUM BROMIDE 0.5 MG: 0.5 SOLUTION RESPIRATORY (INHALATION) at 08:03

## 2025-08-28 RX ADMIN — BUDESONIDE 250 MCG: 0.25 INHALANT RESPIRATORY (INHALATION) at 08:07

## 2025-08-28 RX ADMIN — IPRATROPIUM BROMIDE 0.5 MG: 0.5 SOLUTION RESPIRATORY (INHALATION) at 14:25

## 2025-08-28 RX ADMIN — PANTOPRAZOLE SODIUM 40 MG: 40 TABLET, DELAYED RELEASE ORAL at 06:35

## 2025-08-28 RX ADMIN — ACETAMINOPHEN 1000 MG: 500 TABLET ORAL at 06:35

## 2025-08-28 ASSESSMENT — PAIN SCALES - GENERAL
PAINLEVEL_OUTOF10: 3
PAINLEVEL_OUTOF10: 4

## 2025-08-28 ASSESSMENT — PAIN DESCRIPTION - LOCATION: LOCATION: LEG

## 2025-08-28 ASSESSMENT — PAIN - FUNCTIONAL ASSESSMENT: PAIN_FUNCTIONAL_ASSESSMENT: 0-10

## (undated) DEVICE — SWAB CULT LIQ STUART AGR AERB MOD IN BRK SGL RAYON TIP PLAS

## (undated) DEVICE — GLOVE SURG SZ 8 CRM LTX FREE POLYISOPRENE POLYMER BEAD ANTI

## (undated) DEVICE — PTA BALLOON DILATATION CATHETER: Brand: COYOTE™

## (undated) DEVICE — SPONGE GZ W4XL4IN COT 12 PLY TYP VII WVN C FLD DSGN STERILE

## (undated) DEVICE — Z DISCONTINUED USE 2132446 CONTAINER SPEC ANAERB VACTNR

## (undated) DEVICE — SYRINGE MEDICAL 3ML CLEAR PLASTIC STANDARD NON CONTROL LUERLOCK TIP DISPOSABLE

## (undated) DEVICE — 3M™ MEDIPORE™ H SOFT CLOTH SURGICAL TAPE 2864, 4 INCH X 10 YARD (10CM X 9,14M), 12 ROLLS/CASE: Brand: 3M™ MEDIPORE™

## (undated) DEVICE — GLOVE ORANGE PI 7   MSG9070

## (undated) DEVICE — GOWN SURG 2XL L49IN BLU NONREINFORCED SET IN SL HK LOOP

## (undated) DEVICE — GOWN,SIRUS,NONRNF,SETINSLV,2XL,18/CS: Brand: MEDLINE

## (undated) DEVICE — OR HYBRID-MRMC: Brand: MEDLINE INDUSTRIES, INC.

## (undated) DEVICE — BLADE CLIPPER GEN PURP NS

## (undated) DEVICE — 3M™ TEGADERM™ TRANSPARENT FILM DRESSING FRAME STYLE, 1626W, 4 IN X 4-3/4 IN (10 CM X 12 CM), 50/CT 4CT/CASE: Brand: 3M™ TEGADERM™

## (undated) DEVICE — SUTURE VICRYL SZ 2-0 L27IN ABSRB UD L26MM SH 1/2 CIR J417H

## (undated) DEVICE — Device

## (undated) DEVICE — GUIDEWIRE WITH ICE™ HYDROPHILIC COATING: Brand: V-18™ CONTROL WIRE™

## (undated) DEVICE — STRIP SKIN CLSR W0.25XL4IN WHT SPUNBOUND FBR NYL HI ADH

## (undated) DEVICE — AV FISTULA - MRMC: Brand: MEDLINE INDUSTRIES, INC.

## (undated) DEVICE — PAD ABD W5XL9IN GEN USE NONADHESIVE EXTRA ABSRB SFT

## (undated) DEVICE — SUTURE NONABSORBABLE MONOFILAMENT 4-0 PS-2 18 IN BLK ETHILON 1667G

## (undated) DEVICE — DRESSING STERILE PETRO W3XL8IN N ADH OIL EMUL GZ CURAD

## (undated) DEVICE — PTA BALLOON DILATATION CATHETER: Brand: MUSTANG™

## (undated) DEVICE — SUTURE PROL SZ 7-0 L18IN NONABSORBABLE BLU L9.3MM BV-1 3/8 8701H

## (undated) DEVICE — SUTURE PROL SZ 6-0 L24IN NONABSORBABLE BLU L9.3MM BV-1 3/8 8805H

## (undated) DEVICE — SUTURE VICRYL + SZ 3-0 L27IN ABSRB UD L26MM SH 1/2 CIR VCP416H

## (undated) DEVICE — Device: Brand: GRAND SLAM

## (undated) DEVICE — SOLUTION IV 500ML 0.9% SOD CHL PH 5 INJ USP VIAFLX PLAS

## (undated) DEVICE — INTRODUCER SHTH 5FR CANN L11CM DIL TIP 25MM GRY TUNGSTEN

## (undated) DEVICE — SUTURE VICRYL SZ 2-0 L36IN ABSRB UD L36MM CT-1 1/2 CIR J945H

## (undated) DEVICE — EXTREMITY-MRMC: Brand: MEDLINE INDUSTRIES, INC.

## (undated) DEVICE — BLADE SAW W25XL70MM THK0.89MM CUT THK0.94MM S STL TOOTH

## (undated) DEVICE — SYRINGE ANGIO CNTRST DEL 20 CC POLYCARB LIGHT GRN MEDALLION

## (undated) DEVICE — INFLATION DEVICE: Brand: ENCORE™ 26

## (undated) DEVICE — SUTURE ETHILON SZ 3-0 L18IN NONABSORBABLE BLK L24MM PS-1 3/8 1663G

## (undated) DEVICE — SOLUTION IRRIG 1000ML 09% SOD CHL USP PIC PLAS CONTAINER

## (undated) DEVICE — BANDAGE,ELASTIC,ESMARK,STERILE,4"X9',LF: Brand: MEDLINE

## (undated) DEVICE — DEVICE VASC CLSR MYNX CONTROL 6FR 7FR 10ML LOK SYR W BLLN CATHETER INTEGR (ORDER MUTLIPLES OF 10 EACH)

## (undated) DEVICE — BANDAGE,GAUZE,BULKEE II,4.5"X4.1YD,STRL: Brand: MEDLINE

## (undated) DEVICE — INTRODUCER SHTH PED DIA5FR CANN L5.5CM 0.035IN PERIPH W/

## (undated) DEVICE — 450 ML BOTTLE OF 0.05% CHLORHEXIDINE GLUCONATE IN 99.95% STERILE WATER FOR IRRIGATION, USP AND APPLICATOR.: Brand: IRRISEPT ANTIMICROBIAL WOUND LAVAGE

## (undated) DEVICE — SUTURE PERMAHAND SZ 3-0 L30IN NONABSORBABLE BLK SILK BRAID A304H

## (undated) DEVICE — CONTAINER SPEC ANAERB VACTNR

## (undated) DEVICE — 2108 SERIES SAGITTAL BLADE (24.8 X 0.88 X 73.8MM)

## (undated) DEVICE — RING BASIN GUIDEWIRE BOWL: Brand: MEDLINE INDUSTRIES, INC.

## (undated) DEVICE — SOLUTION IV 1000ML 0.9% SOD CHL PH 5 INJ USP VIAFLX PLAS

## (undated) DEVICE — SOLUTION IV 1000 ML 0.9 NACL INJ USP EXCEL PLAS CONTAINER

## (undated) DEVICE — CATHETER GUID OTW 0.035 IN 6 FRX135 CM 5 FR TRAILBLAZER

## (undated) DEVICE — DRAPE,REIN 53X77,STERILE: Brand: MEDLINE

## (undated) DEVICE — MICROPUNCTURE INTRODUCER SET SILHOUETTE TRANSITIONLESS WITH STAINLESS STEEL WIRE GUIDE: Brand: MICROPUNCTURE

## (undated) DEVICE — EVACUATOR SMOKE L 10 FT SMOKE MANAGEMENT EXTENDED EDGE ELECTRODE STERILE DISP

## (undated) DEVICE — DESTINATION RENAL GUIDING SHEATH: Brand: DESTINATION

## (undated) DEVICE — FOGARTY ARTERIAL EMBOLECTOMY CATHETER 3F 80CM: Brand: FOGARTY

## (undated) DEVICE — BAG WST COLLCTN BIOHAZARD 38X38 IN DRWSTRNG RED

## (undated) DEVICE — AGENT HEMSTAT W4XL4IN OXIDIZED REGENERATED CELOS ABSRB SFT

## (undated) DEVICE — SOLUTION IRRIG 1000ML 0.9% SOD CHL USP POUR PLAS BTL

## (undated) DEVICE — 1LYRTR 16FR10ML100%SIL UMS SNP: Brand: MEDLINE INDUSTRIES, INC.

## (undated) DEVICE — RADIFOCUS GLIDEWIRE: Brand: GLIDEWIRE

## (undated) DEVICE — SUTURE PROL SZ 5-0 L24IN NONABSORBABLE BLU RB-2 L13IN 1/2 8554H

## (undated) DEVICE — FOGARTY ARTERIAL EMBOLECTOMY CATHETER 4F 80CM: Brand: FOGARTY

## (undated) DEVICE — SUTURE PERMA-HAND SZ 2-0 L30IN NONABSORBABLE BLK L26MM SH K833H

## (undated) DEVICE — SPONGE LAP W18XL18IN WHT COT 4 PLY FLD STRUNG RADPQ DISP ST 2 PER PACK

## (undated) DEVICE — COVER TBL W79XL90IN HVY DUTY REINF FAN FLD DISPOSABLE

## (undated) DEVICE — SPONGE GZ KERLIX W4.5INXL4.1YD COT 6 PLY OPN WV STRETCHABLE

## (undated) DEVICE — SUTURE CHROMIC GUT SZ 0 L54IN ABSRB BRN LIGAPAK DISPNS REEL L114G

## (undated) DEVICE — LOOP,VESSEL,MAXI,BLUE,2/PK,STERILE: Brand: MEDLINE

## (undated) DEVICE — PROBE VASC 8MHZ WTRPRF

## (undated) DEVICE — STOCKINETTE ORTH W12XL48IN COT 2 PLY HLLW FOR HANDLING LMB

## (undated) DEVICE — BANDAGE TBLR ELASTIC 6 INX25 YD CHST ABD SEPRO NET

## (undated) DEVICE — APPLICATOR MEDICATED 10.5 CC SOLUTION HI LT ORNG CHLORAPREP

## (undated) DEVICE — SOLUTION IV 500 ML 0.9 NACL INJ EXCEL CONTAINER USP LF

## (undated) DEVICE — GAUZE,PACKING STRIP,IODOFORM,1/2"X5YD,ST: Brand: CURAD

## (undated) DEVICE — CATHETER DIAG 5FR L65CM ID0.046IN GWIRE 0.035IN IMPRESS RIM

## (undated) DEVICE — CATHETER ANGIO 5FR L100CM GWIRE 0.038IN BERN NONBRAIDED HI

## (undated) DEVICE — SUTURE ETHILON SZ 2-0 L30IN NONABSORBABLE BLK L36MM PSLX 3/8 1697H

## (undated) DEVICE — DESTINATION PERIPHERAL GUIDING SHEATH: Brand: DESTINATION

## (undated) DEVICE — TRAILBLAZER 6FR L150CM 0.035IN 50MM SPC HYDRPHLC OTW

## (undated) DEVICE — BLADE,CARBON-STEEL,15,STRL,DISPOSABLE,TB: Brand: MEDLINE

## (undated) DEVICE — SYRINGE TB 1ML TRNSLUC BRL WHT PLUNG BLK MRK CONVENTIONAL

## (undated) DEVICE — STAPLER SKIN H3.9MM WIRE DIA0.58MM CRWN 6.9MM 35 STPL ROT

## (undated) DEVICE — 3M™ IOBAN™ 2 ANTIMICROBIAL INCISE DRAPE 6650EZ: Brand: IOBAN™ 2

## (undated) DEVICE — BANDAGE COBAN 6 IN WND 6INX5YD FOAM

## (undated) DEVICE — TAPE SURG W4INXL10YD SFT CLTH H2O RESIST MEDIPORE H

## (undated) DEVICE — PROVE COVER: Brand: UNBRANDED

## (undated) DEVICE — SHEATH 7FR 11CM BRITETIP

## (undated) DEVICE — LOOP VES W13MM THK09MM MINI RED SIL FLD REPELLENT

## (undated) DEVICE — GAUZE,PACKING STRIP,PLAIN,1/2"X5YD,STRL: Brand: CURAD

## (undated) DEVICE — C-ARM: Brand: UNBRANDED

## (undated) DEVICE — SHEATH 6FR 11CM BRITETIP

## (undated) DEVICE — 1.5MM HYDRO LEMAITRE VALVULOTOME (98 CM): Brand: HYDRO LEMAITRE VALVULOTOME

## (undated) DEVICE — SYSTEM WND DEBRIDEMENT AND CLEANSING IRRISEPT

## (undated) DEVICE — APPLICATOR MEDICATED 26 CC SOLUTION HI LT ORNG CHLORAPREP

## (undated) DEVICE — VASCULAR-MRMC: Brand: MEDLINE INDUSTRIES, INC.

## (undated) DEVICE — SHEET,DRAPE,UNDERBUTTOCK,GRAD POUCH,PORT: Brand: MEDLINE